# Patient Record
Sex: FEMALE | Race: WHITE | NOT HISPANIC OR LATINO | Employment: OTHER | URBAN - NONMETROPOLITAN AREA
[De-identification: names, ages, dates, MRNs, and addresses within clinical notes are randomized per-mention and may not be internally consistent; named-entity substitution may affect disease eponyms.]

---

## 2017-01-12 ENCOUNTER — OFFICE VISIT (OUTPATIENT)
Dept: GASTROENTEROLOGY | Facility: CLINIC | Age: 82
End: 2017-01-12

## 2017-01-12 VITALS
RESPIRATION RATE: 16 BRPM | BODY MASS INDEX: 28.32 KG/M2 | HEIGHT: 69 IN | WEIGHT: 191.2 LBS | HEART RATE: 72 BPM | SYSTOLIC BLOOD PRESSURE: 150 MMHG | DIASTOLIC BLOOD PRESSURE: 90 MMHG

## 2017-01-12 DIAGNOSIS — K59.1 FUNCTIONAL DIARRHEA: Primary | ICD-10-CM

## 2017-01-12 PROCEDURE — 99213 OFFICE O/P EST LOW 20 MIN: CPT | Performed by: INTERNAL MEDICINE

## 2017-01-12 RX ORDER — CLONIDINE HYDROCHLORIDE 0.2 MG/1
0.2 TABLET ORAL 2 TIMES DAILY
COMMUNITY
Start: 2016-10-24 | End: 2018-01-23

## 2017-01-12 RX ORDER — FLUCONAZOLE 100 MG/1
TABLET ORAL
COMMUNITY
Start: 2017-01-11 | End: 2017-04-11

## 2017-01-12 RX ORDER — SIMVASTATIN 20 MG
20 TABLET ORAL NIGHTLY
Status: ON HOLD | COMMUNITY
Start: 2016-10-24 | End: 2020-07-20

## 2017-01-12 RX ORDER — TRAMADOL HYDROCHLORIDE 50 MG/1
50 TABLET ORAL EVERY 4 HOURS
COMMUNITY
Start: 2016-12-22 | End: 2020-06-17

## 2017-01-12 RX ORDER — KETOCONAZOLE 20 MG/ML
SHAMPOO TOPICAL
COMMUNITY
Start: 2017-01-11 | End: 2017-10-24

## 2017-01-12 RX ORDER — POTASSIUM CHLORIDE 750 MG/1
10 CAPSULE, EXTENDED RELEASE ORAL DAILY
COMMUNITY
Start: 2016-12-22 | End: 2020-06-15

## 2017-01-12 RX ORDER — FLUTICASONE PROPIONATE 50 MCG
1 SPRAY, SUSPENSION (ML) NASAL 2 TIMES DAILY
COMMUNITY
Start: 2016-11-10 | End: 2020-12-18

## 2017-01-12 RX ORDER — HYDRALAZINE HYDROCHLORIDE 25 MG/1
10 TABLET, FILM COATED ORAL 2 TIMES DAILY
COMMUNITY
Start: 2016-12-14 | End: 2017-10-24

## 2017-01-12 RX ORDER — PAROXETINE HYDROCHLORIDE 20 MG/1
20 TABLET, FILM COATED ORAL EVERY MORNING
COMMUNITY
Start: 2016-10-24 | End: 2020-05-19

## 2017-01-12 RX ORDER — FUROSEMIDE 80 MG
80 TABLET ORAL DAILY
COMMUNITY
Start: 2016-11-20 | End: 2018-01-23

## 2017-01-12 RX ORDER — LISINOPRIL 40 MG/1
40 TABLET ORAL DAILY
COMMUNITY
Start: 2016-11-28 | End: 2020-09-22

## 2017-01-12 RX ORDER — VITAMIN E 268 MG
400 CAPSULE ORAL DAILY
COMMUNITY
End: 2020-07-21 | Stop reason: HOSPADM

## 2017-01-12 RX ORDER — ASPIRIN 81 MG/1
81 TABLET ORAL DAILY
COMMUNITY
End: 2022-01-01 | Stop reason: HOSPADM

## 2017-01-12 RX ORDER — ZOLPIDEM TARTRATE 5 MG/1
5 TABLET ORAL NIGHTLY PRN
COMMUNITY
End: 2020-02-12

## 2017-01-12 RX ORDER — HYDROCODONE BITARTRATE AND ACETAMINOPHEN 5; 325 MG/1; MG/1
1 TABLET ORAL EVERY 6 HOURS PRN
COMMUNITY
Start: 2017-01-06 | End: 2020-02-05 | Stop reason: SDUPTHER

## 2017-01-12 NOTE — MR AVS SNAPSHOT
Justina Bingham   1/12/2017 3:30 PM   Office Visit    Dept Phone:  [phone]   Encounter #:  [id number]    Provider:  Fabián Heredia MD   Department:  [facility] GASTROENTEROLOGY                Your Full Care Plan              Your Updated Medication List          This list is accurate as of: 1/12/17  3:52 PM.  Always use your most recent med list.                ALPRAZolam 0.25 MG tablet   Commonly known as:  XANAX       aspirin 81 MG EC tablet       CloNIDine 0.2 MG tablet   Commonly known as:  CATAPRES       fluconazole 100 MG tablet   Commonly known as:  DIFLUCAN       fluticasone 50 MCG/ACT nasal spray   Commonly known as:  FLONASE       furosemide 80 MG tablet   Commonly known as:  LASIX       hydrALAZINE 25 MG tablet   Commonly known as:  APRESOLINE       HYDROcodone-acetaminophen 5-325 MG per tablet   Commonly known as:  NORCO       ketoconazole 2 % shampoo   Commonly known as:  NIZORAL       lisinopril 40 MG tablet   Commonly known as:  PRINIVIL,ZESTRIL       metoprolol tartrate 25 MG tablet   Commonly known as:  LOPRESSOR       PARoxetine 20 MG tablet   Commonly known as:  PAXIL       potassium chloride 10 MEQ CR capsule   Commonly known as:  MICRO-K       simvastatin 20 MG tablet   Commonly known as:  ZOCOR       traMADol 50 MG tablet   Commonly known as:  ULTRAM       vitamin E 400 UNIT capsule       zolpidem 5 MG tablet   Commonly known as:  AMBIEN               You Were Diagnosed With        Codes Comments    Functional diarrhea    -  Primary ICD-10-CM: K59.1  ICD-9-CM: 564.5       Instructions     None    Patient Instructions History      Upcoming Appointments     Visit Type Date Time Department    OFFICE VISIT 1/12/2017  3:30 PM [facility] GASTRO [facility]    PACEMAKER CHECK 2/14/2017 10:45 AM [facility]    FOLLOW UP 4/11/2017 10:00 AM [facility]    FOLLOW UP 4/17/2017  1:45 PM [facility] GASTRO [facility]      MyChart Signup     Our records indicate that you have  "declined [facility] [employer] sign[facility]. If you would like to sign up for [employer], please email [facility] at [email or web address] or call [phone] to obtain an activation code.             Other Info from Your Visit           Your Appointments     Feb 14, 2017 10:45 AM CST   PACEMAKER CHECK with PACEMAKER HEART GRP ANU   [facility] HEART GROUP (--)    [address]  [facility] [address] [phone]            Apr 11, 2017 10:00 AM CDT   Follow Up with PAD HEART GROUP NP   [facility] HEART GROUP (--)    [address]  [facility] [address] [phone]           Arrive 15 minutes prior to appointment.            Apr 17, 2017  1:45 PM CDT   Follow Up with Fabián Heredia MD   [facility] GASTROENTEROLOGY (--)    [address]  [facility] [address] [phone]           Arrive 15 minutes prior to appointment.              Allergies     Penicillins      Sulfa Antibiotics        Reason for Visit     Diarrhea UNCONTROLLABLE      Vital Signs     Blood Pressure Pulse Respirations Height Weight Body Mass Index    150/90 (BP Location: Left arm, Patient Position: Sitting, Cuff Size: Adult) 72 16 69\" (175.3 cm) 191 lb 3.2 oz (86.7 kg) 28.24 kg/m2    Smoking Status                   Never Smoker           Problems and Diagnoses Noted     Functional diarrhea    -  Primary        "

## 2017-01-12 NOTE — PROGRESS NOTES
[facility] Gastroenterology - Office note  1/12/2017    Justina Bingham 4/4/1925     Chief Complaint   Patient presents with   • Diarrhea     UNCONTROLLABLE       HISTORY OF PRESENT ILLNESS    Justina Bingham is a 89 y.o. female who presents with complaints of diarrhea.  Actually her complaints are very much the same as they were back in 2013.  Has a lot of gas and diarrhea.  She tells me that she does not have any control at times watery area at other times it will be formed she is not seen any blood.  She has not taking her Citrucel as we discussed in the past.  She has undergone right colectomy which appears to be contributing significantly to her diarrhea     Past Medical History   Diagnosis Date   • Allergic rhinitis    • Anxiety    • Anxiety disorder    • Arthritis    • Asthma    • Asthma    • Colon polyp    • Degenerative arthritis    • Dementia    • Dementia    • Depression    • Depression    • Environmental allergies    • Hx of colonic polyps    • Hyperlipidemia    • Hypertension    • Neuropathy    • Osteoporosis    • Osteoporosis    • Pacemaker    • Peripheral neuropathy        Past Surgical History   Procedure Laterality Date   • Sinus surgery     • Cholecystectomy     • Hysterectomy     • Breast surgery       Biopsy   • Hemicolectomy       Right   • Cataract extraction     • Colonoscopy w/ polypectomy  06/23/2011     2 Adenomatous polyps, DIverticulosis, Hemorrhoids   • Endoscopy  02/29/2008     HH   • Breast surgery     • Cataract extraction     • Hemicolectomy     • Colonoscopy  06/23/2011     EXAM TO ANAST SNARE X2 RECALL PRN   • Endoscopy  02/29/2008     HIATAL HERNIA   • Pacemaker implantation           Current Outpatient Prescriptions:   •  ALPRAZolam (XANAX) 0.25 MG tablet, , Disp: , Rfl:   •  aspirin 81 MG EC tablet, Take 81 mg by mouth Daily., Disp: , Rfl:   •  CloNIDine (CATAPRES) 0.2 MG tablet, , Disp: , Rfl:   •  fluconazole (DIFLUCAN) 100 MG tablet, , Disp: , Rfl:   •  fluticasone (FLONASE)  "50 MCG/ACT nasal spray, , Disp: , Rfl:   •  furosemide (LASIX) 80 MG tablet, , Disp: , Rfl:   •  hydrALAZINE (APRESOLINE) 25 MG tablet, , Disp: , Rfl:   •  HYDROcodone-acetaminophen (NORCO) 5-325 MG per tablet, , Disp: , Rfl:   •  ketoconazole (NIZORAL) 2 % shampoo, , Disp: , Rfl:   •  lisinopril (PRINIVIL,ZESTRIL) 40 MG tablet, , Disp: , Rfl:   •  metoprolol tartrate (LOPRESSOR) 25 MG tablet, , Disp: , Rfl:   •  PARoxetine (PAXIL) 20 MG tablet, , Disp: , Rfl:   •  potassium chloride (MICRO-K) 10 MEQ CR capsule, , Disp: , Rfl:   •  simvastatin (ZOCOR) 20 MG tablet, , Disp: , Rfl:   •  traMADol (ULTRAM) 50 MG tablet, , Disp: , Rfl:   •  vitamin E 400 UNIT capsule, Take 400 Units by mouth Daily., Disp: , Rfl:   •  zolpidem (AMBIEN) 5 MG tablet, Take 5 mg by mouth At Night As Needed for sleep., Disp: , Rfl:     Allergies   Allergen Reactions   • Penicillins    • Sulfa Antibiotics        Social History     Social History   • Marital status:      Spouse name: N/A   • Number of children: N/A   • Years of education: N/A     Occupational History   • Not on file.     Social History Main Topics   • Smoking status: Never Smoker   • Smokeless tobacco: Never Used   • Alcohol use No   • Drug use: No   • Sexual activity: Not on file     Other Topics Concern   • Not on file     Social History Narrative       Family History   Problem Relation Age of Onset   • Colon cancer Neg Hx    • Colon polyps Neg Hx        Review of Systems   HENT: Negative.    Eyes: Negative.    Respiratory: Negative.    Gastrointestinal: Positive for diarrhea. Negative for abdominal pain, blood in stool and constipation.   Genitourinary: Negative.    Musculoskeletal: Negative.    Neurological: Negative.    Hematological: Negative.        Vitals:    01/12/17 1532   BP: 150/90   BP Location: Left arm   Patient Position: Sitting   Cuff Size: Adult   Pulse: 72   Resp: 16   Weight: 191 lb 3.2 oz (86.7 kg)   Height: 69\" (175.3 cm)    Body mass index is 28.24 " kg/(m^2).    Physical Exam   Constitutional: She is oriented to person, place, and time. She appears well-developed and well-nourished.   Eyes: EOM are normal. Pupils are equal, round, and reactive to light.   Abdominal: Soft. Bowel sounds are normal. She exhibits no distension. There is no tenderness.   Neurological: She is alert and oriented to person, place, and time.         ASSESSMENT AND PLAN      1. Functional diarrhea  I think is related to her right colectomy.  Nothing to sound infectious at this time.  Again her symptoms are essentially unchanged from 2013.  I recommended she try Imodium 1 or 2 daily and increase her fiber and I will see her back in 3 months    EMR Dragon/transcription disclaimer: Much of this encounter note is an electronic transcription/translation of spoken language to printed text.  The electronic translation of spoken language may permit erroneous, or at times, nonsensical words or phrases to be inadvertently transcribed.  Although I have reviewed the note for such errors, some may still exist.    Fabián Heredia MD  1/12/2017  3:46 PM

## 2017-02-14 ENCOUNTER — CLINICAL SUPPORT (OUTPATIENT)
Dept: CARDIOLOGY | Facility: CLINIC | Age: 82
End: 2017-02-14

## 2017-02-14 DIAGNOSIS — I49.5 SICK SINUS SYNDROME (HCC): ICD-10-CM

## 2017-02-14 DIAGNOSIS — Z95.0 CARDIAC PACEMAKER IN SITU: Primary | ICD-10-CM

## 2017-02-14 PROCEDURE — 93288 INTERROG EVL PM/LDLS PM IP: CPT | Performed by: INTERNAL MEDICINE

## 2017-02-17 NOTE — PROGRESS NOTES
Dual Chamber Pacemaker Evaluation Report  In office    February 17, 2017    Primary Cardiologist: Kevyn  : Guidant Model: Altrua  Implant date: March 4, 2010    Reason for evaluation:routine  Indication for pacemaker: sick sinus syndrome    Measurements  Atrial sensing - P wave: 2.8 mV  Atrial threshold: 0.6V@ 0.4ms  Atrial lead impedance: 640 ohms  Ventricular sensing - R wave: 8.9 mV  Ventricular threshold: 0.9 V @ 0.4 ms  Ventricular lead impedance:   580 ohms     Diagnostic Data  Atrial paced: 88 %  Ventricular paced: 6 %  Other: 1 episode of a-fib/flutter, 6 seconds long, max v rate 79 bpm   meds include ASA  Battery status: intensify follow up   Est <0.5 years      Final Parameters  Mode:  DDDR  Lower rate: 60 bpm   Upper rate: 130 bpm  AV Delay: paced- 200-300 ms  Yhebge-032-066 ms  Atrial - Amplitude: 2 V   Pulse width: 0.4 ms   Sensitivity: 0.75 mV     Ventricular - Amplitude: 1.4 V  Pulse width: 0.4 ms  Sensitivity: 2.5 mV    Changes made: none  Conclusions: normal pacemaker function and stable pacing and sensing thresholds    Follow up: 2 months

## 2017-04-10 PROBLEM — I49.5 SSS (SICK SINUS SYNDROME) (HCC): Status: ACTIVE | Noted: 2017-04-10

## 2017-04-11 ENCOUNTER — OFFICE VISIT (OUTPATIENT)
Dept: CARDIOLOGY | Facility: CLINIC | Age: 82
End: 2017-04-11

## 2017-04-11 ENCOUNTER — CLINICAL SUPPORT (OUTPATIENT)
Dept: CARDIOLOGY | Facility: CLINIC | Age: 82
End: 2017-04-11

## 2017-04-11 VITALS
BODY MASS INDEX: 28.79 KG/M2 | SYSTOLIC BLOOD PRESSURE: 140 MMHG | HEART RATE: 60 BPM | WEIGHT: 194.4 LBS | HEIGHT: 69 IN | DIASTOLIC BLOOD PRESSURE: 96 MMHG
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
## 2017-04-11 DIAGNOSIS — E78.5 HYPERLIPIDEMIA, UNSPECIFIED HYPERLIPIDEMIA TYPE: ICD-10-CM

## 2017-04-11 DIAGNOSIS — I10 ESSENTIAL HYPERTENSION: ICD-10-CM

## 2017-04-11 DIAGNOSIS — I49.5 SSS (SICK SINUS SYNDROME) (HCC): ICD-10-CM

## 2017-04-11 DIAGNOSIS — I49.5 SSS (SICK SINUS SYNDROME) (HCC): Primary | ICD-10-CM

## 2017-04-11 DIAGNOSIS — Z95.0 CARDIAC PACEMAKER IN SITU: Primary | ICD-10-CM

## 2017-04-11 PROCEDURE — 93000 ELECTROCARDIOGRAM COMPLETE: CPT | Performed by: NURSE PRACTITIONER

## 2017-04-11 PROCEDURE — 99213 OFFICE O/P EST LOW 20 MIN: CPT | Performed by: NURSE PRACTITIONER

## 2017-04-11 PROCEDURE — 93288 INTERROG EVL PM/LDLS PM IP: CPT | Performed by: INTERNAL MEDICINE

## 2017-04-11 RX ORDER — CETIRIZINE HYDROCHLORIDE 10 MG/1
10 TABLET ORAL DAILY
COMMUNITY
End: 2018-01-23

## 2017-04-11 NOTE — PROGRESS NOTES
Subjective: check.     Encounter Date:04/11/2017      Patient ID: Justina Bingham is a 89 y.o. female here for routine follow up and pacemaker check.    Chief Complaint: Routine yearly follow up    The patient presents today for yearly follow up. She is seen for a history of SSS s/p pacer implant, htn, and hyperlipidemia. Her daughter is present wither her in office today. She was checked today in pacer clinic prior to office visit and is nearing CARO. She will follow up in pacer clinic again in 1-2 months for recheck, pacer was functioning fine. Patient has been doing well since last visit until recently she has had some recent falls. She has a history of falls in the past, and follow regularly with Dr. Pfeiffer's office. There are have some medication changes related to her Ambien and Xanax, recommended that she follow up with her primary care this week regarding falls.  Pacemaker is working satisfactorily. Will continue close monitoring through pacer clinic and schedule generator change when appropriate.     Hypertension   This is a chronic problem. The current episode started more than 1 year ago. The problem is unchanged. The problem is controlled. Pertinent negatives include no anxiety, blurred vision, chest pain, headaches, malaise/fatigue, neck pain, orthopnea, palpitations, peripheral edema, PND, shortness of breath or sweats. Past treatments include ACE inhibitors.   Hyperlipidemia   This is a chronic problem. The current episode started more than 1 year ago. Condition status: managed through primary care. Pertinent negatives include no chest pain, focal sensory loss, focal weakness, leg pain, myalgias or shortness of breath. Current antihyperlipidemic treatment includes statins. Risk factors for coronary artery disease include dyslipidemia, family history and hypertension.       The following portions of the patient's history were reviewed and updated as appropriate: allergies, current medications, past  family history, past medical history, past social history, past surgical history and problem list.     Allergies   Allergen Reactions   • Ceclor [Cefaclor]    • Penicillins    • Sulfa Antibiotics        Current Outpatient Prescriptions:   •  ALPRAZolam (XANAX) 0.25 MG tablet, , Disp: , Rfl:   •  aspirin 81 MG EC tablet, Take 81 mg by mouth Daily., Disp: , Rfl:   •  cetirizine (zyrTEC) 10 MG tablet, Take 10 mg by mouth Daily., Disp: , Rfl:   •  CloNIDine (CATAPRES) 0.2 MG tablet, Take 0.2 mg by mouth 2 (Two) Times a Day. 1/2 tablet in AM 1 tablet in PM, Disp: , Rfl:   •  fluticasone (FLONASE) 50 MCG/ACT nasal spray, , Disp: , Rfl:   •  furosemide (LASIX) 80 MG tablet, 80 mg Daily., Disp: , Rfl:   •  hydrALAZINE (APRESOLINE) 25 MG tablet, 10 mg 2 (Two) Times a Day., Disp: , Rfl:   •  HYDROcodone-acetaminophen (NORCO) 5-325 MG per tablet, 1 tablet Every 8 (Eight) Hours As Needed., Disp: , Rfl:   •  ketoconazole (NIZORAL) 2 % shampoo, , Disp: , Rfl:   •  lisinopril (PRINIVIL,ZESTRIL) 40 MG tablet, , Disp: , Rfl:   •  Multiple Vitamins-Minerals (MULTIVITAMIN ADULT PO), Take  by mouth Daily., Disp: , Rfl:   •  PARoxetine (PAXIL) 20 MG tablet, , Disp: , Rfl:   •  potassium chloride (MICRO-K) 10 MEQ CR capsule, , Disp: , Rfl:   •  simvastatin (ZOCOR) 20 MG tablet, , Disp: , Rfl:   •  traMADol (ULTRAM) 50 MG tablet, 50 mg Every 8 (Eight) Hours As Needed., Disp: , Rfl:   •  vitamin E 400 UNIT capsule, Take 400 Units by mouth Daily., Disp: , Rfl:   •  zolpidem (AMBIEN) 5 MG tablet, Take 5 mg by mouth At Night As Needed for Sleep. 1 1/2 nightly, Disp: , Rfl:     Past Medical History:   Diagnosis Date   • Allergic rhinitis    • Anxiety    • Anxiety disorder    • Arthritis    • Asthma    • Asthma    • Colon polyp    • Degenerative arthritis    • Dementia    • Dementia    • Depression    • Depression    • Diabetes mellitus    • Environmental allergies    • Hx of colonic polyps    • Hyperlipidemia    • Hypertension    • Neuropathy     • Osteoporosis    • Osteoporosis    • Pacemaker    • Peripheral neuropathy      Family History   Problem Relation Age of Onset   • Heart attack Mother    • No Known Problems Father    • Colon cancer Neg Hx    • Colon polyps Neg Hx      Social History     Social History   • Marital status:      Spouse name: N/A   • Number of children: N/A   • Years of education: N/A     Occupational History   • Not on file.     Social History Main Topics   • Smoking status: Never Smoker   • Smokeless tobacco: Never Used   • Alcohol use No   • Drug use: Yes     Special: Hydrocodone   • Sexual activity: Defer     Other Topics Concern   • Not on file     Social History Narrative     Past Surgical History:   Procedure Laterality Date   • BREAST SURGERY      Biopsy   • BREAST SURGERY     • CATARACT EXTRACTION     • CATARACT EXTRACTION     • CHOLECYSTECTOMY     • COLONOSCOPY  06/23/2011    EXAM TO ANAST SNARE X2 RECALL PRN   • COLONOSCOPY W/ POLYPECTOMY  06/23/2011    2 Adenomatous polyps, DIverticulosis, Hemorrhoids   • ENDOSCOPY  02/29/2008    HH   • ENDOSCOPY  02/29/2008    HIATAL HERNIA   • HEMICOLECTOMY      Right   • HEMICOLECTOMY     • HYSTERECTOMY     • INSERT / REPLACE / REMOVE PACEMAKER     • PACEMAKER IMPLANTATION     • SINUS SURGERY         Review of Systems   Constitution: Positive for weakness. Negative for chills, fever and malaise/fatigue.   HENT: Positive for hearing loss. Negative for congestion, ear discharge, headaches, hoarse voice, nosebleeds and sore throat.    Eyes: Negative for blurred vision, discharge, redness and visual disturbance.   Cardiovascular: Negative for chest pain, claudication, dyspnea on exertion, irregular heartbeat, leg swelling, near-syncope, orthopnea, palpitations, paroxysmal nocturnal dyspnea and syncope.   Respiratory: Negative for cough, hemoptysis, shortness of breath, sleep disturbances due to breathing, sputum production and wheezing.    Endocrine: Negative.     Hematologic/Lymphatic: Negative for bleeding problem. Does not bruise/bleed easily.   Skin: Positive for poor wound healing (right lower leg, erythema and abrasion realted to recent fall). Negative for color change, dry skin, flushing, itching and rash.   Musculoskeletal: Negative for arthritis, back pain, falls, joint pain, joint swelling, muscle cramps, muscle weakness, myalgias and neck pain.        Uses walker to walk   Gastrointestinal: Negative for bloating, abdominal pain, change in bowel habit, heartburn, hematemesis, hematochezia, melena, nausea and vomiting.   Genitourinary: Negative for bladder incontinence and hematuria.   Neurological: Negative for difficulty with concentration, disturbances in coordination, excessive daytime sleepiness, dizziness, focal weakness, light-headedness, loss of balance, numbness, paresthesias, tremors and vertigo.   Psychiatric/Behavioral: Negative for depression, hallucinations and memory loss. The patient does not have insomnia and is not nervous/anxious.    Allergic/Immunologic: Negative for environmental allergies.       ECG 12 Lead  Date/Time: 4/11/2017 1:11 PM  Performed by: GUERO GARNICA  Authorized by: GUERO GARNICA   Comparison: compared with previous ECG from 4/15/2016  Similar to previous ECG  Rhythm: paced  Rate: normal  BPM: 60  Conduction: bifascicular block  ST Segments: ST segments normal  T Waves: T waves normal  Other: no other findings            Vitals:    04/11/17 1017   BP: 140/96   Pulse: 60            Objective:     Physical Exam   Constitutional: She is oriented to person, place, and time. Vital signs are normal. She appears well-developed and well-nourished. She is cooperative. No distress.   HENT:   Head: Normocephalic and atraumatic.   Mouth/Throat: No oropharyngeal exudate.   Eyes: Conjunctivae are normal. Right eye exhibits no discharge. Left eye exhibits no discharge.   Neck: Normal range of motion. Neck supple. Carotid bruit is not  present.   Cardiovascular: Normal rate, regular rhythm, normal heart sounds and intact distal pulses.  Exam reveals no gallop and no friction rub.    No murmur heard.  Pulses:       Posterior tibial pulses are 2+ on the right side, and 2+ on the left side.   Pulmonary/Chest: Effort normal and breath sounds normal. No respiratory distress. She has no wheezes. She has no rhonchi. She has no rales. She exhibits no tenderness.   Abdominal: Soft. She exhibits no distension. There is no tenderness.   Musculoskeletal: Normal range of motion. She exhibits no edema, tenderness or deformity.   Neurological: She is alert and oriented to person, place, and time.   Skin: Skin is warm and dry. Abrasion noted. No rash noted. She is not diaphoretic. There is erythema. No pallor.        Recent fall, patient hit front of lower right extremity on her walker. Abrasion and redness, recommended follow up with PCP   Psychiatric: She has a normal mood and affect. Her speech is normal and behavior is normal. Judgment and thought content normal. Her mood appears not anxious. Her affect is not angry. She does not exhibit a depressed mood.   Vitals reviewed.      Lab Review:       Assessment:          Diagnosis Plan   1. SSS (sick sinus syndrome)     2. Essential hypertension     3. Hyperlipidemia, unspecified hyperlipidemia type     4. Fall, initial encounter            Plan:       1. Continue close follow up in pacer clinic, patient nearing CARO. Satisfactorily functioning.    2. Managed per primary care.    3. Managed per primary care.    4. Follow up with primary care doctor this week due to complaints of recent falls, abrasion from fall on the right lower extremity and questions concerning some medication changes.    Follow up with us for routine office visit in 1 year, pacer clinic 1-2months.

## 2017-04-13 NOTE — PROGRESS NOTES
Dual Chamber Pacemaker Evaluation Report  In office    April 13, 2017    Primary Cardiologist: Kevyn  : Guidant Model: [employer]a  Implant date: March 4, 2010    Reason for evaluation:watching battery  Indication for pacemaker: sick sinus syndrome    Measurements  Atrial sensing - P wave: 2.9 mV  Atrial threshold: 0.6V@ 0.4ms  Atrial lead impedance: 580 ohms  Ventricular sensing - R wave: 10.3 mV  Ventricular threshold: 0.9 V @ 0.4 ms  Ventricular lead impedance:   590 ohms     Diagnostic Data  Atrial paced: 94 %  Ventricular paced: 7 %  Other: no new events  Battery status: intensify follow up   Est <0.5 years      Final Parameters  Mode:  DDDR  Lower rate: 60 bpm   Upper rate: 130 bpm  AV Delay: paced- 200-300 ms  Fheuxp-761-603 ms  Atrial - Amplitude: 2 V   Pulse width: 0.4 ms   Sensitivity: 0.75 mV     Ventricular - Amplitude: 1.4 V  Pulse width: 0.4 ms  Sensitivity: 2.5 mV    Changes made: none  Conclusions: normal pacemaker function and stable pacing and sensing thresholds    Follow up: 2 mo    I have reviewed the above and agree with the assessment

## 2017-06-20 ENCOUNTER — CLINICAL SUPPORT (OUTPATIENT)
Dept: CARDIOLOGY | Facility: CLINIC | Age: 82
End: 2017-06-20

## 2017-06-20 DIAGNOSIS — Z95.0 CARDIAC PACEMAKER IN SITU: Primary | ICD-10-CM

## 2017-06-20 DIAGNOSIS — I49.5 SSS (SICK SINUS SYNDROME) (HCC): ICD-10-CM

## 2017-06-20 PROCEDURE — 93280 PM DEVICE PROGR EVAL DUAL: CPT | Performed by: INTERNAL MEDICINE

## 2017-06-20 NOTE — PROGRESS NOTES
Dual Chamber Pacemaker Evaluation Report  In office    June 20, 2017    Primary Cardiologist: Kevyn  : Guidant Model: [employer]rua  Implant date: March 4, 2010    Reason for evaluation: battery check  Indication for pacemaker: sick sinus syndrome    Measurements  Atrial sensing - P wave: 2.7 mV  Atrial threshold: 0.5V@ 0.4ms  Atrial lead impedance: 540 ohms  Ventricular sensing - R wave: 11.3 mV  Ventricular threshold: 0.9 V @ 0.4 ms  Ventricular lead impedance:   600 ohms     Diagnostic Data  Atrial paced: 97 %  Ventricular paced: 8 %  Other: no new events  Battery status: intensify follow up   Est <0.5 years      Final Parameters  Mode:  DDDR  Lower rate: 60 bpm   Upper rate: 130 bpm  AV Delay: paced- 200-300 ms  Sensed- 200-300 ms  Atrial - Amplitude: 1.5 V   Pulse width: 0.4 ms   Sensitivity: 0.75 mV     Ventricular - Amplitude: 1.4 V  Pulse width: 0.4 ms  Sensitivity: 2.5 mV    Changes made: changed atrial output to 1.5V  Conclusions: normal pacemaker function and stable pacing and sensing thresholds    Follow up: 2 months

## 2017-07-11 ENCOUNTER — OFFICE VISIT (OUTPATIENT)
Dept: GASTROENTEROLOGY | Facility: CLINIC | Age: 82
End: 2017-07-11

## 2017-07-11 VITALS
HEIGHT: 69 IN | DIASTOLIC BLOOD PRESSURE: 80 MMHG | SYSTOLIC BLOOD PRESSURE: 122 MMHG | HEART RATE: 60 BPM | BODY MASS INDEX: 28.88 KG/M2 | WEIGHT: 195 LBS | OXYGEN SATURATION: 99 %

## 2017-07-11 DIAGNOSIS — K59.1 FUNCTIONAL DIARRHEA: Primary | ICD-10-CM

## 2017-07-11 PROCEDURE — 99212 OFFICE O/P EST SF 10 MIN: CPT | Performed by: INTERNAL MEDICINE

## 2017-07-11 NOTE — PROGRESS NOTES
[facility] Gastroenterology - Office note  7/11/2017    Justina Bingham 4/4/1925     Chief Complaint   Patient presents with   • GI Problem     Here to discuss her Diarrhea       HISTORY OF PRESENT ILLNESS    Justina Bingham is a 89 y.o. female who presentsFor follow-up.  Diarrhea is about the same.  Never did start taking the fiber on a regular basis     Past Medical History:   Diagnosis Date   • Allergic rhinitis    • Anxiety    • Anxiety disorder    • Arthritis    • Asthma    • Asthma    • Colon polyp    • Degenerative arthritis    • Dementia    • Dementia    • Depression    • Depression    • Diabetes mellitus    • Environmental allergies    • Hx of colonic polyps    • Hyperlipidemia    • Hypertension    • Neuropathy    • Osteoporosis    • Osteoporosis    • Pacemaker    • Peripheral neuropathy        Past Surgical History:   Procedure Laterality Date   • BREAST SURGERY      Biopsy   • BREAST SURGERY     • CATARACT EXTRACTION     • CATARACT EXTRACTION     • CHOLECYSTECTOMY     • COLONOSCOPY  06/23/2011    EXAM TO ANAST SNARE X2 RECALL PRN   • COLONOSCOPY W/ POLYPECTOMY  06/23/2011    2 Adenomatous polyps, DIverticulosis, Hemorrhoids   • ENDOSCOPY  02/29/2008    HH   • ENDOSCOPY  02/29/2008    HIATAL HERNIA   • HEMICOLECTOMY      Right   • HEMICOLECTOMY     • HYSTERECTOMY     • INSERT / REPLACE / REMOVE PACEMAKER     • PACEMAKER IMPLANTATION     • SINUS SURGERY           Current Outpatient Prescriptions:   •  ALPRAZolam (XANAX) 0.25 MG tablet, , Disp: , Rfl:   •  aspirin 81 MG EC tablet, Take 81 mg by mouth Daily., Disp: , Rfl:   •  cetirizine (zyrTEC) 10 MG tablet, Take 10 mg by mouth Daily., Disp: , Rfl:   •  CloNIDine (CATAPRES) 0.2 MG tablet, Take 0.2 mg by mouth 2 (Two) Times a Day. 1/2 tablet in AM 1 tablet in PM, Disp: , Rfl:   •  fluticasone (FLONASE) 50 MCG/ACT nasal spray, , Disp: , Rfl:   •  furosemide (LASIX) 80 MG tablet, 80 mg Daily., Disp: , Rfl:   •  hydrALAZINE (APRESOLINE) 25 MG tablet, 10 mg 2  "(Two) Times a Day., Disp: , Rfl:   •  HYDROcodone-acetaminophen (NORCO) 5-325 MG per tablet, 1 tablet Every 8 (Eight) Hours As Needed., Disp: , Rfl:   •  ketoconazole (NIZORAL) 2 % shampoo, , Disp: , Rfl:   •  lisinopril (PRINIVIL,ZESTRIL) 40 MG tablet, , Disp: , Rfl:   •  Multiple Vitamins-Minerals (MULTIVITAMIN ADULT PO), Take  by mouth Daily., Disp: , Rfl:   •  PARoxetine (PAXIL) 20 MG tablet, , Disp: , Rfl:   •  potassium chloride (MICRO-K) 10 MEQ CR capsule, , Disp: , Rfl:   •  simvastatin (ZOCOR) 20 MG tablet, , Disp: , Rfl:   •  traMADol (ULTRAM) 50 MG tablet, 50 mg Every 8 (Eight) Hours As Needed., Disp: , Rfl:   •  vitamin E 400 UNIT capsule, Take 400 Units by mouth Daily., Disp: , Rfl:   •  zolpidem (AMBIEN) 5 MG tablet, Take 5 mg by mouth At Night As Needed for Sleep. 1 1/2 nightly, Disp: , Rfl:     Allergies   Allergen Reactions   • Ceclor [Cefaclor]    • Penicillins    • Sulfa Antibiotics        Social History     Social History   • Marital status:      Spouse name: N/A   • Number of children: N/A   • Years of education: N/A     Occupational History   • Not on file.     Social History Main Topics   • Smoking status: Never Smoker   • Smokeless tobacco: Never Used   • Alcohol use No   • Drug use: Yes     Special: Hydrocodone   • Sexual activity: Defer     Other Topics Concern   • Not on file     Social History Narrative       Family History   Problem Relation Age of Onset   • Heart attack Mother    • No Known Problems Father    • Colon cancer Neg Hx    • Colon polyps Neg Hx        Review of Systems   Gastrointestinal: Positive for diarrhea. Negative for abdominal distention, abdominal pain and constipation.       Vitals:    07/11/17 1445   BP: 122/80   Pulse: 60   SpO2: 99%   Weight: 195 lb (88.5 kg)   Height: 69\" (175.3 cm)    Body mass index is 28.8 kg/(m^2).    Physical Exam   Constitutional: She is oriented to person, place, and time. She appears well-developed and well-nourished.   Eyes: " Pupils are equal, round, and reactive to light.   Pulmonary/Chest: Effort normal.   Abdominal: Soft.   Neurological: She is alert and oriented to person, place, and time.         ASSESSMENT AND PLAN      1. Functional diarrhea  Or related to her right colectomy.  Again I suggested fiber.  I do not plan any further evaluation at this time.  See me back as needed    EMR Dragon/transcription disclaimer: Much of this encounter note is an electronic transcription/translation of spoken language to printed text.  The electronic translation of spoken language may permit erroneous, or at times, nonsensical words or phrases to be inadvertently transcribed.  Although I have reviewed the note for such errors, some may still exist.    Fabián Heredia MD  7/11/2017  3:18 PM

## 2017-08-29 ENCOUNTER — CLINICAL SUPPORT (OUTPATIENT)
Dept: CARDIOLOGY | Facility: CLINIC | Age: 82
End: 2017-08-29

## 2017-08-29 ENCOUNTER — PREP FOR SURGERY (OUTPATIENT)
Dept: OTHER | Facility: HOSPITAL | Age: 82
End: 2017-08-29

## 2017-08-29 DIAGNOSIS — Z45.010 PACEMAKER AT END OF BATTERY LIFE: Primary | ICD-10-CM

## 2017-08-29 DIAGNOSIS — I49.5 SSS (SICK SINUS SYNDROME) (HCC): Primary | ICD-10-CM

## 2017-08-29 PROCEDURE — 93288 INTERROG EVL PM/LDLS PM IP: CPT | Performed by: INTERNAL MEDICINE

## 2017-08-29 RX ORDER — VANCOMYCIN HYDROCHLORIDE 1 G/200ML
1000 INJECTION, SOLUTION INTRAVENOUS
Status: CANCELLED | OUTPATIENT
Start: 2017-08-29

## 2017-08-29 RX ORDER — SODIUM CHLORIDE 0.9 % (FLUSH) 0.9 %
1-10 SYRINGE (ML) INJECTION AS NEEDED
Status: CANCELLED | OUTPATIENT
Start: 2017-08-29

## 2017-08-29 RX ORDER — ONDANSETRON 2 MG/ML
4 INJECTION INTRAMUSCULAR; INTRAVENOUS EVERY 6 HOURS PRN
Status: CANCELLED | OUTPATIENT
Start: 2017-08-29

## 2017-08-29 NOTE — PROGRESS NOTES
Dual Chamber Pacemaker Evaluation Report  In office     August 29, 2017    Primary Cardiologist: Kevyn  : Guidant Model: [email or web address]  Implant date: March 04,2010     Reason for evaluation: battery check  Indication for pacemaker: sick sinus syndrome    Measurements  Atrial sensing - P wave: N/R mV  Atrial threshold: N/RV@ N/Sonal  Atrial lead impedance: N/R ohms  Ventricular sensing - R wave: N/R mV  Ventricular threshold: N/R V @ N/R ms  Ventricular lead impedance:   N/R ohms     Diagnostic Data  Atrial paced: 94 %  Ventricular paced: 7 %  Other: N/A  Battery status: elective replacement time   Triggered on Aug 21,2017      Final Parameters  Mode:  DDD  Lower rate: 60 bpm   Upper rate: 130 bpm  AV Delay: paced- 200-300 ms  [id number]-[id number] ms  Atrial - Amplitude: 1.5 V   Pulse width: 0.4 ms   Sensitivity: 0.75 mV     Ventricular - Amplitude: 3.5 V  Pulse width: 0.4 ms  Sensitivity: 2.5 mV    Changes made: None  Conclusions: battery at elective replacement voltage    Follow up: Generator Change.    Interrogation done by company device rep.

## 2017-09-01 PROBLEM — Z45.010 PACEMAKER AT END OF BATTERY LIFE: Status: ACTIVE | Noted: 2017-09-01

## 2017-09-11 ENCOUNTER — HOSPITAL ENCOUNTER (OUTPATIENT)
Facility: HOSPITAL | Age: 82
Discharge: HOME | End: 2017-09-11
Attending: INTERNAL MEDICINE | Admitting: INTERNAL MEDICINE

## 2017-09-11 VITALS
DIASTOLIC BLOOD PRESSURE: 74 MMHG | HEART RATE: 60 BPM | WEIGHT: 191 LBS | HEIGHT: 69 IN | BODY MASS INDEX: 28.29 KG/M2 | OXYGEN SATURATION: 97 % | TEMPERATURE: 97.6 F | RESPIRATION RATE: 16 BRPM | SYSTOLIC BLOOD PRESSURE: 177 MMHG

## 2017-09-11 DIAGNOSIS — Z45.010 PACEMAKER AT END OF BATTERY LIFE: ICD-10-CM

## 2017-09-11 LAB
ANION GAP SERPL CALCULATED.3IONS-SCNC: 11 MMOL/L (ref 4–13)
BUN BLD-MCNC: 25 MG/DL (ref 5–21)
BUN/CREAT SERPL: 27.8 (ref 7–25)
CALCIUM SPEC-SCNC: 10.4 MG/DL (ref 8.4–10.4)
CHLORIDE SERPL-SCNC: 103 MMOL/L (ref 98–110)
CO2 SERPL-SCNC: 26 MMOL/L (ref 24–31)
CREAT BLD-MCNC: 0.9 MG/DL (ref 0.5–1.4)
DEPRECATED RDW RBC AUTO: 47.8 FL (ref 40–54)
ERYTHROCYTE [DISTWIDTH] IN BLOOD BY AUTOMATED COUNT: 14 % (ref 12–15)
GFR SERPL CREATININE-BSD FRML MDRD: 59 ML/MIN/1.73
GLUCOSE BLD-MCNC: 110 MG/DL (ref 70–100)
HCT VFR BLD AUTO: 46.4 % (ref 37–47)
HGB BLD-MCNC: 15.6 G/DL (ref 12–16)
MCH RBC QN AUTO: 31.6 PG (ref 28–32)
MCHC RBC AUTO-ENTMCNC: 33.6 G/DL (ref 33–36)
MCV RBC AUTO: 93.9 FL (ref 82–98)
PLATELET # BLD AUTO: 190 10*3/MM3 (ref 130–400)
PMV BLD AUTO: 11 FL (ref 6–12)
POTASSIUM BLD-SCNC: 3.9 MMOL/L (ref 3.5–5.3)
RBC # BLD AUTO: 4.94 10*6/MM3 (ref 4.2–5.4)
SODIUM BLD-SCNC: 140 MMOL/L (ref 135–145)
WBC NRBC COR # BLD: 10.45 10*3/MM3 (ref 4.8–10.8)

## 2017-09-11 PROCEDURE — C1785 PMKR, DUAL, RATE-RESP: HCPCS | Performed by: INTERNAL MEDICINE

## 2017-09-11 PROCEDURE — 99152 MOD SED SAME PHYS/QHP 5/>YRS: CPT | Performed by: INTERNAL MEDICINE

## 2017-09-11 PROCEDURE — 93005 ELECTROCARDIOGRAM TRACING: CPT | Performed by: INTERNAL MEDICINE

## 2017-09-11 PROCEDURE — 33228 REMV&REPLC PM GEN DUAL LEAD: CPT | Performed by: INTERNAL MEDICINE

## 2017-09-11 PROCEDURE — 85027 COMPLETE CBC AUTOMATED: CPT | Performed by: INTERNAL MEDICINE

## 2017-09-11 PROCEDURE — 25010000002 FENTANYL CITRATE (PF) 100 MCG/2ML SOLUTION: Performed by: INTERNAL MEDICINE

## 2017-09-11 PROCEDURE — 80048 BASIC METABOLIC PNL TOTAL CA: CPT | Performed by: INTERNAL MEDICINE

## 2017-09-11 PROCEDURE — 36415 COLL VENOUS BLD VENIPUNCTURE: CPT | Performed by: INTERNAL MEDICINE

## 2017-09-11 PROCEDURE — 93010 ELECTROCARDIOGRAM REPORT: CPT | Performed by: INTERNAL MEDICINE

## 2017-09-11 PROCEDURE — 25010000002 MIDAZOLAM PER 1 MG: Performed by: INTERNAL MEDICINE

## 2017-09-11 DEVICE — PACEMAKER
Type: IMPLANTABLE DEVICE | Status: FUNCTIONAL
Brand: ESSENTIO™ DR

## 2017-09-11 RX ORDER — ACETAMINOPHEN 325 MG/1
650 TABLET ORAL EVERY 4 HOURS PRN
Status: DISCONTINUED | OUTPATIENT
Start: 2017-09-11 | End: 2017-09-11 | Stop reason: HOSPADM

## 2017-09-11 RX ORDER — METOPROLOL TARTRATE 5 MG/5ML
INJECTION INTRAVENOUS AS NEEDED
Status: DISCONTINUED | OUTPATIENT
Start: 2017-09-11 | End: 2017-09-11 | Stop reason: HOSPADM

## 2017-09-11 RX ORDER — FENTANYL CITRATE 50 UG/ML
INJECTION, SOLUTION INTRAMUSCULAR; INTRAVENOUS AS NEEDED
Status: DISCONTINUED | OUTPATIENT
Start: 2017-09-11 | End: 2017-09-11 | Stop reason: HOSPADM

## 2017-09-11 RX ORDER — LEVOFLOXACIN 500 MG/1
500 TABLET, FILM COATED ORAL DAILY
Qty: 2 TABLET | Refills: 0 | Status: SHIPPED | OUTPATIENT
Start: 2017-09-11 | End: 2017-09-13

## 2017-09-11 RX ORDER — HYDROCODONE BITARTRATE AND ACETAMINOPHEN 5; 325 MG/1; MG/1
2 TABLET ORAL EVERY 4 HOURS PRN
Status: DISCONTINUED | OUTPATIENT
Start: 2017-09-11 | End: 2017-09-11 | Stop reason: HOSPADM

## 2017-09-11 RX ORDER — ONDANSETRON 2 MG/ML
4 INJECTION INTRAMUSCULAR; INTRAVENOUS EVERY 6 HOURS PRN
Status: DISCONTINUED | OUTPATIENT
Start: 2017-09-11 | End: 2017-09-11 | Stop reason: HOSPADM

## 2017-09-11 RX ORDER — ACETAMINOPHEN 325 MG/1
650 TABLET ORAL EVERY 4 HOURS PRN
Status: CANCELLED | OUTPATIENT
Start: 2017-09-11

## 2017-09-11 RX ORDER — SODIUM CHLORIDE 9 MG/ML
250 INJECTION, SOLUTION INTRAVENOUS CONTINUOUS
Status: DISPENSED | OUTPATIENT
Start: 2017-09-11 | End: 2017-09-11

## 2017-09-11 RX ORDER — LIDOCAINE HYDROCHLORIDE 20 MG/ML
INJECTION, SOLUTION INFILTRATION; PERINEURAL AS NEEDED
Status: DISCONTINUED | OUTPATIENT
Start: 2017-09-11 | End: 2017-09-11 | Stop reason: HOSPADM

## 2017-09-11 RX ORDER — ONDANSETRON 2 MG/ML
4 INJECTION INTRAMUSCULAR; INTRAVENOUS EVERY 6 HOURS PRN
Status: CANCELLED | OUTPATIENT
Start: 2017-09-11

## 2017-09-11 RX ORDER — MIDAZOLAM HYDROCHLORIDE 1 MG/ML
INJECTION INTRAMUSCULAR; INTRAVENOUS AS NEEDED
Status: DISCONTINUED | OUTPATIENT
Start: 2017-09-11 | End: 2017-09-11 | Stop reason: HOSPADM

## 2017-09-11 RX ORDER — NEOMYCIN AND POLYMYXIN B SULFATES 40; 200000 MG/ML; [USP'U]/ML
SOLUTION IRRIGATION AS NEEDED
Status: DISCONTINUED | OUTPATIENT
Start: 2017-09-11 | End: 2017-09-11 | Stop reason: HOSPADM

## 2017-09-11 RX ORDER — SODIUM CHLORIDE 0.9 % (FLUSH) 0.9 %
1-10 SYRINGE (ML) INJECTION AS NEEDED
Status: DISCONTINUED | OUTPATIENT
Start: 2017-09-11 | End: 2017-09-11 | Stop reason: HOSPADM

## 2017-09-11 RX ORDER — ZOLPIDEM TARTRATE 5 MG/1
5 TABLET ORAL NIGHTLY PRN
Status: DISCONTINUED | OUTPATIENT
Start: 2017-09-11 | End: 2017-09-11 | Stop reason: HOSPADM

## 2017-09-11 RX ADMIN — VANCOMYCIN HYDROCHLORIDE 1000 MG: 1 INJECTION, POWDER, LYOPHILIZED, FOR SOLUTION INTRAVENOUS at 13:21

## 2017-09-11 NOTE — H&P (VIEW-ONLY)
Dual Chamber Pacemaker Evaluation Report  In office     August 29, 2017    Primary Cardiologist: Kevyn  : Guidant Model: [email or web address]  Implant date: March 04,2010     Reason for evaluation: battery check  Indication for pacemaker: sick sinus syndrome    Measurements  Atrial sensing - P wave: N/R mV  Atrial threshold: N/RV@ N/Sonal  Atrial lead impedance: N/R ohms  Ventricular sensing - R wave: N/R mV  Ventricular threshold: N/R V @ N/R ms  Ventricular lead impedance:   N/R ohms     Diagnostic Data  Atrial paced: 94 %  Ventricular paced: 7 %  Other: N/A  Battery status: elective replacement time   Triggered on Aug 21,2017      Final Parameters  Mode:  DDD  Lower rate: 60 bpm   Upper rate: 130 bpm  AV Delay: paced- 200-300 ms  Gdlshp-928-085 ms  Atrial - Amplitude: 1.5 V   Pulse width: 0.4 ms   Sensitivity: 0.75 mV     Ventricular - Amplitude: 3.5 V  Pulse width: 0.4 ms  Sensitivity: 2.5 mV    Changes made: None  Conclusions: battery at elective replacement voltage    Follow up: Generator Change.    Interrogation done by company device rep.

## 2017-10-24 ENCOUNTER — APPOINTMENT (OUTPATIENT)
Dept: GENERAL RADIOLOGY | Facility: HOSPITAL | Age: 82
End: 2017-10-24

## 2017-10-24 ENCOUNTER — HOSPITAL ENCOUNTER (INPATIENT)
Facility: HOSPITAL | Age: 82
LOS: 6 days | Discharge: NURSING, REHAB OR LONG-TERM CARE | End: 2017-10-30
Attending: EMERGENCY MEDICINE | Admitting: INTERNAL MEDICINE

## 2017-10-24 DIAGNOSIS — Z78.9 IMPAIRED MOBILITY AND ADLS: ICD-10-CM

## 2017-10-24 DIAGNOSIS — R09.02 HYPOXEMIA: ICD-10-CM

## 2017-10-24 DIAGNOSIS — R26.89 IMPAIRED GAIT AND MOBILITY: ICD-10-CM

## 2017-10-24 DIAGNOSIS — Z74.09 IMPAIRED MOBILITY AND ADLS: ICD-10-CM

## 2017-10-24 DIAGNOSIS — D72.829 LEUKOCYTOSIS, UNSPECIFIED TYPE: ICD-10-CM

## 2017-10-24 DIAGNOSIS — S72.002A CLOSED FRACTURE OF LEFT HIP, INITIAL ENCOUNTER (HCC): Primary | ICD-10-CM
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
## 2017-10-24 LAB
ABO GROUP BLD: NORMAL
ALBUMIN SERPL-MCNC: 4.6 G/DL (ref 3.5–5)
ALBUMIN/GLOB SERPL: 1.5 G/DL (ref 1.1–2.5)
ALP SERPL-CCNC: 65 U/L (ref 24–120)
ALT SERPL W P-5'-P-CCNC: 37 U/L (ref 0–54)
ANION GAP SERPL CALCULATED.3IONS-SCNC: 18 MMOL/L (ref 4–13)
APTT PPP: 31.3 SECONDS (ref 24.1–34.8)
ARTERIAL PATENCY WRIST A: POSITIVE
AST SERPL-CCNC: 70 U/L (ref 7–45)
ATMOSPHERIC PRESS: 749 MMHG
BACTERIA UR QL AUTO: ABNORMAL /HPF
BASE EXCESS BLDA CALC-SCNC: -2.4 MMOL/L (ref 0–2)
BDY SITE: ABNORMAL
BILIRUB SERPL-MCNC: 1 MG/DL (ref 0.1–1)
BILIRUB UR QL STRIP: NEGATIVE
BLD GP AB SCN SERPL QL: NEGATIVE
BODY TEMPERATURE: 37 C
BUN BLD-MCNC: 26 MG/DL (ref 5–21)
BUN/CREAT SERPL: 21.3 (ref 7–25)
CALCIUM SPEC-SCNC: 10.4 MG/DL (ref 8.4–10.4)
CHLORIDE SERPL-SCNC: 102 MMOL/L (ref 98–110)
CK SERPL-CCNC: 1306 U/L (ref 0–203)
CLARITY UR: CLEAR
CO2 SERPL-SCNC: 23 MMOL/L (ref 24–31)
COLOR UR: YELLOW
CREAT BLD-MCNC: 1.22 MG/DL (ref 0.5–1.4)
D-LACTATE SERPL-SCNC: 4.4 MMOL/L (ref 0.5–2)
D-LACTATE SERPL-SCNC: 7.5 MMOL/L (ref 0.5–2)
DEPRECATED RDW RBC AUTO: 46.1 FL (ref 40–54)
ERYTHROCYTE [DISTWIDTH] IN BLOOD BY AUTOMATED COUNT: 14 % (ref 12–15)
GAS FLOW AIRWAY: 3.5 LPM
GFR SERPL CREATININE-BSD FRML MDRD: 41 ML/MIN/1.73
GLOBULIN UR ELPH-MCNC: 3.1 GM/DL
GLUCOSE BLD-MCNC: 235 MG/DL (ref 70–100)
GLUCOSE UR STRIP-MCNC: ABNORMAL MG/DL
HCO3 BLDA-SCNC: 21.5 MMOL/L (ref 20–26)
HCT VFR BLD AUTO: 53.4 % (ref 37–47)
HGB BLD-MCNC: 18.8 G/DL (ref 12–16)
HGB UR QL STRIP.AUTO: ABNORMAL
HOLD SPECIMEN: NORMAL
HYALINE CASTS UR QL AUTO: ABNORMAL /LPF
INR PPP: 1.05 (ref 0.91–1.09)
KETONES UR QL STRIP: ABNORMAL
LEUKOCYTE ESTERASE UR QL STRIP.AUTO: NEGATIVE
LYMPHOCYTES # BLD MANUAL: 0.95 10*3/MM3 (ref 0.72–4.86)
LYMPHOCYTES NFR BLD MANUAL: 3 % (ref 15–45)
LYMPHOCYTES NFR BLD MANUAL: 6 % (ref 4–12)
Lab: ABNORMAL
MCH RBC QN AUTO: 32 PG (ref 28–32)
MCHC RBC AUTO-ENTMCNC: 35.2 G/DL (ref 33–36)
MCV RBC AUTO: 90.8 FL (ref 82–98)
MODALITY: ABNORMAL
MONOCYTES # BLD AUTO: 1.9 10*3/MM3 (ref 0.19–1.3)
MYELOCYTES NFR BLD MANUAL: 1 % (ref 0–0)
NEUTROPHILS # BLD AUTO: 28.47 10*3/MM3 (ref 1.87–8.4)
NEUTROPHILS NFR BLD MANUAL: 71 % (ref 39–78)
NEUTS BAND NFR BLD MANUAL: 19 % (ref 0–10)
NITRITE UR QL STRIP: NEGATIVE
NT-PROBNP SERPL-MCNC: 9470 PG/ML (ref 0–1800)
PCO2 BLDA: 34.4 MM HG (ref 35–45)
PH BLDA: 7.4 PH UNITS (ref 7.35–7.45)
PH UR STRIP.AUTO: 6.5 [PH] (ref 5–8)
PLATELET # BLD AUTO: 235 10*3/MM3 (ref 130–400)
PMV BLD AUTO: 11.7 FL (ref 6–12)
PO2 BLDA: 63.1 MM HG (ref 83–108)
POTASSIUM BLD-SCNC: 3.8 MMOL/L (ref 3.5–5.3)
PROT SERPL-MCNC: 7.7 G/DL (ref 6.3–8.7)
PROT UR QL STRIP: ABNORMAL
PROTHROMBIN TIME: 14 SECONDS (ref 11.9–14.6)
RBC # BLD AUTO: 5.88 10*6/MM3 (ref 4.2–5.4)
RBC # UR: ABNORMAL /HPF
RBC MORPH BLD: NORMAL
REF LAB TEST METHOD: ABNORMAL
RH BLD: POSITIVE
SAO2 % BLDCOA: 92 % (ref 94–99)
SCAN SLIDE: NORMAL
SMALL PLATELETS BLD QL SMEAR: ADEQUATE
SODIUM BLD-SCNC: 143 MMOL/L (ref 135–145)
SP GR UR STRIP: 1.02 (ref 1–1.03)
SQUAMOUS #/AREA URNS HPF: ABNORMAL /HPF
TROPONIN I SERPL-MCNC: 0.57 NG/ML (ref 0–0.03)
TROPONIN I SERPL-MCNC: 0.7 NG/ML (ref 0–0.03)
UROBILINOGEN UR QL STRIP: ABNORMAL
VENTILATOR MODE: ABNORMAL
WBC MORPH BLD: NORMAL
WBC NRBC COR # BLD: 31.63 10*3/MM3 (ref 4.8–10.8)
WBC UR QL AUTO: ABNORMAL /HPF

## 2017-10-24 PROCEDURE — 87086 URINE CULTURE/COLONY COUNT: CPT | Performed by: EMERGENCY MEDICINE

## 2017-10-24 PROCEDURE — 81001 URINALYSIS AUTO W/SCOPE: CPT | Performed by: EMERGENCY MEDICINE

## 2017-10-24 PROCEDURE — 73502 X-RAY EXAM HIP UNI 2-3 VIEWS: CPT

## 2017-10-24 PROCEDURE — 99285 EMERGENCY DEPT VISIT HI MDM: CPT

## 2017-10-24 PROCEDURE — 85007 BL SMEAR W/DIFF WBC COUNT: CPT | Performed by: EMERGENCY MEDICINE

## 2017-10-24 PROCEDURE — 25010000002 ONDANSETRON PER 1 MG: Performed by: EMERGENCY MEDICINE

## 2017-10-24 PROCEDURE — 84484 ASSAY OF TROPONIN QUANT: CPT | Performed by: EMERGENCY MEDICINE

## 2017-10-24 PROCEDURE — 25010000002 MORPHINE PER 10 MG: Performed by: EMERGENCY MEDICINE

## 2017-10-24 PROCEDURE — 86901 BLOOD TYPING SEROLOGIC RH(D): CPT | Performed by: EMERGENCY MEDICINE

## 2017-10-24 PROCEDURE — 80053 COMPREHEN METABOLIC PANEL: CPT | Performed by: EMERGENCY MEDICINE

## 2017-10-24 PROCEDURE — 85730 THROMBOPLASTIN TIME PARTIAL: CPT | Performed by: EMERGENCY MEDICINE

## 2017-10-24 PROCEDURE — 83880 ASSAY OF NATRIURETIC PEPTIDE: CPT | Performed by: EMERGENCY MEDICINE

## 2017-10-24 PROCEDURE — 25010000002 MORPHINE SULFATE (PF) 2 MG/ML SOLUTION: Performed by: EMERGENCY MEDICINE

## 2017-10-24 PROCEDURE — 85610 PROTHROMBIN TIME: CPT | Performed by: EMERGENCY MEDICINE

## 2017-10-24 PROCEDURE — 82550 ASSAY OF CK (CPK): CPT | Performed by: EMERGENCY MEDICINE

## 2017-10-24 PROCEDURE — 86901 BLOOD TYPING SEROLOGIC RH(D): CPT

## 2017-10-24 PROCEDURE — 87040 BLOOD CULTURE FOR BACTERIA: CPT | Performed by: EMERGENCY MEDICINE

## 2017-10-24 PROCEDURE — 36415 COLL VENOUS BLD VENIPUNCTURE: CPT | Performed by: EMERGENCY MEDICINE

## 2017-10-24 PROCEDURE — 86900 BLOOD TYPING SEROLOGIC ABO: CPT | Performed by: EMERGENCY MEDICINE

## 2017-10-24 PROCEDURE — 85025 COMPLETE CBC W/AUTO DIFF WBC: CPT | Performed by: EMERGENCY MEDICINE

## 2017-10-24 PROCEDURE — 83605 ASSAY OF LACTIC ACID: CPT | Performed by: INTERNAL MEDICINE

## 2017-10-24 PROCEDURE — 99222 1ST HOSP IP/OBS MODERATE 55: CPT | Performed by: NURSE PRACTITIONER

## 2017-10-24 PROCEDURE — 86900 BLOOD TYPING SEROLOGIC ABO: CPT

## 2017-10-24 PROCEDURE — 71010 HC CHEST PA OR AP: CPT

## 2017-10-24 PROCEDURE — 86920 COMPATIBILITY TEST SPIN: CPT

## 2017-10-24 PROCEDURE — 25010000002 MORPHINE SULFATE (PF) 2 MG/ML SOLUTION: Performed by: INTERNAL MEDICINE

## 2017-10-24 PROCEDURE — 93005 ELECTROCARDIOGRAM TRACING: CPT | Performed by: EMERGENCY MEDICINE

## 2017-10-24 PROCEDURE — 86850 RBC ANTIBODY SCREEN: CPT | Performed by: EMERGENCY MEDICINE

## 2017-10-24 PROCEDURE — 83605 ASSAY OF LACTIC ACID: CPT | Performed by: EMERGENCY MEDICINE

## 2017-10-24 PROCEDURE — 82803 BLOOD GASES ANY COMBINATION: CPT

## 2017-10-24 PROCEDURE — 84484 ASSAY OF TROPONIN QUANT: CPT | Performed by: NURSE PRACTITIONER

## 2017-10-24 PROCEDURE — 93010 ELECTROCARDIOGRAM REPORT: CPT | Performed by: INTERNAL MEDICINE

## 2017-10-24 PROCEDURE — 36600 WITHDRAWAL OF ARTERIAL BLOOD: CPT

## 2017-10-24 RX ORDER — HYDRALAZINE HYDROCHLORIDE 10 MG/1
10 TABLET, FILM COATED ORAL 2 TIMES DAILY
COMMUNITY
End: 2017-10-24

## 2017-10-24 RX ORDER — MELATONIN
1000 DAILY
COMMUNITY
End: 2022-01-01 | Stop reason: HOSPADM

## 2017-10-24 RX ORDER — PAROXETINE HYDROCHLORIDE 20 MG/1
20 TABLET, FILM COATED ORAL EVERY MORNING
Status: DISCONTINUED | OUTPATIENT
Start: 2017-10-25 | End: 2017-10-30 | Stop reason: HOSPADM

## 2017-10-24 RX ORDER — NALOXONE HCL 0.4 MG/ML
0.4 VIAL (ML) INJECTION
Status: DISCONTINUED | OUTPATIENT
Start: 2017-10-24 | End: 2017-10-30 | Stop reason: HOSPADM

## 2017-10-24 RX ORDER — ACETAMINOPHEN 325 MG/1
650 TABLET ORAL EVERY 4 HOURS PRN
Status: DISCONTINUED | OUTPATIENT
Start: 2017-10-24 | End: 2017-10-26

## 2017-10-24 RX ORDER — ATORVASTATIN CALCIUM 10 MG/1
10 TABLET, FILM COATED ORAL NIGHTLY
Status: DISCONTINUED | OUTPATIENT
Start: 2017-10-24 | End: 2017-10-26

## 2017-10-24 RX ORDER — ONDANSETRON 4 MG/1
4 TABLET, FILM COATED ORAL EVERY 6 HOURS PRN
Status: DISCONTINUED | OUTPATIENT
Start: 2017-10-24 | End: 2017-10-27 | Stop reason: SDUPTHER

## 2017-10-24 RX ORDER — MELATONIN
1000 DAILY
Status: DISCONTINUED | OUTPATIENT
Start: 2017-10-24 | End: 2017-10-26

## 2017-10-24 RX ORDER — MORPHINE SULFATE 2 MG/ML
1 INJECTION, SOLUTION INTRAMUSCULAR; INTRAVENOUS EVERY 4 HOURS PRN
Status: DISCONTINUED | OUTPATIENT
Start: 2017-10-24 | End: 2017-10-30 | Stop reason: HOSPADM

## 2017-10-24 RX ORDER — CETIRIZINE HYDROCHLORIDE 10 MG/1
10 TABLET ORAL DAILY
Status: DISCONTINUED | OUTPATIENT
Start: 2017-10-24 | End: 2017-10-26

## 2017-10-24 RX ORDER — MORPHINE SULFATE 4 MG/ML
4 INJECTION, SOLUTION INTRAMUSCULAR; INTRAVENOUS ONCE
Status: COMPLETED | OUTPATIENT
Start: 2017-10-24 | End: 2017-10-24

## 2017-10-24 RX ORDER — ALPRAZOLAM 0.25 MG/1
0.12 TABLET ORAL 2 TIMES DAILY PRN
Status: DISCONTINUED | OUTPATIENT
Start: 2017-10-24 | End: 2017-10-26

## 2017-10-24 RX ORDER — CLONIDINE HYDROCHLORIDE 0.2 MG/1
0.2 TABLET ORAL 2 TIMES DAILY
Status: DISCONTINUED | OUTPATIENT
Start: 2017-10-24 | End: 2017-10-30 | Stop reason: HOSPADM

## 2017-10-24 RX ORDER — ONDANSETRON 2 MG/ML
4 INJECTION INTRAMUSCULAR; INTRAVENOUS EVERY 6 HOURS PRN
Status: DISCONTINUED | OUTPATIENT
Start: 2017-10-24 | End: 2017-10-27 | Stop reason: SDUPTHER

## 2017-10-24 RX ORDER — ALPRAZOLAM 0.25 MG/1
0.25 TABLET ORAL TAKE AS DIRECTED
Status: DISCONTINUED | OUTPATIENT
Start: 2017-10-24 | End: 2017-10-24

## 2017-10-24 RX ORDER — SODIUM CHLORIDE 0.9 % (FLUSH) 0.9 %
1-10 SYRINGE (ML) INJECTION AS NEEDED
Status: DISCONTINUED | OUTPATIENT
Start: 2017-10-24 | End: 2017-10-30 | Stop reason: HOSPADM

## 2017-10-24 RX ORDER — HYDRALAZINE HYDROCHLORIDE 25 MG/1
25 TABLET, FILM COATED ORAL 2 TIMES DAILY
COMMUNITY
End: 2020-06-15

## 2017-10-24 RX ORDER — MULTIVIT,CALC,MINS/IRON/FOLIC 9MG-400MCG
1 TABLET ORAL DAILY
Status: DISCONTINUED | OUTPATIENT
Start: 2017-10-24 | End: 2017-10-26

## 2017-10-24 RX ORDER — ONDANSETRON 4 MG/1
4 TABLET, ORALLY DISINTEGRATING ORAL EVERY 6 HOURS PRN
Status: DISCONTINUED | OUTPATIENT
Start: 2017-10-24 | End: 2017-10-27 | Stop reason: SDUPTHER

## 2017-10-24 RX ORDER — SODIUM CHLORIDE 9 MG/ML
50 INJECTION, SOLUTION INTRAVENOUS CONTINUOUS
Status: DISCONTINUED | OUTPATIENT
Start: 2017-10-24 | End: 2017-10-27

## 2017-10-24 RX ORDER — VITAMIN E 268 MG
400 CAPSULE ORAL DAILY
Status: DISCONTINUED | OUTPATIENT
Start: 2017-10-24 | End: 2017-10-26

## 2017-10-24 RX ORDER — MORPHINE SULFATE 2 MG/ML
2 INJECTION, SOLUTION INTRAMUSCULAR; INTRAVENOUS ONCE
Status: COMPLETED | OUTPATIENT
Start: 2017-10-24 | End: 2017-10-24

## 2017-10-24 RX ORDER — CHLORAL HYDRATE 500 MG
1000 CAPSULE ORAL
COMMUNITY
End: 2020-07-21 | Stop reason: HOSPADM

## 2017-10-24 RX ORDER — SODIUM CHLORIDE 0.9 % (FLUSH) 0.9 %
10 SYRINGE (ML) INJECTION AS NEEDED
Status: DISCONTINUED | OUTPATIENT
Start: 2017-10-24 | End: 2017-10-24

## 2017-10-24 RX ORDER — ONDANSETRON 2 MG/ML
4 INJECTION INTRAMUSCULAR; INTRAVENOUS ONCE
Status: COMPLETED | OUTPATIENT
Start: 2017-10-24 | End: 2017-10-24

## 2017-10-24 RX ADMIN — MORPHINE SULFATE 2 MG: 2 INJECTION, SOLUTION INTRAMUSCULAR; INTRAVENOUS at 11:54

## 2017-10-24 RX ADMIN — ONDANSETRON 4 MG: 2 INJECTION INTRAMUSCULAR; INTRAVENOUS at 11:55

## 2017-10-24 RX ADMIN — MORPHINE SULFATE 1 MG: 2 INJECTION, SOLUTION INTRAMUSCULAR; INTRAVENOUS at 15:57

## 2017-10-24 RX ADMIN — MORPHINE SULFATE 4 MG: 4 INJECTION, SOLUTION INTRAMUSCULAR; INTRAVENOUS at 14:20

## 2017-10-24 RX ADMIN — MORPHINE SULFATE 1 MG: 2 INJECTION, SOLUTION INTRAMUSCULAR; INTRAVENOUS at 21:03

## 2017-10-24 RX ADMIN — SODIUM CHLORIDE 125 ML/HR: 9 INJECTION, SOLUTION INTRAVENOUS at 12:53

## 2017-10-24 RX ADMIN — MORPHINE SULFATE 4 MG: 4 INJECTION, SOLUTION INTRAMUSCULAR; INTRAVENOUS at 12:54

## 2017-10-24 RX ADMIN — ALPRAZOLAM 0.12 MG: 0.25 TABLET ORAL at 21:03

## 2017-10-24 RX ADMIN — ATORVASTATIN CALCIUM 10 MG: 10 TABLET, FILM COATED ORAL at 21:03

## 2017-10-24 RX ADMIN — SODIUM CHLORIDE 125 ML/HR: 9 INJECTION, SOLUTION INTRAVENOUS at 15:36

## 2017-10-25 ENCOUNTER — APPOINTMENT (OUTPATIENT)
Dept: CARDIOLOGY | Facility: HOSPITAL | Age: 82
End: 2017-10-25
Attending: INTERNAL MEDICINE

## 2017-10-25 ENCOUNTER — ANESTHESIA (OUTPATIENT)
Dept: PERIOP | Facility: HOSPITAL | Age: 82
End: 2017-10-25

## 2017-10-25 ENCOUNTER — ANESTHESIA EVENT (OUTPATIENT)
Dept: PERIOP | Facility: HOSPITAL | Age: 82
End: 2017-10-25

## 2017-10-25 PROBLEM — S72.142A CLOSED DISPLACED INTERTROCHANTERIC FRACTURE OF LEFT FEMUR: Status: ACTIVE | Noted: 2017-10-24

## 2017-10-25 PROBLEM — Z95.0 PACEMAKER: Status: ACTIVE | Noted: 2017-10-25

## 2017-10-25 LAB
ANION GAP SERPL CALCULATED.3IONS-SCNC: 12 MMOL/L (ref 4–13)
BASOPHILS # BLD AUTO: 0.07 10*3/MM3 (ref 0–0.2)
BASOPHILS NFR BLD AUTO: 0.3 % (ref 0–2)
BUN BLD-MCNC: 37 MG/DL (ref 5–21)
BUN/CREAT SERPL: 30.1 (ref 7–25)
CALCIUM SPEC-SCNC: 9.2 MG/DL (ref 8.4–10.4)
CHLORIDE SERPL-SCNC: 107 MMOL/L (ref 98–110)
CK SERPL-CCNC: 780 U/L (ref 0–203)
CO2 SERPL-SCNC: 26 MMOL/L (ref 24–31)
CREAT BLD-MCNC: 1.23 MG/DL (ref 0.5–1.4)
DEPRECATED RDW RBC AUTO: 49.8 FL (ref 40–54)
EOSINOPHIL # BLD AUTO: 0.25 10*3/MM3 (ref 0–0.7)
EOSINOPHIL NFR BLD AUTO: 1 % (ref 0–4)
ERYTHROCYTE [DISTWIDTH] IN BLOOD BY AUTOMATED COUNT: 14.6 % (ref 12–15)
GFR SERPL CREATININE-BSD FRML MDRD: 41 ML/MIN/1.73
GLUCOSE BLD-MCNC: 145 MG/DL (ref 70–100)
HCT VFR BLD AUTO: 44.3 % (ref 37–47)
HGB BLD-MCNC: 14.6 G/DL (ref 12–16)
IMM GRANULOCYTES # BLD: 0.19 10*3/MM3 (ref 0–0.03)
IMM GRANULOCYTES NFR BLD: 0.8 % (ref 0–5)
LYMPHOCYTES # BLD AUTO: 2.42 10*3/MM3 (ref 0.72–4.86)
LYMPHOCYTES NFR BLD AUTO: 9.7 % (ref 15–45)
MCH RBC QN AUTO: 31.1 PG (ref 28–32)
MCHC RBC AUTO-ENTMCNC: 33 G/DL (ref 33–36)
MCV RBC AUTO: 94.5 FL (ref 82–98)
MONOCYTES # BLD AUTO: 2 10*3/MM3 (ref 0.19–1.3)
MONOCYTES NFR BLD AUTO: 8 % (ref 4–12)
NEUTROPHILS # BLD AUTO: 20.05 10*3/MM3 (ref 1.87–8.4)
NEUTROPHILS NFR BLD AUTO: 80.2 % (ref 39–78)
PLATELET # BLD AUTO: 176 10*3/MM3 (ref 130–400)
PMV BLD AUTO: 11.1 FL (ref 6–12)
POTASSIUM BLD-SCNC: 4 MMOL/L (ref 3.5–5.3)
RBC # BLD AUTO: 4.69 10*6/MM3 (ref 4.2–5.4)
SODIUM BLD-SCNC: 145 MMOL/L (ref 135–145)
TROPONIN I SERPL-MCNC: 0.26 NG/ML (ref 0–0.03)
TROPONIN I SERPL-MCNC: 0.48 NG/ML (ref 0–0.03)
WBC NRBC COR # BLD: 24.98 10*3/MM3 (ref 4.8–10.8)

## 2017-10-25 PROCEDURE — 99232 SBSQ HOSP IP/OBS MODERATE 35: CPT | Performed by: INTERNAL MEDICINE

## 2017-10-25 PROCEDURE — 82550 ASSAY OF CK (CPK): CPT | Performed by: NURSE PRACTITIONER

## 2017-10-25 PROCEDURE — 25010000002 MORPHINE SULFATE (PF) 2 MG/ML SOLUTION: Performed by: INTERNAL MEDICINE

## 2017-10-25 PROCEDURE — 84484 ASSAY OF TROPONIN QUANT: CPT | Performed by: NURSE PRACTITIONER

## 2017-10-25 PROCEDURE — 93306 TTE W/DOPPLER COMPLETE: CPT

## 2017-10-25 PROCEDURE — 85025 COMPLETE CBC W/AUTO DIFF WBC: CPT | Performed by: INTERNAL MEDICINE

## 2017-10-25 PROCEDURE — 93306 TTE W/DOPPLER COMPLETE: CPT | Performed by: INTERNAL MEDICINE

## 2017-10-25 PROCEDURE — 80048 BASIC METABOLIC PNL TOTAL CA: CPT | Performed by: INTERNAL MEDICINE

## 2017-10-25 RX ORDER — LISINOPRIL 20 MG/1
40 TABLET ORAL DAILY
Status: DISCONTINUED | OUTPATIENT
Start: 2017-10-25 | End: 2017-10-30 | Stop reason: HOSPADM

## 2017-10-25 RX ADMIN — CLONIDINE HYDROCHLORIDE 0.2 MG: 0.2 TABLET ORAL at 09:22

## 2017-10-25 RX ADMIN — LISINOPRIL 40 MG: 20 TABLET ORAL at 16:58

## 2017-10-25 RX ADMIN — MORPHINE SULFATE 1 MG: 2 INJECTION, SOLUTION INTRAMUSCULAR; INTRAVENOUS at 08:40

## 2017-10-25 RX ADMIN — ALPRAZOLAM 0.12 MG: 0.25 TABLET ORAL at 21:53

## 2017-10-25 RX ADMIN — SODIUM CHLORIDE 125 ML/HR: 9 INJECTION, SOLUTION INTRAVENOUS at 09:22

## 2017-10-25 RX ADMIN — ATORVASTATIN CALCIUM 10 MG: 10 TABLET, FILM COATED ORAL at 21:55

## 2017-10-25 RX ADMIN — MORPHINE SULFATE 1 MG: 2 INJECTION, SOLUTION INTRAMUSCULAR; INTRAVENOUS at 21:55

## 2017-10-25 RX ADMIN — METOPROLOL TARTRATE 12.5 MG: 25 TABLET, FILM COATED ORAL at 17:47

## 2017-10-25 RX ADMIN — MORPHINE SULFATE 1 MG: 2 INJECTION, SOLUTION INTRAMUSCULAR; INTRAVENOUS at 03:27

## 2017-10-25 RX ADMIN — CLONIDINE HYDROCHLORIDE 0.2 MG: 0.2 TABLET ORAL at 17:47

## 2017-10-25 RX ADMIN — SODIUM CHLORIDE 125 ML/HR: 9 INJECTION, SOLUTION INTRAVENOUS at 00:51

## 2017-10-25 RX ADMIN — SODIUM CHLORIDE 125 ML/HR: 9 INJECTION, SOLUTION INTRAVENOUS at 18:58

## 2017-10-26 ENCOUNTER — APPOINTMENT (OUTPATIENT)
Dept: GENERAL RADIOLOGY | Facility: HOSPITAL | Age: 82
End: 2017-10-26

## 2017-10-26 ENCOUNTER — CLINICAL SUPPORT (OUTPATIENT)
Dept: CARDIOLOGY | Facility: CLINIC | Age: 82
End: 2017-10-26

## 2017-10-26 DIAGNOSIS — I49.5 SSS (SICK SINUS SYNDROME) (HCC): ICD-10-CM

## 2017-10-26 LAB
ANION GAP SERPL CALCULATED.3IONS-SCNC: 4 MMOL/L (ref 4–13)
BACTERIA SPEC AEROBE CULT: NORMAL
BASOPHILS # BLD AUTO: 0.05 10*3/MM3 (ref 0–0.2)
BASOPHILS NFR BLD AUTO: 0.2 % (ref 0–2)
BH CV ECHO MEAS - AO MAX PG (FULL): 8.6 MMHG
BH CV ECHO MEAS - AO MAX PG: 18.3 MMHG
BH CV ECHO MEAS - AO MEAN PG (FULL): 4 MMHG
BH CV ECHO MEAS - AO MEAN PG: 10 MMHG
BH CV ECHO MEAS - AO ROOT AREA (BSA CORRECTED): 1.3
BH CV ECHO MEAS - AO ROOT AREA: 5.7 CM^2
BH CV ECHO MEAS - AO ROOT DIAM: 2.7 CM
BH CV ECHO MEAS - AO V2 MAX: 214 CM/SEC
BH CV ECHO MEAS - AO V2 MEAN: 142 CM/SEC
BH CV ECHO MEAS - AO V2 VTI: 35.3 CM
BH CV ECHO MEAS - AVA(I,A): 3 CM^2
BH CV ECHO MEAS - AVA(I,D): 3 CM^2
BH CV ECHO MEAS - AVA(V,A): 2.3 CM^2
BH CV ECHO MEAS - AVA(V,D): 2.3 CM^2
BH CV ECHO MEAS - BSA(HAYCOCK): 2.1 M^2
BH CV ECHO MEAS - BSA: 2 M^2
BH CV ECHO MEAS - BZI_BMI: 28.4 KILOGRAMS/M^2
BH CV ECHO MEAS - BZI_METRIC_HEIGHT: 175.3 CM
BH CV ECHO MEAS - BZI_METRIC_WEIGHT: 87.1 KG
BH CV ECHO MEAS - CONTRAST EF 4CH: 66.8 ML/M^2
BH CV ECHO MEAS - EDV(CUBED): 48.6 ML
BH CV ECHO MEAS - EDV(MOD-SP4): 54.6 ML
BH CV ECHO MEAS - EDV(TEICH): 56.3 ML
BH CV ECHO MEAS - EF(CUBED): 65.9 %
BH CV ECHO MEAS - EF(MOD-SP4): 66.8 %
BH CV ECHO MEAS - EF(TEICH): 58.3 %
BH CV ECHO MEAS - ESV(CUBED): 16.6 ML
BH CV ECHO MEAS - ESV(MOD-SP4): 18.1 ML
BH CV ECHO MEAS - ESV(TEICH): 23.4 ML
BH CV ECHO MEAS - FS: 30.1 %
BH CV ECHO MEAS - IVS/LVPW: 1.4
BH CV ECHO MEAS - IVSD: 1.6 CM
BH CV ECHO MEAS - LA DIMENSION: 3 CM
BH CV ECHO MEAS - LA/AO: 1.1
BH CV ECHO MEAS - LAT PEAK E' VEL: 7.5 CM/SEC
BH CV ECHO MEAS - LV DIASTOLIC VOL/BSA (35-75): 26.9 ML/M^2
BH CV ECHO MEAS - LV MASS(C)D: 182.4 GRAMS
BH CV ECHO MEAS - LV MASS(C)DI: 89.8 GRAMS/M^2
BH CV ECHO MEAS - LV MAX PG: 9.8 MMHG
BH CV ECHO MEAS - LV MEAN PG: 6 MMHG
BH CV ECHO MEAS - LV SYSTOLIC VOL/BSA (12-30): 8.9 ML/M^2
BH CV ECHO MEAS - LV V1 MAX: 156 CM/SEC
BH CV ECHO MEAS - LV V1 MEAN: 115 CM/SEC
BH CV ECHO MEAS - LV V1 VTI: 33.9 CM
BH CV ECHO MEAS - LVIDD: 3.7 CM
BH CV ECHO MEAS - LVIDS: 2.6 CM
BH CV ECHO MEAS - LVLD AP4: 7.4 CM
BH CV ECHO MEAS - LVLS AP4: 6.3 CM
BH CV ECHO MEAS - LVOT AREA (M): 3.1 CM^2
BH CV ECHO MEAS - LVOT AREA: 3.1 CM^2
BH CV ECHO MEAS - LVOT DIAM: 2 CM
BH CV ECHO MEAS - LVPWD: 1.2 CM
BH CV ECHO MEAS - MED PEAK E' VEL: 8.05 CM/SEC
BH CV ECHO MEAS - MR MAX PG: 9.4 MMHG
BH CV ECHO MEAS - MR MAX VEL: 153 CM/SEC
BH CV ECHO MEAS - MV A MAX VEL: 138 CM/SEC
BH CV ECHO MEAS - MV DEC TIME: 0.39 SEC
BH CV ECHO MEAS - MV E MAX VEL: 88.6 CM/SEC
BH CV ECHO MEAS - MV E/A: 0.64
BH CV ECHO MEAS - RAP SYSTOLE: 5 MMHG
BH CV ECHO MEAS - RVSP: 46.2 MMHG
BH CV ECHO MEAS - SI(AO): 99.6 ML/M^2
BH CV ECHO MEAS - SI(CUBED): 15.8 ML/M^2
BH CV ECHO MEAS - SI(LVOT): 52.5 ML/M^2
BH CV ECHO MEAS - SI(MOD-SP4): 18 ML/M^2
BH CV ECHO MEAS - SI(TEICH): 16.2 ML/M^2
BH CV ECHO MEAS - SV(AO): 202.1 ML
BH CV ECHO MEAS - SV(CUBED): 32 ML
BH CV ECHO MEAS - SV(LVOT): 106.5 ML
BH CV ECHO MEAS - SV(MOD-SP4): 36.5 ML
BH CV ECHO MEAS - SV(TEICH): 32.8 ML
BH CV ECHO MEAS - TR MAX VEL: 321 CM/SEC
BUN BLD-MCNC: 27 MG/DL (ref 5–21)
BUN/CREAT SERPL: 33.3 (ref 7–25)
CALCIUM SPEC-SCNC: 8.7 MG/DL (ref 8.4–10.4)
CHLORIDE SERPL-SCNC: 111 MMOL/L (ref 98–110)
CO2 SERPL-SCNC: 26 MMOL/L (ref 24–31)
CREAT BLD-MCNC: 0.81 MG/DL (ref 0.5–1.4)
DEPRECATED RDW RBC AUTO: 52 FL (ref 40–54)
EOSINOPHIL # BLD AUTO: 0.63 10*3/MM3 (ref 0–0.7)
EOSINOPHIL NFR BLD AUTO: 3.1 % (ref 0–4)
ERYTHROCYTE [DISTWIDTH] IN BLOOD BY AUTOMATED COUNT: 14.8 % (ref 12–15)
GFR SERPL CREATININE-BSD FRML MDRD: 66 ML/MIN/1.73
GLUCOSE BLD-MCNC: 120 MG/DL (ref 70–100)
GLUCOSE BLDC GLUCOMTR-MCNC: 138 MG/DL (ref 70–130)
GLUCOSE BLDC GLUCOMTR-MCNC: 138 MG/DL (ref 70–130)
HCT VFR BLD AUTO: 39.7 % (ref 37–47)
HGB BLD-MCNC: 13 G/DL (ref 12–16)
IMM GRANULOCYTES # BLD: 0.15 10*3/MM3 (ref 0–0.03)
IMM GRANULOCYTES NFR BLD: 0.7 % (ref 0–5)
INR PPP: 1.12 (ref 0.91–1.09)
LEFT ATRIUM VOLUME INDEX: 22.6 ML/M2
LEFT ATRIUM VOLUME: 45.9 CM3
LV EF 2D ECHO EST: 70 %
LYMPHOCYTES # BLD AUTO: 2.88 10*3/MM3 (ref 0.72–4.86)
LYMPHOCYTES NFR BLD AUTO: 14.3 % (ref 15–45)
MCH RBC QN AUTO: 31.3 PG (ref 28–32)
MCHC RBC AUTO-ENTMCNC: 32.7 G/DL (ref 33–36)
MCV RBC AUTO: 95.4 FL (ref 82–98)
MONOCYTES # BLD AUTO: 1.76 10*3/MM3 (ref 0.19–1.3)
MONOCYTES NFR BLD AUTO: 8.7 % (ref 4–12)
NEUTROPHILS # BLD AUTO: 14.67 10*3/MM3 (ref 1.87–8.4)
NEUTROPHILS NFR BLD AUTO: 73 % (ref 39–78)
NRBC BLD MANUAL-RTO: 0 /100 WBC (ref 0–0)
PLATELET # BLD AUTO: 142 10*3/MM3 (ref 130–400)
PMV BLD AUTO: 10.8 FL (ref 6–12)
POTASSIUM BLD-SCNC: 4.1 MMOL/L (ref 3.5–5.3)
PROTHROMBIN TIME: 14.8 SECONDS (ref 11.9–14.6)
RBC # BLD AUTO: 4.16 10*6/MM3 (ref 4.2–5.4)
SODIUM BLD-SCNC: 141 MMOL/L (ref 135–145)
TROPONIN I SERPL-MCNC: 0.27 NG/ML (ref 0–0.03)
WBC NRBC COR # BLD: 20.14 10*3/MM3 (ref 4.8–10.8)

## 2017-10-26 PROCEDURE — 73501 X-RAY EXAM HIP UNI 1 VIEW: CPT

## 2017-10-26 PROCEDURE — 76000 FLUOROSCOPY <1 HR PHYS/QHP: CPT

## 2017-10-26 PROCEDURE — 36600 WITHDRAWAL OF ARTERIAL BLOOD: CPT

## 2017-10-26 PROCEDURE — 80048 BASIC METABOLIC PNL TOTAL CA: CPT | Performed by: INTERNAL MEDICINE

## 2017-10-26 PROCEDURE — 99232 SBSQ HOSP IP/OBS MODERATE 35: CPT | Performed by: INTERNAL MEDICINE

## 2017-10-26 PROCEDURE — 25010000002 NEOSTIGMINE PER 0.5 MG: Performed by: NURSE ANESTHETIST, CERTIFIED REGISTERED

## 2017-10-26 PROCEDURE — 93296 REM INTERROG EVL PM/IDS: CPT | Performed by: PHYSICIAN ASSISTANT

## 2017-10-26 PROCEDURE — 85025 COMPLETE CBC W/AUTO DIFF WBC: CPT | Performed by: INTERNAL MEDICINE

## 2017-10-26 PROCEDURE — C1713 ANCHOR/SCREW BN/BN,TIS/BN: HCPCS | Performed by: ORTHOPAEDIC SURGERY

## 2017-10-26 PROCEDURE — 94640 AIRWAY INHALATION TREATMENT: CPT

## 2017-10-26 PROCEDURE — 0QS706Z REPOSITION LEFT UPPER FEMUR WITH INTRAMEDULLARY INTERNAL FIXATION DEVICE, OPEN APPROACH: ICD-10-PCS | Performed by: ORTHOPAEDIC SURGERY

## 2017-10-26 PROCEDURE — 94799 UNLISTED PULMONARY SVC/PX: CPT

## 2017-10-26 PROCEDURE — 25010000002 HYDRALAZINE PER 20 MG: Performed by: NURSE PRACTITIONER

## 2017-10-26 PROCEDURE — 93294 REM INTERROG EVL PM/LDLS PM: CPT | Performed by: PHYSICIAN ASSISTANT

## 2017-10-26 PROCEDURE — 82962 GLUCOSE BLOOD TEST: CPT

## 2017-10-26 PROCEDURE — 25010000002 FENTANYL CITRATE (PF) 100 MCG/2ML SOLUTION: Performed by: NURSE ANESTHETIST, CERTIFIED REGISTERED

## 2017-10-26 PROCEDURE — 25010000002 MORPHINE SULFATE (PF) 2 MG/ML SOLUTION: Performed by: INTERNAL MEDICINE

## 2017-10-26 PROCEDURE — 85610 PROTHROMBIN TIME: CPT | Performed by: ORTHOPAEDIC SURGERY

## 2017-10-26 PROCEDURE — 25010000002 HYDROMORPHONE PER 4 MG: Performed by: ANESTHESIOLOGY

## 2017-10-26 PROCEDURE — 25010000002 ONDANSETRON PER 1 MG: Performed by: ANESTHESIOLOGY

## 2017-10-26 PROCEDURE — 25010000002 VANCOMYCIN PER 500 MG: Performed by: INTERNAL MEDICINE

## 2017-10-26 DEVICE — SCRW LK STRDRV TI 5X36MM STRL: Type: IMPLANTABLE DEVICE | Status: FUNCTIONAL

## 2017-10-26 DEVICE — NAIL FEM TFN ADV PROX 130D 11X235MM LT STRL: Type: IMPLANTABLE DEVICE | Status: FUNCTIONAL

## 2017-10-26 DEVICE — BLD FEM FIX HELI TFN ADV 100MM STRL: Type: IMPLANTABLE DEVICE | Status: FUNCTIONAL

## 2017-10-26 RX ORDER — IPRATROPIUM BROMIDE AND ALBUTEROL SULFATE 2.5; .5 MG/3ML; MG/3ML
3 SOLUTION RESPIRATORY (INHALATION) ONCE
Status: COMPLETED | OUTPATIENT
Start: 2017-10-26 | End: 2017-10-26

## 2017-10-26 RX ORDER — VANCOMYCIN HYDROCHLORIDE 1 G/200ML
1 INJECTION, SOLUTION INTRAVENOUS ONCE
Status: DISCONTINUED | OUTPATIENT
Start: 2017-10-26 | End: 2017-10-26 | Stop reason: SDUPTHER

## 2017-10-26 RX ORDER — ONDANSETRON 4 MG/1
4 TABLET, ORALLY DISINTEGRATING ORAL EVERY 6 HOURS PRN
Status: DISCONTINUED | OUTPATIENT
Start: 2017-10-26 | End: 2017-10-30 | Stop reason: HOSPADM

## 2017-10-26 RX ORDER — ROCURONIUM BROMIDE 10 MG/ML
INJECTION, SOLUTION INTRAVENOUS AS NEEDED
Status: DISCONTINUED | OUTPATIENT
Start: 2017-10-26 | End: 2017-10-26 | Stop reason: SURG

## 2017-10-26 RX ORDER — VANCOMYCIN HYDROCHLORIDE 1 G/200ML
1000 INJECTION, SOLUTION INTRAVENOUS EVERY 12 HOURS
Status: DISCONTINUED | OUTPATIENT
Start: 2017-10-27 | End: 2017-10-27 | Stop reason: DRUGHIGH

## 2017-10-26 RX ORDER — LABETALOL HYDROCHLORIDE 5 MG/ML
5 INJECTION, SOLUTION INTRAVENOUS
Status: DISCONTINUED | OUTPATIENT
Start: 2017-10-26 | End: 2017-10-26 | Stop reason: HOSPADM

## 2017-10-26 RX ORDER — ONDANSETRON 2 MG/ML
4 INJECTION INTRAMUSCULAR; INTRAVENOUS AS NEEDED
Status: DISCONTINUED | OUTPATIENT
Start: 2017-10-26 | End: 2017-10-26 | Stop reason: HOSPADM

## 2017-10-26 RX ORDER — SODIUM CHLORIDE, SODIUM LACTATE, POTASSIUM CHLORIDE, CALCIUM CHLORIDE 600; 310; 30; 20 MG/100ML; MG/100ML; MG/100ML; MG/100ML
100 INJECTION, SOLUTION INTRAVENOUS CONTINUOUS
Status: DISCONTINUED | OUTPATIENT
Start: 2017-10-26 | End: 2017-10-26

## 2017-10-26 RX ORDER — ZOLPIDEM TARTRATE 5 MG/1
5 TABLET ORAL NIGHTLY PRN
Status: DISCONTINUED | OUTPATIENT
Start: 2017-10-26 | End: 2017-10-30 | Stop reason: HOSPADM

## 2017-10-26 RX ORDER — MEPERIDINE HYDROCHLORIDE 25 MG/ML
12.5 INJECTION INTRAMUSCULAR; INTRAVENOUS; SUBCUTANEOUS
Status: DISCONTINUED | OUTPATIENT
Start: 2017-10-26 | End: 2017-10-26 | Stop reason: HOSPADM

## 2017-10-26 RX ORDER — NALOXONE HCL 0.4 MG/ML
0.04 VIAL (ML) INJECTION AS NEEDED
Status: DISCONTINUED | OUTPATIENT
Start: 2017-10-26 | End: 2017-10-26 | Stop reason: HOSPADM

## 2017-10-26 RX ORDER — IPRATROPIUM BROMIDE AND ALBUTEROL SULFATE 2.5; .5 MG/3ML; MG/3ML
3 SOLUTION RESPIRATORY (INHALATION) ONCE AS NEEDED
Status: COMPLETED | OUTPATIENT
Start: 2017-10-26 | End: 2017-10-26

## 2017-10-26 RX ORDER — HYDROCODONE BITARTRATE AND ACETAMINOPHEN 7.5; 325 MG/1; MG/1
1 TABLET ORAL EVERY 4 HOURS PRN
Status: DISCONTINUED | OUTPATIENT
Start: 2017-10-26 | End: 2017-10-30 | Stop reason: HOSPADM

## 2017-10-26 RX ORDER — SODIUM CHLORIDE, SODIUM LACTATE, POTASSIUM CHLORIDE, CALCIUM CHLORIDE 600; 310; 30; 20 MG/100ML; MG/100ML; MG/100ML; MG/100ML
50 INJECTION, SOLUTION INTRAVENOUS CONTINUOUS
Status: DISCONTINUED | OUTPATIENT
Start: 2017-10-26 | End: 2017-10-27

## 2017-10-26 RX ORDER — ONDANSETRON 4 MG/1
4 TABLET, FILM COATED ORAL EVERY 6 HOURS PRN
Status: DISCONTINUED | OUTPATIENT
Start: 2017-10-26 | End: 2017-10-30 | Stop reason: HOSPADM

## 2017-10-26 RX ORDER — HYDRALAZINE HYDROCHLORIDE 20 MG/ML
5 INJECTION INTRAMUSCULAR; INTRAVENOUS
Status: DISCONTINUED | OUTPATIENT
Start: 2017-10-26 | End: 2017-10-26 | Stop reason: HOSPADM

## 2017-10-26 RX ORDER — SODIUM CHLORIDE 0.9 % (FLUSH) 0.9 %
1-10 SYRINGE (ML) INJECTION AS NEEDED
Status: DISCONTINUED | OUTPATIENT
Start: 2017-10-26 | End: 2017-10-26 | Stop reason: HOSPADM

## 2017-10-26 RX ORDER — GLYCOPYRROLATE 0.2 MG/ML
INJECTION INTRAMUSCULAR; INTRAVENOUS AS NEEDED
Status: DISCONTINUED | OUTPATIENT
Start: 2017-10-26 | End: 2017-10-26 | Stop reason: SURG

## 2017-10-26 RX ORDER — HYDRALAZINE HYDROCHLORIDE 25 MG/1
25 TABLET, FILM COATED ORAL EVERY 12 HOURS SCHEDULED
Status: DISCONTINUED | OUTPATIENT
Start: 2017-10-26 | End: 2017-10-30 | Stop reason: HOSPADM

## 2017-10-26 RX ORDER — HYDRALAZINE HYDROCHLORIDE 20 MG/ML
20 INJECTION INTRAMUSCULAR; INTRAVENOUS ONCE
Status: COMPLETED | OUTPATIENT
Start: 2017-10-26 | End: 2017-10-26

## 2017-10-26 RX ORDER — FLUMAZENIL 0.1 MG/ML
0.2 INJECTION INTRAVENOUS AS NEEDED
Status: DISCONTINUED | OUTPATIENT
Start: 2017-10-26 | End: 2017-10-26 | Stop reason: HOSPADM

## 2017-10-26 RX ORDER — WARFARIN SODIUM 3 MG/1
3 TABLET ORAL
Status: DISCONTINUED | OUTPATIENT
Start: 2017-10-26 | End: 2017-10-27

## 2017-10-26 RX ORDER — PHENYLEPHRINE HCL IN 0.9% NACL 0.8MG/10ML
SYRINGE (ML) INTRAVENOUS AS NEEDED
Status: DISCONTINUED | OUTPATIENT
Start: 2017-10-26 | End: 2017-10-26 | Stop reason: SURG

## 2017-10-26 RX ORDER — ETOMIDATE 2 MG/ML
INJECTION INTRAVENOUS AS NEEDED
Status: DISCONTINUED | OUTPATIENT
Start: 2017-10-26 | End: 2017-10-26 | Stop reason: SURG

## 2017-10-26 RX ORDER — MAGNESIUM HYDROXIDE 1200 MG/15ML
LIQUID ORAL AS NEEDED
Status: DISCONTINUED | OUTPATIENT
Start: 2017-10-26 | End: 2017-10-26 | Stop reason: HOSPADM

## 2017-10-26 RX ORDER — METOCLOPRAMIDE HYDROCHLORIDE 5 MG/ML
5 INJECTION INTRAMUSCULAR; INTRAVENOUS
Status: DISCONTINUED | OUTPATIENT
Start: 2017-10-26 | End: 2017-10-26 | Stop reason: HOSPADM

## 2017-10-26 RX ORDER — MORPHINE SULFATE 2 MG/ML
2 INJECTION, SOLUTION INTRAMUSCULAR; INTRAVENOUS AS NEEDED
Status: DISCONTINUED | OUTPATIENT
Start: 2017-10-26 | End: 2017-10-26 | Stop reason: HOSPADM

## 2017-10-26 RX ORDER — FENTANYL CITRATE 50 UG/ML
INJECTION, SOLUTION INTRAMUSCULAR; INTRAVENOUS AS NEEDED
Status: DISCONTINUED | OUTPATIENT
Start: 2017-10-26 | End: 2017-10-26 | Stop reason: SURG

## 2017-10-26 RX ORDER — ONDANSETRON 2 MG/ML
4 INJECTION INTRAMUSCULAR; INTRAVENOUS EVERY 6 HOURS PRN
Status: DISCONTINUED | OUTPATIENT
Start: 2017-10-26 | End: 2017-10-30 | Stop reason: HOSPADM

## 2017-10-26 RX ORDER — BISACODYL 5 MG/1
10 TABLET, DELAYED RELEASE ORAL DAILY PRN
Status: DISCONTINUED | OUTPATIENT
Start: 2017-10-26 | End: 2017-10-30 | Stop reason: HOSPADM

## 2017-10-26 RX ADMIN — LISINOPRIL 40 MG: 20 TABLET ORAL at 09:20

## 2017-10-26 RX ADMIN — SODIUM CHLORIDE 50 ML/HR: 9 INJECTION, SOLUTION INTRAVENOUS at 13:31

## 2017-10-26 RX ADMIN — FENTANYL CITRATE 100 MCG: 50 INJECTION, SOLUTION INTRAMUSCULAR; INTRAVENOUS at 16:31

## 2017-10-26 RX ADMIN — SODIUM CHLORIDE 125 ML/HR: 9 INJECTION, SOLUTION INTRAVENOUS at 04:01

## 2017-10-26 RX ADMIN — PAROXETINE 20 MG: 20 TABLET, FILM COATED ORAL at 06:20

## 2017-10-26 RX ADMIN — MORPHINE SULFATE 1 MG: 2 INJECTION, SOLUTION INTRAMUSCULAR; INTRAVENOUS at 06:26

## 2017-10-26 RX ADMIN — Medication 160 MCG: at 16:41

## 2017-10-26 RX ADMIN — HYDRALAZINE HYDROCHLORIDE 20 MG: 20 INJECTION INTRAMUSCULAR; INTRAVENOUS at 11:37

## 2017-10-26 RX ADMIN — LABETALOL HYDROCHLORIDE 5 MG: 5 INJECTION, SOLUTION INTRAVENOUS at 17:33

## 2017-10-26 RX ADMIN — METOPROLOL TARTRATE 12.5 MG: 25 TABLET, FILM COATED ORAL at 22:50

## 2017-10-26 RX ADMIN — SODIUM CHLORIDE, POTASSIUM CHLORIDE, SODIUM LACTATE AND CALCIUM CHLORIDE 100 ML/HR: 600; 310; 30; 20 INJECTION, SOLUTION INTRAVENOUS at 15:29

## 2017-10-26 RX ADMIN — FENTANYL CITRATE 100 MCG: 50 INJECTION, SOLUTION INTRAMUSCULAR; INTRAVENOUS at 15:55

## 2017-10-26 RX ADMIN — IPRATROPIUM BROMIDE AND ALBUTEROL SULFATE 3 ML: 2.5; .5 SOLUTION RESPIRATORY (INHALATION) at 15:30

## 2017-10-26 RX ADMIN — IPRATROPIUM BROMIDE AND ALBUTEROL SULFATE 3 ML: 2.5; .5 SOLUTION RESPIRATORY (INHALATION) at 17:46

## 2017-10-26 RX ADMIN — CLONIDINE HYDROCHLORIDE 0.2 MG: 0.2 TABLET ORAL at 09:12

## 2017-10-26 RX ADMIN — ACETAMINOPHEN 650 MG: 325 TABLET, FILM COATED ORAL at 01:15

## 2017-10-26 RX ADMIN — HYDRALAZINE HYDROCHLORIDE 25 MG: 25 TABLET ORAL at 22:50

## 2017-10-26 RX ADMIN — MEPERIDINE HYDROCHLORIDE 12.5 MG: 25 INJECTION, SOLUTION INTRAMUSCULAR; INTRAVENOUS; SUBCUTANEOUS at 17:27

## 2017-10-26 RX ADMIN — ONDANSETRON 4 MG: 2 INJECTION INTRAMUSCULAR; INTRAVENOUS at 17:47

## 2017-10-26 RX ADMIN — HYDROMORPHONE HYDROCHLORIDE 0.5 MG: 1 INJECTION, SOLUTION INTRAMUSCULAR; INTRAVENOUS; SUBCUTANEOUS at 19:20

## 2017-10-26 RX ADMIN — Medication 3 MG: at 17:03

## 2017-10-26 RX ADMIN — WARFARIN SODIUM 3 MG: 3 TABLET ORAL at 22:51

## 2017-10-26 RX ADMIN — CLONIDINE HYDROCHLORIDE 0.2 MG: 0.2 TABLET ORAL at 22:52

## 2017-10-26 RX ADMIN — SUGAMMADEX 100 MG: 100 INJECTION, SOLUTION INTRAVENOUS at 17:08

## 2017-10-26 RX ADMIN — GLYCOPYRROLATE 0.4 MG: 0.2 INJECTION, SOLUTION INTRAMUSCULAR; INTRAVENOUS at 17:03

## 2017-10-26 RX ADMIN — VANCOMYCIN HYDROCHLORIDE 1000 MG: 1 INJECTION, POWDER, LYOPHILIZED, FOR SOLUTION INTRAVENOUS at 15:49

## 2017-10-26 RX ADMIN — ROCURONIUM BROMIDE 20 MG: 10 INJECTION INTRAVENOUS at 15:55

## 2017-10-26 RX ADMIN — METOPROLOL TARTRATE 12.5 MG: 25 TABLET, FILM COATED ORAL at 09:20

## 2017-10-26 RX ADMIN — ETOMIDATE 10 MG: 2 INJECTION, SOLUTION INTRAVENOUS at 15:55

## 2017-10-26 NOTE — ANESTHESIA PROCEDURE NOTES
Airway  Urgency: elective    Airway not difficult    General Information and Staff    Patient location during procedure: OR    Indications and Patient Condition  Indications for airway management: airway protection    Preoxygenated: yes  MILS maintained throughout  Mask difficulty assessment: 1 - vent by mask    Final Airway Details  Final airway type: endotracheal airway      Successful airway: ETT  Cuffed: yes   Successful intubation technique: direct laryngoscopy  Endotracheal tube insertion site: oral  Blade: Brock  Blade size: #2  ETT size: 7.5 mm  Cormack-Lehane Classification: grade I - full view of glottis  Placement verified by: chest auscultation, capnometry and palpation of cuff   Cuff volume (mL): 10  Measured from: lips  ETT to lips (cm): 20  Number of attempts at approach: 1

## 2017-10-26 NOTE — ANESTHESIA PREPROCEDURE EVALUATION
Anesthesia Evaluation     Patient summary reviewed and Nursing notes reviewed          Airway   Mallampati: I  TM distance: <3 FB  Neck ROM: full  no difficulty expected  Dental - normal exam     Pulmonary - normal exam   (+) asthma,   Cardiovascular - normal exam    Patient on routine beta blocker and Beta blocker given within 24 hours of surgery    (+) pacemaker pacemaker interrogated 1-3 months ago, hypertension,     ROS comment: Echo today, EF 70%    Troponin elevated, cardiology consulted. Echo reviewed. Thought to be 2/2 hypotension/anemia and not cardiac    Neuro/Psych  GI/Hepatic/Renal/Endo    (+)  diabetes mellitus,     Musculoskeletal     Abdominal  - normal exam    Bowel sounds: normal.   Substance History      OB/GYN          Other   (+) arthritis                                   Anesthesia Plan    ASA 3     general   (Pt has pacemaker, interrogation reviewed. Recently had battery change. 98% a paced, occasionally vpaced. Have magnet in room. )  intravenous induction   Anesthetic plan and risks discussed with patient.

## 2017-10-27 LAB
HCT VFR BLD AUTO: 36.7 % (ref 37–47)
HGB BLD-MCNC: 11.8 G/DL (ref 12–16)
INR PPP: 1.16 (ref 0.91–1.09)
PROTHROMBIN TIME: 15.2 SECONDS (ref 11.9–14.6)

## 2017-10-27 PROCEDURE — G8988 SELF CARE GOAL STATUS: HCPCS

## 2017-10-27 PROCEDURE — G8978 MOBILITY CURRENT STATUS: HCPCS | Performed by: PHYSICAL THERAPIST

## 2017-10-27 PROCEDURE — 97162 PT EVAL MOD COMPLEX 30 MIN: CPT

## 2017-10-27 PROCEDURE — 25010000002 VANCOMYCIN 10 G RECONSTITUTED SOLUTION: Performed by: ORTHOPAEDIC SURGERY

## 2017-10-27 PROCEDURE — G8979 MOBILITY GOAL STATUS: HCPCS | Performed by: PHYSICAL THERAPIST

## 2017-10-27 PROCEDURE — G8987 SELF CARE CURRENT STATUS: HCPCS

## 2017-10-27 PROCEDURE — 97110 THERAPEUTIC EXERCISES: CPT

## 2017-10-27 PROCEDURE — 85018 HEMOGLOBIN: CPT | Performed by: ORTHOPAEDIC SURGERY

## 2017-10-27 PROCEDURE — 85610 PROTHROMBIN TIME: CPT | Performed by: ORTHOPAEDIC SURGERY

## 2017-10-27 PROCEDURE — 85014 HEMATOCRIT: CPT | Performed by: ORTHOPAEDIC SURGERY

## 2017-10-27 PROCEDURE — 97166 OT EVAL MOD COMPLEX 45 MIN: CPT

## 2017-10-27 RX ORDER — WARFARIN SODIUM 2 MG/1
2 TABLET ORAL
Status: DISCONTINUED | OUTPATIENT
Start: 2017-10-27 | End: 2017-10-30

## 2017-10-27 RX ADMIN — LISINOPRIL 40 MG: 20 TABLET ORAL at 10:55

## 2017-10-27 RX ADMIN — HYDRALAZINE HYDROCHLORIDE 25 MG: 25 TABLET ORAL at 21:47

## 2017-10-27 RX ADMIN — METOPROLOL TARTRATE 12.5 MG: 25 TABLET, FILM COATED ORAL at 10:55

## 2017-10-27 RX ADMIN — SODIUM CHLORIDE, POTASSIUM CHLORIDE, SODIUM LACTATE AND CALCIUM CHLORIDE 50 ML/HR: 600; 310; 30; 20 INJECTION, SOLUTION INTRAVENOUS at 04:20

## 2017-10-27 RX ADMIN — VANCOMYCIN HYDROCHLORIDE 1500 MG: 10 INJECTION, POWDER, LYOPHILIZED, FOR SOLUTION INTRAVENOUS at 10:55

## 2017-10-27 RX ADMIN — METOPROLOL TARTRATE 12.5 MG: 25 TABLET, FILM COATED ORAL at 17:21

## 2017-10-27 RX ADMIN — HYDRALAZINE HYDROCHLORIDE 25 MG: 25 TABLET ORAL at 10:55

## 2017-10-27 RX ADMIN — CLONIDINE HYDROCHLORIDE 0.2 MG: 0.2 TABLET ORAL at 10:55

## 2017-10-27 RX ADMIN — HYDROCODONE BITARTRATE AND ACETAMINOPHEN 1 TABLET: 7.5; 325 TABLET ORAL at 15:10

## 2017-10-27 RX ADMIN — HYDROCODONE BITARTRATE AND ACETAMINOPHEN 1 TABLET: 7.5; 325 TABLET ORAL at 04:29

## 2017-10-27 RX ADMIN — CLONIDINE HYDROCHLORIDE 0.2 MG: 0.2 TABLET ORAL at 17:21

## 2017-10-27 RX ADMIN — WARFARIN SODIUM 2 MG: 2 TABLET ORAL at 17:21

## 2017-10-27 RX ADMIN — HYDROCODONE BITARTRATE AND ACETAMINOPHEN 1 TABLET: 7.5; 325 TABLET ORAL at 23:08

## 2017-10-27 RX ADMIN — PAROXETINE 20 MG: 20 TABLET, FILM COATED ORAL at 10:55

## 2017-10-27 RX ADMIN — BISACODYL 10 MG: 5 TABLET, COATED ORAL at 10:56

## 2017-10-27 RX ADMIN — ZOLPIDEM TARTRATE 5 MG: 5 TABLET, COATED ORAL at 23:08

## 2017-10-27 NOTE — ANESTHESIA POSTPROCEDURE EVALUATION
"Patient: Justina MARIE Mix    Procedure Summary     Date Anesthesia Start Anesthesia Stop Room / Location    10/26/17 1555 1716 [facility] OR 11 / [facility] OR       Procedure Diagnosis Surgeon Provider    HIP TROCANTERIC NAILING SHORT WITH INTRAMEDULLARY HIP SCREW (Left Hip) No diagnosis on file. MD Luciana Campo CRNA          Anesthesia Type: general  Last vitals  BP   148/59 (10/27/17 0421)   Temp   100.5 °F (38.1 °C) (10/27/17 0421)   Pulse   73 (10/27/17 0421)   Resp   18 (10/27/17 0421)     SpO2   94 % (10/27/17 0421)     Post Anesthesia Care and Evaluation    Patient location during evaluation: PACU  Patient participation: complete - patient participated  Level of consciousness: awake and alert  Pain management: adequate  Airway patency: patent  Anesthetic complications: No anesthetic complications  PONV Status: none  Cardiovascular status: acceptable and hemodynamically stable  Respiratory status: acceptable  Hydration status: acceptable    Comments: Blood pressure 148/59, pulse 73, temperature 100.5 °F (38.1 °C), temperature source Oral, resp. rate 18, height 69\" (175.3 cm), weight 197 lb 7 oz (89.6 kg), SpO2 94 %.    Patient discharged from PACU based upon Harish score. Please see RN notes for further details      "

## 2017-10-28 LAB
ABO + RH BLD: NORMAL
ABO + RH BLD: NORMAL
BH BB BLOOD EXPIRATION DATE: NORMAL
BH BB BLOOD EXPIRATION DATE: NORMAL
BH BB BLOOD TYPE BARCODE: 5100
BH BB BLOOD TYPE BARCODE: 5100
BH BB DISPENSE STATUS: NORMAL
BH BB DISPENSE STATUS: NORMAL
BH BB PRODUCT CODE: NORMAL
BH BB PRODUCT CODE: NORMAL
BH BB UNIT NUMBER: NORMAL
BH BB UNIT NUMBER: NORMAL
CROSSMATCH INTERPRETATION: NORMAL
CROSSMATCH INTERPRETATION: NORMAL
GLUCOSE BLDC GLUCOMTR-MCNC: 118 MG/DL (ref 70–130)
HCT VFR BLD AUTO: 36.2 % (ref 37–47)
HGB BLD-MCNC: 11.7 G/DL (ref 12–16)
INR PPP: 1.2 (ref 0.91–1.09)
PROTHROMBIN TIME: 15.6 SECONDS (ref 11.9–14.6)
UNIT  ABO: NORMAL
UNIT  ABO: NORMAL
UNIT  RH: NORMAL
UNIT  RH: NORMAL

## 2017-10-28 PROCEDURE — 36600 WITHDRAWAL OF ARTERIAL BLOOD: CPT

## 2017-10-28 PROCEDURE — 85014 HEMATOCRIT: CPT | Performed by: ORTHOPAEDIC SURGERY

## 2017-10-28 PROCEDURE — 97110 THERAPEUTIC EXERCISES: CPT

## 2017-10-28 PROCEDURE — 85610 PROTHROMBIN TIME: CPT | Performed by: ORTHOPAEDIC SURGERY

## 2017-10-28 PROCEDURE — 85018 HEMOGLOBIN: CPT | Performed by: ORTHOPAEDIC SURGERY

## 2017-10-28 PROCEDURE — 94640 AIRWAY INHALATION TREATMENT: CPT

## 2017-10-28 PROCEDURE — 82962 GLUCOSE BLOOD TEST: CPT

## 2017-10-28 PROCEDURE — 97530 THERAPEUTIC ACTIVITIES: CPT

## 2017-10-28 RX ORDER — TAMSULOSIN HYDROCHLORIDE 0.4 MG/1
0.4 CAPSULE ORAL DAILY
Status: DISCONTINUED | OUTPATIENT
Start: 2017-10-28 | End: 2017-10-30 | Stop reason: HOSPADM

## 2017-10-28 RX ADMIN — CLONIDINE HYDROCHLORIDE 0.2 MG: 0.2 TABLET ORAL at 08:49

## 2017-10-28 RX ADMIN — METOPROLOL TARTRATE 12.5 MG: 25 TABLET, FILM COATED ORAL at 18:12

## 2017-10-28 RX ADMIN — TAMSULOSIN HYDROCHLORIDE 0.4 MG: 0.4 CAPSULE ORAL at 13:18

## 2017-10-28 RX ADMIN — CLONIDINE HYDROCHLORIDE 0.2 MG: 0.2 TABLET ORAL at 18:12

## 2017-10-28 RX ADMIN — WARFARIN SODIUM 2 MG: 2 TABLET ORAL at 18:12

## 2017-10-28 RX ADMIN — HYDRALAZINE HYDROCHLORIDE 25 MG: 25 TABLET ORAL at 08:49

## 2017-10-28 RX ADMIN — HYDROCODONE BITARTRATE AND ACETAMINOPHEN 1 TABLET: 7.5; 325 TABLET ORAL at 08:56

## 2017-10-28 RX ADMIN — HYDROCODONE BITARTRATE AND ACETAMINOPHEN 1 TABLET: 7.5; 325 TABLET ORAL at 13:17

## 2017-10-28 RX ADMIN — HYDRALAZINE HYDROCHLORIDE 25 MG: 25 TABLET ORAL at 20:43

## 2017-10-28 RX ADMIN — PAROXETINE 20 MG: 20 TABLET, FILM COATED ORAL at 08:49

## 2017-10-28 RX ADMIN — BISACODYL 10 MG: 5 TABLET, COATED ORAL at 13:17

## 2017-10-28 RX ADMIN — METOPROLOL TARTRATE 12.5 MG: 25 TABLET, FILM COATED ORAL at 08:49

## 2017-10-28 RX ADMIN — LISINOPRIL 40 MG: 20 TABLET ORAL at 08:49

## 2017-10-28 RX ADMIN — HYDROCODONE BITARTRATE AND ACETAMINOPHEN 1 TABLET: 7.5; 325 TABLET ORAL at 20:42

## 2017-10-29 LAB
BACTERIA SPEC AEROBE CULT: NORMAL
BACTERIA SPEC AEROBE CULT: NORMAL
HCT VFR BLD AUTO: 35.2 % (ref 37–47)
HGB BLD-MCNC: 11.3 G/DL (ref 12–16)
INR PPP: 1.11 (ref 0.91–1.09)
PROTHROMBIN TIME: 14.7 SECONDS (ref 11.9–14.6)

## 2017-10-29 PROCEDURE — 97110 THERAPEUTIC EXERCISES: CPT

## 2017-10-29 PROCEDURE — 85018 HEMOGLOBIN: CPT | Performed by: ORTHOPAEDIC SURGERY

## 2017-10-29 PROCEDURE — 85014 HEMATOCRIT: CPT | Performed by: ORTHOPAEDIC SURGERY

## 2017-10-29 PROCEDURE — 85610 PROTHROMBIN TIME: CPT | Performed by: ORTHOPAEDIC SURGERY

## 2017-10-29 PROCEDURE — 97530 THERAPEUTIC ACTIVITIES: CPT

## 2017-10-29 RX ORDER — METOPROLOL TARTRATE 50 MG/1
50 TABLET, FILM COATED ORAL EVERY 12 HOURS SCHEDULED
Status: DISCONTINUED | OUTPATIENT
Start: 2017-10-29 | End: 2017-10-30 | Stop reason: HOSPADM

## 2017-10-29 RX ADMIN — ZOLPIDEM TARTRATE 5 MG: 5 TABLET, COATED ORAL at 00:13

## 2017-10-29 RX ADMIN — ZOLPIDEM TARTRATE 5 MG: 5 TABLET, COATED ORAL at 22:11

## 2017-10-29 RX ADMIN — HYDRALAZINE HYDROCHLORIDE 25 MG: 25 TABLET ORAL at 22:11

## 2017-10-29 RX ADMIN — METOPROLOL TARTRATE 12.5 MG: 25 TABLET, FILM COATED ORAL at 08:28

## 2017-10-29 RX ADMIN — LISINOPRIL 40 MG: 20 TABLET ORAL at 08:28

## 2017-10-29 RX ADMIN — HYDROCODONE BITARTRATE AND ACETAMINOPHEN 1 TABLET: 7.5; 325 TABLET ORAL at 12:58

## 2017-10-29 RX ADMIN — METOPROLOL TARTRATE 12.5 MG: 25 TABLET, FILM COATED ORAL at 17:14

## 2017-10-29 RX ADMIN — CLONIDINE HYDROCHLORIDE 0.2 MG: 0.2 TABLET ORAL at 17:14

## 2017-10-29 RX ADMIN — HYDROCODONE BITARTRATE AND ACETAMINOPHEN 1 TABLET: 7.5; 325 TABLET ORAL at 22:18

## 2017-10-29 RX ADMIN — CLONIDINE HYDROCHLORIDE 0.2 MG: 0.2 TABLET ORAL at 08:28

## 2017-10-29 RX ADMIN — HYDROCODONE BITARTRATE AND ACETAMINOPHEN 1 TABLET: 7.5; 325 TABLET ORAL at 18:27

## 2017-10-29 RX ADMIN — WARFARIN SODIUM 2 MG: 2 TABLET ORAL at 17:14

## 2017-10-29 RX ADMIN — HYDROCODONE BITARTRATE AND ACETAMINOPHEN 1 TABLET: 7.5; 325 TABLET ORAL at 00:50

## 2017-10-29 RX ADMIN — HYDRALAZINE HYDROCHLORIDE 25 MG: 25 TABLET ORAL at 08:28

## 2017-10-29 RX ADMIN — PAROXETINE 20 MG: 20 TABLET, FILM COATED ORAL at 08:28

## 2017-10-29 RX ADMIN — HYDROCODONE BITARTRATE AND ACETAMINOPHEN 1 TABLET: 7.5; 325 TABLET ORAL at 08:28

## 2017-10-29 RX ADMIN — METOPROLOL TARTRATE 50 MG: 50 TABLET, FILM COATED ORAL at 19:26

## 2017-10-29 RX ADMIN — TAMSULOSIN HYDROCHLORIDE 0.4 MG: 0.4 CAPSULE ORAL at 08:28

## 2017-10-30 VITALS
DIASTOLIC BLOOD PRESSURE: 75 MMHG | SYSTOLIC BLOOD PRESSURE: 195 MMHG | OXYGEN SATURATION: 92 % | RESPIRATION RATE: 20 BRPM | HEIGHT: 69 IN | TEMPERATURE: 99.4 F | HEART RATE: 70 BPM | BODY MASS INDEX: 29.24 KG/M2 | WEIGHT: 197.44 LBS

## 2017-10-30 LAB
INR PPP: 1.05 (ref 0.91–1.09)
PROTHROMBIN TIME: 14 SECONDS (ref 11.9–14.6)

## 2017-10-30 PROCEDURE — 97530 THERAPEUTIC ACTIVITIES: CPT

## 2017-10-30 PROCEDURE — 85610 PROTHROMBIN TIME: CPT | Performed by: ORTHOPAEDIC SURGERY

## 2017-10-30 PROCEDURE — 97110 THERAPEUTIC EXERCISES: CPT

## 2017-10-30 RX ORDER — TAMSULOSIN HYDROCHLORIDE 0.4 MG/1
0.4 CAPSULE ORAL DAILY
Qty: 30 CAPSULE
Start: 2017-10-31 | End: 2019-05-03

## 2017-10-30 RX ORDER — WARFARIN SODIUM 3 MG/1
3 TABLET ORAL
Status: DISCONTINUED | OUTPATIENT
Start: 2017-10-30 | End: 2017-10-30 | Stop reason: HOSPADM

## 2017-10-30 RX ORDER — CLONIDINE HYDROCHLORIDE 0.1 MG/1
0.1 TABLET ORAL ONCE
Status: COMPLETED | OUTPATIENT
Start: 2017-10-30 | End: 2017-10-30

## 2017-10-30 RX ORDER — WARFARIN SODIUM 3 MG/1
TABLET ORAL
Start: 2017-10-30 | End: 2018-01-23

## 2017-10-30 RX ADMIN — LISINOPRIL 40 MG: 20 TABLET ORAL at 08:09

## 2017-10-30 RX ADMIN — HYDROCODONE BITARTRATE AND ACETAMINOPHEN 1 TABLET: 7.5; 325 TABLET ORAL at 06:11

## 2017-10-30 RX ADMIN — METOPROLOL TARTRATE 50 MG: 50 TABLET, FILM COATED ORAL at 06:12

## 2017-10-30 RX ADMIN — PAROXETINE 20 MG: 20 TABLET, FILM COATED ORAL at 08:09

## 2017-10-30 RX ADMIN — CLONIDINE HYDROCHLORIDE 0.2 MG: 0.2 TABLET ORAL at 08:09

## 2017-10-30 RX ADMIN — TAMSULOSIN HYDROCHLORIDE 0.4 MG: 0.4 CAPSULE ORAL at 08:09

## 2017-10-30 RX ADMIN — CLONIDINE HYDROCHLORIDE 0.1 MG: 0.1 TABLET ORAL at 12:53

## 2017-10-30 RX ADMIN — HYDROCODONE BITARTRATE AND ACETAMINOPHEN 1 TABLET: 7.5; 325 TABLET ORAL at 11:43

## 2017-10-30 RX ADMIN — HYDRALAZINE HYDROCHLORIDE 25 MG: 25 TABLET ORAL at 08:09

## 2018-01-23 ENCOUNTER — APPOINTMENT (OUTPATIENT)
Dept: GENERAL RADIOLOGY | Facility: HOSPITAL | Age: 83
End: 2018-01-23

## 2018-01-23 ENCOUNTER — HOSPITAL ENCOUNTER (INPATIENT)
Facility: HOSPITAL | Age: 83
LOS: 13 days | Discharge: NURSING, REHAB OR LONG-TERM CARE | End: 2018-02-05
Attending: EMERGENCY MEDICINE | Admitting: INTERNAL MEDICINE

## 2018-01-23 DIAGNOSIS — A41.9 SEPSIS, DUE TO UNSPECIFIED ORGANISM: ICD-10-CM

## 2018-01-23 DIAGNOSIS — J18.9 PNEUMONIA OF BOTH LUNGS DUE TO INFECTIOUS ORGANISM, UNSPECIFIED PART OF LUNG: Primary | ICD-10-CM

## 2018-01-23 DIAGNOSIS — Z74.09 IMPAIRED MOBILITY AND ADLS: ICD-10-CM

## 2018-01-23 DIAGNOSIS — R77.8 ELEVATED TROPONIN: ICD-10-CM

## 2018-01-23 DIAGNOSIS — Z74.09 IMPAIRED FUNCTIONAL MOBILITY, BALANCE, GAIT, AND ENDURANCE: ICD-10-CM

## 2018-01-23 DIAGNOSIS — Z78.9 IMPAIRED MOBILITY AND ADLS: ICD-10-CM

## 2018-01-23 DIAGNOSIS — R09.02 HYPOXIA: ICD-10-CM

## 2018-01-23 LAB
ALBUMIN SERPL-MCNC: 4 G/DL (ref 3.5–5)
ALBUMIN/GLOB SERPL: 1.4 G/DL (ref 1.1–2.5)
ALP SERPL-CCNC: 84 U/L (ref 24–120)
ALT SERPL W P-5'-P-CCNC: 37 U/L (ref 0–54)
ANION GAP SERPL CALCULATED.3IONS-SCNC: 12 MMOL/L (ref 4–13)
ARTERIAL PATENCY WRIST A: POSITIVE
AST SERPL-CCNC: 24 U/L (ref 7–45)
ATMOSPHERIC PRESS: 749 MMHG
ATMOSPHERIC PRESS: 749 MMHG
BACTERIA UR QL AUTO: ABNORMAL /HPF
BASE EXCESS BLDA CALC-SCNC: 6.1 MMOL/L (ref 0–2)
BASE EXCESS BLDV CALC-SCNC: 6.7 MMOL/L (ref 0–2)
BASOPHILS # BLD AUTO: 0.06 10*3/MM3 (ref 0–0.2)
BASOPHILS NFR BLD AUTO: 0.4 % (ref 0–2)
BDY SITE: ABNORMAL
BDY SITE: ABNORMAL
BILIRUB SERPL-MCNC: 0.4 MG/DL (ref 0.1–1)
BILIRUB UR QL STRIP: NEGATIVE
BODY TEMPERATURE: 37 C
BODY TEMPERATURE: 37 C
BUN BLD-MCNC: 16 MG/DL (ref 5–21)
BUN/CREAT SERPL: 19.8 (ref 7–25)
CALCIUM SPEC-SCNC: 9.9 MG/DL (ref 8.4–10.4)
CHLORIDE SERPL-SCNC: 97 MMOL/L (ref 98–110)
CK MB SERPL-CCNC: 0.35 NG/ML (ref 0–5)
CLARITY UR: ABNORMAL
CO2 SERPL-SCNC: 32 MMOL/L (ref 24–31)
COLOR UR: YELLOW
CREAT BLD-MCNC: 0.81 MG/DL (ref 0.5–1.4)
D-LACTATE SERPL-SCNC: 1.1 MMOL/L (ref 0.5–2)
DEPRECATED RDW RBC AUTO: 51 FL (ref 40–54)
EOSINOPHIL # BLD AUTO: 0.06 10*3/MM3 (ref 0–0.7)
EOSINOPHIL NFR BLD AUTO: 0.4 % (ref 0–4)
ERYTHROCYTE [DISTWIDTH] IN BLOOD BY AUTOMATED COUNT: 15.5 % (ref 12–15)
FLUAV AG NPH QL: NEGATIVE
FLUBV AG NPH QL IA: NEGATIVE
GAS FLOW AIRWAY: 11 LPM
GAS FLOW AIRWAY: 5 LPM
GFR SERPL CREATININE-BSD FRML MDRD: 66 ML/MIN/1.73
GLOBULIN UR ELPH-MCNC: 2.8 GM/DL
GLUCOSE BLD-MCNC: 123 MG/DL (ref 70–100)
GLUCOSE UR STRIP-MCNC: NEGATIVE MG/DL
HCO3 BLDA-SCNC: 31.9 MMOL/L (ref 20–26)
HCO3 BLDV-SCNC: 32.6 MMOL/L (ref 22–28)
HCT VFR BLD AUTO: 38.5 % (ref 37–47)
HGB BLD-MCNC: 12.2 G/DL (ref 12–16)
HGB UR QL STRIP.AUTO: ABNORMAL
HOLD SPECIMEN: NORMAL
HOLD SPECIMEN: NORMAL
HYALINE CASTS UR QL AUTO: ABNORMAL /LPF
IMM GRANULOCYTES # BLD: 0.1 10*3/MM3 (ref 0–0.03)
IMM GRANULOCYTES NFR BLD: 0.7 % (ref 0–5)
INR PPP: 1.72 (ref 0.91–1.09)
INR PPP: 1.73 (ref 0.91–1.09)
KETONES UR QL STRIP: NEGATIVE
LEUKOCYTE ESTERASE UR QL STRIP.AUTO: ABNORMAL
LYMPHOCYTES # BLD AUTO: 0.99 10*3/MM3 (ref 0.72–4.86)
LYMPHOCYTES NFR BLD AUTO: 7.2 % (ref 15–45)
Lab: ABNORMAL
Lab: ABNORMAL
MCH RBC QN AUTO: 28 PG (ref 28–32)
MCHC RBC AUTO-ENTMCNC: 31.7 G/DL (ref 33–36)
MCV RBC AUTO: 88.3 FL (ref 82–98)
MODALITY: ABNORMAL
MODALITY: ABNORMAL
MONOCYTES # BLD AUTO: 1.34 10*3/MM3 (ref 0.19–1.3)
MONOCYTES NFR BLD AUTO: 9.8 % (ref 4–12)
NEUTROPHILS # BLD AUTO: 11.11 10*3/MM3 (ref 1.87–8.4)
NEUTROPHILS NFR BLD AUTO: 81.5 % (ref 39–78)
NITRITE UR QL STRIP: POSITIVE
NRBC BLD MANUAL-RTO: 0 /100 WBC (ref 0–0)
NT-PROBNP SERPL-MCNC: 730 PG/ML (ref 0–1800)
PCO2 BLDA: 50.7 MM HG (ref 35–45)
PCO2 BLDV: 50.9 MM HG (ref 41–51)
PH BLDA: 7.41 PH UNITS (ref 7.35–7.45)
PH BLDV: 7.41 PH UNITS (ref 7.32–7.42)
PH UR STRIP.AUTO: 5.5 [PH] (ref 5–8)
PLATELET # BLD AUTO: 179 10*3/MM3 (ref 130–400)
PMV BLD AUTO: 10.3 FL (ref 6–12)
PO2 BLDA: 80.1 MM HG (ref 83–108)
PO2 BLDV: 75.2 MM HG (ref 27–53)
POTASSIUM BLD-SCNC: 3.8 MMOL/L (ref 3.5–5.3)
PROT SERPL-MCNC: 6.8 G/DL (ref 6.3–8.7)
PROT UR QL STRIP: ABNORMAL
PROTHROMBIN TIME: 20.8 SECONDS (ref 11.9–14.6)
PROTHROMBIN TIME: 20.9 SECONDS (ref 11.9–14.6)
RBC # BLD AUTO: 4.36 10*6/MM3 (ref 4.2–5.4)
RBC # UR: ABNORMAL /HPF
REF LAB TEST METHOD: ABNORMAL
SAO2 % BLDCOA: 96.5 % (ref 94–99)
SAO2 % BLDCOV: 95.9 % (ref 45–75)
SODIUM BLD-SCNC: 141 MMOL/L (ref 135–145)
SP GR UR STRIP: 1.01 (ref 1–1.03)
SQUAMOUS #/AREA URNS HPF: ABNORMAL /HPF
TROPONIN I SERPL-MCNC: 0.05 NG/ML (ref 0–0.03)
UROBILINOGEN UR QL STRIP: ABNORMAL
VENTILATOR MODE: ABNORMAL
VENTILATOR MODE: ABNORMAL
WBC NRBC COR # BLD: 13.66 10*3/MM3 (ref 4.8–10.8)
WBC UR QL AUTO: ABNORMAL /HPF
WHOLE BLOOD HOLD SPECIMEN: NORMAL
WHOLE BLOOD HOLD SPECIMEN: NORMAL

## 2018-01-23 PROCEDURE — 25010000002 LEVOFLOXACIN PER 250 MG: Performed by: EMERGENCY MEDICINE

## 2018-01-23 PROCEDURE — 94644 CONT INHLJ TX 1ST HOUR: CPT

## 2018-01-23 PROCEDURE — 25010000002 ONDANSETRON PER 1 MG: Performed by: INTERNAL MEDICINE

## 2018-01-23 PROCEDURE — 81001 URINALYSIS AUTO W/SCOPE: CPT | Performed by: EMERGENCY MEDICINE

## 2018-01-23 PROCEDURE — 36600 WITHDRAWAL OF ARTERIAL BLOOD: CPT

## 2018-01-23 PROCEDURE — 85025 COMPLETE CBC W/AUTO DIFF WBC: CPT | Performed by: EMERGENCY MEDICINE

## 2018-01-23 PROCEDURE — 83880 ASSAY OF NATRIURETIC PEPTIDE: CPT | Performed by: EMERGENCY MEDICINE

## 2018-01-23 PROCEDURE — 25010000002 VANCOMYCIN 10 G RECONSTITUTED SOLUTION: Performed by: NURSE PRACTITIONER

## 2018-01-23 PROCEDURE — 94799 UNLISTED PULMONARY SVC/PX: CPT

## 2018-01-23 PROCEDURE — 93010 ELECTROCARDIOGRAM REPORT: CPT | Performed by: INTERNAL MEDICINE

## 2018-01-23 PROCEDURE — 93005 ELECTROCARDIOGRAM TRACING: CPT | Performed by: EMERGENCY MEDICINE

## 2018-01-23 PROCEDURE — 80053 COMPREHEN METABOLIC PANEL: CPT | Performed by: EMERGENCY MEDICINE

## 2018-01-23 PROCEDURE — 87147 CULTURE TYPE IMMUNOLOGIC: CPT | Performed by: EMERGENCY MEDICINE

## 2018-01-23 PROCEDURE — 94640 AIRWAY INHALATION TREATMENT: CPT

## 2018-01-23 PROCEDURE — 85610 PROTHROMBIN TIME: CPT | Performed by: INTERNAL MEDICINE

## 2018-01-23 PROCEDURE — 82553 CREATINE MB FRACTION: CPT | Performed by: EMERGENCY MEDICINE

## 2018-01-23 PROCEDURE — 99284 EMERGENCY DEPT VISIT MOD MDM: CPT

## 2018-01-23 PROCEDURE — 84484 ASSAY OF TROPONIN QUANT: CPT | Performed by: EMERGENCY MEDICINE

## 2018-01-23 PROCEDURE — 83605 ASSAY OF LACTIC ACID: CPT | Performed by: EMERGENCY MEDICINE

## 2018-01-23 PROCEDURE — 87804 INFLUENZA ASSAY W/OPTIC: CPT | Performed by: EMERGENCY MEDICINE

## 2018-01-23 PROCEDURE — 85610 PROTHROMBIN TIME: CPT | Performed by: EMERGENCY MEDICINE

## 2018-01-23 PROCEDURE — 71046 X-RAY EXAM CHEST 2 VIEWS: CPT

## 2018-01-23 PROCEDURE — 87040 BLOOD CULTURE FOR BACTERIA: CPT | Performed by: EMERGENCY MEDICINE

## 2018-01-23 PROCEDURE — 82803 BLOOD GASES ANY COMBINATION: CPT

## 2018-01-23 PROCEDURE — 87086 URINE CULTURE/COLONY COUNT: CPT | Performed by: EMERGENCY MEDICINE

## 2018-01-23 RX ORDER — ONDANSETRON 4 MG/1
4 TABLET, FILM COATED ORAL EVERY 6 HOURS PRN
Status: DISCONTINUED | OUTPATIENT
Start: 2018-01-23 | End: 2018-02-05 | Stop reason: HOSPADM

## 2018-01-23 RX ORDER — ECHINACEA PURPUREA EXTRACT 125 MG
1 TABLET ORAL DAILY
COMMUNITY
End: 2019-05-03

## 2018-01-23 RX ORDER — GABAPENTIN 300 MG/1
300 CAPSULE ORAL DAILY
Status: DISCONTINUED | OUTPATIENT
Start: 2018-01-23 | End: 2018-02-05 | Stop reason: HOSPADM

## 2018-01-23 RX ORDER — ALBUTEROL SULFATE 2.5 MG/3ML
10 SOLUTION RESPIRATORY (INHALATION) CONTINUOUS
Status: DISPENSED | OUTPATIENT
Start: 2018-01-23 | End: 2018-01-23

## 2018-01-23 RX ORDER — HYDRALAZINE HYDROCHLORIDE 25 MG/1
25 TABLET, FILM COATED ORAL EVERY 12 HOURS SCHEDULED
Status: DISCONTINUED | OUTPATIENT
Start: 2018-01-23 | End: 2018-01-29

## 2018-01-23 RX ORDER — FLUTICASONE PROPIONATE 50 MCG
1 SPRAY, SUSPENSION (ML) NASAL 2 TIMES DAILY
Status: DISCONTINUED | OUTPATIENT
Start: 2018-01-23 | End: 2018-02-05 | Stop reason: HOSPADM

## 2018-01-23 RX ORDER — ASPIRIN 81 MG/1
162 TABLET ORAL DAILY
Status: DISCONTINUED | OUTPATIENT
Start: 2018-01-24 | End: 2018-02-05 | Stop reason: HOSPADM

## 2018-01-23 RX ORDER — SODIUM CHLORIDE 0.9 % (FLUSH) 0.9 %
1-10 SYRINGE (ML) INJECTION AS NEEDED
Status: DISCONTINUED | OUTPATIENT
Start: 2018-01-23 | End: 2018-02-05 | Stop reason: HOSPADM

## 2018-01-23 RX ORDER — TAMSULOSIN HYDROCHLORIDE 0.4 MG/1
0.4 CAPSULE ORAL DAILY
Status: DISCONTINUED | OUTPATIENT
Start: 2018-01-23 | End: 2018-02-05 | Stop reason: HOSPADM

## 2018-01-23 RX ORDER — ACETAMINOPHEN 325 MG/1
650 TABLET ORAL EVERY 6 HOURS PRN
COMMUNITY
End: 2022-01-01 | Stop reason: HOSPADM

## 2018-01-23 RX ORDER — WARFARIN SODIUM 5 MG/1
5 TABLET ORAL NIGHTLY
COMMUNITY
End: 2019-05-03

## 2018-01-23 RX ORDER — ACETAMINOPHEN 325 MG/1
650 TABLET ORAL EVERY 4 HOURS PRN
Status: DISCONTINUED | OUTPATIENT
Start: 2018-01-23 | End: 2018-02-05 | Stop reason: HOSPADM

## 2018-01-23 RX ORDER — AMLODIPINE BESYLATE 5 MG/1
5 TABLET ORAL DAILY
COMMUNITY
End: 2018-02-05 | Stop reason: HOSPADM

## 2018-01-23 RX ORDER — FUROSEMIDE 40 MG/1
40 TABLET ORAL DAILY
Status: DISCONTINUED | OUTPATIENT
Start: 2018-01-23 | End: 2018-02-05 | Stop reason: HOSPADM

## 2018-01-23 RX ORDER — GABAPENTIN 300 MG/1
300 CAPSULE ORAL DAILY
COMMUNITY
End: 2019-05-03 | Stop reason: SDUPTHER

## 2018-01-23 RX ORDER — VITAMIN E 268 MG
400 CAPSULE ORAL DAILY
Status: DISCONTINUED | OUTPATIENT
Start: 2018-01-23 | End: 2018-02-05 | Stop reason: HOSPADM

## 2018-01-23 RX ORDER — ONDANSETRON 2 MG/ML
4 INJECTION INTRAMUSCULAR; INTRAVENOUS EVERY 6 HOURS PRN
Status: DISCONTINUED | OUTPATIENT
Start: 2018-01-23 | End: 2018-02-05 | Stop reason: HOSPADM

## 2018-01-23 RX ORDER — AMLODIPINE BESYLATE 5 MG/1
5 TABLET ORAL DAILY
Status: DISCONTINUED | OUTPATIENT
Start: 2018-01-23 | End: 2018-01-30

## 2018-01-23 RX ORDER — POTASSIUM CHLORIDE 750 MG/1
10 CAPSULE, EXTENDED RELEASE ORAL DAILY
Status: DISCONTINUED | OUTPATIENT
Start: 2018-01-23 | End: 2018-02-05 | Stop reason: HOSPADM

## 2018-01-23 RX ORDER — ALPRAZOLAM 0.25 MG/1
0.12 TABLET ORAL DAILY
Status: DISCONTINUED | OUTPATIENT
Start: 2018-01-23 | End: 2018-02-05 | Stop reason: HOSPADM

## 2018-01-23 RX ORDER — ZOLPIDEM TARTRATE 5 MG/1
5 TABLET ORAL NIGHTLY PRN
Status: DISCONTINUED | OUTPATIENT
Start: 2018-01-23 | End: 2018-02-05 | Stop reason: HOSPADM

## 2018-01-23 RX ORDER — CLONIDINE HYDROCHLORIDE 0.1 MG/1
0.1 TABLET ORAL EVERY 4 HOURS PRN
Status: DISCONTINUED | OUTPATIENT
Start: 2018-01-23 | End: 2018-02-05 | Stop reason: HOSPADM

## 2018-01-23 RX ORDER — IPRATROPIUM BROMIDE AND ALBUTEROL SULFATE 2.5; .5 MG/3ML; MG/3ML
3 SOLUTION RESPIRATORY (INHALATION)
Status: DISCONTINUED | OUTPATIENT
Start: 2018-01-23 | End: 2018-02-05 | Stop reason: HOSPADM

## 2018-01-23 RX ORDER — ATORVASTATIN CALCIUM 10 MG/1
10 TABLET, FILM COATED ORAL NIGHTLY
Status: DISCONTINUED | OUTPATIENT
Start: 2018-01-23 | End: 2018-02-05 | Stop reason: HOSPADM

## 2018-01-23 RX ORDER — TRAMADOL HYDROCHLORIDE 50 MG/1
50 TABLET ORAL EVERY 4 HOURS
Status: DISCONTINUED | OUTPATIENT
Start: 2018-01-23 | End: 2018-02-05 | Stop reason: HOSPADM

## 2018-01-23 RX ORDER — ACETAMINOPHEN 325 MG/1
650 TABLET ORAL EVERY 4 HOURS PRN
Status: DISCONTINUED | OUTPATIENT
Start: 2018-01-23 | End: 2018-01-23 | Stop reason: SDUPTHER

## 2018-01-23 RX ORDER — LEVOFLOXACIN 5 MG/ML
750 INJECTION, SOLUTION INTRAVENOUS ONCE
Status: COMPLETED | OUTPATIENT
Start: 2018-01-23 | End: 2018-01-23

## 2018-01-23 RX ORDER — GABAPENTIN 300 MG/1
600 CAPSULE ORAL NIGHTLY
Status: DISCONTINUED | OUTPATIENT
Start: 2018-01-23 | End: 2018-02-05 | Stop reason: HOSPADM

## 2018-01-23 RX ORDER — ONDANSETRON 4 MG/1
4 TABLET, ORALLY DISINTEGRATING ORAL EVERY 6 HOURS PRN
Status: DISCONTINUED | OUTPATIENT
Start: 2018-01-23 | End: 2018-02-05 | Stop reason: HOSPADM

## 2018-01-23 RX ORDER — PAROXETINE HYDROCHLORIDE 20 MG/1
20 TABLET, FILM COATED ORAL EVERY MORNING
Status: DISCONTINUED | OUTPATIENT
Start: 2018-01-24 | End: 2018-02-05 | Stop reason: HOSPADM

## 2018-01-23 RX ORDER — HYDROCODONE BITARTRATE AND ACETAMINOPHEN 5; 325 MG/1; MG/1
1 TABLET ORAL EVERY 6 HOURS PRN
Status: DISCONTINUED | OUTPATIENT
Start: 2018-01-23 | End: 2018-02-05 | Stop reason: HOSPADM

## 2018-01-23 RX ORDER — GABAPENTIN 300 MG/1
300 CAPSULE ORAL DAILY
COMMUNITY
End: 2020-03-23

## 2018-01-23 RX ORDER — SODIUM CHLORIDE 0.9 % (FLUSH) 0.9 %
10 SYRINGE (ML) INJECTION AS NEEDED
Status: DISCONTINUED | OUTPATIENT
Start: 2018-01-23 | End: 2018-02-05 | Stop reason: HOSPADM

## 2018-01-23 RX ORDER — MELATONIN
1000 DAILY
Status: DISCONTINUED | OUTPATIENT
Start: 2018-01-23 | End: 2018-02-05 | Stop reason: HOSPADM

## 2018-01-23 RX ORDER — LISINOPRIL 20 MG/1
40 TABLET ORAL DAILY
Status: DISCONTINUED | OUTPATIENT
Start: 2018-01-23 | End: 2018-01-30

## 2018-01-23 RX ORDER — IPRATROPIUM BROMIDE AND ALBUTEROL SULFATE 2.5; .5 MG/3ML; MG/3ML
3 SOLUTION RESPIRATORY (INHALATION) ONCE
Status: COMPLETED | OUTPATIENT
Start: 2018-01-23 | End: 2018-01-23

## 2018-01-23 RX ORDER — CLONIDINE HYDROCHLORIDE 0.1 MG/1
0.1 TABLET ORAL 2 TIMES DAILY
COMMUNITY
End: 2020-04-13

## 2018-01-23 RX ORDER — WARFARIN SODIUM 5 MG/1
5 TABLET ORAL NIGHTLY
Status: DISCONTINUED | OUTPATIENT
Start: 2018-01-23 | End: 2018-01-28

## 2018-01-23 RX ORDER — ECHINACEA PURPUREA EXTRACT 125 MG
1 TABLET ORAL DAILY
Status: DISCONTINUED | OUTPATIENT
Start: 2018-01-23 | End: 2018-02-05 | Stop reason: HOSPADM

## 2018-01-23 RX ORDER — ACETAMINOPHEN 500 MG
1000 TABLET ORAL ONCE
Status: COMPLETED | OUTPATIENT
Start: 2018-01-23 | End: 2018-01-23

## 2018-01-23 RX ORDER — FUROSEMIDE 40 MG/1
40 TABLET ORAL DAILY
COMMUNITY
End: 2020-06-29

## 2018-01-23 RX ORDER — CHOLECALCIFEROL (VITAMIN D3) 125 MCG
10 CAPSULE ORAL NIGHTLY
COMMUNITY
End: 2022-01-01 | Stop reason: HOSPADM

## 2018-01-23 RX ORDER — ASPIRIN 81 MG/1
324 TABLET, CHEWABLE ORAL ONCE
Status: COMPLETED | OUTPATIENT
Start: 2018-01-23 | End: 2018-01-23

## 2018-01-23 RX ADMIN — WARFARIN SODIUM 5 MG: 5 TABLET ORAL at 20:34

## 2018-01-23 RX ADMIN — VITAMIN E CAP 400 UNIT 400 UNITS: 400 CAP at 15:29

## 2018-01-23 RX ADMIN — ONDANSETRON 4 MG: 2 INJECTION, SOLUTION INTRAMUSCULAR; INTRAVENOUS at 18:51

## 2018-01-23 RX ADMIN — CLONIDINE HYDROCHLORIDE 0.1 MG: 0.1 TABLET ORAL at 17:31

## 2018-01-23 RX ADMIN — SODIUM CHLORIDE 1000 ML: 9 INJECTION, SOLUTION INTRAVENOUS at 07:54

## 2018-01-23 RX ADMIN — CHOLECALCIFEROL (VITAMIN D3) 25 MCG (1,000 UNIT) TABLET 1000 UNITS: TABLET at 15:29

## 2018-01-23 RX ADMIN — AZTREONAM 500 MG: 1 INJECTION, POWDER, LYOPHILIZED, FOR SOLUTION INTRAMUSCULAR; INTRAVENOUS at 15:59

## 2018-01-23 RX ADMIN — IPRATROPIUM BROMIDE AND ALBUTEROL SULFATE 3 ML: 2.5; .5 SOLUTION RESPIRATORY (INHALATION) at 19:44

## 2018-01-23 RX ADMIN — POTASSIUM CHLORIDE 10 MEQ: 750 CAPSULE, EXTENDED RELEASE ORAL at 15:30

## 2018-01-23 RX ADMIN — ACETAMINOPHEN 1000 MG: 500 TABLET ORAL at 06:39

## 2018-01-23 RX ADMIN — ZOLPIDEM TARTRATE 5 MG: 5 TABLET ORAL at 20:40

## 2018-01-23 RX ADMIN — FLUTICASONE PROPIONATE 1 SPRAY: 50 SPRAY, METERED NASAL at 20:34

## 2018-01-23 RX ADMIN — GABAPENTIN 600 MG: 300 CAPSULE ORAL at 20:40

## 2018-01-23 RX ADMIN — GABAPENTIN 300 MG: 300 CAPSULE ORAL at 15:29

## 2018-01-23 RX ADMIN — ATORVASTATIN CALCIUM 10 MG: 10 TABLET, FILM COATED ORAL at 20:34

## 2018-01-23 RX ADMIN — METOPROLOL TARTRATE 12.5 MG: 25 TABLET, FILM COATED ORAL at 20:34

## 2018-01-23 RX ADMIN — ALPRAZOLAM 0.12 MG: 0.25 TABLET ORAL at 15:30

## 2018-01-23 RX ADMIN — SODIUM CHLORIDE 1000 ML: 9 INJECTION, SOLUTION INTRAVENOUS at 06:29

## 2018-01-23 RX ADMIN — TRAMADOL HYDROCHLORIDE 50 MG: 50 TABLET, COATED ORAL at 15:29

## 2018-01-23 RX ADMIN — HYDRALAZINE HYDROCHLORIDE 25 MG: 25 TABLET ORAL at 20:34

## 2018-01-23 RX ADMIN — VANCOMYCIN HYDROCHLORIDE 1250 MG: 100 INJECTION, POWDER, LYOPHILIZED, FOR SOLUTION INTRAVENOUS at 17:16

## 2018-01-23 RX ADMIN — Medication 1 SPRAY: at 15:31

## 2018-01-23 RX ADMIN — LISINOPRIL 40 MG: 20 TABLET ORAL at 15:29

## 2018-01-23 RX ADMIN — IPRATROPIUM BROMIDE AND ALBUTEROL SULFATE 3 ML: 2.5; .5 SOLUTION RESPIRATORY (INHALATION) at 06:16

## 2018-01-23 RX ADMIN — ACETAMINOPHEN 650 MG: 325 TABLET, FILM COATED ORAL at 18:51

## 2018-01-23 RX ADMIN — AZTREONAM 2000 MG: 2 INJECTION, POWDER, LYOPHILIZED, FOR SOLUTION INTRAMUSCULAR; INTRAVENOUS at 06:38

## 2018-01-23 RX ADMIN — FUROSEMIDE 40 MG: 40 TABLET ORAL at 15:29

## 2018-01-23 RX ADMIN — ALBUTEROL SULFATE 10 MG: 2.5 SOLUTION RESPIRATORY (INHALATION) at 07:15

## 2018-01-23 RX ADMIN — AZTREONAM 500 MG: 1 INJECTION, POWDER, LYOPHILIZED, FOR SOLUTION INTRAMUSCULAR; INTRAVENOUS at 21:30

## 2018-01-23 RX ADMIN — TAMSULOSIN HYDROCHLORIDE 0.4 MG: 0.4 CAPSULE ORAL at 15:29

## 2018-01-23 RX ADMIN — TRAMADOL HYDROCHLORIDE 50 MG: 50 TABLET, COATED ORAL at 20:40

## 2018-01-23 RX ADMIN — ASPIRIN 81 MG 324 MG: 81 TABLET ORAL at 10:35

## 2018-01-23 RX ADMIN — AMLODIPINE BESYLATE 5 MG: 5 TABLET ORAL at 15:29

## 2018-01-23 RX ADMIN — LEVOFLOXACIN 750 MG: 5 INJECTION, SOLUTION INTRAVENOUS at 06:44

## 2018-01-23 RX ADMIN — HYDROCODONE BITARTRATE AND ACETAMINOPHEN 1 TABLET: 5; 325 TABLET ORAL at 17:16

## 2018-01-24 LAB
ANION GAP SERPL CALCULATED.3IONS-SCNC: 9 MMOL/L (ref 4–13)
BACTERIA SPEC AEROBE CULT: ABNORMAL
BACTERIA SPEC AEROBE CULT: ABNORMAL
BASOPHILS # BLD AUTO: 0.05 10*3/MM3 (ref 0–0.2)
BASOPHILS NFR BLD AUTO: 0.4 % (ref 0–2)
BUN BLD-MCNC: 15 MG/DL (ref 5–21)
BUN/CREAT SERPL: 18.5 (ref 7–25)
CALCIUM SPEC-SCNC: 9.1 MG/DL (ref 8.4–10.4)
CHLORIDE SERPL-SCNC: 98 MMOL/L (ref 98–110)
CO2 SERPL-SCNC: 35 MMOL/L (ref 24–31)
CREAT BLD-MCNC: 0.81 MG/DL (ref 0.5–1.4)
DEPRECATED RDW RBC AUTO: 51.6 FL (ref 40–54)
EOSINOPHIL # BLD AUTO: 0.03 10*3/MM3 (ref 0–0.7)
EOSINOPHIL NFR BLD AUTO: 0.2 % (ref 0–4)
ERYTHROCYTE [DISTWIDTH] IN BLOOD BY AUTOMATED COUNT: 15.8 % (ref 12–15)
FLUAV AG NPH QL: POSITIVE
FLUBV AG NPH QL IA: NEGATIVE
GFR SERPL CREATININE-BSD FRML MDRD: 66 ML/MIN/1.73
GLUCOSE BLD-MCNC: 111 MG/DL (ref 70–100)
HCT VFR BLD AUTO: 39.5 % (ref 37–47)
HGB BLD-MCNC: 12.4 G/DL (ref 12–16)
IMM GRANULOCYTES # BLD: 0.06 10*3/MM3 (ref 0–0.03)
IMM GRANULOCYTES NFR BLD: 0.4 % (ref 0–5)
INR PPP: 1.74 (ref 0.91–1.09)
LYMPHOCYTES # BLD AUTO: 2.98 10*3/MM3 (ref 0.72–4.86)
LYMPHOCYTES NFR BLD AUTO: 21.7 % (ref 15–45)
MCH RBC QN AUTO: 28.2 PG (ref 28–32)
MCHC RBC AUTO-ENTMCNC: 31.4 G/DL (ref 33–36)
MCV RBC AUTO: 89.8 FL (ref 82–98)
MONOCYTES # BLD AUTO: 1.18 10*3/MM3 (ref 0.19–1.3)
MONOCYTES NFR BLD AUTO: 8.6 % (ref 4–12)
NEUTROPHILS # BLD AUTO: 9.43 10*3/MM3 (ref 1.87–8.4)
NEUTROPHILS NFR BLD AUTO: 68.7 % (ref 39–78)
NRBC BLD MANUAL-RTO: 0 /100 WBC (ref 0–0)
PLATELET # BLD AUTO: 144 10*3/MM3 (ref 130–400)
PMV BLD AUTO: 10.3 FL (ref 6–12)
POTASSIUM BLD-SCNC: 4 MMOL/L (ref 3.5–5.3)
PROTHROMBIN TIME: 21 SECONDS (ref 11.9–14.6)
RBC # BLD AUTO: 4.4 10*6/MM3 (ref 4.2–5.4)
SODIUM BLD-SCNC: 142 MMOL/L (ref 135–145)
WBC NRBC COR # BLD: 13.73 10*3/MM3 (ref 4.8–10.8)

## 2018-01-24 PROCEDURE — 94799 UNLISTED PULMONARY SVC/PX: CPT

## 2018-01-24 PROCEDURE — 94760 N-INVAS EAR/PLS OXIMETRY 1: CPT

## 2018-01-24 PROCEDURE — 85025 COMPLETE CBC W/AUTO DIFF WBC: CPT | Performed by: INTERNAL MEDICINE

## 2018-01-24 PROCEDURE — 80048 BASIC METABOLIC PNL TOTAL CA: CPT | Performed by: INTERNAL MEDICINE

## 2018-01-24 PROCEDURE — G8981 BODY POS CURRENT STATUS: HCPCS

## 2018-01-24 PROCEDURE — 97162 PT EVAL MOD COMPLEX 30 MIN: CPT

## 2018-01-24 PROCEDURE — 85610 PROTHROMBIN TIME: CPT | Performed by: INTERNAL MEDICINE

## 2018-01-24 PROCEDURE — 25010000002 VANCOMYCIN 10 G RECONSTITUTED SOLUTION: Performed by: NURSE PRACTITIONER

## 2018-01-24 PROCEDURE — 87804 INFLUENZA ASSAY W/OPTIC: CPT | Performed by: INTERNAL MEDICINE

## 2018-01-24 PROCEDURE — G8987 SELF CARE CURRENT STATUS: HCPCS

## 2018-01-24 PROCEDURE — G8982 BODY POS GOAL STATUS: HCPCS

## 2018-01-24 PROCEDURE — G8988 SELF CARE GOAL STATUS: HCPCS

## 2018-01-24 PROCEDURE — 97167 OT EVAL HIGH COMPLEX 60 MIN: CPT

## 2018-01-24 RX ADMIN — AZTREONAM 500 MG: 1 INJECTION, POWDER, LYOPHILIZED, FOR SOLUTION INTRAMUSCULAR; INTRAVENOUS at 05:18

## 2018-01-24 RX ADMIN — IPRATROPIUM BROMIDE AND ALBUTEROL SULFATE 3 ML: 2.5; .5 SOLUTION RESPIRATORY (INHALATION) at 14:30

## 2018-01-24 RX ADMIN — TRAMADOL HYDROCHLORIDE 50 MG: 50 TABLET, COATED ORAL at 04:59

## 2018-01-24 RX ADMIN — ACETAMINOPHEN 650 MG: 325 TABLET, FILM COATED ORAL at 19:43

## 2018-01-24 RX ADMIN — FUROSEMIDE 40 MG: 40 TABLET ORAL at 08:55

## 2018-01-24 RX ADMIN — VANCOMYCIN HYDROCHLORIDE 1250 MG: 100 INJECTION, POWDER, LYOPHILIZED, FOR SOLUTION INTRAVENOUS at 17:14

## 2018-01-24 RX ADMIN — TRAMADOL HYDROCHLORIDE 50 MG: 50 TABLET, COATED ORAL at 12:28

## 2018-01-24 RX ADMIN — CHOLECALCIFEROL (VITAMIN D3) 25 MCG (1,000 UNIT) TABLET 1000 UNITS: TABLET at 08:55

## 2018-01-24 RX ADMIN — AMLODIPINE BESYLATE 5 MG: 5 TABLET ORAL at 08:55

## 2018-01-24 RX ADMIN — AZTREONAM 500 MG: 1 INJECTION, POWDER, LYOPHILIZED, FOR SOLUTION INTRAMUSCULAR; INTRAVENOUS at 12:28

## 2018-01-24 RX ADMIN — ZOLPIDEM TARTRATE 5 MG: 5 TABLET ORAL at 20:50

## 2018-01-24 RX ADMIN — ATORVASTATIN CALCIUM 10 MG: 10 TABLET, FILM COATED ORAL at 20:51

## 2018-01-24 RX ADMIN — AZTREONAM 500 MG: 1 INJECTION, POWDER, LYOPHILIZED, FOR SOLUTION INTRAMUSCULAR; INTRAVENOUS at 22:20

## 2018-01-24 RX ADMIN — POTASSIUM CHLORIDE 10 MEQ: 750 CAPSULE, EXTENDED RELEASE ORAL at 09:01

## 2018-01-24 RX ADMIN — Medication 1 SPRAY: at 08:56

## 2018-01-24 RX ADMIN — TAMSULOSIN HYDROCHLORIDE 0.4 MG: 0.4 CAPSULE ORAL at 08:55

## 2018-01-24 RX ADMIN — TRAMADOL HYDROCHLORIDE 50 MG: 50 TABLET, COATED ORAL at 08:54

## 2018-01-24 RX ADMIN — TRAMADOL HYDROCHLORIDE 50 MG: 50 TABLET, COATED ORAL at 20:51

## 2018-01-24 RX ADMIN — VITAMIN E CAP 400 UNIT 400 UNITS: 400 CAP at 08:54

## 2018-01-24 RX ADMIN — TRAMADOL HYDROCHLORIDE 50 MG: 50 TABLET, COATED ORAL at 17:14

## 2018-01-24 RX ADMIN — METOPROLOL TARTRATE 12.5 MG: 25 TABLET, FILM COATED ORAL at 08:55

## 2018-01-24 RX ADMIN — GABAPENTIN 300 MG: 300 CAPSULE ORAL at 08:54

## 2018-01-24 RX ADMIN — METOPROLOL TARTRATE 12.5 MG: 25 TABLET, FILM COATED ORAL at 20:51

## 2018-01-24 RX ADMIN — HYDRALAZINE HYDROCHLORIDE 25 MG: 25 TABLET ORAL at 08:55

## 2018-01-24 RX ADMIN — IPRATROPIUM BROMIDE AND ALBUTEROL SULFATE 3 ML: 2.5; .5 SOLUTION RESPIRATORY (INHALATION) at 10:31

## 2018-01-24 RX ADMIN — GABAPENTIN 600 MG: 300 CAPSULE ORAL at 20:50

## 2018-01-24 RX ADMIN — FLUTICASONE PROPIONATE 1 SPRAY: 50 SPRAY, METERED NASAL at 08:56

## 2018-01-24 RX ADMIN — WARFARIN SODIUM 5 MG: 5 TABLET ORAL at 20:51

## 2018-01-24 RX ADMIN — Medication 162 MG: at 08:55

## 2018-01-24 RX ADMIN — PAROXETINE 20 MG: 20 TABLET, FILM COATED ORAL at 08:55

## 2018-01-24 RX ADMIN — HYDRALAZINE HYDROCHLORIDE 25 MG: 25 TABLET ORAL at 20:51

## 2018-01-24 RX ADMIN — IPRATROPIUM BROMIDE AND ALBUTEROL SULFATE 3 ML: 2.5; .5 SOLUTION RESPIRATORY (INHALATION) at 06:33

## 2018-01-24 RX ADMIN — CLONIDINE HYDROCHLORIDE 0.1 MG: 0.1 TABLET ORAL at 19:43

## 2018-01-24 RX ADMIN — ALPRAZOLAM 0.12 MG: 0.25 TABLET ORAL at 08:55

## 2018-01-24 RX ADMIN — LISINOPRIL 40 MG: 20 TABLET ORAL at 08:54

## 2018-01-24 RX ADMIN — IPRATROPIUM BROMIDE AND ALBUTEROL SULFATE 3 ML: 2.5; .5 SOLUTION RESPIRATORY (INHALATION) at 20:37

## 2018-01-24 RX ADMIN — CLONIDINE HYDROCHLORIDE 0.1 MG: 0.1 TABLET ORAL at 12:41

## 2018-01-24 RX ADMIN — FLUTICASONE PROPIONATE 1 SPRAY: 50 SPRAY, METERED NASAL at 20:51

## 2018-01-25 ENCOUNTER — APPOINTMENT (OUTPATIENT)
Dept: GENERAL RADIOLOGY | Facility: HOSPITAL | Age: 83
End: 2018-01-25

## 2018-01-25 LAB
ANION GAP SERPL CALCULATED.3IONS-SCNC: 7 MMOL/L (ref 4–13)
ARTERIAL PATENCY WRIST A: ABNORMAL
ATMOSPHERIC PRESS: 760 MMHG
BASE EXCESS BLDA CALC-SCNC: 6.8 MMOL/L (ref 0–2)
BASOPHILS # BLD AUTO: 0.04 10*3/MM3 (ref 0–0.2)
BASOPHILS NFR BLD AUTO: 0.3 % (ref 0–2)
BDY SITE: ABNORMAL
BODY TEMPERATURE: 37 C
BUN BLD-MCNC: 15 MG/DL (ref 5–21)
BUN/CREAT SERPL: 21.7 (ref 7–25)
CALCIUM SPEC-SCNC: 9.3 MG/DL (ref 8.4–10.4)
CHLORIDE SERPL-SCNC: 93 MMOL/L (ref 98–110)
CO2 SERPL-SCNC: 36 MMOL/L (ref 24–31)
CREAT BLD-MCNC: 0.69 MG/DL (ref 0.5–1.4)
DEPRECATED RDW RBC AUTO: 51.3 FL (ref 40–54)
EOSINOPHIL # BLD AUTO: 0.01 10*3/MM3 (ref 0–0.7)
EOSINOPHIL NFR BLD AUTO: 0.1 % (ref 0–4)
ERYTHROCYTE [DISTWIDTH] IN BLOOD BY AUTOMATED COUNT: 15.8 % (ref 12–15)
GAS FLOW AIRWAY: 10 LPM
GFR SERPL CREATININE-BSD FRML MDRD: 80 ML/MIN/1.73
GLUCOSE BLD-MCNC: 116 MG/DL (ref 70–100)
HCO3 BLDA-SCNC: 33 MMOL/L (ref 20–26)
HCT VFR BLD AUTO: 36.5 % (ref 37–47)
HGB BLD-MCNC: 11.6 G/DL (ref 12–16)
IMM GRANULOCYTES # BLD: 0.07 10*3/MM3 (ref 0–0.03)
IMM GRANULOCYTES NFR BLD: 0.5 % (ref 0–5)
INR PPP: 1.88 (ref 0.91–1.09)
LYMPHOCYTES # BLD AUTO: 1.79 10*3/MM3 (ref 0.72–4.86)
LYMPHOCYTES NFR BLD AUTO: 12.7 % (ref 15–45)
Lab: ABNORMAL
MCH RBC QN AUTO: 28 PG (ref 28–32)
MCHC RBC AUTO-ENTMCNC: 31.8 G/DL (ref 33–36)
MCV RBC AUTO: 88.2 FL (ref 82–98)
MODALITY: ABNORMAL
MONOCYTES # BLD AUTO: 1.12 10*3/MM3 (ref 0.19–1.3)
MONOCYTES NFR BLD AUTO: 7.9 % (ref 4–12)
NEUTROPHILS # BLD AUTO: 11.06 10*3/MM3 (ref 1.87–8.4)
NEUTROPHILS NFR BLD AUTO: 78.5 % (ref 39–78)
NRBC BLD MANUAL-RTO: 0 /100 WBC (ref 0–0)
PCO2 BLDA: 52.5 MM HG (ref 35–45)
PH BLDA: 7.41 PH UNITS (ref 7.35–7.45)
PLATELET # BLD AUTO: 141 10*3/MM3 (ref 130–400)
PMV BLD AUTO: 10.6 FL (ref 6–12)
PO2 BLDA: 58.8 MM HG (ref 83–108)
POTASSIUM BLD-SCNC: 3.8 MMOL/L (ref 3.5–5.3)
PROTHROMBIN TIME: 22.3 SECONDS (ref 11.9–14.6)
RBC # BLD AUTO: 4.14 10*6/MM3 (ref 4.2–5.4)
SAO2 % BLDCOA: 91.7 % (ref 94–99)
SODIUM BLD-SCNC: 136 MMOL/L (ref 135–145)
VENTILATOR MODE: ABNORMAL
WBC NRBC COR # BLD: 14.09 10*3/MM3 (ref 4.8–10.8)

## 2018-01-25 PROCEDURE — 25010000002 FUROSEMIDE PER 20 MG: Performed by: INTERNAL MEDICINE

## 2018-01-25 PROCEDURE — 85025 COMPLETE CBC W/AUTO DIFF WBC: CPT | Performed by: INTERNAL MEDICINE

## 2018-01-25 PROCEDURE — 94799 UNLISTED PULMONARY SVC/PX: CPT

## 2018-01-25 PROCEDURE — 82803 BLOOD GASES ANY COMBINATION: CPT

## 2018-01-25 PROCEDURE — 94660 CPAP INITIATION&MGMT: CPT

## 2018-01-25 PROCEDURE — 80048 BASIC METABOLIC PNL TOTAL CA: CPT | Performed by: INTERNAL MEDICINE

## 2018-01-25 PROCEDURE — 97110 THERAPEUTIC EXERCISES: CPT

## 2018-01-25 PROCEDURE — 97530 THERAPEUTIC ACTIVITIES: CPT

## 2018-01-25 PROCEDURE — 36415 COLL VENOUS BLD VENIPUNCTURE: CPT | Performed by: INTERNAL MEDICINE

## 2018-01-25 PROCEDURE — 85610 PROTHROMBIN TIME: CPT | Performed by: INTERNAL MEDICINE

## 2018-01-25 PROCEDURE — 71045 X-RAY EXAM CHEST 1 VIEW: CPT

## 2018-01-25 PROCEDURE — 25010000002 VANCOMYCIN 10 G RECONSTITUTED SOLUTION: Performed by: NURSE PRACTITIONER

## 2018-01-25 PROCEDURE — 36600 WITHDRAWAL OF ARTERIAL BLOOD: CPT

## 2018-01-25 PROCEDURE — 94760 N-INVAS EAR/PLS OXIMETRY 1: CPT

## 2018-01-25 RX ORDER — GUAIFENESIN 600 MG/1
1200 TABLET, EXTENDED RELEASE ORAL EVERY 12 HOURS SCHEDULED
Status: DISCONTINUED | OUTPATIENT
Start: 2018-01-25 | End: 2018-02-05 | Stop reason: HOSPADM

## 2018-01-25 RX ORDER — FUROSEMIDE 10 MG/ML
40 INJECTION INTRAMUSCULAR; INTRAVENOUS ONCE
Status: COMPLETED | OUTPATIENT
Start: 2018-01-25 | End: 2018-01-25

## 2018-01-25 RX ORDER — OSELTAMIVIR PHOSPHATE 75 MG/1
75 CAPSULE ORAL EVERY 12 HOURS SCHEDULED
Status: COMPLETED | OUTPATIENT
Start: 2018-01-25 | End: 2018-01-29

## 2018-01-25 RX ADMIN — TRAMADOL HYDROCHLORIDE 50 MG: 50 TABLET, COATED ORAL at 09:24

## 2018-01-25 RX ADMIN — GABAPENTIN 300 MG: 300 CAPSULE ORAL at 09:24

## 2018-01-25 RX ADMIN — TRAMADOL HYDROCHLORIDE 50 MG: 50 TABLET, COATED ORAL at 05:23

## 2018-01-25 RX ADMIN — IPRATROPIUM BROMIDE AND ALBUTEROL SULFATE 3 ML: 2.5; .5 SOLUTION RESPIRATORY (INHALATION) at 20:48

## 2018-01-25 RX ADMIN — ACETAMINOPHEN 650 MG: 325 TABLET, FILM COATED ORAL at 15:05

## 2018-01-25 RX ADMIN — PAROXETINE 20 MG: 20 TABLET, FILM COATED ORAL at 09:22

## 2018-01-25 RX ADMIN — Medication 1 SPRAY: at 09:25

## 2018-01-25 RX ADMIN — AZTREONAM 500 MG: 1 INJECTION, POWDER, LYOPHILIZED, FOR SOLUTION INTRAMUSCULAR; INTRAVENOUS at 05:22

## 2018-01-25 RX ADMIN — CLONIDINE HYDROCHLORIDE 0.1 MG: 0.1 TABLET ORAL at 01:17

## 2018-01-25 RX ADMIN — ALPRAZOLAM 0.12 MG: 0.25 TABLET ORAL at 09:22

## 2018-01-25 RX ADMIN — POTASSIUM CHLORIDE 10 MEQ: 750 CAPSULE, EXTENDED RELEASE ORAL at 12:35

## 2018-01-25 RX ADMIN — CLONIDINE HYDROCHLORIDE 0.1 MG: 0.1 TABLET ORAL at 05:52

## 2018-01-25 RX ADMIN — LISINOPRIL 40 MG: 20 TABLET ORAL at 12:22

## 2018-01-25 RX ADMIN — GABAPENTIN 600 MG: 300 CAPSULE ORAL at 21:50

## 2018-01-25 RX ADMIN — WARFARIN SODIUM 5 MG: 5 TABLET ORAL at 21:50

## 2018-01-25 RX ADMIN — Medication 162 MG: at 09:25

## 2018-01-25 RX ADMIN — AZTREONAM 500 MG: 1 INJECTION, POWDER, LYOPHILIZED, FOR SOLUTION INTRAMUSCULAR; INTRAVENOUS at 12:49

## 2018-01-25 RX ADMIN — GUAIFENESIN 1200 MG: 600 TABLET, EXTENDED RELEASE ORAL at 21:49

## 2018-01-25 RX ADMIN — TRAMADOL HYDROCHLORIDE 50 MG: 50 TABLET, COATED ORAL at 16:33

## 2018-01-25 RX ADMIN — CHOLECALCIFEROL (VITAMIN D3) 25 MCG (1,000 UNIT) TABLET 1000 UNITS: TABLET at 12:22

## 2018-01-25 RX ADMIN — TRAMADOL HYDROCHLORIDE 50 MG: 50 TABLET, COATED ORAL at 00:53

## 2018-01-25 RX ADMIN — AZTREONAM 500 MG: 1 INJECTION, POWDER, LYOPHILIZED, FOR SOLUTION INTRAMUSCULAR; INTRAVENOUS at 21:50

## 2018-01-25 RX ADMIN — DOXYCYCLINE 100 MG: 100 INJECTION, POWDER, LYOPHILIZED, FOR SOLUTION INTRAVENOUS at 20:16

## 2018-01-25 RX ADMIN — TRAMADOL HYDROCHLORIDE 50 MG: 50 TABLET, COATED ORAL at 21:50

## 2018-01-25 RX ADMIN — METOPROLOL TARTRATE 12.5 MG: 25 TABLET, FILM COATED ORAL at 21:49

## 2018-01-25 RX ADMIN — FLUTICASONE PROPIONATE 1 SPRAY: 50 SPRAY, METERED NASAL at 09:25

## 2018-01-25 RX ADMIN — METOPROLOL TARTRATE 12.5 MG: 25 TABLET, FILM COATED ORAL at 09:23

## 2018-01-25 RX ADMIN — ATORVASTATIN CALCIUM 10 MG: 10 TABLET, FILM COATED ORAL at 21:49

## 2018-01-25 RX ADMIN — IPRATROPIUM BROMIDE AND ALBUTEROL SULFATE 3 ML: 2.5; .5 SOLUTION RESPIRATORY (INHALATION) at 10:50

## 2018-01-25 RX ADMIN — VANCOMYCIN HYDROCHLORIDE 1250 MG: 100 INJECTION, POWDER, LYOPHILIZED, FOR SOLUTION INTRAVENOUS at 16:32

## 2018-01-25 RX ADMIN — FUROSEMIDE 40 MG: 40 TABLET ORAL at 12:35

## 2018-01-25 RX ADMIN — OSELTAMIVIR PHOSPHATE 75 MG: 75 CAPSULE ORAL at 12:23

## 2018-01-25 RX ADMIN — HYDRALAZINE HYDROCHLORIDE 25 MG: 25 TABLET ORAL at 09:22

## 2018-01-25 RX ADMIN — IPRATROPIUM BROMIDE AND ALBUTEROL SULFATE 3 ML: 2.5; .5 SOLUTION RESPIRATORY (INHALATION) at 14:33

## 2018-01-25 RX ADMIN — OSELTAMIVIR PHOSPHATE 75 MG: 75 CAPSULE ORAL at 21:59

## 2018-01-25 RX ADMIN — ACETAMINOPHEN 650 MG: 325 TABLET, FILM COATED ORAL at 05:52

## 2018-01-25 RX ADMIN — HYDRALAZINE HYDROCHLORIDE 25 MG: 25 TABLET ORAL at 21:50

## 2018-01-25 RX ADMIN — FLUTICASONE PROPIONATE 1 SPRAY: 50 SPRAY, METERED NASAL at 21:59

## 2018-01-25 RX ADMIN — VITAMIN E CAP 400 UNIT 400 UNITS: 400 CAP at 09:23

## 2018-01-25 RX ADMIN — AMLODIPINE BESYLATE 5 MG: 5 TABLET ORAL at 09:26

## 2018-01-25 RX ADMIN — IPRATROPIUM BROMIDE AND ALBUTEROL SULFATE 3 ML: 2.5; .5 SOLUTION RESPIRATORY (INHALATION) at 06:34

## 2018-01-25 RX ADMIN — TAMSULOSIN HYDROCHLORIDE 0.4 MG: 0.4 CAPSULE ORAL at 09:23

## 2018-01-25 RX ADMIN — FUROSEMIDE 40 MG: 10 INJECTION, SOLUTION INTRAMUSCULAR; INTRAVENOUS at 12:49

## 2018-01-26 LAB
ANION GAP SERPL CALCULATED.3IONS-SCNC: 8 MMOL/L (ref 4–13)
BASOPHILS # BLD AUTO: 0.03 10*3/MM3 (ref 0–0.2)
BASOPHILS NFR BLD AUTO: 0.2 % (ref 0–2)
BUN BLD-MCNC: 22 MG/DL (ref 5–21)
BUN/CREAT SERPL: 29.7 (ref 7–25)
CALCIUM SPEC-SCNC: 9.7 MG/DL (ref 8.4–10.4)
CHLORIDE SERPL-SCNC: 94 MMOL/L (ref 98–110)
CO2 SERPL-SCNC: 35 MMOL/L (ref 24–31)
CREAT BLD-MCNC: 0.74 MG/DL (ref 0.5–1.4)
DEPRECATED RDW RBC AUTO: 50.2 FL (ref 40–54)
EOSINOPHIL # BLD AUTO: 0.02 10*3/MM3 (ref 0–0.7)
EOSINOPHIL NFR BLD AUTO: 0.2 % (ref 0–4)
ERYTHROCYTE [DISTWIDTH] IN BLOOD BY AUTOMATED COUNT: 15.8 % (ref 12–15)
GFR SERPL CREATININE-BSD FRML MDRD: 73 ML/MIN/1.73
GLUCOSE BLD-MCNC: 104 MG/DL (ref 70–100)
HCT VFR BLD AUTO: 34.8 % (ref 37–47)
HGB BLD-MCNC: 11.4 G/DL (ref 12–16)
IMM GRANULOCYTES # BLD: 0.09 10*3/MM3 (ref 0–0.03)
IMM GRANULOCYTES NFR BLD: 0.7 % (ref 0–5)
INR PPP: 2.82 (ref 0.91–1.09)
LYMPHOCYTES # BLD AUTO: 1.99 10*3/MM3 (ref 0.72–4.86)
LYMPHOCYTES NFR BLD AUTO: 15.1 % (ref 15–45)
MCH RBC QN AUTO: 28.2 PG (ref 28–32)
MCHC RBC AUTO-ENTMCNC: 32.8 G/DL (ref 33–36)
MCV RBC AUTO: 86.1 FL (ref 82–98)
MONOCYTES # BLD AUTO: 1.1 10*3/MM3 (ref 0.19–1.3)
MONOCYTES NFR BLD AUTO: 8.4 % (ref 4–12)
NEUTROPHILS # BLD AUTO: 9.93 10*3/MM3 (ref 1.87–8.4)
NEUTROPHILS NFR BLD AUTO: 75.4 % (ref 39–78)
NRBC BLD MANUAL-RTO: 0 /100 WBC (ref 0–0)
PLATELET # BLD AUTO: 142 10*3/MM3 (ref 130–400)
PMV BLD AUTO: 10.5 FL (ref 6–12)
POTASSIUM BLD-SCNC: 4 MMOL/L (ref 3.5–5.3)
PROTHROMBIN TIME: 30.7 SECONDS (ref 11.9–14.6)
RBC # BLD AUTO: 4.04 10*6/MM3 (ref 4.2–5.4)
SODIUM BLD-SCNC: 137 MMOL/L (ref 135–145)
VANCOMYCIN TROUGH SERPL-MCNC: 12.13 MCG/ML (ref 10–20)
WBC NRBC COR # BLD: 13.16 10*3/MM3 (ref 4.8–10.8)

## 2018-01-26 PROCEDURE — 94660 CPAP INITIATION&MGMT: CPT

## 2018-01-26 PROCEDURE — 85025 COMPLETE CBC W/AUTO DIFF WBC: CPT | Performed by: INTERNAL MEDICINE

## 2018-01-26 PROCEDURE — 25010000002 VANCOMYCIN PER 500 MG: Performed by: INTERNAL MEDICINE

## 2018-01-26 PROCEDURE — 94799 UNLISTED PULMONARY SVC/PX: CPT

## 2018-01-26 PROCEDURE — 80048 BASIC METABOLIC PNL TOTAL CA: CPT | Performed by: INTERNAL MEDICINE

## 2018-01-26 PROCEDURE — 97530 THERAPEUTIC ACTIVITIES: CPT

## 2018-01-26 PROCEDURE — 85610 PROTHROMBIN TIME: CPT | Performed by: INTERNAL MEDICINE

## 2018-01-26 PROCEDURE — 36415 COLL VENOUS BLD VENIPUNCTURE: CPT | Performed by: INTERNAL MEDICINE

## 2018-01-26 PROCEDURE — 80202 ASSAY OF VANCOMYCIN: CPT | Performed by: INTERNAL MEDICINE

## 2018-01-26 PROCEDURE — 97110 THERAPEUTIC EXERCISES: CPT

## 2018-01-26 RX ADMIN — ACETAMINOPHEN 650 MG: 325 TABLET, FILM COATED ORAL at 21:38

## 2018-01-26 RX ADMIN — PAROXETINE 20 MG: 20 TABLET, FILM COATED ORAL at 08:21

## 2018-01-26 RX ADMIN — GABAPENTIN 300 MG: 300 CAPSULE ORAL at 08:31

## 2018-01-26 RX ADMIN — OSELTAMIVIR PHOSPHATE 75 MG: 75 CAPSULE ORAL at 21:40

## 2018-01-26 RX ADMIN — WARFARIN SODIUM 5 MG: 5 TABLET ORAL at 21:34

## 2018-01-26 RX ADMIN — ALPRAZOLAM 0.12 MG: 0.25 TABLET ORAL at 08:30

## 2018-01-26 RX ADMIN — IPRATROPIUM BROMIDE AND ALBUTEROL SULFATE 3 ML: 2.5; .5 SOLUTION RESPIRATORY (INHALATION) at 11:25

## 2018-01-26 RX ADMIN — HYDROCODONE BITARTRATE AND ACETAMINOPHEN 1 TABLET: 5; 325 TABLET ORAL at 07:26

## 2018-01-26 RX ADMIN — TRAMADOL HYDROCHLORIDE 50 MG: 50 TABLET, COATED ORAL at 15:58

## 2018-01-26 RX ADMIN — AZTREONAM 500 MG: 1 INJECTION, POWDER, LYOPHILIZED, FOR SOLUTION INTRAMUSCULAR; INTRAVENOUS at 21:34

## 2018-01-26 RX ADMIN — FUROSEMIDE 40 MG: 40 TABLET ORAL at 08:30

## 2018-01-26 RX ADMIN — HYDRALAZINE HYDROCHLORIDE 25 MG: 25 TABLET ORAL at 21:35

## 2018-01-26 RX ADMIN — GUAIFENESIN 1200 MG: 600 TABLET, EXTENDED RELEASE ORAL at 21:34

## 2018-01-26 RX ADMIN — Medication 1 SPRAY: at 08:19

## 2018-01-26 RX ADMIN — FLUTICASONE PROPIONATE 1 SPRAY: 50 SPRAY, METERED NASAL at 08:22

## 2018-01-26 RX ADMIN — METOPROLOL TARTRATE 12.5 MG: 25 TABLET, FILM COATED ORAL at 21:34

## 2018-01-26 RX ADMIN — METOPROLOL TARTRATE 12.5 MG: 25 TABLET, FILM COATED ORAL at 08:18

## 2018-01-26 RX ADMIN — HYDRALAZINE HYDROCHLORIDE 25 MG: 25 TABLET ORAL at 08:21

## 2018-01-26 RX ADMIN — CHOLECALCIFEROL (VITAMIN D3) 25 MCG (1,000 UNIT) TABLET 1000 UNITS: TABLET at 08:22

## 2018-01-26 RX ADMIN — IPRATROPIUM BROMIDE AND ALBUTEROL SULFATE 3 ML: 2.5; .5 SOLUTION RESPIRATORY (INHALATION) at 16:09

## 2018-01-26 RX ADMIN — DOXYCYCLINE 100 MG: 100 INJECTION, POWDER, LYOPHILIZED, FOR SOLUTION INTRAVENOUS at 21:41

## 2018-01-26 RX ADMIN — IPRATROPIUM BROMIDE AND ALBUTEROL SULFATE 3 ML: 2.5; .5 SOLUTION RESPIRATORY (INHALATION) at 07:52

## 2018-01-26 RX ADMIN — AZTREONAM 500 MG: 1 INJECTION, POWDER, LYOPHILIZED, FOR SOLUTION INTRAMUSCULAR; INTRAVENOUS at 04:57

## 2018-01-26 RX ADMIN — VITAMIN E CAP 400 UNIT 400 UNITS: 400 CAP at 08:21

## 2018-01-26 RX ADMIN — LISINOPRIL 40 MG: 20 TABLET ORAL at 08:20

## 2018-01-26 RX ADMIN — TRAMADOL HYDROCHLORIDE 50 MG: 50 TABLET, COATED ORAL at 21:39

## 2018-01-26 RX ADMIN — VANCOMYCIN HYDROCHLORIDE 1750 MG: 1 INJECTION, POWDER, LYOPHILIZED, FOR SOLUTION INTRAVENOUS at 17:32

## 2018-01-26 RX ADMIN — CLONIDINE HYDROCHLORIDE 0.1 MG: 0.1 TABLET ORAL at 21:38

## 2018-01-26 RX ADMIN — FLUTICASONE PROPIONATE 1 SPRAY: 50 SPRAY, METERED NASAL at 21:44

## 2018-01-26 RX ADMIN — TRAMADOL HYDROCHLORIDE 50 MG: 50 TABLET, COATED ORAL at 04:57

## 2018-01-26 RX ADMIN — GUAIFENESIN 1200 MG: 600 TABLET, EXTENDED RELEASE ORAL at 08:19

## 2018-01-26 RX ADMIN — DOXYCYCLINE 100 MG: 100 INJECTION, POWDER, LYOPHILIZED, FOR SOLUTION INTRAVENOUS at 08:22

## 2018-01-26 RX ADMIN — AMLODIPINE BESYLATE 5 MG: 5 TABLET ORAL at 08:19

## 2018-01-26 RX ADMIN — AZTREONAM 500 MG: 1 INJECTION, POWDER, LYOPHILIZED, FOR SOLUTION INTRAMUSCULAR; INTRAVENOUS at 15:16

## 2018-01-26 RX ADMIN — TAMSULOSIN HYDROCHLORIDE 0.4 MG: 0.4 CAPSULE ORAL at 08:21

## 2018-01-26 RX ADMIN — ATORVASTATIN CALCIUM 10 MG: 10 TABLET, FILM COATED ORAL at 21:34

## 2018-01-26 RX ADMIN — OSELTAMIVIR PHOSPHATE 75 MG: 75 CAPSULE ORAL at 08:19

## 2018-01-26 RX ADMIN — TRAMADOL HYDROCHLORIDE 50 MG: 50 TABLET, COATED ORAL at 12:07

## 2018-01-26 RX ADMIN — GABAPENTIN 600 MG: 300 CAPSULE ORAL at 21:34

## 2018-01-26 RX ADMIN — POTASSIUM CHLORIDE 10 MEQ: 750 CAPSULE, EXTENDED RELEASE ORAL at 08:30

## 2018-01-26 RX ADMIN — Medication 162 MG: at 08:21

## 2018-01-26 RX ADMIN — IPRATROPIUM BROMIDE AND ALBUTEROL SULFATE 3 ML: 2.5; .5 SOLUTION RESPIRATORY (INHALATION) at 20:04

## 2018-01-27 LAB
ANION GAP SERPL CALCULATED.3IONS-SCNC: 9 MMOL/L (ref 4–13)
BASOPHILS # BLD AUTO: 0.03 10*3/MM3 (ref 0–0.2)
BASOPHILS NFR BLD AUTO: 0.2 % (ref 0–2)
BUN BLD-MCNC: 20 MG/DL (ref 5–21)
BUN/CREAT SERPL: 29.4 (ref 7–25)
CALCIUM SPEC-SCNC: 9.7 MG/DL (ref 8.4–10.4)
CHLORIDE SERPL-SCNC: 95 MMOL/L (ref 98–110)
CO2 SERPL-SCNC: 35 MMOL/L (ref 24–31)
CREAT BLD-MCNC: 0.68 MG/DL (ref 0.5–1.4)
DEPRECATED RDW RBC AUTO: 49.6 FL (ref 40–54)
EOSINOPHIL # BLD AUTO: 0.07 10*3/MM3 (ref 0–0.7)
EOSINOPHIL NFR BLD AUTO: 0.6 % (ref 0–4)
ERYTHROCYTE [DISTWIDTH] IN BLOOD BY AUTOMATED COUNT: 15.5 % (ref 12–15)
GFR SERPL CREATININE-BSD FRML MDRD: 81 ML/MIN/1.73
GLUCOSE BLD-MCNC: 101 MG/DL (ref 70–100)
HCT VFR BLD AUTO: 35.7 % (ref 37–47)
HGB BLD-MCNC: 11.2 G/DL (ref 12–16)
IMM GRANULOCYTES # BLD: 0.11 10*3/MM3 (ref 0–0.03)
IMM GRANULOCYTES NFR BLD: 0.9 % (ref 0–5)
INR PPP: 2.94 (ref 0.91–1.09)
LYMPHOCYTES # BLD AUTO: 1.58 10*3/MM3 (ref 0.72–4.86)
LYMPHOCYTES NFR BLD AUTO: 12.8 % (ref 15–45)
MCH RBC QN AUTO: 27.5 PG (ref 28–32)
MCHC RBC AUTO-ENTMCNC: 31.4 G/DL (ref 33–36)
MCV RBC AUTO: 87.5 FL (ref 82–98)
MONOCYTES # BLD AUTO: 1.09 10*3/MM3 (ref 0.19–1.3)
MONOCYTES NFR BLD AUTO: 8.8 % (ref 4–12)
NEUTROPHILS # BLD AUTO: 9.51 10*3/MM3 (ref 1.87–8.4)
NEUTROPHILS NFR BLD AUTO: 76.7 % (ref 39–78)
NRBC BLD MANUAL-RTO: 0 /100 WBC (ref 0–0)
PLATELET # BLD AUTO: 149 10*3/MM3 (ref 130–400)
PMV BLD AUTO: 10.9 FL (ref 6–12)
POTASSIUM BLD-SCNC: 3.2 MMOL/L (ref 3.5–5.3)
PROTHROMBIN TIME: 31.8 SECONDS (ref 11.9–14.6)
RBC # BLD AUTO: 4.08 10*6/MM3 (ref 4.2–5.4)
SODIUM BLD-SCNC: 139 MMOL/L (ref 135–145)
WBC NRBC COR # BLD: 12.39 10*3/MM3 (ref 4.8–10.8)

## 2018-01-27 PROCEDURE — 97530 THERAPEUTIC ACTIVITIES: CPT

## 2018-01-27 PROCEDURE — 85025 COMPLETE CBC W/AUTO DIFF WBC: CPT | Performed by: INTERNAL MEDICINE

## 2018-01-27 PROCEDURE — 85610 PROTHROMBIN TIME: CPT | Performed by: INTERNAL MEDICINE

## 2018-01-27 PROCEDURE — 80048 BASIC METABOLIC PNL TOTAL CA: CPT | Performed by: INTERNAL MEDICINE

## 2018-01-27 PROCEDURE — 94799 UNLISTED PULMONARY SVC/PX: CPT

## 2018-01-27 PROCEDURE — 94660 CPAP INITIATION&MGMT: CPT

## 2018-01-27 PROCEDURE — 25010000002 VANCOMYCIN PER 500 MG: Performed by: INTERNAL MEDICINE

## 2018-01-27 PROCEDURE — 97110 THERAPEUTIC EXERCISES: CPT

## 2018-01-27 RX ORDER — POTASSIUM CHLORIDE 750 MG/1
40 CAPSULE, EXTENDED RELEASE ORAL ONCE
Status: COMPLETED | OUTPATIENT
Start: 2018-01-27 | End: 2018-01-27

## 2018-01-27 RX ADMIN — POTASSIUM CHLORIDE 40 MEQ: 750 CAPSULE, EXTENDED RELEASE ORAL at 09:07

## 2018-01-27 RX ADMIN — HYDRALAZINE HYDROCHLORIDE 25 MG: 25 TABLET ORAL at 20:41

## 2018-01-27 RX ADMIN — TRAMADOL HYDROCHLORIDE 50 MG: 50 TABLET, COATED ORAL at 20:40

## 2018-01-27 RX ADMIN — TRAMADOL HYDROCHLORIDE 50 MG: 50 TABLET, COATED ORAL at 05:17

## 2018-01-27 RX ADMIN — LISINOPRIL 40 MG: 20 TABLET ORAL at 08:58

## 2018-01-27 RX ADMIN — ALPRAZOLAM 0.12 MG: 0.25 TABLET ORAL at 09:07

## 2018-01-27 RX ADMIN — GUAIFENESIN 1200 MG: 600 TABLET, EXTENDED RELEASE ORAL at 20:41

## 2018-01-27 RX ADMIN — AZTREONAM 500 MG: 1 INJECTION, POWDER, LYOPHILIZED, FOR SOLUTION INTRAMUSCULAR; INTRAVENOUS at 05:17

## 2018-01-27 RX ADMIN — DOXYCYCLINE 100 MG: 100 INJECTION, POWDER, LYOPHILIZED, FOR SOLUTION INTRAVENOUS at 23:59

## 2018-01-27 RX ADMIN — PAROXETINE 20 MG: 20 TABLET, FILM COATED ORAL at 08:59

## 2018-01-27 RX ADMIN — IPRATROPIUM BROMIDE AND ALBUTEROL SULFATE 3 ML: 2.5; .5 SOLUTION RESPIRATORY (INHALATION) at 20:12

## 2018-01-27 RX ADMIN — FLUTICASONE PROPIONATE 1 SPRAY: 50 SPRAY, METERED NASAL at 20:49

## 2018-01-27 RX ADMIN — Medication 162 MG: at 09:08

## 2018-01-27 RX ADMIN — ATORVASTATIN CALCIUM 10 MG: 10 TABLET, FILM COATED ORAL at 20:41

## 2018-01-27 RX ADMIN — AZTREONAM 500 MG: 1 INJECTION, POWDER, LYOPHILIZED, FOR SOLUTION INTRAMUSCULAR; INTRAVENOUS at 23:14

## 2018-01-27 RX ADMIN — AZTREONAM 500 MG: 1 INJECTION, POWDER, LYOPHILIZED, FOR SOLUTION INTRAMUSCULAR; INTRAVENOUS at 14:02

## 2018-01-27 RX ADMIN — POTASSIUM CHLORIDE 10 MEQ: 750 CAPSULE, EXTENDED RELEASE ORAL at 09:08

## 2018-01-27 RX ADMIN — TRAMADOL HYDROCHLORIDE 50 MG: 50 TABLET, COATED ORAL at 09:10

## 2018-01-27 RX ADMIN — HYDRALAZINE HYDROCHLORIDE 25 MG: 25 TABLET ORAL at 08:59

## 2018-01-27 RX ADMIN — VANCOMYCIN HYDROCHLORIDE 1750 MG: 1 INJECTION, POWDER, LYOPHILIZED, FOR SOLUTION INTRAVENOUS at 17:43

## 2018-01-27 RX ADMIN — FLUTICASONE PROPIONATE 1 SPRAY: 50 SPRAY, METERED NASAL at 09:09

## 2018-01-27 RX ADMIN — IPRATROPIUM BROMIDE AND ALBUTEROL SULFATE 3 ML: 2.5; .5 SOLUTION RESPIRATORY (INHALATION) at 07:51

## 2018-01-27 RX ADMIN — METOPROLOL TARTRATE 12.5 MG: 25 TABLET, FILM COATED ORAL at 20:42

## 2018-01-27 RX ADMIN — CHOLECALCIFEROL (VITAMIN D3) 25 MCG (1,000 UNIT) TABLET 1000 UNITS: TABLET at 09:08

## 2018-01-27 RX ADMIN — OSELTAMIVIR PHOSPHATE 75 MG: 75 CAPSULE ORAL at 20:42

## 2018-01-27 RX ADMIN — GUAIFENESIN 1200 MG: 600 TABLET, EXTENDED RELEASE ORAL at 09:08

## 2018-01-27 RX ADMIN — GABAPENTIN 600 MG: 300 CAPSULE ORAL at 20:41

## 2018-01-27 RX ADMIN — OSELTAMIVIR PHOSPHATE 75 MG: 75 CAPSULE ORAL at 09:08

## 2018-01-27 RX ADMIN — FUROSEMIDE 40 MG: 40 TABLET ORAL at 09:07

## 2018-01-27 RX ADMIN — AMLODIPINE BESYLATE 5 MG: 5 TABLET ORAL at 08:59

## 2018-01-27 RX ADMIN — VITAMIN E CAP 400 UNIT 400 UNITS: 400 CAP at 09:08

## 2018-01-27 RX ADMIN — GABAPENTIN 300 MG: 300 CAPSULE ORAL at 09:07

## 2018-01-27 RX ADMIN — DOXYCYCLINE 100 MG: 100 INJECTION, POWDER, LYOPHILIZED, FOR SOLUTION INTRAVENOUS at 09:09

## 2018-01-27 RX ADMIN — MAGNESIUM HYDROXIDE 10 ML: 2400 SUSPENSION ORAL at 14:01

## 2018-01-27 RX ADMIN — WARFARIN SODIUM 5 MG: 5 TABLET ORAL at 20:48

## 2018-01-27 RX ADMIN — IPRATROPIUM BROMIDE AND ALBUTEROL SULFATE 3 ML: 2.5; .5 SOLUTION RESPIRATORY (INHALATION) at 15:52

## 2018-01-27 RX ADMIN — MAGNESIUM HYDROXIDE 10 ML: 2400 SUSPENSION ORAL at 20:58

## 2018-01-27 RX ADMIN — METOPROLOL TARTRATE 12.5 MG: 25 TABLET, FILM COATED ORAL at 08:58

## 2018-01-27 RX ADMIN — ZOLPIDEM TARTRATE 5 MG: 5 TABLET ORAL at 23:14

## 2018-01-27 RX ADMIN — Medication 1 SPRAY: at 09:09

## 2018-01-27 RX ADMIN — TAMSULOSIN HYDROCHLORIDE 0.4 MG: 0.4 CAPSULE ORAL at 08:59

## 2018-01-27 RX ADMIN — IPRATROPIUM BROMIDE AND ALBUTEROL SULFATE 3 ML: 2.5; .5 SOLUTION RESPIRATORY (INHALATION) at 11:02

## 2018-01-28 ENCOUNTER — APPOINTMENT (OUTPATIENT)
Dept: GENERAL RADIOLOGY | Facility: HOSPITAL | Age: 83
End: 2018-01-28

## 2018-01-28 LAB
ANION GAP SERPL CALCULATED.3IONS-SCNC: 8 MMOL/L (ref 4–13)
ARTERIAL PATENCY WRIST A: ABNORMAL
ATMOSPHERIC PRESS: 759 MMHG
BACTERIA SPEC AEROBE CULT: NORMAL
BACTERIA SPEC AEROBE CULT: NORMAL
BASE EXCESS BLDA CALC-SCNC: 10.2 MMOL/L (ref 0–2)
BASOPHILS # BLD AUTO: 0.03 10*3/MM3 (ref 0–0.2)
BASOPHILS NFR BLD AUTO: 0.2 % (ref 0–2)
BDY SITE: ABNORMAL
BODY TEMPERATURE: 37 C
BUN BLD-MCNC: 15 MG/DL (ref 5–21)
BUN/CREAT SERPL: 24.2 (ref 7–25)
CA-I BLD-MCNC: 5.05 MG/DL (ref 4.6–5.4)
CALCIUM SPEC-SCNC: 9.3 MG/DL (ref 8.4–10.4)
CHLORIDE SERPL-SCNC: 96 MMOL/L (ref 98–110)
CO2 SERPL-SCNC: 36 MMOL/L (ref 24–31)
COHGB MFR BLD: 1.1 % (ref 0–5)
CREAT BLD-MCNC: 0.62 MG/DL (ref 0.5–1.4)
DEPRECATED RDW RBC AUTO: 49 FL (ref 40–54)
EOSINOPHIL # BLD AUTO: 0.09 10*3/MM3 (ref 0–0.7)
EOSINOPHIL NFR BLD AUTO: 0.7 % (ref 0–4)
EPAP: 6
ERYTHROCYTE [DISTWIDTH] IN BLOOD BY AUTOMATED COUNT: 15.4 % (ref 12–15)
GFR SERPL CREATININE-BSD FRML MDRD: 90 ML/MIN/1.73
GLUCOSE BLD-MCNC: 108 MG/DL (ref 70–100)
HCO3 BLDA-SCNC: 35.3 MMOL/L (ref 20–26)
HCT VFR BLD AUTO: 35.8 % (ref 37–47)
HCT VFR BLD CALC: 35.6 % (ref 38–51)
HGB BLD-MCNC: 11.4 G/DL (ref 12–16)
HGB BLDA-MCNC: 11.6 G/DL (ref 13.5–17.5)
HOROWITZ INDEX BLD+IHG-RTO: 60 %
IMM GRANULOCYTES # BLD: 0.13 10*3/MM3 (ref 0–0.03)
IMM GRANULOCYTES NFR BLD: 1 % (ref 0–5)
INR PPP: 3.36 (ref 0.91–1.09)
IPAP: 12
LYMPHOCYTES # BLD AUTO: 1.61 10*3/MM3 (ref 0.72–4.86)
LYMPHOCYTES NFR BLD AUTO: 12.4 % (ref 15–45)
Lab: ABNORMAL
MAGNESIUM SERPL-MCNC: 1.9 MG/DL (ref 1.4–2.2)
MCH RBC QN AUTO: 27.7 PG (ref 28–32)
MCHC RBC AUTO-ENTMCNC: 31.8 G/DL (ref 33–36)
MCV RBC AUTO: 86.9 FL (ref 82–98)
METHGB BLD QL: 0.9 % (ref 0–3)
MODALITY: ABNORMAL
MONOCYTES # BLD AUTO: 1.06 10*3/MM3 (ref 0.19–1.3)
MONOCYTES NFR BLD AUTO: 8.1 % (ref 4–12)
NEUTROPHILS # BLD AUTO: 10.1 10*3/MM3 (ref 1.87–8.4)
NEUTROPHILS NFR BLD AUTO: 77.6 % (ref 39–78)
NRBC BLD MANUAL-RTO: 0 /100 WBC (ref 0–0)
OXYHGB MFR BLDV: 94 % (ref 94–99)
PCO2 BLDA: 48.6 MM HG (ref 35–45)
PH BLDA: 7.47 PH UNITS (ref 7.35–7.45)
PLATELET # BLD AUTO: 179 10*3/MM3 (ref 130–400)
PMV BLD AUTO: 10.7 FL (ref 6–12)
PO2 BLDA: 73.1 MM HG (ref 83–108)
POTASSIUM BLD-SCNC: 3.5 MMOL/L (ref 3.5–5.3)
POTASSIUM BLDA-SCNC: 3.7 MMOL/L (ref 3.5–5.2)
PROTHROMBIN TIME: 35.3 SECONDS (ref 11.9–14.6)
RBC # BLD AUTO: 4.12 10*6/MM3 (ref 4.2–5.4)
SAO2 % BLDCOA: 95.9 % (ref 94–99)
SET MECH RESP RATE: 14
SODIUM BLD-SCNC: 140 MMOL/L (ref 135–145)
SODIUM BLDA-SCNC: 137 MMOL/L (ref 136–145)
VENTILATOR MODE: ABNORMAL
WBC NRBC COR # BLD: 13.02 10*3/MM3 (ref 4.8–10.8)

## 2018-01-28 PROCEDURE — 80048 BASIC METABOLIC PNL TOTAL CA: CPT | Performed by: INTERNAL MEDICINE

## 2018-01-28 PROCEDURE — 85025 COMPLETE CBC W/AUTO DIFF WBC: CPT | Performed by: INTERNAL MEDICINE

## 2018-01-28 PROCEDURE — 94799 UNLISTED PULMONARY SVC/PX: CPT

## 2018-01-28 PROCEDURE — 82375 ASSAY CARBOXYHB QUANT: CPT

## 2018-01-28 PROCEDURE — 83050 HGB METHEMOGLOBIN QUAN: CPT

## 2018-01-28 PROCEDURE — 36600 WITHDRAWAL OF ARTERIAL BLOOD: CPT

## 2018-01-28 PROCEDURE — 25010000002 VANCOMYCIN PER 500 MG: Performed by: NURSE PRACTITIONER

## 2018-01-28 PROCEDURE — 71045 X-RAY EXAM CHEST 1 VIEW: CPT

## 2018-01-28 PROCEDURE — 83735 ASSAY OF MAGNESIUM: CPT | Performed by: NURSE PRACTITIONER

## 2018-01-28 PROCEDURE — 85610 PROTHROMBIN TIME: CPT | Performed by: INTERNAL MEDICINE

## 2018-01-28 PROCEDURE — 94669 MECHANICAL CHEST WALL OSCILL: CPT

## 2018-01-28 PROCEDURE — 25010000002 MEROPENEM PER 100 MG: Performed by: INTERNAL MEDICINE

## 2018-01-28 PROCEDURE — 94668 MNPJ CHEST WALL SBSQ: CPT

## 2018-01-28 PROCEDURE — 94760 N-INVAS EAR/PLS OXIMETRY 1: CPT

## 2018-01-28 PROCEDURE — 82805 BLOOD GASES W/O2 SATURATION: CPT

## 2018-01-28 RX ORDER — ACETYLCYSTEINE 200 MG/ML
1.5 SOLUTION ORAL; RESPIRATORY (INHALATION)
Status: COMPLETED | OUTPATIENT
Start: 2018-01-28 | End: 2018-01-28

## 2018-01-28 RX ORDER — WARFARIN SODIUM 5 MG/1
5 TABLET ORAL
Status: DISCONTINUED | OUTPATIENT
Start: 2018-01-29 | End: 2018-01-29

## 2018-01-28 RX ADMIN — ALPRAZOLAM 0.12 MG: 0.25 TABLET ORAL at 08:37

## 2018-01-28 RX ADMIN — CHOLECALCIFEROL (VITAMIN D3) 25 MCG (1,000 UNIT) TABLET 1000 UNITS: TABLET at 08:41

## 2018-01-28 RX ADMIN — GABAPENTIN 600 MG: 300 CAPSULE ORAL at 20:10

## 2018-01-28 RX ADMIN — FUROSEMIDE 40 MG: 40 TABLET ORAL at 08:37

## 2018-01-28 RX ADMIN — FLUTICASONE PROPIONATE 1 SPRAY: 50 SPRAY, METERED NASAL at 20:11

## 2018-01-28 RX ADMIN — IPRATROPIUM BROMIDE AND ALBUTEROL SULFATE 3 ML: 2.5; .5 SOLUTION RESPIRATORY (INHALATION) at 20:04

## 2018-01-28 RX ADMIN — OSELTAMIVIR PHOSPHATE 75 MG: 75 CAPSULE ORAL at 08:40

## 2018-01-28 RX ADMIN — TRAMADOL HYDROCHLORIDE 50 MG: 50 TABLET, COATED ORAL at 08:37

## 2018-01-28 RX ADMIN — IPRATROPIUM BROMIDE AND ALBUTEROL SULFATE 3 ML: 2.5; .5 SOLUTION RESPIRATORY (INHALATION) at 07:33

## 2018-01-28 RX ADMIN — ACETYLCYSTEINE 1.5 ML: 200 SOLUTION ORAL; RESPIRATORY (INHALATION) at 20:04

## 2018-01-28 RX ADMIN — GUAIFENESIN 1200 MG: 600 TABLET, EXTENDED RELEASE ORAL at 20:10

## 2018-01-28 RX ADMIN — IPRATROPIUM BROMIDE AND ALBUTEROL SULFATE 3 ML: 2.5; .5 SOLUTION RESPIRATORY (INHALATION) at 14:55

## 2018-01-28 RX ADMIN — GUAIFENESIN 1200 MG: 600 TABLET, EXTENDED RELEASE ORAL at 08:41

## 2018-01-28 RX ADMIN — VITAMIN E CAP 400 UNIT 400 UNITS: 400 CAP at 08:43

## 2018-01-28 RX ADMIN — OSELTAMIVIR PHOSPHATE 75 MG: 75 CAPSULE ORAL at 20:10

## 2018-01-28 RX ADMIN — METOPROLOL TARTRATE 12.5 MG: 25 TABLET, FILM COATED ORAL at 08:41

## 2018-01-28 RX ADMIN — METOPROLOL TARTRATE 12.5 MG: 25 TABLET, FILM COATED ORAL at 20:10

## 2018-01-28 RX ADMIN — FLUTICASONE PROPIONATE 1 SPRAY: 50 SPRAY, METERED NASAL at 08:43

## 2018-01-28 RX ADMIN — TRAMADOL HYDROCHLORIDE 50 MG: 50 TABLET, COATED ORAL at 00:12

## 2018-01-28 RX ADMIN — PAROXETINE 20 MG: 20 TABLET, FILM COATED ORAL at 08:43

## 2018-01-28 RX ADMIN — GABAPENTIN 300 MG: 300 CAPSULE ORAL at 08:37

## 2018-01-28 RX ADMIN — AZTREONAM 500 MG: 1 INJECTION, POWDER, LYOPHILIZED, FOR SOLUTION INTRAMUSCULAR; INTRAVENOUS at 06:19

## 2018-01-28 RX ADMIN — HYDRALAZINE HYDROCHLORIDE 25 MG: 25 TABLET ORAL at 08:43

## 2018-01-28 RX ADMIN — VANCOMYCIN HYDROCHLORIDE 1750 MG: 1 INJECTION, POWDER, LYOPHILIZED, FOR SOLUTION INTRAVENOUS at 16:03

## 2018-01-28 RX ADMIN — TAMSULOSIN HYDROCHLORIDE 0.4 MG: 0.4 CAPSULE ORAL at 08:42

## 2018-01-28 RX ADMIN — POTASSIUM CHLORIDE 10 MEQ: 750 CAPSULE, EXTENDED RELEASE ORAL at 08:37

## 2018-01-28 RX ADMIN — DOXYCYCLINE 100 MG: 100 INJECTION, POWDER, LYOPHILIZED, FOR SOLUTION INTRAVENOUS at 20:10

## 2018-01-28 RX ADMIN — CLONIDINE HYDROCHLORIDE 0.1 MG: 0.1 TABLET ORAL at 00:11

## 2018-01-28 RX ADMIN — MEROPENEM 1 G: 1 INJECTION, POWDER, FOR SOLUTION INTRAVENOUS at 15:48

## 2018-01-28 RX ADMIN — LISINOPRIL 40 MG: 20 TABLET ORAL at 08:41

## 2018-01-28 RX ADMIN — Medication 162 MG: at 08:41

## 2018-01-28 RX ADMIN — DOXYCYCLINE 100 MG: 100 INJECTION, POWDER, LYOPHILIZED, FOR SOLUTION INTRAVENOUS at 10:44

## 2018-01-28 RX ADMIN — CLONIDINE HYDROCHLORIDE 0.1 MG: 0.1 TABLET ORAL at 06:20

## 2018-01-28 RX ADMIN — ACETYLCYSTEINE 1.5 ML: 200 SOLUTION ORAL; RESPIRATORY (INHALATION) at 11:20

## 2018-01-28 RX ADMIN — AMLODIPINE BESYLATE 5 MG: 5 TABLET ORAL at 08:43

## 2018-01-28 RX ADMIN — IPRATROPIUM BROMIDE AND ALBUTEROL SULFATE 3 ML: 2.5; .5 SOLUTION RESPIRATORY (INHALATION) at 11:19

## 2018-01-28 RX ADMIN — HYDRALAZINE HYDROCHLORIDE 25 MG: 25 TABLET ORAL at 20:10

## 2018-01-28 RX ADMIN — ATORVASTATIN CALCIUM 10 MG: 10 TABLET, FILM COATED ORAL at 20:12

## 2018-01-29 ENCOUNTER — APPOINTMENT (OUTPATIENT)
Dept: GENERAL RADIOLOGY | Facility: HOSPITAL | Age: 83
End: 2018-01-29

## 2018-01-29 LAB
ANION GAP SERPL CALCULATED.3IONS-SCNC: 6 MMOL/L (ref 4–13)
BASOPHILS # BLD AUTO: 0.05 10*3/MM3 (ref 0–0.2)
BASOPHILS NFR BLD AUTO: 0.4 % (ref 0–2)
BUN BLD-MCNC: 13 MG/DL (ref 5–21)
BUN/CREAT SERPL: 20.6 (ref 7–25)
CALCIUM SPEC-SCNC: 9.4 MG/DL (ref 8.4–10.4)
CHLORIDE SERPL-SCNC: 96 MMOL/L (ref 98–110)
CO2 SERPL-SCNC: 37 MMOL/L (ref 24–31)
CREAT BLD-MCNC: 0.63 MG/DL (ref 0.5–1.4)
DEPRECATED RDW RBC AUTO: 48.4 FL (ref 40–54)
EOSINOPHIL # BLD AUTO: 0.12 10*3/MM3 (ref 0–0.7)
EOSINOPHIL NFR BLD AUTO: 0.9 % (ref 0–4)
ERYTHROCYTE [DISTWIDTH] IN BLOOD BY AUTOMATED COUNT: 15.3 % (ref 12–15)
GFR SERPL CREATININE-BSD FRML MDRD: 88 ML/MIN/1.73
GLUCOSE BLD-MCNC: 108 MG/DL (ref 70–100)
HCT VFR BLD AUTO: 34.1 % (ref 37–47)
HGB BLD-MCNC: 11 G/DL (ref 12–16)
IMM GRANULOCYTES # BLD: 0.22 10*3/MM3 (ref 0–0.03)
IMM GRANULOCYTES NFR BLD: 1.6 % (ref 0–5)
INR PPP: 5.21 (ref 0.91–1.09)
LYMPHOCYTES # BLD AUTO: 1.68 10*3/MM3 (ref 0.72–4.86)
LYMPHOCYTES NFR BLD AUTO: 12.4 % (ref 15–45)
MCH RBC QN AUTO: 27.8 PG (ref 28–32)
MCHC RBC AUTO-ENTMCNC: 32.3 G/DL (ref 33–36)
MCV RBC AUTO: 86.3 FL (ref 82–98)
MONOCYTES # BLD AUTO: 1.26 10*3/MM3 (ref 0.19–1.3)
MONOCYTES NFR BLD AUTO: 9.3 % (ref 4–12)
NEUTROPHILS # BLD AUTO: 10.17 10*3/MM3 (ref 1.87–8.4)
NEUTROPHILS NFR BLD AUTO: 75.4 % (ref 39–78)
NRBC BLD MANUAL-RTO: 0 /100 WBC (ref 0–0)
PLATELET # BLD AUTO: 235 10*3/MM3 (ref 130–400)
PMV BLD AUTO: 10.4 FL (ref 6–12)
POTASSIUM BLD-SCNC: 3.5 MMOL/L (ref 3.5–5.3)
PROTHROMBIN TIME: 50 SECONDS (ref 11.9–14.6)
RBC # BLD AUTO: 3.95 10*6/MM3 (ref 4.2–5.4)
SODIUM BLD-SCNC: 139 MMOL/L (ref 135–145)
WBC NRBC COR # BLD: 13.5 10*3/MM3 (ref 4.8–10.8)

## 2018-01-29 PROCEDURE — 94799 UNLISTED PULMONARY SVC/PX: CPT

## 2018-01-29 PROCEDURE — 25010000002 MEROPENEM PER 100 MG: Performed by: INTERNAL MEDICINE

## 2018-01-29 PROCEDURE — 85610 PROTHROMBIN TIME: CPT | Performed by: INTERNAL MEDICINE

## 2018-01-29 PROCEDURE — 94660 CPAP INITIATION&MGMT: CPT

## 2018-01-29 PROCEDURE — 97110 THERAPEUTIC EXERCISES: CPT

## 2018-01-29 PROCEDURE — 25010000002 VITAMIN K1 1 MG/1 ML SOLUTION: Performed by: INTERNAL MEDICINE

## 2018-01-29 PROCEDURE — 94669 MECHANICAL CHEST WALL OSCILL: CPT

## 2018-01-29 PROCEDURE — 71045 X-RAY EXAM CHEST 1 VIEW: CPT

## 2018-01-29 PROCEDURE — 85025 COMPLETE CBC W/AUTO DIFF WBC: CPT | Performed by: INTERNAL MEDICINE

## 2018-01-29 PROCEDURE — 80048 BASIC METABOLIC PNL TOTAL CA: CPT | Performed by: INTERNAL MEDICINE

## 2018-01-29 PROCEDURE — 36415 COLL VENOUS BLD VENIPUNCTURE: CPT | Performed by: INTERNAL MEDICINE

## 2018-01-29 PROCEDURE — 97530 THERAPEUTIC ACTIVITIES: CPT

## 2018-01-29 RX ORDER — ACETYLCYSTEINE 200 MG/ML
1.5 SOLUTION ORAL; RESPIRATORY (INHALATION)
Status: DISPENSED | OUTPATIENT
Start: 2018-01-29 | End: 2018-01-30

## 2018-01-29 RX ORDER — PHYTONADIONE 2 MG/ML
10 INJECTION, EMULSION INTRAMUSCULAR; INTRAVENOUS; SUBCUTANEOUS ONCE
Status: COMPLETED | OUTPATIENT
Start: 2018-01-29 | End: 2018-01-29

## 2018-01-29 RX ORDER — DOCUSATE SODIUM 100 MG/1
100 CAPSULE, LIQUID FILLED ORAL 2 TIMES DAILY
Status: DISCONTINUED | OUTPATIENT
Start: 2018-01-29 | End: 2018-02-05 | Stop reason: HOSPADM

## 2018-01-29 RX ORDER — HYDRALAZINE HYDROCHLORIDE 25 MG/1
25 TABLET, FILM COATED ORAL EVERY 8 HOURS SCHEDULED
Status: DISCONTINUED | OUTPATIENT
Start: 2018-01-29 | End: 2018-02-02

## 2018-01-29 RX ORDER — LACTULOSE 20 G/30ML
20 SOLUTION ORAL ONCE
Status: COMPLETED | OUTPATIENT
Start: 2018-01-29 | End: 2018-01-29

## 2018-01-29 RX ADMIN — DOXYCYCLINE 100 MG: 100 INJECTION, POWDER, LYOPHILIZED, FOR SOLUTION INTRAVENOUS at 08:58

## 2018-01-29 RX ADMIN — CHOLECALCIFEROL (VITAMIN D3) 25 MCG (1,000 UNIT) TABLET 1000 UNITS: TABLET at 08:57

## 2018-01-29 RX ADMIN — GABAPENTIN 300 MG: 300 CAPSULE ORAL at 08:58

## 2018-01-29 RX ADMIN — DOXYCYCLINE 100 MG: 100 INJECTION, POWDER, LYOPHILIZED, FOR SOLUTION INTRAVENOUS at 21:14

## 2018-01-29 RX ADMIN — DOCUSATE SODIUM 100 MG: 100 CAPSULE ORAL at 10:47

## 2018-01-29 RX ADMIN — METOPROLOL TARTRATE 12.5 MG: 25 TABLET, FILM COATED ORAL at 21:14

## 2018-01-29 RX ADMIN — METOPROLOL TARTRATE 12.5 MG: 25 TABLET, FILM COATED ORAL at 08:57

## 2018-01-29 RX ADMIN — ZOLPIDEM TARTRATE 5 MG: 5 TABLET ORAL at 21:38

## 2018-01-29 RX ADMIN — OSELTAMIVIR PHOSPHATE 75 MG: 75 CAPSULE ORAL at 21:13

## 2018-01-29 RX ADMIN — MEROPENEM 1 G: 1 INJECTION, POWDER, FOR SOLUTION INTRAVENOUS at 00:18

## 2018-01-29 RX ADMIN — AMLODIPINE BESYLATE 5 MG: 5 TABLET ORAL at 08:56

## 2018-01-29 RX ADMIN — TAMSULOSIN HYDROCHLORIDE 0.4 MG: 0.4 CAPSULE ORAL at 08:56

## 2018-01-29 RX ADMIN — IPRATROPIUM BROMIDE AND ALBUTEROL SULFATE 3 ML: 2.5; .5 SOLUTION RESPIRATORY (INHALATION) at 11:28

## 2018-01-29 RX ADMIN — ALPRAZOLAM 0.12 MG: 0.25 TABLET ORAL at 08:58

## 2018-01-29 RX ADMIN — ACETYLCYSTEINE 1.5 ML: 200 SOLUTION ORAL; RESPIRATORY (INHALATION) at 19:48

## 2018-01-29 RX ADMIN — IPRATROPIUM BROMIDE AND ALBUTEROL SULFATE 3 ML: 2.5; .5 SOLUTION RESPIRATORY (INHALATION) at 15:44

## 2018-01-29 RX ADMIN — HYDRALAZINE HYDROCHLORIDE 25 MG: 25 TABLET, FILM COATED ORAL at 14:53

## 2018-01-29 RX ADMIN — IPRATROPIUM BROMIDE AND ALBUTEROL SULFATE 3 ML: 2.5; .5 SOLUTION RESPIRATORY (INHALATION) at 19:48

## 2018-01-29 RX ADMIN — GUAIFENESIN 1200 MG: 600 TABLET, EXTENDED RELEASE ORAL at 21:14

## 2018-01-29 RX ADMIN — Medication 1 SPRAY: at 08:58

## 2018-01-29 RX ADMIN — HYDRALAZINE HYDROCHLORIDE 25 MG: 25 TABLET ORAL at 08:57

## 2018-01-29 RX ADMIN — MEROPENEM 1 G: 1 INJECTION, POWDER, FOR SOLUTION INTRAVENOUS at 21:14

## 2018-01-29 RX ADMIN — LISINOPRIL 40 MG: 20 TABLET ORAL at 08:57

## 2018-01-29 RX ADMIN — POTASSIUM CHLORIDE 10 MEQ: 750 CAPSULE, EXTENDED RELEASE ORAL at 08:57

## 2018-01-29 RX ADMIN — CLONIDINE HYDROCHLORIDE 0.1 MG: 0.1 TABLET ORAL at 00:18

## 2018-01-29 RX ADMIN — Medication 162 MG: at 08:57

## 2018-01-29 RX ADMIN — HYDRALAZINE HYDROCHLORIDE 25 MG: 25 TABLET, FILM COATED ORAL at 21:14

## 2018-01-29 RX ADMIN — TRAMADOL HYDROCHLORIDE 50 MG: 50 TABLET, COATED ORAL at 08:57

## 2018-01-29 RX ADMIN — IPRATROPIUM BROMIDE AND ALBUTEROL SULFATE 3 ML: 2.5; .5 SOLUTION RESPIRATORY (INHALATION) at 07:30

## 2018-01-29 RX ADMIN — GUAIFENESIN 1200 MG: 600 TABLET, EXTENDED RELEASE ORAL at 08:57

## 2018-01-29 RX ADMIN — LACTULOSE 20 G: 20 SOLUTION ORAL at 10:47

## 2018-01-29 RX ADMIN — PAROXETINE 20 MG: 20 TABLET, FILM COATED ORAL at 07:32

## 2018-01-29 RX ADMIN — TRAMADOL HYDROCHLORIDE 50 MG: 50 TABLET, COATED ORAL at 17:43

## 2018-01-29 RX ADMIN — MEROPENEM 1 G: 1 INJECTION, POWDER, FOR SOLUTION INTRAVENOUS at 05:18

## 2018-01-29 RX ADMIN — FUROSEMIDE 40 MG: 40 TABLET ORAL at 08:57

## 2018-01-29 RX ADMIN — DOCUSATE SODIUM 100 MG: 100 CAPSULE ORAL at 21:14

## 2018-01-29 RX ADMIN — TRAMADOL HYDROCHLORIDE 50 MG: 50 TABLET, COATED ORAL at 00:20

## 2018-01-29 RX ADMIN — FLUTICASONE PROPIONATE 1 SPRAY: 50 SPRAY, METERED NASAL at 21:14

## 2018-01-29 RX ADMIN — GABAPENTIN 600 MG: 300 CAPSULE ORAL at 21:14

## 2018-01-29 RX ADMIN — VITAMIN E CAP 400 UNIT 400 UNITS: 400 CAP at 08:57

## 2018-01-29 RX ADMIN — FLUTICASONE PROPIONATE 1 SPRAY: 50 SPRAY, METERED NASAL at 08:58

## 2018-01-29 RX ADMIN — PHYTONADIONE 10 MG: 1 INJECTION, EMULSION INTRAMUSCULAR; INTRAVENOUS; SUBCUTANEOUS at 07:32

## 2018-01-29 RX ADMIN — OSELTAMIVIR PHOSPHATE 75 MG: 75 CAPSULE ORAL at 08:58

## 2018-01-29 RX ADMIN — TRAMADOL HYDROCHLORIDE 50 MG: 50 TABLET, COATED ORAL at 21:13

## 2018-01-29 RX ADMIN — MEROPENEM 1 G: 1 INJECTION, POWDER, FOR SOLUTION INTRAVENOUS at 14:53

## 2018-01-29 RX ADMIN — ATORVASTATIN CALCIUM 10 MG: 10 TABLET, FILM COATED ORAL at 21:14

## 2018-01-30 LAB
ANION GAP SERPL CALCULATED.3IONS-SCNC: 9 MMOL/L (ref 4–13)
BASOPHILS # BLD AUTO: 0.07 10*3/MM3 (ref 0–0.2)
BASOPHILS NFR BLD AUTO: 0.5 % (ref 0–2)
BUN BLD-MCNC: 15 MG/DL (ref 5–21)
BUN/CREAT SERPL: 24.6 (ref 7–25)
CALCIUM SPEC-SCNC: 9.8 MG/DL (ref 8.4–10.4)
CHLORIDE SERPL-SCNC: 95 MMOL/L (ref 98–110)
CO2 SERPL-SCNC: 35 MMOL/L (ref 24–31)
CREAT BLD-MCNC: 0.61 MG/DL (ref 0.5–1.4)
DEPRECATED RDW RBC AUTO: 48.9 FL (ref 40–54)
EOSINOPHIL # BLD AUTO: 0.42 10*3/MM3 (ref 0–0.7)
EOSINOPHIL NFR BLD AUTO: 2.8 % (ref 0–4)
ERYTHROCYTE [DISTWIDTH] IN BLOOD BY AUTOMATED COUNT: 15.5 % (ref 12–15)
GFR SERPL CREATININE-BSD FRML MDRD: 92 ML/MIN/1.73
GLUCOSE BLD-MCNC: 113 MG/DL (ref 70–100)
HCT VFR BLD AUTO: 35.1 % (ref 37–47)
HGB BLD-MCNC: 11.2 G/DL (ref 12–16)
IMM GRANULOCYTES # BLD: 0.57 10*3/MM3 (ref 0–0.03)
IMM GRANULOCYTES NFR BLD: 3.8 % (ref 0–5)
INR PPP: 1.28 (ref 0.91–1.09)
LYMPHOCYTES # BLD AUTO: 2 10*3/MM3 (ref 0.72–4.86)
LYMPHOCYTES NFR BLD AUTO: 13.4 % (ref 15–45)
MCH RBC QN AUTO: 27.5 PG (ref 28–32)
MCHC RBC AUTO-ENTMCNC: 31.9 G/DL (ref 33–36)
MCV RBC AUTO: 86.2 FL (ref 82–98)
MONOCYTES # BLD AUTO: 1.22 10*3/MM3 (ref 0.19–1.3)
MONOCYTES NFR BLD AUTO: 8.2 % (ref 4–12)
NEUTROPHILS # BLD AUTO: 10.62 10*3/MM3 (ref 1.87–8.4)
NEUTROPHILS NFR BLD AUTO: 71.3 % (ref 39–78)
NRBC BLD MANUAL-RTO: 0 /100 WBC (ref 0–0)
PLATELET # BLD AUTO: 298 10*3/MM3 (ref 130–400)
PMV BLD AUTO: 10.2 FL (ref 6–12)
POTASSIUM BLD-SCNC: 3.3 MMOL/L (ref 3.5–5.3)
PROTHROMBIN TIME: 16.4 SECONDS (ref 11.9–14.6)
RBC # BLD AUTO: 4.07 10*6/MM3 (ref 4.2–5.4)
SODIUM BLD-SCNC: 139 MMOL/L (ref 135–145)
WBC NRBC COR # BLD: 14.9 10*3/MM3 (ref 4.8–10.8)

## 2018-01-30 PROCEDURE — 94799 UNLISTED PULMONARY SVC/PX: CPT

## 2018-01-30 PROCEDURE — 94669 MECHANICAL CHEST WALL OSCILL: CPT

## 2018-01-30 PROCEDURE — 94660 CPAP INITIATION&MGMT: CPT

## 2018-01-30 PROCEDURE — 97535 SELF CARE MNGMENT TRAINING: CPT

## 2018-01-30 PROCEDURE — 80048 BASIC METABOLIC PNL TOTAL CA: CPT | Performed by: INTERNAL MEDICINE

## 2018-01-30 PROCEDURE — 25010000002 MEROPENEM PER 100 MG: Performed by: INTERNAL MEDICINE

## 2018-01-30 PROCEDURE — 85610 PROTHROMBIN TIME: CPT | Performed by: INTERNAL MEDICINE

## 2018-01-30 PROCEDURE — 85025 COMPLETE CBC W/AUTO DIFF WBC: CPT | Performed by: INTERNAL MEDICINE

## 2018-01-30 PROCEDURE — 97110 THERAPEUTIC EXERCISES: CPT

## 2018-01-30 RX ORDER — POTASSIUM CHLORIDE 750 MG/1
40 CAPSULE, EXTENDED RELEASE ORAL ONCE
Status: COMPLETED | OUTPATIENT
Start: 2018-01-30 | End: 2018-01-30

## 2018-01-30 RX ORDER — AMLODIPINE BESYLATE 10 MG/1
10 TABLET ORAL DAILY
Status: DISCONTINUED | OUTPATIENT
Start: 2018-01-30 | End: 2018-02-05 | Stop reason: HOSPADM

## 2018-01-30 RX ORDER — ACETYLCYSTEINE 200 MG/ML
1.5 SOLUTION ORAL; RESPIRATORY (INHALATION)
Status: DISPENSED | OUTPATIENT
Start: 2018-01-30 | End: 2018-01-31

## 2018-01-30 RX ORDER — WARFARIN SODIUM 2.5 MG/1
2.5 TABLET ORAL
Status: DISCONTINUED | OUTPATIENT
Start: 2018-01-30 | End: 2018-02-01

## 2018-01-30 RX ORDER — LISINOPRIL 20 MG/1
40 TABLET ORAL
Status: DISCONTINUED | OUTPATIENT
Start: 2018-01-30 | End: 2018-02-05 | Stop reason: HOSPADM

## 2018-01-30 RX ADMIN — MEROPENEM 1 G: 1 INJECTION, POWDER, FOR SOLUTION INTRAVENOUS at 06:03

## 2018-01-30 RX ADMIN — IPRATROPIUM BROMIDE AND ALBUTEROL SULFATE 3 ML: 2.5; .5 SOLUTION RESPIRATORY (INHALATION) at 11:47

## 2018-01-30 RX ADMIN — MEROPENEM 1 G: 1 INJECTION, POWDER, FOR SOLUTION INTRAVENOUS at 13:56

## 2018-01-30 RX ADMIN — CLONIDINE HYDROCHLORIDE 0.1 MG: 0.1 TABLET ORAL at 05:04

## 2018-01-30 RX ADMIN — MEROPENEM 1 G: 1 INJECTION, POWDER, FOR SOLUTION INTRAVENOUS at 23:35

## 2018-01-30 RX ADMIN — METOPROLOL TARTRATE 25 MG: 25 TABLET, FILM COATED ORAL at 20:29

## 2018-01-30 RX ADMIN — Medication 162 MG: at 09:25

## 2018-01-30 RX ADMIN — FUROSEMIDE 40 MG: 40 TABLET ORAL at 09:25

## 2018-01-30 RX ADMIN — ZOLPIDEM TARTRATE 5 MG: 5 TABLET ORAL at 21:53

## 2018-01-30 RX ADMIN — VITAMIN E CAP 400 UNIT 400 UNITS: 400 CAP at 09:25

## 2018-01-30 RX ADMIN — TRAMADOL HYDROCHLORIDE 50 MG: 50 TABLET, COATED ORAL at 01:19

## 2018-01-30 RX ADMIN — GUAIFENESIN 1200 MG: 600 TABLET, EXTENDED RELEASE ORAL at 09:25

## 2018-01-30 RX ADMIN — PAROXETINE 20 MG: 20 TABLET, FILM COATED ORAL at 06:02

## 2018-01-30 RX ADMIN — HYDRALAZINE HYDROCHLORIDE 25 MG: 25 TABLET, FILM COATED ORAL at 05:04

## 2018-01-30 RX ADMIN — GABAPENTIN 600 MG: 300 CAPSULE ORAL at 20:28

## 2018-01-30 RX ADMIN — ATORVASTATIN CALCIUM 10 MG: 10 TABLET, FILM COATED ORAL at 21:53

## 2018-01-30 RX ADMIN — GUAIFENESIN 1200 MG: 600 TABLET, EXTENDED RELEASE ORAL at 20:28

## 2018-01-30 RX ADMIN — IPRATROPIUM BROMIDE AND ALBUTEROL SULFATE 3 ML: 2.5; .5 SOLUTION RESPIRATORY (INHALATION) at 15:28

## 2018-01-30 RX ADMIN — METOPROLOL TARTRATE 25 MG: 25 TABLET, FILM COATED ORAL at 09:25

## 2018-01-30 RX ADMIN — TRAMADOL HYDROCHLORIDE 50 MG: 50 TABLET, COATED ORAL at 23:25

## 2018-01-30 RX ADMIN — HYDROCODONE BITARTRATE AND ACETAMINOPHEN 1 TABLET: 5; 325 TABLET ORAL at 20:28

## 2018-01-30 RX ADMIN — FLUTICASONE PROPIONATE 1 SPRAY: 50 SPRAY, METERED NASAL at 09:35

## 2018-01-30 RX ADMIN — GABAPENTIN 300 MG: 300 CAPSULE ORAL at 09:26

## 2018-01-30 RX ADMIN — LISINOPRIL 40 MG: 20 TABLET ORAL at 09:25

## 2018-01-30 RX ADMIN — IPRATROPIUM BROMIDE AND ALBUTEROL SULFATE 3 ML: 2.5; .5 SOLUTION RESPIRATORY (INHALATION) at 07:22

## 2018-01-30 RX ADMIN — POTASSIUM CHLORIDE 40 MEQ: 750 CAPSULE, EXTENDED RELEASE ORAL at 09:35

## 2018-01-30 RX ADMIN — TAMSULOSIN HYDROCHLORIDE 0.4 MG: 0.4 CAPSULE ORAL at 09:25

## 2018-01-30 RX ADMIN — ALPRAZOLAM 0.12 MG: 0.25 TABLET ORAL at 09:26

## 2018-01-30 RX ADMIN — DOXYCYCLINE 100 MG: 100 INJECTION, POWDER, LYOPHILIZED, FOR SOLUTION INTRAVENOUS at 09:25

## 2018-01-30 RX ADMIN — IPRATROPIUM BROMIDE AND ALBUTEROL SULFATE 3 ML: 2.5; .5 SOLUTION RESPIRATORY (INHALATION) at 21:38

## 2018-01-30 RX ADMIN — WARFARIN SODIUM 2.5 MG: 5 TABLET ORAL at 17:21

## 2018-01-30 RX ADMIN — TRAMADOL HYDROCHLORIDE 50 MG: 50 TABLET, COATED ORAL at 09:26

## 2018-01-30 RX ADMIN — FLUTICASONE PROPIONATE 1 SPRAY: 50 SPRAY, METERED NASAL at 20:31

## 2018-01-30 RX ADMIN — DOCUSATE SODIUM 100 MG: 100 CAPSULE ORAL at 20:32

## 2018-01-30 RX ADMIN — DOCUSATE SODIUM 100 MG: 100 CAPSULE ORAL at 09:25

## 2018-01-30 RX ADMIN — CHOLECALCIFEROL (VITAMIN D3) 25 MCG (1,000 UNIT) TABLET 1000 UNITS: TABLET at 09:25

## 2018-01-30 RX ADMIN — HYDRALAZINE HYDROCHLORIDE 25 MG: 25 TABLET, FILM COATED ORAL at 13:56

## 2018-01-30 RX ADMIN — TRAMADOL HYDROCHLORIDE 50 MG: 50 TABLET, COATED ORAL at 04:34

## 2018-01-30 RX ADMIN — TRAMADOL HYDROCHLORIDE 50 MG: 50 TABLET, COATED ORAL at 11:53

## 2018-01-30 RX ADMIN — AMLODIPINE BESYLATE 10 MG: 10 TABLET ORAL at 09:25

## 2018-01-30 RX ADMIN — HYDRALAZINE HYDROCHLORIDE 25 MG: 25 TABLET, FILM COATED ORAL at 21:53

## 2018-01-30 RX ADMIN — Medication 1 SPRAY: at 09:35

## 2018-01-30 RX ADMIN — POTASSIUM CHLORIDE 10 MEQ: 750 CAPSULE, EXTENDED RELEASE ORAL at 09:25

## 2018-01-30 RX ADMIN — DOXYCYCLINE 100 MG: 100 INJECTION, POWDER, LYOPHILIZED, FOR SOLUTION INTRAVENOUS at 20:28

## 2018-01-30 RX ADMIN — TRAMADOL HYDROCHLORIDE 50 MG: 50 TABLET, COATED ORAL at 17:21

## 2018-01-31 ENCOUNTER — APPOINTMENT (OUTPATIENT)
Dept: GENERAL RADIOLOGY | Facility: HOSPITAL | Age: 83
End: 2018-01-31

## 2018-01-31 LAB
ANION GAP SERPL CALCULATED.3IONS-SCNC: 6 MMOL/L (ref 4–13)
BUN BLD-MCNC: 14 MG/DL (ref 5–21)
BUN/CREAT SERPL: 21.5 (ref 7–25)
CALCIUM SPEC-SCNC: 9.9 MG/DL (ref 8.4–10.4)
CHLORIDE SERPL-SCNC: 96 MMOL/L (ref 98–110)
CO2 SERPL-SCNC: 39 MMOL/L (ref 24–31)
CREAT BLD-MCNC: 0.65 MG/DL (ref 0.5–1.4)
DEPRECATED RDW RBC AUTO: 49.6 FL (ref 40–54)
EOSINOPHIL # BLD MANUAL: 0.9 10*3/MM3 (ref 0–0.7)
EOSINOPHIL NFR BLD MANUAL: 6 % (ref 0–4)
ERYTHROCYTE [DISTWIDTH] IN BLOOD BY AUTOMATED COUNT: 15.5 % (ref 12–15)
GFR SERPL CREATININE-BSD FRML MDRD: 85 ML/MIN/1.73
GLUCOSE BLD-MCNC: 113 MG/DL (ref 70–100)
HCT VFR BLD AUTO: 38.2 % (ref 37–47)
HGB BLD-MCNC: 11.9 G/DL (ref 12–16)
INR PPP: 1.15 (ref 0.91–1.09)
LYMPHOCYTES # BLD MANUAL: 2.1 10*3/MM3 (ref 0.72–4.86)
LYMPHOCYTES NFR BLD MANUAL: 11 % (ref 4–12)
LYMPHOCYTES NFR BLD MANUAL: 14 % (ref 15–45)
MAGNESIUM SERPL-MCNC: 1.9 MG/DL (ref 1.4–2.2)
MCH RBC QN AUTO: 27.1 PG (ref 28–32)
MCHC RBC AUTO-ENTMCNC: 31.2 G/DL (ref 33–36)
MCV RBC AUTO: 87 FL (ref 82–98)
MONOCYTES # BLD AUTO: 1.65 10*3/MM3 (ref 0.19–1.3)
MYELOCYTES NFR BLD MANUAL: 1 % (ref 0–0)
NEUTROPHILS # BLD AUTO: 10.2 10*3/MM3 (ref 1.87–8.4)
NEUTROPHILS NFR BLD MANUAL: 66 % (ref 39–78)
NEUTS BAND NFR BLD MANUAL: 2 % (ref 0–10)
PLAT MORPH BLD: NORMAL
PLATELET # BLD AUTO: 358 10*3/MM3 (ref 130–400)
PMV BLD AUTO: 9.7 FL (ref 6–12)
POIKILOCYTOSIS BLD QL SMEAR: ABNORMAL
POTASSIUM BLD-SCNC: 4 MMOL/L (ref 3.5–5.3)
PROTHROMBIN TIME: 15.1 SECONDS (ref 11.9–14.6)
RBC # BLD AUTO: 4.39 10*6/MM3 (ref 4.2–5.4)
SCAN SLIDE: NORMAL
SODIUM BLD-SCNC: 141 MMOL/L (ref 135–145)
WBC MORPH BLD: NORMAL
WBC NRBC COR # BLD: 15 10*3/MM3 (ref 4.8–10.8)

## 2018-01-31 PROCEDURE — 94799 UNLISTED PULMONARY SVC/PX: CPT

## 2018-01-31 PROCEDURE — 25010000002 MEROPENEM PER 100 MG: Performed by: INTERNAL MEDICINE

## 2018-01-31 PROCEDURE — 97110 THERAPEUTIC EXERCISES: CPT

## 2018-01-31 PROCEDURE — 25010000002 MEROPENEM 1 G/20ML IV PUSH SYRINGE KIT (PAD): Performed by: INTERNAL MEDICINE

## 2018-01-31 PROCEDURE — 83735 ASSAY OF MAGNESIUM: CPT | Performed by: NURSE PRACTITIONER

## 2018-01-31 PROCEDURE — 97530 THERAPEUTIC ACTIVITIES: CPT

## 2018-01-31 PROCEDURE — 94660 CPAP INITIATION&MGMT: CPT

## 2018-01-31 PROCEDURE — 85025 COMPLETE CBC W/AUTO DIFF WBC: CPT | Performed by: INTERNAL MEDICINE

## 2018-01-31 PROCEDURE — 71045 X-RAY EXAM CHEST 1 VIEW: CPT

## 2018-01-31 PROCEDURE — 36415 COLL VENOUS BLD VENIPUNCTURE: CPT | Performed by: INTERNAL MEDICINE

## 2018-01-31 PROCEDURE — 94668 MNPJ CHEST WALL SBSQ: CPT

## 2018-01-31 PROCEDURE — 85007 BL SMEAR W/DIFF WBC COUNT: CPT | Performed by: INTERNAL MEDICINE

## 2018-01-31 PROCEDURE — 94669 MECHANICAL CHEST WALL OSCILL: CPT

## 2018-01-31 PROCEDURE — 80048 BASIC METABOLIC PNL TOTAL CA: CPT | Performed by: INTERNAL MEDICINE

## 2018-01-31 PROCEDURE — 85610 PROTHROMBIN TIME: CPT | Performed by: INTERNAL MEDICINE

## 2018-01-31 RX ORDER — METOPROLOL TARTRATE 50 MG/1
50 TABLET, FILM COATED ORAL EVERY 12 HOURS SCHEDULED
Status: DISCONTINUED | OUTPATIENT
Start: 2018-01-31 | End: 2018-02-05 | Stop reason: HOSPADM

## 2018-01-31 RX ADMIN — ALPRAZOLAM 0.12 MG: 0.25 TABLET ORAL at 10:11

## 2018-01-31 RX ADMIN — DOCUSATE SODIUM 100 MG: 100 CAPSULE ORAL at 20:48

## 2018-01-31 RX ADMIN — NYSTATIN: 100000 CREAM TOPICAL at 14:25

## 2018-01-31 RX ADMIN — IPRATROPIUM BROMIDE AND ALBUTEROL SULFATE 3 ML: 2.5; .5 SOLUTION RESPIRATORY (INHALATION) at 20:10

## 2018-01-31 RX ADMIN — Medication 1 SPRAY: at 10:14

## 2018-01-31 RX ADMIN — CLONIDINE HYDROCHLORIDE 0.1 MG: 0.1 TABLET ORAL at 12:00

## 2018-01-31 RX ADMIN — METOPROLOL TARTRATE 50 MG: 50 TABLET ORAL at 20:48

## 2018-01-31 RX ADMIN — VITAMIN E CAP 400 UNIT 400 UNITS: 400 CAP at 10:13

## 2018-01-31 RX ADMIN — DOXYCYCLINE 100 MG: 100 INJECTION, POWDER, LYOPHILIZED, FOR SOLUTION INTRAVENOUS at 20:48

## 2018-01-31 RX ADMIN — HYDRALAZINE HYDROCHLORIDE 25 MG: 25 TABLET, FILM COATED ORAL at 13:31

## 2018-01-31 RX ADMIN — LISINOPRIL 40 MG: 20 TABLET ORAL at 10:12

## 2018-01-31 RX ADMIN — AMLODIPINE BESYLATE 10 MG: 10 TABLET ORAL at 10:13

## 2018-01-31 RX ADMIN — Medication 162 MG: at 10:14

## 2018-01-31 RX ADMIN — WARFARIN SODIUM 2.5 MG: 5 TABLET ORAL at 17:10

## 2018-01-31 RX ADMIN — GABAPENTIN 300 MG: 300 CAPSULE ORAL at 10:12

## 2018-01-31 RX ADMIN — GABAPENTIN 600 MG: 300 CAPSULE ORAL at 20:49

## 2018-01-31 RX ADMIN — MEROPENEM 1 G: 1 INJECTION, POWDER, FOR SOLUTION INTRAVENOUS at 21:59

## 2018-01-31 RX ADMIN — GUAIFENESIN 1200 MG: 600 TABLET, EXTENDED RELEASE ORAL at 20:49

## 2018-01-31 RX ADMIN — GUAIFENESIN 1200 MG: 600 TABLET, EXTENDED RELEASE ORAL at 10:13

## 2018-01-31 RX ADMIN — IPRATROPIUM BROMIDE AND ALBUTEROL SULFATE 3 ML: 2.5; .5 SOLUTION RESPIRATORY (INHALATION) at 07:43

## 2018-01-31 RX ADMIN — TRAMADOL HYDROCHLORIDE 50 MG: 50 TABLET, COATED ORAL at 23:31

## 2018-01-31 RX ADMIN — HYDROCODONE BITARTRATE AND ACETAMINOPHEN 1 TABLET: 5; 325 TABLET ORAL at 10:19

## 2018-01-31 RX ADMIN — HYDRALAZINE HYDROCHLORIDE 25 MG: 25 TABLET, FILM COATED ORAL at 21:59

## 2018-01-31 RX ADMIN — TRAMADOL HYDROCHLORIDE 50 MG: 50 TABLET, COATED ORAL at 04:30

## 2018-01-31 RX ADMIN — IPRATROPIUM BROMIDE AND ALBUTEROL SULFATE 3 ML: 2.5; .5 SOLUTION RESPIRATORY (INHALATION) at 11:17

## 2018-01-31 RX ADMIN — POTASSIUM CHLORIDE 10 MEQ: 750 CAPSULE, EXTENDED RELEASE ORAL at 10:13

## 2018-01-31 RX ADMIN — TRAMADOL HYDROCHLORIDE 50 MG: 50 TABLET, COATED ORAL at 17:10

## 2018-01-31 RX ADMIN — DOXYCYCLINE 100 MG: 100 INJECTION, POWDER, LYOPHILIZED, FOR SOLUTION INTRAVENOUS at 10:55

## 2018-01-31 RX ADMIN — HYDROCODONE BITARTRATE AND ACETAMINOPHEN 1 TABLET: 5; 325 TABLET ORAL at 20:56

## 2018-01-31 RX ADMIN — CHOLECALCIFEROL (VITAMIN D3) 25 MCG (1,000 UNIT) TABLET 1000 UNITS: TABLET at 10:13

## 2018-01-31 RX ADMIN — MEROPENEM 1 G: 1 INJECTION, POWDER, FOR SOLUTION INTRAVENOUS at 13:32

## 2018-01-31 RX ADMIN — DOCUSATE SODIUM 100 MG: 100 CAPSULE ORAL at 10:13

## 2018-01-31 RX ADMIN — FLUTICASONE PROPIONATE 1 SPRAY: 50 SPRAY, METERED NASAL at 10:14

## 2018-01-31 RX ADMIN — FLUTICASONE PROPIONATE 1 SPRAY: 50 SPRAY, METERED NASAL at 20:56

## 2018-01-31 RX ADMIN — MEROPENEM 1 G: 1 INJECTION, POWDER, FOR SOLUTION INTRAVENOUS at 06:12

## 2018-01-31 RX ADMIN — IPRATROPIUM BROMIDE AND ALBUTEROL SULFATE 3 ML: 2.5; .5 SOLUTION RESPIRATORY (INHALATION) at 16:19

## 2018-01-31 RX ADMIN — ZOLPIDEM TARTRATE 5 MG: 5 TABLET ORAL at 21:59

## 2018-01-31 RX ADMIN — HYDRALAZINE HYDROCHLORIDE 25 MG: 25 TABLET, FILM COATED ORAL at 06:12

## 2018-01-31 RX ADMIN — CLONIDINE HYDROCHLORIDE 0.1 MG: 0.1 TABLET ORAL at 04:28

## 2018-01-31 RX ADMIN — PAROXETINE 20 MG: 20 TABLET, FILM COATED ORAL at 06:12

## 2018-01-31 RX ADMIN — TAMSULOSIN HYDROCHLORIDE 0.4 MG: 0.4 CAPSULE ORAL at 10:13

## 2018-01-31 RX ADMIN — FUROSEMIDE 40 MG: 40 TABLET ORAL at 10:13

## 2018-01-31 RX ADMIN — ATORVASTATIN CALCIUM 10 MG: 10 TABLET, FILM COATED ORAL at 20:49

## 2018-01-31 RX ADMIN — METOPROLOL TARTRATE 50 MG: 50 TABLET ORAL at 10:12

## 2018-02-01 LAB
ANION GAP SERPL CALCULATED.3IONS-SCNC: 8 MMOL/L (ref 4–13)
BASOPHILS # BLD MANUAL: 0.14 10*3/MM3 (ref 0–0.2)
BASOPHILS NFR BLD AUTO: 1 % (ref 0–2)
BUN BLD-MCNC: 15 MG/DL (ref 5–21)
BUN/CREAT SERPL: 25 (ref 7–25)
CALCIUM SPEC-SCNC: 9.7 MG/DL (ref 8.4–10.4)
CHLORIDE SERPL-SCNC: 94 MMOL/L (ref 98–110)
CO2 SERPL-SCNC: 37 MMOL/L (ref 24–31)
CREAT BLD-MCNC: 0.6 MG/DL (ref 0.5–1.4)
DEPRECATED RDW RBC AUTO: 49 FL (ref 40–54)
EOSINOPHIL # BLD MANUAL: 0.28 10*3/MM3 (ref 0–0.7)
EOSINOPHIL NFR BLD MANUAL: 2 % (ref 0–4)
ERYTHROCYTE [DISTWIDTH] IN BLOOD BY AUTOMATED COUNT: 15.6 % (ref 12–15)
GFR SERPL CREATININE-BSD FRML MDRD: 93 ML/MIN/1.73
GLUCOSE BLD-MCNC: 108 MG/DL (ref 70–100)
HCT VFR BLD AUTO: 34.2 % (ref 37–47)
HGB BLD-MCNC: 11.1 G/DL (ref 12–16)
INR PPP: 1.07 (ref 0.91–1.09)
LYMPHOCYTES # BLD MANUAL: 2.08 10*3/MM3 (ref 0.72–4.86)
LYMPHOCYTES NFR BLD MANUAL: 15 % (ref 15–45)
LYMPHOCYTES NFR BLD MANUAL: 6 % (ref 4–12)
MCH RBC QN AUTO: 27.8 PG (ref 28–32)
MCHC RBC AUTO-ENTMCNC: 32.5 G/DL (ref 33–36)
MCV RBC AUTO: 85.7 FL (ref 82–98)
MONOCYTES # BLD AUTO: 0.83 10*3/MM3 (ref 0.19–1.3)
MYELOCYTES NFR BLD MANUAL: 1 % (ref 0–0)
NEUTROPHILS # BLD AUTO: 10.12 10*3/MM3 (ref 1.87–8.4)
NEUTROPHILS NFR BLD MANUAL: 73 % (ref 39–78)
PLATELET # BLD AUTO: 356 10*3/MM3 (ref 130–400)
PMV BLD AUTO: 10 FL (ref 6–12)
POIKILOCYTOSIS BLD QL SMEAR: ABNORMAL
POTASSIUM BLD-SCNC: 4 MMOL/L (ref 3.5–5.3)
PROTHROMBIN TIME: 14.2 SECONDS (ref 11.9–14.6)
RBC # BLD AUTO: 3.99 10*6/MM3 (ref 4.2–5.4)
SCAN SLIDE: NORMAL
SMALL PLATELETS BLD QL SMEAR: ADEQUATE
SODIUM BLD-SCNC: 139 MMOL/L (ref 135–145)
VARIANT LYMPHS NFR BLD MANUAL: 2 % (ref 0–5)
WBC MORPH BLD: NORMAL
WBC NRBC COR # BLD: 13.86 10*3/MM3 (ref 4.8–10.8)

## 2018-02-01 PROCEDURE — 85610 PROTHROMBIN TIME: CPT | Performed by: INTERNAL MEDICINE

## 2018-02-01 PROCEDURE — 87070 CULTURE OTHR SPECIMN AEROBIC: CPT | Performed by: NURSE PRACTITIONER

## 2018-02-01 PROCEDURE — 85007 BL SMEAR W/DIFF WBC COUNT: CPT | Performed by: INTERNAL MEDICINE

## 2018-02-01 PROCEDURE — 94799 UNLISTED PULMONARY SVC/PX: CPT

## 2018-02-01 PROCEDURE — 25010000002 MEROPENEM PER 100 MG: Performed by: INTERNAL MEDICINE

## 2018-02-01 PROCEDURE — 94669 MECHANICAL CHEST WALL OSCILL: CPT

## 2018-02-01 PROCEDURE — 97110 THERAPEUTIC EXERCISES: CPT

## 2018-02-01 PROCEDURE — 97530 THERAPEUTIC ACTIVITIES: CPT

## 2018-02-01 PROCEDURE — 36415 COLL VENOUS BLD VENIPUNCTURE: CPT | Performed by: INTERNAL MEDICINE

## 2018-02-01 PROCEDURE — 87205 SMEAR GRAM STAIN: CPT | Performed by: NURSE PRACTITIONER

## 2018-02-01 PROCEDURE — 85025 COMPLETE CBC W/AUTO DIFF WBC: CPT | Performed by: INTERNAL MEDICINE

## 2018-02-01 PROCEDURE — 94660 CPAP INITIATION&MGMT: CPT

## 2018-02-01 PROCEDURE — 94668 MNPJ CHEST WALL SBSQ: CPT

## 2018-02-01 PROCEDURE — 80048 BASIC METABOLIC PNL TOTAL CA: CPT | Performed by: INTERNAL MEDICINE

## 2018-02-01 RX ORDER — WARFARIN SODIUM 5 MG/1
5 TABLET ORAL
Status: DISCONTINUED | OUTPATIENT
Start: 2018-02-01 | End: 2018-02-05

## 2018-02-01 RX ADMIN — DOCUSATE SODIUM 100 MG: 100 CAPSULE ORAL at 21:15

## 2018-02-01 RX ADMIN — TAMSULOSIN HYDROCHLORIDE 0.4 MG: 0.4 CAPSULE ORAL at 11:03

## 2018-02-01 RX ADMIN — GABAPENTIN 600 MG: 300 CAPSULE ORAL at 21:15

## 2018-02-01 RX ADMIN — HYDROCODONE BITARTRATE AND ACETAMINOPHEN 1 TABLET: 5; 325 TABLET ORAL at 21:15

## 2018-02-01 RX ADMIN — WARFARIN SODIUM 5 MG: 5 TABLET ORAL at 18:20

## 2018-02-01 RX ADMIN — ATORVASTATIN CALCIUM 10 MG: 10 TABLET, FILM COATED ORAL at 21:15

## 2018-02-01 RX ADMIN — FLUTICASONE PROPIONATE 1 SPRAY: 50 SPRAY, METERED NASAL at 11:02

## 2018-02-01 RX ADMIN — IPRATROPIUM BROMIDE AND ALBUTEROL SULFATE 3 ML: 2.5; .5 SOLUTION RESPIRATORY (INHALATION) at 11:18

## 2018-02-01 RX ADMIN — AMLODIPINE BESYLATE 10 MG: 10 TABLET ORAL at 11:02

## 2018-02-01 RX ADMIN — HYDRALAZINE HYDROCHLORIDE 25 MG: 25 TABLET, FILM COATED ORAL at 14:02

## 2018-02-01 RX ADMIN — DOCUSATE SODIUM 100 MG: 100 CAPSULE ORAL at 11:02

## 2018-02-01 RX ADMIN — CLONIDINE HYDROCHLORIDE 0.1 MG: 0.1 TABLET ORAL at 11:30

## 2018-02-01 RX ADMIN — GUAIFENESIN 1200 MG: 600 TABLET, EXTENDED RELEASE ORAL at 21:15

## 2018-02-01 RX ADMIN — FUROSEMIDE 40 MG: 40 TABLET ORAL at 11:02

## 2018-02-01 RX ADMIN — IPRATROPIUM BROMIDE AND ALBUTEROL SULFATE 3 ML: 2.5; .5 SOLUTION RESPIRATORY (INHALATION) at 20:35

## 2018-02-01 RX ADMIN — TRAMADOL HYDROCHLORIDE 50 MG: 50 TABLET, COATED ORAL at 11:01

## 2018-02-01 RX ADMIN — METOPROLOL TARTRATE 50 MG: 50 TABLET ORAL at 11:02

## 2018-02-01 RX ADMIN — FLUTICASONE PROPIONATE 1 SPRAY: 50 SPRAY, METERED NASAL at 21:14

## 2018-02-01 RX ADMIN — Medication 1 SPRAY: at 11:02

## 2018-02-01 RX ADMIN — MEROPENEM 1 G: 1 INJECTION, POWDER, FOR SOLUTION INTRAVENOUS at 21:14

## 2018-02-01 RX ADMIN — ZOLPIDEM TARTRATE 5 MG: 5 TABLET ORAL at 21:15

## 2018-02-01 RX ADMIN — POTASSIUM CHLORIDE 10 MEQ: 750 CAPSULE, EXTENDED RELEASE ORAL at 11:03

## 2018-02-01 RX ADMIN — LISINOPRIL 40 MG: 20 TABLET ORAL at 11:02

## 2018-02-01 RX ADMIN — PAROXETINE 20 MG: 20 TABLET, FILM COATED ORAL at 06:39

## 2018-02-01 RX ADMIN — IPRATROPIUM BROMIDE AND ALBUTEROL SULFATE 3 ML: 2.5; .5 SOLUTION RESPIRATORY (INHALATION) at 15:35

## 2018-02-01 RX ADMIN — VITAMIN E CAP 400 UNIT 400 UNITS: 400 CAP at 11:03

## 2018-02-01 RX ADMIN — HYDRALAZINE HYDROCHLORIDE 25 MG: 25 TABLET, FILM COATED ORAL at 21:15

## 2018-02-01 RX ADMIN — TRAMADOL HYDROCHLORIDE 50 MG: 50 TABLET, COATED ORAL at 03:30

## 2018-02-01 RX ADMIN — GABAPENTIN 300 MG: 300 CAPSULE ORAL at 11:01

## 2018-02-01 RX ADMIN — Medication 162 MG: at 11:04

## 2018-02-01 RX ADMIN — TRAMADOL HYDROCHLORIDE 50 MG: 50 TABLET, COATED ORAL at 14:02

## 2018-02-01 RX ADMIN — HYDROCODONE BITARTRATE AND ACETAMINOPHEN 1 TABLET: 5; 325 TABLET ORAL at 06:39

## 2018-02-01 RX ADMIN — TRAMADOL HYDROCHLORIDE 50 MG: 50 TABLET, COATED ORAL at 18:20

## 2018-02-01 RX ADMIN — TRAMADOL HYDROCHLORIDE 50 MG: 50 TABLET, COATED ORAL at 23:22

## 2018-02-01 RX ADMIN — MEROPENEM 1 G: 1 INJECTION, POWDER, FOR SOLUTION INTRAVENOUS at 06:40

## 2018-02-01 RX ADMIN — HYDRALAZINE HYDROCHLORIDE 25 MG: 25 TABLET, FILM COATED ORAL at 06:39

## 2018-02-01 RX ADMIN — DOXYCYCLINE 100 MG: 100 INJECTION, POWDER, LYOPHILIZED, FOR SOLUTION INTRAVENOUS at 11:01

## 2018-02-01 RX ADMIN — IPRATROPIUM BROMIDE AND ALBUTEROL SULFATE 3 ML: 2.5; .5 SOLUTION RESPIRATORY (INHALATION) at 07:40

## 2018-02-01 RX ADMIN — MEROPENEM 1 G: 1 INJECTION, POWDER, FOR SOLUTION INTRAVENOUS at 14:08

## 2018-02-01 RX ADMIN — DOXYCYCLINE 100 MG: 100 INJECTION, POWDER, LYOPHILIZED, FOR SOLUTION INTRAVENOUS at 21:15

## 2018-02-01 RX ADMIN — ALPRAZOLAM 0.12 MG: 0.25 TABLET ORAL at 11:04

## 2018-02-01 RX ADMIN — METOPROLOL TARTRATE 50 MG: 50 TABLET ORAL at 21:15

## 2018-02-01 RX ADMIN — CHOLECALCIFEROL (VITAMIN D3) 25 MCG (1,000 UNIT) TABLET 1000 UNITS: TABLET at 11:03

## 2018-02-01 RX ADMIN — GUAIFENESIN 1200 MG: 600 TABLET, EXTENDED RELEASE ORAL at 11:03

## 2018-02-01 RX ADMIN — NYSTATIN: 100000 CREAM TOPICAL at 14:02

## 2018-02-01 NOTE — PLAN OF CARE
Problem: Patient Care Overview (Adult)  Goal: Plan of Care Review  Outcome: Ongoing (interventions implemented as appropriate)   01/31/18 1912   Coping/Psychosocial Response Interventions   Plan Of Care Reviewed With patient   Patient Care Overview   Progress no change   Outcome Evaluation   Outcome Summary/Follow up Plan Pt weaned to 9L hiflo today. rested well. medicated for pain with some relief. incont. exoriated. will continue to monitor       Problem: Fall Risk (Adult)  Goal: Absence of Falls  Outcome: Ongoing (interventions implemented as appropriate)      Problem: Pneumonia (Adult)  Goal: Signs and Symptoms of Listed Potential Problems Will be Absent or Manageable (Pneumonia)  Outcome: Ongoing (interventions implemented as appropriate)      Problem: Infection, Risk/Actual (Adult)  Goal: Infection Prevention/Resolution  Outcome: Ongoing (interventions implemented as appropriate)

## 2018-02-01 NOTE — PLAN OF CARE
Problem: Patient Care Overview (Adult)  Goal: Plan of Care Review  Outcome: Ongoing (interventions implemented as appropriate)   02/01/18 0417   Coping/Psychosocial Response Interventions   Plan Of Care Reviewed With patient   Patient Care Overview   Progress no change   Outcome Evaluation   Outcome Summary/Follow up Plan 9L [place] O2 continues, BiPAP with sleep. Pain meds given with relief. Incontinence care provided.        Problem: Fall Risk (Adult)  Goal: Identify Related Risk Factors and Signs and Symptoms  Outcome: Ongoing (interventions implemented as appropriate)   02/01/18 0417   Fall Risk   Fall Risk: Related Risk Factors age-related changes;fatigue/slow reaction;gait/mobility problems;environment unfamiliar   Fall Risk: Signs and Symptoms presence of risk factors     Goal: Absence of Falls  Outcome: Ongoing (interventions implemented as appropriate)   02/01/18 0417   Fall Risk (Adult)   Absence of Falls achieves outcome       Problem: Pneumonia (Adult)  Goal: Signs and Symptoms of Listed Potential Problems Will be Absent or Manageable (Pneumonia)  Outcome: Ongoing (interventions implemented as appropriate)   02/01/18 0417   Pneumonia   Problems Assessed (Pneumonia) all   Problems Present (Pneumonia) respiratory compromise       Problem: Infection, Risk/Actual (Adult)  Goal: Identify Related Risk Factors and Signs and Symptoms  Outcome: Ongoing (interventions implemented as appropriate)   02/01/18 0417   Infection, Risk/Actual   Infection, Risk/Actual: Related Risk Factors age extremes;exposure to microbes;prolonged hospitalization   Signs and Symptoms (Infection, Risk/Actual) lab value changes;pain;weakness     Goal: Infection Prevention/Resolution  Outcome: Ongoing (interventions implemented as appropriate)   02/01/18 0417   Infection, Risk/Actual (Adult)   Infection Prevention/Resolution making progress toward outcome

## 2018-02-01 NOTE — PLAN OF CARE
Problem: Patient Care Overview (Adult)  Goal: Plan of Care Review  Outcome: Ongoing (interventions implemented as appropriate)   02/01/18 1045   Coping/Psychosocial Response Interventions   Plan Of Care Reviewed With patient   Patient Care Overview   Progress progress toward functional goals as expected   Outcome Evaluation   Outcome Summary/Follow up Plan Pt. progressing as expected, sob and requires rest prn!

## 2018-02-01 NOTE — PROGRESS NOTES
[facility]  REGAN Raines M.D.  [facility]      Internal Medicine Progress Note    2/1/2018   8:51 AM    Name:  Justina Bingham  MRN:    [id number]     Acct:     [id number]   Room:  [address] [place] [address] Day: 9     Admit Date: 1/23/2018  5:05 AM  PCP: Ruddy Pfeiffer MD    Subjective:     C/C:   Chief Complaint   Patient presents with   • Shortness of Breath   • Cough       Interval History: Status:  Improved. Up to chair. No family at bedside. Tolerating 02 at 9 lpm hi flow. Bipap with sleep. Feeling some better. Remains congested and very weak. BP remains elevated at times. Counts stable.     Review of Systems   Constitution: Positive for weakness and malaise/fatigue. Negative for chills, decreased appetite, weight gain and weight loss.   HENT: Negative for congestion, ear discharge, hoarse voice and tinnitus.    Eyes: Negative for blurred vision, discharge, visual disturbance and visual halos.   Cardiovascular: Negative for chest pain, claudication, dyspnea on exertion, irregular heartbeat, leg swelling, orthopnea and paroxysmal nocturnal dyspnea.   Respiratory: Positive for cough and shortness of breath. Negative for sputum production and wheezing.    Endocrine: Negative for cold intolerance, heat intolerance and polyuria.   Hematologic/Lymphatic: Negative for adenopathy. Does not bruise/bleed easily.   Skin: Negative for dry skin, itching and suspicious lesions.   Musculoskeletal: Negative for arthritis, back pain, falls, joint pain, muscle weakness and myalgias.   Gastrointestinal: Positive for constipation. Negative for abdominal pain, diarrhea, dysphagia and hematemesis.   Genitourinary: Negative for bladder incontinence, dysuria and frequency.   Neurological: Negative for aphonia, disturbances in coordination and dizziness.   Psychiatric/Behavioral: Negative for altered mental status, depression, memory loss and substance abuse. The patient does not have insomnia and is  not nervous/anxious.      Medications:     Allergies:   Allergies   Allergen Reactions   • Ceclor [Cefaclor]    • Eggs Or Egg-Derived Products Itching   • Penicillins    • Sulfa Antibiotics        Current Meds:   Current Facility-Administered Medications:   •  acetaminophen (TYLENOL) tablet 650 mg, 650 mg, Oral, Q4H PRN, Clinton Pfeiffer MD, 650 mg at 01/26/18 2138  •  ALPRAZolam (XANAX) tablet 0.125 mg, 0.125 mg, Oral, Daily, Clinton Pfeiffer MD, 0.125 mg at 01/31/18 1011  •  amLODIPine (NORVASC) tablet 10 mg, 10 mg, Oral, Daily, WU Burt, 10 mg at 01/31/18 1013  •  aspirin EC tablet 162 mg, 162 mg, Oral, Daily, Clinton Pfeiffer MD, 162 mg at 01/31/18 1014  •  atorvastatin (LIPITOR) tablet 10 mg, 10 mg, Oral, Nightly, Clinton Pfeiffer MD, 10 mg at 01/31/18 2049  •  cholecalciferol (VITAMIN D3) tablet 1,000 Units, 1,000 Units, Oral, Daily, Clinton Pfeiffer MD, 1,000 Units at 01/31/18 1013  •  CloNIDine (CATAPRES) tablet 0.1 mg, 0.1 mg, Oral, Q4H PRN, Clinton Pfeiffer MD, 0.1 mg at 01/31/18 1200  •  docusate sodium (COLACE) capsule 100 mg, 100 mg, Oral, BID, WU Burt, 100 mg at 01/31/18 2048  •  doxycycline (VIBRAMYCIN) 100 mg/100 mL 0.9% NS MBP, 100 mg, Intravenous, Q12H, Angeles Davidson, APRN, Stopped at 01/31/18 2329  •  fluticasone (FLONASE) 50 MCG/ACT nasal spray 1 spray, 1 spray, Nasal, BID, Clinton Pfeiffer MD, 1 spray at 01/31/18 2056  •  furosemide (LASIX) tablet 40 mg, 40 mg, Oral, Daily, Clinton Pfeiffer MD, 40 mg at 01/31/18 1013  •  gabapentin (NEURONTIN) capsule 300 mg, 300 mg, Oral, Daily, Clinton Pfeiffer MD, 300 mg at 01/31/18 1012  •  gabapentin (NEURONTIN) capsule 600 mg, 600 mg, Oral, Nightly, Clinton Pfeiffer MD, 600 mg at 01/31/18 2049  •  guaiFENesin (MUCINEX) 12 hr tablet 1,200 mg, 1,200 mg, Oral, Q12H, WU Germain, 1,200 mg at 01/31/18 2049  •  hydrALAZINE (APRESOLINE) tablet 25 mg, 25  mg, Oral, Q8H, Binta Lundy APRN, 25 mg at 02/01/18 0639  •  HYDROcodone-acetaminophen (NORCO) 5-325 MG per tablet 1 tablet, 1 tablet, Oral, Q6H PRN, Clinton Pfeiffer MD, 1 tablet at 02/01/18 0639  •  ipratropium-albuterol (DUO-NEB) nebulizer solution 3 mL, 3 mL, Nebulization, 4x Daily - RT, Clinton Pfeiffer MD, 3 mL at 02/01/18 0740  •  lisinopril (PRINIVIL,ZESTRIL) tablet 40 mg, 40 mg, Oral, Q24H, Binta Lundy APRN, 40 mg at 01/31/18 1012  •  magnesium hydroxide (MILK OF MAGNESIA) suspension 2400 mg/10mL 10 mL, 10 mL, Oral, Nightly PRN, Clinton Pfeiffer MD, 10 mL at 01/27/18 2058  •  magnesium hydroxide (MILK OF MAGNESIA) suspension 2400 mg/10mL 10 mL, 10 mL, Oral, Daily PRN, Clinton Pfeiffer MD, 10 mL at 01/27/18 1401  •  meropenem (MERREM) 1 g/20mL IV PUSH syringe, 1 g, Intravenous, Q8H, Lonny Xavier MD, 1 g at 02/01/18 0640  •  metoprolol tartrate (LOPRESSOR) tablet 50 mg, 50 mg, Oral, Q12H, Binta Lundy, APRN, 50 mg at 01/31/18 2048  •  ondansetron (ZOFRAN) tablet 4 mg, 4 mg, Oral, Q6H PRN **OR** ondansetron ODT (ZOFRAN-ODT) disintegrating tablet 4 mg, 4 mg, Oral, Q6H PRN **OR** ondansetron (ZOFRAN) injection 4 mg, 4 mg, Intravenous, Q6H PRN, Clinton Pfeiffer MD, 4 mg at 01/23/18 1851  •  PARoxetine (PAXIL) tablet 20 mg, 20 mg, Oral, QAM, Clinton Pfeiffer MD, 20 mg at 02/01/18 0639  •  potassium chloride (MICRO-K) CR capsule 10 mEq, 10 mEq, Oral, Daily, Clinton Pfeiffer MD, 10 mEq at 01/31/18 1013  •  sodium chloride (OCEAN) nasal spray 1 spray, 1 spray, Nasal, Daily, Clinton Pfeiffer MD, 1 spray at 01/31/18 1014  •  sodium chloride 0.9 % flush 1-10 mL, 1-10 mL, Intravenous, PRN, Clinton Pfeiffer MD  •  sodium chloride 0.9 % flush 10 mL, 10 mL, Intravenous, PRN, Shawn Blount MD  •  tamsulosin (FLOMAX) 24 hr capsule 0.4 mg, 0.4 mg, Oral, Daily, Clinton Pfeiffer MD, 0.4 mg at 01/31/18 1013  •  traMADol (ULTRAM) tablet 50  "mg, 50 mg, Oral, Q4H, Clinton Pfeiffer MD, 50 mg at 18 0330  •  vitamin E capsule 400 Units, 400 Units, Oral, Daily, Clinton Pfeiffer MD, 400 Units at 18 1013  •  Karen's amazing butt cream, , Topical, PRN, Binta Lundy APRN  •  warfarin (COUMADIN) tablet 2.5 mg, 2.5 mg, Oral, Daily, Binta Lundy APRN, 2.5 mg at 18 1710  •  zolpidem (AMBIEN) tablet 5 mg, 5 mg, Oral, Nightly PRN, Clinton Pfeiffer MD, 5 mg at 18 8529    Data:     Code Status:  Full Code    Family History   Problem Relation Age of Onset   • Heart attack Mother    • No Known Problems Father    • Colon cancer Neg Hx    • Colon polyps Neg Hx        Social History     Social History   • Marital status:      Spouse name: N/A   • Number of children: N/A   • Years of education: N/A     Occupational History   • Not on file.     Social History Main Topics   • Smoking status: Never Smoker   • Smokeless tobacco: Never Used   • Alcohol use No   • Drug use: Yes     Special: Hydrocodone   • Sexual activity: Defer     Other Topics Concern   • Not on file     Social History Narrative       Vitals:  /65  Pulse 73  Temp 98.7 °F (37.1 °C)  Resp 18  Ht 175.3 cm (69\")  Wt 95 kg (209 lb 6.4 oz)  SpO2 93%  BMI 30.92 kg/m2  Temp (24hrs), Av.8 °F (37.1 °C), Min:98.2 °F (36.8 °C), Max:99.5 °F (37.5 °C)      I/O (24Hr):    Intake/Output Summary (Last 24 hours) at 18 0851  Last data filed at 18 2329   Gross per 24 hour   Intake           289.05 ml   Output                0 ml   Net           289.05 ml       Labs and imaging:      Recent Results (from the past 12 hour(s))   Protime-INR    Collection Time: 18  7:00 AM   Result Value Ref Range    Protime 14.2 11.9 - 14.6 Seconds    INR 1.07 0.91 - 1.09   Basic Metabolic Panel    Collection Time: 18  7:00 AM   Result Value Ref Range    Glucose 108 (H) 70 - 100 mg/dL    BUN 15 5 - 21 mg/dL    Creatinine 0.60 0.50 - 1.40 mg/dL    " Sodium 139 135 - 145 mmol/L    Potassium 4.0 3.5 - 5.3 mmol/L    Chloride 94 (L) 98 - 110 mmol/L    CO2 37.0 (H) 24.0 - 31.0 mmol/L    Calcium 9.7 8.4 - 10.4 mg/dL    eGFR Non African Amer 93 >60 mL/min/1.73    BUN/Creatinine Ratio 25.0 7.0 - 25.0    Anion Gap 8.0 4.0 - 13.0 mmol/L   CBC Auto Differential    Collection Time: 02/01/18  7:00 AM   Result Value Ref Range    WBC 13.86 (H) 4.80 - 10.80 10*3/mm3    RBC 3.99 (L) 4.20 - 5.40 10*6/mm3    Hemoglobin 11.1 (L) 12.0 - 16.0 g/dL    Hematocrit 34.2 (L) 37.0 - 47.0 %    MCV 85.7 82.0 - 98.0 fL    MCH 27.8 (L) 28.0 - 32.0 pg    MCHC 32.5 (L) 33.0 - 36.0 g/dL    RDW 15.6 (H) 12.0 - 15.0 %    RDW-SD 49.0 40.0 - 54.0 fl    MPV 10.0 6.0 - 12.0 fL    Platelets 356 130 - 400 10*3/mm3   Scan Slide    Collection Time: 02/01/18  7:00 AM   Result Value Ref Range    Scan Slide     Manual Differential    Collection Time: 02/01/18  7:00 AM   Result Value Ref Range    Neutrophil % 73.0 39.0 - 78.0 %    Lymphocyte % 15.0 15.0 - 45.0 %    Monocyte % 6.0 4.0 - 12.0 %    Eosinophil % 2.0 0.0 - 4.0 %    Basophil % 1.0 0.0 - 2.0 %    Myelocyte % 1.0 (H) 0.0 - 0.0 %    Atypical Lymphocyte % 2.0 0.0 - 5.0 %    Neutrophils Absolute 10.12 (H) 1.87 - 8.40 10*3/mm3    Lymphocytes Absolute 2.08 0.72 - 4.86 10*3/mm3    Monocytes Absolute 0.83 0.19 - 1.30 10*3/mm3    Eosinophils Absolute 0.28 0.00 - 0.70 10*3/mm3    Basophils Absolute 0.14 0.00 - 0.20 10*3/mm3    Poikilocytes Slight/1+ None Seen    WBC Morphology Normal Normal    Platelet Estimate Adequate Normal     Physical Examination:        Physical Exam   Constitutional: She is oriented to person, place, and time. She appears well-developed and well-nourished.   HENT:   Head: Normocephalic and atraumatic.   Nose: Nose normal.   Mouth/Throat: Oropharynx is clear and moist.   Koi   Eyes: Conjunctivae and EOM are normal. Pupils are equal, round, and reactive to light.   Neck: Normal range of motion. Neck supple.   Cardiovascular: Normal rate,  regular rhythm, normal heart sounds and intact distal pulses.    Pulmonary/Chest: Effort normal. She has rhonchi. She has rales.   Abdominal: Soft. Bowel sounds are normal.   Musculoskeletal:   Generalized weakness   Neurological: She is alert and oriented to person, place, and time. She has normal reflexes.   Skin: Skin is warm and dry.   Psychiatric: She has a normal mood and affect. Her behavior is normal.   Nursing note and vitals reviewed.    Assessment:        Active Problems:    Pneumonia of both lungs due to infectious organism    Past Medical History:   Diagnosis Date   • Allergic rhinitis    • Anxiety    • Anxiety disorder    • Arthritis    • Asthma    • Asthma    • Colon polyp    • Degenerative arthritis    • Dementia    • Dementia    • Depression    • Depression    • Diabetes mellitus    • Environmental allergies    • Hx of colonic polyps    • Hyperlipidemia    • Hypertension    • Neuropathy    • Osteoporosis    • Osteoporosis    • Pacemaker    • Peripheral neuropathy         Plan:        1. Bilateral HCAP  2. HTN  3. Anxiety  4. Chronic anticoagulation  5. Insomnia  6. HLD  7. Influenza A  8. Hypokalemia  9. Coumadin toxicity    Continue current treatment. Monitor counts. Increase activity. Continue antibiotics. Continue aggressive pulmonary toilet. Monitor INR closely and adjust coumadin dosing accordingly. Increase coumadin. Wean oxygen as patient tolerates.         Electronically signed by WU Burt on 2/1/2018 at 8:51 AM

## 2018-02-01 NOTE — THERAPY TREATMENT NOTE
Acute Care - Physical Therapy Treatment Note  [facility]     Patient Name: Justina Bingham  : 1925  MRN: [id number]  Today's Date: 2018  Onset of Illness/Injury or Date of Surgery Date: 18  Date of Referral to PT: 18  Referring Physician: Dr. Pfeiffer    Admit Date: 2018    Visit Dx:    ICD-10-CM ICD-9-CM   1. Pneumonia of both lungs due to infectious organism, unspecified part of lung J18.9 483.8   2. Hypoxia R09.02 799.02   3. Elevated troponin R74.8 790.6   4. Sepsis, due to unspecified organism A41.9 038.9     995.91   5. Impaired functional mobility, balance, gait, and endurance Z74.09 V49.89   6. 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   • Closed displaced intertrochanteric fracture of left femur   • Pacemaker   • Pneumonia of both lungs due to infectious organism               Adult Rehabilitation Note       18 0819 18 1534 18 1300    Rehab Assessment/Intervention    Discipline physical therapy assistant  -LG physical therapy assistant  -LG occupational therapy assistant  -CJ    Document Type therapy note (daily note)  -LG therapy note (daily note)  -LG therapy note (daily note)  -    Subjective Information agree to therapy;no complaints  -LG agree to therapy;complains of;weakness;fatigue  -LG agree to therapy;complains of;weakness;fatigue  -CJ    Patient Effort, Rehab Treatment good  -LG good  -LG good  -CJ    Precautions/Limitations fall precautions;oxygen therapy device and L/min  -LG fall precautions;oxygen therapy device and L/min  -LG fall precautions;oxygen therapy device and L/min  -CJ    Precautions/Limitations, Hearing hearing aid, bilaterally  -LG hearing aid, bilaterally  -LG hearing aid, bilaterally;hearing impairment, bilaterally  -CJ    Specific Treatment Considerations Extra time spent  setting bedside chair, incontenence and lfit pad.   -LG      Recorded by [LG] Anup Hui PTA [LG] Anup Hui PTA [CJ] Michi Gong, DAMON/L    Vital Signs    Pre SpO2 (%) 93  -LG      O2 Delivery Pre Treatment supplemental O2   bipap  -LG  supplemental O2   10L/o2 hi sadaf  -CJ    Intra SpO2 (%) 89  -LG      O2 Delivery Intra Treatment supplemental O2   9L  -LG  supplemental O2   10L/o2 hi sadaf!  -CJ    Post SpO2 (%) 85  -LG      O2 Delivery Post Treatment supplemental O2   15L, Teri CARTER turned up O2 with pt up eating breakfast.   -LG  supplemental O2   10L/o2 hi sadaf!  -CJ    Pre Patient Position Supine  -LG  Sitting  -CJ    Intra Patient Position Sitting  -LG  Sitting  -CJ    Post Patient Position Sitting  -LG  Sitting  -CJ    Recorded by [LG] Anup Hui PTA  [CJ] Michi Gong, DAMON/L    Pain Assessment    Pain Assessment No/denies pain  -LG No/denies pain  -LG No/denies pain  -CJ    Recorded by [LG] Anup Hui PTA [LG] Anup Hui PTA [CJ] Michi Gong, DAMON/L    Bed Mobility, Assessment/Treatment    Bed Mobility, Assistive Device  bed rails;draw sheet  -LG     Bed Mobility, Roll Left, [place] verbal cues required;moderate assist (50% patient effort)  -LG verbal cues required;moderate assist (50% patient effort)  -LG     Bed Mobility, Roll Right, [place]  verbal cues required;moderate assist (50% patient effort)  -LG     Bed Mobility, Scoot/Bridge, [place]  maximum assist (25% patient effort)  -LG     Bed Mob, Supine to Sit, [place] verbal cues required;minimum assist (75% patient effort);moderate assist (50% patient effort);2 person assist required  -LG      Bed Mob, Sit to Supine, [place] not tested;other (see comments)   up in chair  -LG      Bed Mobility, Safety Issues decreased use of arms for pushing/pulling;decreased use of legs for bridging/pushing  -LG      Bed Mobility, Impairments strength decreased;impaired balance  -LG      Bed Mobility, Comment Pt  iniitated legs to eob but needed assist to get legs off bed.   -LG worked on rolling in bed, scooting and repositioning  -LG fowlers in bed!  -CJ    Recorded by [LG] Anup Hui PTA [LG] Anup Hui PTA [CJ] Michi Gong, DAMON/L    Transfer Assessment/Treatment    Transfers, Bed-Chair [place] moderate assist (50% patient effort);maximum assist (25% patient effort);2 person assist required  -LG      Transfers, Sit-Stand [place] moderate assist (50% patient effort);maximum assist (25% patient effort);2 person assist required   stood x 2 reps  -LG      Transfer, Safety Issues balance decreased during turns;step length decreased;weight-shifting ability decreased  -LG      Transfer, Impairments strength decreased;impaired balance  -LG      Transfer, Comment Pt took a few shuffling steps from bed to chair  -LG      Recorded by [LG] Anup Hui PTA      Therapy Exercises    Bilateral Lower Extremities AAROM:;10 reps;sitting  - AAROM:;20 reps;supine;sitting;ankle pumps/circles;heel slides;hip abduction/adduction;quad sets  -     Bilateral Upper Extremity   AROM:;20 reps;sitting;elbow flexion/extension;shoulder extension/flexion    3lb. bar, 2lb. dumb bells! No shd. flex exs!  -CJ    Recorded by [LG] Anup Hui PTA [LG] Anup Hui PTA [CJ] Michi Gong, DAMON/L    Positioning and Restraints    Pre-Treatment Position in bed  -LG in bed  -LG in bed  -CJ    Post Treatment Position chair  - bed  -LG bed  -CJ    In Bed  fowlers;call light within reach;encouraged to call for assist;side rails up x2  -LG fowlers;call light within reach;encouraged to call for assist;with family/caregiver;side rails up x2  -CJ    In Chair sitting;call light within reach;encouraged to call for assist;on mechanical lift sling;with nsg   Notified Teri CARTRE pt on lift sling for nsg to transfer back  -LG      Recorded by [LG] Anup Hui PTA [LG] Anup Hui PTA [CJ] Michi Gong, DAMON/L      01/30/18 1117  01/30/18 0835 01/29/18 1043    Rehab Assessment/Intervention    Discipline occupational therapy assistant  -TS physical therapy assistant  -CW physical therapy assistant  -LG    Document Type therapy note (daily note)  -TS therapy note (daily note)  -CW therapy note (daily note)  -LG    Subjective Information agree to therapy;complains of;fatigue  -TS agree to therapy  -CW     Patient Effort, Rehab Treatment good  -TS adequate  -CW     Precautions/Limitations fall precautions;oxygen therapy device and L/min   10 L high sadaf, flu  -TS fall precautions   contact, droplet, flu  -CW fall precautions   contact, droplet, flu  -LG    Precautions/Limitations, Hearing  hearing aid, bilaterally  -CW hearing aid, bilaterally  -LG    Recorded by [TS] MARISOL Ibarra/L [CW] Esperanza Hutton PTA [LG] Anup Hui, PTA    Pain Assessment    Pain Assessment No/denies pain  -TS No/denies pain  -CW No/denies pain  -LG    Recorded by [TS] MARISOL Ibarra/SHAILESH [CW] Esperanza Hutton PTA [LG] Anup Hui PTA    Cognitive Assessment/Intervention    Personal Safety Interventions fall prevention program maintained;nonskid shoes/slippers when out of bed  -TS      Recorded by [TS] JONAS IbarraA/L      Bed Mobility, Assessment/Treatment    Bed Mobility, Assistive Device bed rails;head of bed elevated;draw sheet  -TS      Bed Mobility, Roll Left, [place] moderate assist (50% patient effort);2 person assist required  -TS      Bed Mobility, Roll Right, [place] moderate assist (50% patient effort);2 person assist required  -TS  verbal cues required;moderate assist (50% patient effort);2 person assist required  -LG    Bed Mobility, Scoot/Bridge, [place] maximum assist (25% patient effort);2 person assist required  -TS  dependent (less than 25% patient effort);2 person assist required  -LG    Bed Mob, Supine to Sit, [place] moderate assist (50% patient effort);maximum assist (25% patient  effort)  -TS  verbal cues required;moderate assist (50% patient effort);2 person assist required  -LG    Bed Mob, Sit to Supine, [place] maximum assist (25% patient effort)  -TS  moderate assist (50% patient effort);maximum assist (25% patient effort);2 person assist required  -LG    Bed Mobility, Safety Issues   decreased use of arms for pushing/pulling;decreased use of legs for bridging/pushing  -LG    Bed Mobility, Impairments   decreased flexibility;ROM decreased;strength decreased;impaired balance  -LG    Bed Mobility, Comment   Sat eob x 13 min working on balance and LE strengthenning exercises.   -LG    Recorded by [TS] JORGE Ibarra  [LG] Anup Hui PTA    Transfer Assessment/Treatment    Transfers, Sit-Stand [place]   maximum assist (25% patient effort);2 person assist required   x 4 reps x 10 secs each rep  -LG    Transfers, Stand-Sit [place]   maximum assist (25% patient effort);2 person assist required  -LG    Transfer, Impairments   strength decreased;impaired balance  -LG    Transfer, Comment   Pt stood 4 reps approx 10 secs each reps  -LG    Recorded by   [LG] Anup Hui PTA    Upper Body Dressing Assessment/Training    UB Dressing Assess/Train, Clothing Type donning:;doffing:;hospital gown  -TS      UB Dressing Assess/Train, Position edge of bed  -TS      UB Dressing Assess/Train, [place] minimum assist (75% patient effort);set up required  -TS      Recorded by [TS] MARISOL Ibarra/L      Toileting Assessment/Training    Toileting Assess/Train, Position supine  -TS      Toileting Assess/Train, Indepen Level maximum assist (25% patient effort)  -TS      Recorded by [TS] MARISOL Ibarra/L      Grooming Assessment/Training    Grooming Assess/Train, Position sitting;edge of bed  -TS      Grooming Assess/Train, Indepen Level set up required;supervision required  -TS      Recorded by [TS] MARISOL Ibarra/L      Balance Skills Training     Sitting-Level of Assistance Close supervision  -TS      Sitting-Balance Support Feet supported  -TS      Sitting # of Minutes 15  -TS      Recorded by [TS] JORGE Ibarra      Therapy Exercises    Bilateral Lower Extremities  AAROM:;20 reps;supine   R weaker than L  -CW AAROM:;20 reps;sitting;ankle pumps/circles;LAQ;hip flexion;hip abduction/adduction  -LG    Recorded by  [CW] Esperanza Hutton PTA [LG] Anup Hui PTA    Positioning and Restraints    Pre-Treatment Position in bed  -TS in bed  -CW in bed  -LG    Post Treatment Position bed  -TS bed  -CW bed  -LG    In Bed fowlers;call light within reach;encouraged to call for assist;with family/caregiver;side rails up x2  -TS fowlers;call light within reach;with nsg  -CW fowlers;call light within reach;encouraged to call for assist;with family/caregiver;with nsg;side rails up x2  -LG    Recorded by [TS] MARISOL Ibarra/SHAILESH [CW] Esperanza Hutton PTA [LG] Anup Hui PTA      01/29/18 0912          Rehab Assessment/Intervention    Discipline occupational therapy assistant  -CJ      Document Type therapy note (daily note)  -CJ      Subjective Information agree to therapy;complains of;weakness;fatigue  -CJ      Patient Effort, Rehab Treatment adequate  -CJ      Precautions/Limitations fall precautions;oxygen therapy device and L/min  -CJ      Precautions/Limitations, Hearing hearing impairment, bilaterally  -CJ      Recorded by [CJ] MARISOL Langston/L      Vital Signs    O2 Delivery Pre Treatment supplemental O2   10L/o2  -CJ      O2 Delivery Intra Treatment supplemental O2   10L/o2  -CJ      O2 Delivery Post Treatment supplemental O2   10L/o2  -CJ      Pre Patient Position Sitting  -CJ      Intra Patient Position Sitting  -CJ      Post Patient Position Sitting  -CJ      Recorded by [CJ] MARISOL Langston/L      Pain Assessment    Pain Assessment No/denies pain  -CJ      Recorded by [CJ] MARISOL [place]/SHAILESH      Bed  Mobility, Assessment/Treatment    Bed Mobility, Comment fowlers in bed!  -CJ      Recorded by [CJ] MARISOL Langston/L      Therapy Exercises    Bilateral Upper Extremity AROM:;sitting;elbow flexion/extension;shoulder extension/flexion;20 reps   3lb. bar, 2lb. dumb bells, No shd. flex exs!  -CJ      Recorded by [CJ] MARISOL Lang[place]/SHAILESH      Positioning and Restraints    Pre-Treatment Position in bed  -CJ      Post Treatment Position bed  -CJ      In Bed fowlers;call light within reach;encouraged to call for assist;with family/caregiver;side rails up x2  -CJ      Recorded by [CJ] JONAS LangstonA/L        User Key  (r) = Recorded By, (t) = Taken By, (c) = Cosigned By    Initials Name Effective Dates    LG Anup Hui, PTA 08/02/16 -     CJ Michi Gong, DAMON/L 08/02/16 -     TS Luz Maria Jarrett, DAMON/L 08/02/16 -     CW Esperanza Hutton, PTA 06/22/15 -                 IP PT Goals       01/24/18 1304          Bed Mobility PT LTG    Bed Mobility PT LTG, Date Established 01/24/18  -PB (r) JM (t) PB (c)      Bed Mobility PT LTG, Time to Achieve by discharge  -PB (r) JM (t) PB (c)      Bed Mobility PT LTG, Activity Type all bed mobility  -PB (r) JM (t) PB (c)      Bed Mobility PT LTG, [place] Level contact guard assist;conditional independence  -PB (r) JM (t) PB (c)      Bed Mobility PT Goal  LTG, Assist Device bed rails  -PB (r) JM (t) PB (c)      Transfer Training PT LTG    Transfer Training PT LTG, Date Established 01/24/18  -PB (r) JM (t) PB (c)      Transfer Training PT LTG, Time to Achieve by discharge  -PB (r) JM (t) PB (c)      Transfer Training PT LTG, Activity Type bed to chair /chair to bed;sit to stand/stand to sit  -PB (r) JM (t) PB (c)      Transfer Training PT LTG, [place] Level minimum assist (75% patient effort)  -PB (r) JM (t) PB (c)      Transfer Training PT LTG, Assist Device walker, rolling  -PB (r) JM (t) PB (c)      Strength Goal PT LTG    Strength Goal PT  LTG, Date Established 01/24/18  -PB (r) JM (t) PB (c)      Strength Goal PT LTG, Time to Achieve by discharge  -PB (r) JM (t) PB (c)      Strength Goal PT LTG, Measure to Achieve 10 reps of LAQs, hip flexion, ankle pumps, ab/adduction sitting EOB against gravity  -PB (r) JM (t) PB (c)        User Key  (r) = Recorded By, (t) = Taken By, (c) = Cosigned By    Initials Name Provider Type    PB Cristi Holland, PT DPT Physical Therapist    RITA Vizcarra, PT Student PT Student          Physical Therapy Education     Title: PT OT SLP Therapies (Active)     Topic: Physical Therapy (Active)     Point: Mobility training (Active)    Learning Progress Summary    Learner Readiness Method Response Comment Documented by Status   Patient Acceptance E,D NR Educated on benefits of activity. Instructed in transfers, gait, and therapeutic exercises. Also eduated NSG to use mechanical lift to get pt from bedside chair back to bed.  02/01/18 0819 Active    Acceptance E NR Educated on benefits of activity and bed mobility  01/31/18 1534 Active    Acceptance E,D NR Plan of care, exercise, benefit of exercise  01/30/18 0942 Active    Acceptance E,D NR Educated on benefits of activity, instructed in bed moblity, sit to stand transfers, LE strengthening exercises.  01/29/18 1043 Active    Eager E NR   01/27/18 1549 Active    Eager E VU poc, t/f's AE 01/26/18 1221 Done    Acceptance E VU Educated on benefits and role of PT and increased activity  01/24/18 1003 Done   Family Acceptance E,D NR Educated on benefits of activity, instructed in bed moblity, sit to stand transfers, LE strengthening exercises.  01/29/18 1043 Active   Caregiver Acceptance E VU Educated on benefits and role of PT and increased activity  01/24/18 1003 Done                      User Key     Initials Effective Dates Name Provider Type Discipline    EC 08/02/16 -  John Maldonado PTA Physical Therapy Assistant PT    AE 06/22/15 -  Rachel Griffith,  PTA Physical Therapy Assistant PT    LG 08/02/16 -  Anup Hui, PTA Physical Therapy Assistant PT    CW 06/22/15 -  Esperanza Hutton, PTA Physical Therapy Assistant PT    JM 01/10/18 -  Nadeem Vizcarra, PT Student PT Student PT                    PT Recommendation and Plan  Anticipated Equipment Needs At Discharge: front wheeled walker  Anticipated Discharge Disposition: skilled nursing facility, extended care facility  Planned Therapy Interventions: balance training, bed mobility training, patient/family education, strengthening, transfer training  PT Frequency: daily, 2 times/day, per priority policy  Plan of Care Review  Plan Of Care Reviewed With: patient  Progress: progress toward functional goals is gradual  Outcome Summary/Follow up Plan: Pt awake/alert in fowlers on bipap, O2 @ 95%.  Mod A Supine to Sit EOB, Mod Max x 2 Sit to Stand, Mod Max x 2 steps from bed to bedside chair approx 2 feet, Pt states she feels some better today. O2 sats dropped with activity to 87-89%,  Teri CARTER increaased hiflo to 15L so pt could eat breakfast sitting up in bedside chair.           Outcome Measures       02/01/18 0904 01/31/18 1629 01/31/18 1300    How much help from another person do you currently need...    Turning from your back to your side while in flat bed without using bedrails? 2  -LG 2  -LG     Moving from lying on back to sitting on the side of a flat bed without bedrails? 2  -LG 2  -LG     Moving to and from a bed to a chair (including a wheelchair)? 2  -LG 2  -LG     Standing up from a chair using your arms (e.g., wheelchair, bedside chair)? 2  -LG 2  -LG     Climbing 3-5 steps with a railing? 1  -LG 1  -LG     To walk in hospital room? 1  -LG 1  -LG     AM-PAC 6 Clicks Score 10  -LG 10  -LG     How much help from another is currently needed...    Putting on and taking off regular lower body clothing?   2  -CJ    Bathing (including washing, rinsing, and drying)   2  -CJ    Toileting (which includes using  toilet bed pan or urinal)   2  -CJ    Putting on and taking off regular upper body clothing   3  -CJ    Taking care of personal grooming (such as brushing teeth)   3  -CJ    Eating meals   4  -CJ    Score   16  -CJ    Functional Assessment    Outcome Measure Options AM-PAC 6 Clicks Basic Mobility (PT)  -LG AM-PAC 6 Clicks Basic Mobility (PT)  -LG AM-PAC 6 Clicks Daily Activity (OT)  -CJ      01/30/18 1300 01/30/18 0900 01/29/18 1112    How much help from another person do you currently need...    Turning from your back to your side while in flat bed without using bedrails?  2  -CW 2  -LG    Moving from lying on back to sitting on the side of a flat bed without bedrails?  2  -CW 2  -LG    Moving to and from a bed to a chair (including a wheelchair)?  2  -CW 2  -LG    Standing up from a chair using your arms (e.g., wheelchair, bedside chair)?  2  -CW 2  -LG    Climbing 3-5 steps with a railing?  1  -CW 1  -LG    To walk in hospital room?  1  -CW 1  -LG    AM-PAC 6 Clicks Score  10  -CW 10  -LG    How much help from another is currently needed...    Putting on and taking off regular lower body clothing? 2  -TS      Bathing (including washing, rinsing, and drying) 2  -TS      Toileting (which includes using toilet bed pan or urinal) 2  -TS      Putting on and taking off regular upper body clothing 3  -TS      Taking care of personal grooming (such as brushing teeth) 3  -TS      Eating meals 4  -TS      Score 16  -TS      Functional Assessment    Outcome Measure Options AM-PAC 6 Clicks Daily Activity (OT)  -TS AM-PAC 6 Clicks Basic Mobility (PT)  -CW AM-PAC 6 Clicks Basic Mobility (PT)  -LG      01/29/18 0912          How much help from another is currently needed...    Putting on and taking off regular lower body clothing? 2  -CJ      Bathing (including washing, rinsing, and drying) 2  -CJ      Toileting (which includes using toilet bed pan or urinal) 2  -CJ      Putting on and taking off regular upper body clothing 2   -CJ      Taking care of personal grooming (such as brushing teeth) 3  -CJ      Eating meals 4  -CJ      Score 15  -CJ      Functional Assessment    Outcome Measure Options AM-PAC 6 Clicks Daily Activity (OT)  -CJ        User Key  (r) = Recorded By, (t) = Taken By, (c) = Cosigned By    Initials Name Provider Type    LG Anup Hui, JOE Physical Therapy Assistant    CJ Michi Gong, DAMON/L Occupational Therapy Assistant    TS Luz Maria Jarrett, DAMON/L Occupational Therapy Assistant    CW Esperanza Hutton, JOE Physical Therapy Assistant           Time Calculation:         PT Charges       02/01/18 0819          Time Calculation    Start Time 0819  -LG      Stop Time 0905  -LG      Time Calculation (min) 46 min  -LG      PT Received On 02/01/18  -LG      PT Goal Re-Cert Due Date 02/03/18  -      Time Calculation- PT    Total Timed Code Minutes- PT 46 minute(s)  -LG        User Key  (r) = Recorded By, (t) = Taken By, (c) = Cosigned By    Initials Name Provider Type    LG Anup Hui PTA Physical Therapy Assistant          Therapy Charges for Today     Code Description Service Date Service Provider Modifiers Qty    80553041814 HC PT THERAPEUTIC ACT EA 15 MIN 1/31/2018 Anup Hui, PTA GP, KX 2    09458216169 HC PT THER PROC EA 15 MIN 1/31/2018 Anup Hui, PTA GP, KX 1    49023798421 HC PT THERAPEUTIC ACT EA 15 MIN 2/1/2018 Anup Hui, JOE GP, KX 2    57898796292 HC PT THER PROC EA 15 MIN 2/1/2018 Anup Hui, JOE GP, KX 1          PT G-Codes  Outcome Measure Options: AM-PAC 6 Clicks Basic Mobility (PT)  Score: 11  Functional Limitation: Changing and maintaining body position  Changing and Maintaining Body Position Current Status (): At least 60 percent but less than 80 percent impaired, limited or restricted  Changing and Maintaining Body Position Goal Status (): At least 40 percent but less than 60 percent impaired, limited or restricted    Anup Hui PTA  2/1/2018

## 2018-02-01 NOTE — PROGRESS NOTES
PULMONARY AND CRITICAL CARE PROGRESS NOTE - [facility]    Patient: Justina MARIE Mix  4/4/1925   MR# [id number]   Acct# [id number]  02/01/18   9:55 AM  Referring Provider: Clinton Pfeiffer MD    Chief Complaint: Dyspnea    Interval history: Patient is currently resting on BiPAP 16/8, 45%, O2 sat 92%.  She awakens easily, but does not have hearing aids in to understand us.  No other aggravating or allevitating factors.     Meds:    ALPRAZolam 0.125 mg Oral Daily   amLODIPine 10 mg Oral Daily   aspirin 162 mg Oral Daily   atorvastatin 10 mg Oral Nightly   cholecalciferol 1,000 Units Oral Daily   docusate sodium 100 mg Oral BID   doxycycline 100 mg Intravenous Q12H   fluticasone 1 spray Nasal BID   furosemide 40 mg Oral Daily   gabapentin 300 mg Oral Daily   gabapentin 600 mg Oral Nightly   guaiFENesin 1,200 mg Oral Q12H   hydrALAZINE 25 mg Oral Q8H   ipratropium-albuterol 3 mL Nebulization 4x Daily - RT   lisinopril 40 mg Oral Q24H   meropenem 1 g Intravenous Q8H   metoprolol tartrate 50 mg Oral Q12H   PARoxetine 20 mg Oral QAM   potassium chloride 10 mEq Oral Daily   sodium chloride 1 spray Nasal Daily   tamsulosin 0.4 mg Oral Daily   traMADol 50 mg Oral Q4H   vitamin E 400 Units Oral Daily   warfarin 5 mg Oral Daily        Review of Systems:   Review of Systems   Constitutional: Positive for fatigue. Negative for chills and fever.   Cardiovascular: Negative for chest pain.   Gastrointestinal: Negative for diarrhea, nausea and vomiting.   :    Very hard of hearing but non-verbal at the moment    Physical Exam:  SpO2 Readings from Last 3 Encounters:   02/01/18 93%   10/30/17 92%   09/11/17 97%     Temp:  [98.5 °F (36.9 °C)-99.5 °F (37.5 °C)] 98.7 °F (37.1 °C)  Heart Rate:  [62-88] 73  Resp:  [14-22] 18  BP: (147-193)/() 147/65    Intake/Output Summary (Last 24 hours) at 02/01/18 0955  Last data filed at 01/31/18 2329   Gross per 24 hour   Intake           289.05 ml   Output                0 ml    Net           289.05 ml     Physical Exam:    Constitutional: She appears well-developed and well-nourished. She appears awake and alert. She has a sickly appearance.    HENT: BiPAP in place. Very hard of hearing  Head: Normocephalic and atraumatic.   Eyes: Pupils are equal, round, and reactive to light. No scleral icterus.   Neck: Neck supple.   Cardiovascular: Normal rate and regular rhythm.    Pulmonary/Chest: She has rhonchi. Diminished bilaterally.   Abdominal: Soft. Bowel sounds are normal. She exhibits no distension.   Musculoskeletal: Normal range of motion. She exhibits edema (bilateral LE).   Lymphadenopathy:     She has no cervical adenopathy.   Neurological: She appears awake and alert but non-verbal   Skin: Skin is warm and dry.   Nursing note and vitals reviewed.    Laboratory Data:    Results from last 7 days  Lab Units 02/01/18  0700 01/31/18  0442 01/30/18  0436   WBC 10*3/mm3 13.86* 15.00* 14.90*   HEMOGLOBIN g/dL 11.1* 11.9* 11.2*   PLATELETS 10*3/mm3 356 358 298       Results from last 7 days  Lab Units 02/01/18  0700 01/31/18  0442 01/30/18  0436   SODIUM mmol/L 139 141 139   POTASSIUM mmol/L 4.0 4.0 3.3*   BUN mg/dL 15 14 15   CREATININE mg/dL 0.60 0.65 0.61   INR  1.07 1.15* 1.28*       Results from last 7 days  Lab Units 01/28/18  1108 01/25/18  1258   PH, ARTERIAL pH units 7.469* 7.406   PCO2, ARTERIAL mm Hg 48.6* 52.5*   PO2 ART mm Hg 73.1* 58.8*   FIO2 % 60  --      Blood Culture   Date Value Ref Range Status   01/23/2018 No growth at 3 days  Preliminary   01/23/2018 No growth at 3 days  Preliminary     Urine Culture   Date Value Ref Range Status   01/23/2018 >100,000 CFU/mL Streptococcus agalactiae (Group B) (A)  Final     Comment:     This organism is considered to be universally susceptible to penicillin.  No further antibiotic testing will be performed.     Recent films:  Imaging Results (last 24 hours)     ** No results found for the last 24 hours. **        Films reviewed  personally by me.  My interpretation: none today     Pulmonary Assessment:    1. Acute hypoxemic/hypercapnic respiratory failure  2. Influenza A  3. Bilateral pneumonia  4. High risk for post viral complications with advanced age  5. Chronic Coumadin therapy  6. Mucous plugging on right  7. Full code status  8. Supratherapeutic INR  9. Streptococcus UTI     Recommend:     · Continue supplemental high flow oxygen and wean as tolerated  · Continue Bipap with sleep and as needed  · Continue merrem and vibramycin    · Continue duonebs, mucinex  · Continue CPT, IS/flutter   · High risk for decompensation due to advanced age and post viral complications  · CXR in AM     Electronically signed by WU Mendoza, 02/01/18, 9:55 AM     Physician substantive contribution:  Pertinent symptoms/interval history include: She is about the same.  Still on bipap. noncommunicative  Respiratory exam shows pertinent findings of:diminished breath sounds, crackles, stable, on bipap.  Plan includes: no room to reduce any of the above interventions; improved however so this does appear survivable.  I have seen and examined patient personally, performing a face-to-face diagnostic evaluation with plan of care reviewed and developed with APRN and nursing staff. I have addended and/or modified the above history of present illness, physical examination, and assessment and plan to reflect my findings and impressions. Essential elements of the care plan were discussed with APRN above.  Agree with findings and assessment/plan as documented above.    Electronically signed by Joseph Reynoso MD, on 2/1/2018, 10:23 AM

## 2018-02-01 NOTE — PROGRESS NOTES
Continued Stay Note  RADHA Quevedo     Patient Name: Justina Bingham  MRN: [id number]  Today's Date: 2/1/2018    Admit Date: 1/23/2018          Discharge Plan       02/01/18 1122    Case Management/Social Work Plan    Plan Possible LTAC    Patient/Family In Agreement With Plan yes    Additional Comments [facility] started precert yesterday. Spoke with daughter in the room and she is in agreement with this.               Discharge Codes     None            TAJ Deng

## 2018-02-01 NOTE — PLAN OF CARE
Problem: Patient Care Overview (Adult)  Goal: Plan of Care Review   02/01/18 1711   Coping/Psychosocial Response Interventions   Plan Of Care Reviewed With patient   Patient Care Overview   Progress improving   Outcome Evaluation   Outcome Summary/Follow up Plan patient on 9L high flow nasal canula with O2 saturation at 94%, Very [place] bilaterally, up with mechanical lift, up in chair today, no complaints of pain       Problem: Fall Risk (Adult)  Goal: Identify Related Risk Factors and Signs and Symptoms  Outcome: Ongoing (interventions implemented as appropriate)    Goal: Absence of Falls  Outcome: Ongoing (interventions implemented as appropriate)      Problem: Pneumonia (Adult)  Goal: Signs and Symptoms of Listed Potential Problems Will be Absent or Manageable (Pneumonia)  Outcome: Ongoing (interventions implemented as appropriate)      Problem: Infection, Risk/Actual (Adult)  Goal: Identify Related Risk Factors and Signs and Symptoms  Outcome: Ongoing (interventions implemented as appropriate)    Goal: Infection Prevention/Resolution  Outcome: Ongoing (interventions implemented as appropriate)

## 2018-02-01 NOTE — PROGRESS NOTES
[facility]  REGAN Raines M.D.  WU Sawyer      Internal Medicine Progress Note    2/1/2018   12:55 PM    Name:  Justina Bingham  MRN:    [id number]     Acct:     [id number]   Room:  [address] [place] [address] Day: 9     Admit Date: 1/23/2018  5:05 AM  PCP: Ruddy Pfeiffer MD    Subjective:     C/C:   Chief Complaint   Patient presents with   • Shortness of Breath   • Cough       Interval History: Status:  Improved. Up to chair. Daughter at bedside. Tolerating 02 at 9 lpm hi flow. Bipap with sleep. Better air movement and more appetite.     Review of Systems   Constitution: Positive for weakness and malaise/fatigue. Negative for chills, decreased appetite, weight gain and weight loss.   HENT: Negative for congestion, ear discharge, hoarse voice and tinnitus.    Eyes: Negative for blurred vision, discharge, visual disturbance and visual halos.   Cardiovascular: Negative for chest pain, claudication, dyspnea on exertion, irregular heartbeat, leg swelling, orthopnea and paroxysmal nocturnal dyspnea.   Respiratory: Positive for cough and shortness of breath. Negative for sputum production and wheezing.    Endocrine: Negative for cold intolerance, heat intolerance and polyuria.   Hematologic/Lymphatic: Negative for adenopathy. Does not bruise/bleed easily.   Skin: Negative for dry skin, itching and suspicious lesions.   Musculoskeletal: Negative for arthritis, back pain, falls, joint pain, muscle weakness and myalgias.   Gastrointestinal: Positive for constipation. Negative for abdominal pain, diarrhea, dysphagia and hematemesis.   Genitourinary: Negative for bladder incontinence, dysuria and frequency.   Neurological: Negative for aphonia, disturbances in coordination and dizziness.   Psychiatric/Behavioral: Negative for altered mental status, depression, memory loss and substance abuse. The patient does not have insomnia and is not nervous/anxious.      Medications:     Allergies:    Allergies   Allergen Reactions   • Ceclor [Cefaclor]    • Eggs Or Egg-Derived Products Itching   • Penicillins    • Sulfa Antibiotics        Current Meds:   Current Facility-Administered Medications:   •  acetaminophen (TYLENOL) tablet 650 mg, 650 mg, Oral, Q4H PRN, Clinton Pfeiffer MD, 650 mg at 01/26/18 2138  •  ALPRAZolam (XANAX) tablet 0.125 mg, 0.125 mg, Oral, Daily, Clinton Pfeiffer MD, 0.125 mg at 02/01/18 1104  •  amLODIPine (NORVASC) tablet 10 mg, 10 mg, Oral, Daily, Binta Lundy APRN, 10 mg at 02/01/18 1102  •  aspirin EC tablet 162 mg, 162 mg, Oral, Daily, Clinton Pfeiffer MD, 162 mg at 02/01/18 1104  •  atorvastatin (LIPITOR) tablet 10 mg, 10 mg, Oral, Nightly, Clinton Pfeiffer MD, 10 mg at 01/31/18 2049  •  cholecalciferol (VITAMIN D3) tablet 1,000 Units, 1,000 Units, Oral, Daily, Clinton Pfeiffer MD, 1,000 Units at 02/01/18 1103  •  CloNIDine (CATAPRES) tablet 0.1 mg, 0.1 mg, Oral, Q4H PRN, Clinton Pfeiffer MD, 0.1 mg at 02/01/18 1130  •  docusate sodium (COLACE) capsule 100 mg, 100 mg, Oral, BID, Binta Lundy APRN, 100 mg at 02/01/18 1102  •  doxycycline (VIBRAMYCIN) 100 mg/100 mL 0.9% NS MBP, 100 mg, Intravenous, Q12H, Angeles Davidson, APRN, Last Rate: 0 mL/hr at 01/31/18 2329, 100 mg at 02/01/18 1101  •  fluticasone (FLONASE) 50 MCG/ACT nasal spray 1 spray, 1 spray, Nasal, BID, Clinton Pfeiffer MD, 1 spray at 02/01/18 1102  •  furosemide (LASIX) tablet 40 mg, 40 mg, Oral, Daily, Clinton Pfeiffer MD, 40 mg at 02/01/18 1102  •  gabapentin (NEURONTIN) capsule 300 mg, 300 mg, Oral, Daily, Clinton Pfeiffer MD, 300 mg at 02/01/18 1101  •  gabapentin (NEURONTIN) capsule 600 mg, 600 mg, Oral, Nightly, Clinton Pfeiffer MD, 600 mg at 01/31/18 2049  •  guaiFENesin (MUCINEX) 12 hr tablet 1,200 mg, 1,200 mg, Oral, Q12H, Jeff Judd, APRN, 1,200 mg at 02/01/18 1103  •  hydrALAZINE (APRESOLINE) tablet 25 mg, 25 mg, Oral, Q8H,  Binta Lundy APRN, 25 mg at 02/01/18 0639  •  HYDROcodone-acetaminophen (NORCO) 5-325 MG per tablet 1 tablet, 1 tablet, Oral, Q6H PRN, Clinton Pfeiffer MD, 1 tablet at 02/01/18 0639  •  ipratropium-albuterol (DUO-NEB) nebulizer solution 3 mL, 3 mL, Nebulization, 4x Daily - RT, Clinton Pfeiffer MD, 3 mL at 02/01/18 1118  •  lisinopril (PRINIVIL,ZESTRIL) tablet 40 mg, 40 mg, Oral, Q24H, Binta Lundy APRN, 40 mg at 02/01/18 1102  •  magnesium hydroxide (MILK OF MAGNESIA) suspension 2400 mg/10mL 10 mL, 10 mL, Oral, Nightly PRN, Clinton Pfeiffer MD, 10 mL at 01/27/18 2058  •  magnesium hydroxide (MILK OF MAGNESIA) suspension 2400 mg/10mL 10 mL, 10 mL, Oral, Daily PRN, Clinton Pfeiffer MD, 10 mL at 01/27/18 1401  •  meropenem (MERREM) 1 g/20mL IV PUSH syringe, 1 g, Intravenous, Q8H, Lonny Xavier MD, 1 g at 02/01/18 0640  •  metoprolol tartrate (LOPRESSOR) tablet 50 mg, 50 mg, Oral, Q12H, Binta Lundy APRN, 50 mg at 02/01/18 1102  •  ondansetron (ZOFRAN) tablet 4 mg, 4 mg, Oral, Q6H PRN **OR** ondansetron ODT (ZOFRAN-ODT) disintegrating tablet 4 mg, 4 mg, Oral, Q6H PRN **OR** ondansetron (ZOFRAN) injection 4 mg, 4 mg, Intravenous, Q6H PRN, Clinton Pfeiffer MD, 4 mg at 01/23/18 1851  •  PARoxetine (PAXIL) tablet 20 mg, 20 mg, Oral, QAM, Clinton Pfeiffer MD, 20 mg at 02/01/18 0639  •  potassium chloride (MICRO-K) CR capsule 10 mEq, 10 mEq, Oral, Daily, Clinton Pfeiffer MD, 10 mEq at 02/01/18 1103  •  sodium chloride (OCEAN) nasal spray 1 spray, 1 spray, Nasal, Daily, Clinton Pfeiffer MD, 1 spray at 02/01/18 1102  •  sodium chloride 0.9 % flush 1-10 mL, 1-10 mL, Intravenous, PRN, Clniton Pfeiffer MD  •  sodium chloride 0.9 % flush 10 mL, 10 mL, Intravenous, PRN, Shawn Blount MD  •  tamsulosin (FLOMAX) 24 hr capsule 0.4 mg, 0.4 mg, Oral, Daily, Clinton Pfeiffer MD, 0.4 mg at 02/01/18 1103  •  traMADol (ULTRAM) tablet 50 mg, 50 mg, Oral,  "Q4H, Clinton Pfeiffer MD, 50 mg at 18 1101  •  vitamin E capsule 400 Units, 400 Units, Oral, Daily, Clinton Pfeiffer MD, 400 Units at 18 1103  •  Karen's amazing butt cream, , Topical, PRN, Binta Lundy APRN  •  warfarin (COUMADIN) tablet 5 mg, 5 mg, Oral, Daily, WU Burt  •  zolpidem (AMBIEN) tablet 5 mg, 5 mg, Oral, Nightly PRN, Clinton Pfeiffer MD, 5 mg at 18 1682    Data:     Code Status:  Full Code    Family History   Problem Relation Age of Onset   • Heart attack Mother    • No Known Problems Father    • Colon cancer Neg Hx    • Colon polyps Neg Hx        Social History     Social History   • Marital status:      Spouse name: N/A   • Number of children: N/A   • Years of education: N/A     Occupational History   • Not on file.     Social History Main Topics   • Smoking status: Never Smoker   • Smokeless tobacco: Never Used   • Alcohol use No   • Drug use: Yes     Special: Hydrocodone   • Sexual activity: Defer     Other Topics Concern   • Not on file     Social History Narrative       Vitals:  BP (!) 186/63  Pulse 82  Temp 98.5 °F (36.9 °C)  Resp 16  Ht 175.3 cm (69\")  Wt 95 kg (209 lb 6.4 oz)  SpO2 91%  BMI 30.92 kg/m2  Temp (24hrs), Av.8 °F (37.1 °C), Min:98.5 °F (36.9 °C), Max:99.5 °F (37.5 °C)      I/O (24Hr):    Intake/Output Summary (Last 24 hours) at 18 1255  Last data filed at 18 2329   Gross per 24 hour   Intake           189.05 ml   Output                0 ml   Net           189.05 ml       Labs and imaging:      Recent Results (from the past 12 hour(s))   Protime-INR    Collection Time: 18  7:00 AM   Result Value Ref Range    Protime 14.2 11.9 - 14.6 Seconds    INR 1.07 0.91 - 1.09   Basic Metabolic Panel    Collection Time: 18  7:00 AM   Result Value Ref Range    Glucose 108 (H) 70 - 100 mg/dL    BUN 15 5 - 21 mg/dL    Creatinine 0.60 0.50 - 1.40 mg/dL    Sodium 139 135 - 145 mmol/L    Potassium 4.0 " 3.5 - 5.3 mmol/L    Chloride 94 (L) 98 - 110 mmol/L    CO2 37.0 (H) 24.0 - 31.0 mmol/L    Calcium 9.7 8.4 - 10.4 mg/dL    eGFR Non African Amer 93 >60 mL/min/1.73    BUN/Creatinine Ratio 25.0 7.0 - 25.0    Anion Gap 8.0 4.0 - 13.0 mmol/L   CBC Auto Differential    Collection Time: 02/01/18  7:00 AM   Result Value Ref Range    WBC 13.86 (H) 4.80 - 10.80 10*3/mm3    RBC 3.99 (L) 4.20 - 5.40 10*6/mm3    Hemoglobin 11.1 (L) 12.0 - 16.0 g/dL    Hematocrit 34.2 (L) 37.0 - 47.0 %    MCV 85.7 82.0 - 98.0 fL    MCH 27.8 (L) 28.0 - 32.0 pg    MCHC 32.5 (L) 33.0 - 36.0 g/dL    RDW 15.6 (H) 12.0 - 15.0 %    RDW-SD 49.0 40.0 - 54.0 fl    MPV 10.0 6.0 - 12.0 fL    Platelets 356 130 - 400 10*3/mm3   Scan Slide    Collection Time: 02/01/18  7:00 AM   Result Value Ref Range    Scan Slide     Manual Differential    Collection Time: 02/01/18  7:00 AM   Result Value Ref Range    Neutrophil % 73.0 39.0 - 78.0 %    Lymphocyte % 15.0 15.0 - 45.0 %    Monocyte % 6.0 4.0 - 12.0 %    Eosinophil % 2.0 0.0 - 4.0 %    Basophil % 1.0 0.0 - 2.0 %    Myelocyte % 1.0 (H) 0.0 - 0.0 %    Atypical Lymphocyte % 2.0 0.0 - 5.0 %    Neutrophils Absolute 10.12 (H) 1.87 - 8.40 10*3/mm3    Lymphocytes Absolute 2.08 0.72 - 4.86 10*3/mm3    Monocytes Absolute 0.83 0.19 - 1.30 10*3/mm3    Eosinophils Absolute 0.28 0.00 - 0.70 10*3/mm3    Basophils Absolute 0.14 0.00 - 0.20 10*3/mm3    Poikilocytes Slight/1+ None Seen    WBC Morphology Normal Normal    Platelet Estimate Adequate Normal     Physical Examination:        Physical Exam   Constitutional: She is oriented to person, place, and time. She appears well-developed and well-nourished.   HENT:   Head: Normocephalic and atraumatic.   Nose: Nose normal.   Mouth/Throat: Oropharynx is clear and moist.   [place]   Eyes: Conjunctivae and EOM are normal. Pupils are equal, round, and reactive to light.   Neck: Normal range of motion. Neck supple.   Cardiovascular: Normal rate, regular rhythm, normal heart sounds and  intact distal pulses.    Pulmonary/Chest: Effort normal. She has rhonchi. She has no rales.   Abdominal: Soft. Bowel sounds are normal.   Musculoskeletal:   Generalized weakness   Neurological: She is alert and oriented to person, place, and time. She has normal reflexes.   Skin: Skin is warm and dry.   Psychiatric: She has a normal mood and affect. Her behavior is normal.   Nursing note and vitals reviewed.    Assessment:        Active Problems:    Pneumonia of both lungs due to infectious organism    Past Medical History:   Diagnosis Date   • Allergic rhinitis    • Anxiety    • Anxiety disorder    • Arthritis    • Asthma    • Asthma    • Colon polyp    • Degenerative arthritis    • Dementia    • Dementia    • Depression    • Depression    • Diabetes mellitus    • Environmental allergies    • Hx of colonic polyps    • Hyperlipidemia    • Hypertension    • Neuropathy    • Osteoporosis    • Osteoporosis    • Pacemaker    • Peripheral neuropathy         Plan:        1. Bilateral HCAP  2. HTN  3. Anxiety  4. Chronic anticoagulation  5. Insomnia  6. HLD  7. Influenza A  8. Hypokalemia  9. Coumadin toxicity    Continue current treatment. Monitor counts. Increase activity. Continue antibiotics. Continue aggressive pulmonary toilet. Monitor INR closely and adjust coumadin dosing accordingly. Look to possible ltach placement.         Electronically signed by Clinton Pfeiffer MD on 2/1/2018 at 12:55 PM

## 2018-02-01 NOTE — PLAN OF CARE
Problem: Patient Care Overview (Adult)  Goal: Plan of Care Review  Outcome: Ongoing (interventions implemented as appropriate)   02/01/18 0819   Coping/Psychosocial Response Interventions   Plan Of Care Reviewed With patient   Patient Care Overview   Progress progress toward functional goals is gradual   Outcome Evaluation   Outcome Summary/Follow up Plan Pt awake/alert in fowlers on bipap, O2 @ 95%. Mod A Supine to Sit EOB, Mod Max x 2 Sit to Stand, Mod Max x 2 steps from bed to bedside chair approx 2 feet, Pt states she feels some better today. O2 sats dropped with activity to 87-89%, Teri CARTER increaased hiflo to 15L so pt could eat breakfast sitting up in bedside chair.

## 2018-02-02 ENCOUNTER — APPOINTMENT (OUTPATIENT)
Dept: GENERAL RADIOLOGY | Facility: HOSPITAL | Age: 83
End: 2018-02-02

## 2018-02-02 LAB
ANION GAP SERPL CALCULATED.3IONS-SCNC: 8 MMOL/L (ref 4–13)
BUN BLD-MCNC: 19 MG/DL (ref 5–21)
BUN/CREAT SERPL: 28.4 (ref 7–25)
CALCIUM SPEC-SCNC: 9.5 MG/DL (ref 8.4–10.4)
CHLORIDE SERPL-SCNC: 92 MMOL/L (ref 98–110)
CO2 SERPL-SCNC: 38 MMOL/L (ref 24–31)
CREAT BLD-MCNC: 0.67 MG/DL (ref 0.5–1.4)
DEPRECATED RDW RBC AUTO: 48.7 FL (ref 40–54)
EOSINOPHIL # BLD MANUAL: 0.42 10*3/MM3 (ref 0–0.7)
EOSINOPHIL NFR BLD MANUAL: 3 % (ref 0–4)
ERYTHROCYTE [DISTWIDTH] IN BLOOD BY AUTOMATED COUNT: 15.6 % (ref 12–15)
GFR SERPL CREATININE-BSD FRML MDRD: 82 ML/MIN/1.73
GLUCOSE BLD-MCNC: 102 MG/DL (ref 70–100)
HCT VFR BLD AUTO: 33 % (ref 37–47)
HGB BLD-MCNC: 10.7 G/DL (ref 12–16)
INR PPP: 1.18 (ref 0.91–1.09)
LYMPHOCYTES # BLD MANUAL: 1.41 10*3/MM3 (ref 0.72–4.86)
LYMPHOCYTES NFR BLD MANUAL: 10 % (ref 15–45)
LYMPHOCYTES NFR BLD MANUAL: 3 % (ref 4–12)
MCH RBC QN AUTO: 27.7 PG (ref 28–32)
MCHC RBC AUTO-ENTMCNC: 32.4 G/DL (ref 33–36)
MCV RBC AUTO: 85.5 FL (ref 82–98)
MONOCYTES # BLD AUTO: 0.42 10*3/MM3 (ref 0.19–1.3)
MYELOCYTES NFR BLD MANUAL: 3 % (ref 0–0)
NEUTROPHILS # BLD AUTO: 10.72 10*3/MM3 (ref 1.87–8.4)
NEUTROPHILS NFR BLD MANUAL: 75 % (ref 39–78)
NEUTS BAND NFR BLD MANUAL: 1 % (ref 0–10)
PLATELET # BLD AUTO: 360 10*3/MM3 (ref 130–400)
PMV BLD AUTO: 9.8 FL (ref 6–12)
POIKILOCYTOSIS BLD QL SMEAR: ABNORMAL
POTASSIUM BLD-SCNC: 4 MMOL/L (ref 3.5–5.3)
PROTHROMBIN TIME: 15.4 SECONDS (ref 11.9–14.6)
RBC # BLD AUTO: 3.86 10*6/MM3 (ref 4.2–5.4)
SCAN SLIDE: NORMAL
SMALL PLATELETS BLD QL SMEAR: ADEQUATE
SODIUM BLD-SCNC: 138 MMOL/L (ref 135–145)
VARIANT LYMPHS NFR BLD MANUAL: 5 % (ref 0–5)
WBC MORPH BLD: NORMAL
WBC NRBC COR # BLD: 14.1 10*3/MM3 (ref 4.8–10.8)

## 2018-02-02 PROCEDURE — G8987 SELF CARE CURRENT STATUS: HCPCS | Performed by: OCCUPATIONAL THERAPIST

## 2018-02-02 PROCEDURE — G8988 SELF CARE GOAL STATUS: HCPCS | Performed by: OCCUPATIONAL THERAPIST

## 2018-02-02 PROCEDURE — 94660 CPAP INITIATION&MGMT: CPT

## 2018-02-02 PROCEDURE — 94799 UNLISTED PULMONARY SVC/PX: CPT

## 2018-02-02 PROCEDURE — 25010000002 MEROPENEM PER 100 MG: Performed by: INTERNAL MEDICINE

## 2018-02-02 PROCEDURE — 94669 MECHANICAL CHEST WALL OSCILL: CPT

## 2018-02-02 PROCEDURE — 36415 COLL VENOUS BLD VENIPUNCTURE: CPT | Performed by: INTERNAL MEDICINE

## 2018-02-02 PROCEDURE — 85007 BL SMEAR W/DIFF WBC COUNT: CPT | Performed by: INTERNAL MEDICINE

## 2018-02-02 PROCEDURE — 97535 SELF CARE MNGMENT TRAINING: CPT | Performed by: OCCUPATIONAL THERAPIST

## 2018-02-02 PROCEDURE — 80048 BASIC METABOLIC PNL TOTAL CA: CPT | Performed by: INTERNAL MEDICINE

## 2018-02-02 PROCEDURE — 71045 X-RAY EXAM CHEST 1 VIEW: CPT

## 2018-02-02 PROCEDURE — 85610 PROTHROMBIN TIME: CPT | Performed by: INTERNAL MEDICINE

## 2018-02-02 PROCEDURE — 85025 COMPLETE CBC W/AUTO DIFF WBC: CPT | Performed by: INTERNAL MEDICINE

## 2018-02-02 RX ORDER — HYDRALAZINE HYDROCHLORIDE 50 MG/1
50 TABLET, FILM COATED ORAL EVERY 8 HOURS SCHEDULED
Status: DISCONTINUED | OUTPATIENT
Start: 2018-02-02 | End: 2018-02-05 | Stop reason: HOSPADM

## 2018-02-02 RX ADMIN — ALPRAZOLAM 0.12 MG: 0.25 TABLET ORAL at 08:15

## 2018-02-02 RX ADMIN — GABAPENTIN 600 MG: 300 CAPSULE ORAL at 21:30

## 2018-02-02 RX ADMIN — CHOLECALCIFEROL (VITAMIN D3) 25 MCG (1,000 UNIT) TABLET 1000 UNITS: TABLET at 08:14

## 2018-02-02 RX ADMIN — GABAPENTIN 300 MG: 300 CAPSULE ORAL at 08:15

## 2018-02-02 RX ADMIN — HYDRALAZINE HYDROCHLORIDE 25 MG: 25 TABLET, FILM COATED ORAL at 06:02

## 2018-02-02 RX ADMIN — TRAMADOL HYDROCHLORIDE 50 MG: 50 TABLET, COATED ORAL at 13:19

## 2018-02-02 RX ADMIN — DOXYCYCLINE 100 MG: 100 INJECTION, POWDER, LYOPHILIZED, FOR SOLUTION INTRAVENOUS at 21:29

## 2018-02-02 RX ADMIN — TRAMADOL HYDROCHLORIDE 50 MG: 50 TABLET, COATED ORAL at 18:19

## 2018-02-02 RX ADMIN — IPRATROPIUM BROMIDE AND ALBUTEROL SULFATE 3 ML: 2.5; .5 SOLUTION RESPIRATORY (INHALATION) at 20:16

## 2018-02-02 RX ADMIN — DOCUSATE SODIUM 100 MG: 100 CAPSULE ORAL at 08:14

## 2018-02-02 RX ADMIN — NYSTATIN: 100000 CREAM TOPICAL at 09:26

## 2018-02-02 RX ADMIN — AMLODIPINE BESYLATE 10 MG: 10 TABLET ORAL at 08:14

## 2018-02-02 RX ADMIN — LISINOPRIL 40 MG: 20 TABLET ORAL at 08:14

## 2018-02-02 RX ADMIN — METOPROLOL TARTRATE 50 MG: 50 TABLET ORAL at 08:14

## 2018-02-02 RX ADMIN — ZOLPIDEM TARTRATE 5 MG: 5 TABLET ORAL at 21:30

## 2018-02-02 RX ADMIN — DOXYCYCLINE 100 MG: 100 INJECTION, POWDER, LYOPHILIZED, FOR SOLUTION INTRAVENOUS at 09:23

## 2018-02-02 RX ADMIN — MEROPENEM 1 G: 1 INJECTION, POWDER, FOR SOLUTION INTRAVENOUS at 21:30

## 2018-02-02 RX ADMIN — GUAIFENESIN 1200 MG: 600 TABLET, EXTENDED RELEASE ORAL at 21:30

## 2018-02-02 RX ADMIN — VITAMIN E CAP 400 UNIT 400 UNITS: 400 CAP at 08:14

## 2018-02-02 RX ADMIN — Medication 1 SPRAY: at 08:15

## 2018-02-02 RX ADMIN — HYDROCODONE BITARTRATE AND ACETAMINOPHEN 1 TABLET: 5; 325 TABLET ORAL at 18:20

## 2018-02-02 RX ADMIN — GUAIFENESIN 1200 MG: 600 TABLET, EXTENDED RELEASE ORAL at 08:14

## 2018-02-02 RX ADMIN — FLUTICASONE PROPIONATE 1 SPRAY: 50 SPRAY, METERED NASAL at 08:15

## 2018-02-02 RX ADMIN — MEROPENEM 1 G: 1 INJECTION, POWDER, FOR SOLUTION INTRAVENOUS at 13:19

## 2018-02-02 RX ADMIN — HYDRALAZINE HYDROCHLORIDE 50 MG: 50 TABLET ORAL at 21:29

## 2018-02-02 RX ADMIN — DOCUSATE SODIUM 100 MG: 100 CAPSULE ORAL at 21:30

## 2018-02-02 RX ADMIN — CLONIDINE HYDROCHLORIDE 0.1 MG: 0.1 TABLET ORAL at 08:14

## 2018-02-02 RX ADMIN — TRAMADOL HYDROCHLORIDE 50 MG: 50 TABLET, COATED ORAL at 21:30

## 2018-02-02 RX ADMIN — ACETAMINOPHEN 650 MG: 325 TABLET, FILM COATED ORAL at 23:01

## 2018-02-02 RX ADMIN — TRAMADOL HYDROCHLORIDE 50 MG: 50 TABLET, COATED ORAL at 08:15

## 2018-02-02 RX ADMIN — ATORVASTATIN CALCIUM 10 MG: 10 TABLET, FILM COATED ORAL at 21:30

## 2018-02-02 RX ADMIN — FLUTICASONE PROPIONATE 1 SPRAY: 50 SPRAY, METERED NASAL at 21:29

## 2018-02-02 RX ADMIN — IPRATROPIUM BROMIDE AND ALBUTEROL SULFATE 3 ML: 2.5; .5 SOLUTION RESPIRATORY (INHALATION) at 07:35

## 2018-02-02 RX ADMIN — MEROPENEM 1 G: 1 INJECTION, POWDER, FOR SOLUTION INTRAVENOUS at 06:02

## 2018-02-02 RX ADMIN — METOPROLOL TARTRATE 50 MG: 50 TABLET ORAL at 21:29

## 2018-02-02 RX ADMIN — POTASSIUM CHLORIDE 10 MEQ: 750 CAPSULE, EXTENDED RELEASE ORAL at 08:14

## 2018-02-02 RX ADMIN — IPRATROPIUM BROMIDE AND ALBUTEROL SULFATE 3 ML: 2.5; .5 SOLUTION RESPIRATORY (INHALATION) at 14:36

## 2018-02-02 RX ADMIN — HYDROCODONE BITARTRATE AND ACETAMINOPHEN 1 TABLET: 5; 325 TABLET ORAL at 09:23

## 2018-02-02 RX ADMIN — WARFARIN SODIUM 5 MG: 5 TABLET ORAL at 18:19

## 2018-02-02 RX ADMIN — FUROSEMIDE 40 MG: 40 TABLET ORAL at 08:14

## 2018-02-02 RX ADMIN — PAROXETINE 20 MG: 20 TABLET, FILM COATED ORAL at 06:02

## 2018-02-02 RX ADMIN — HYDRALAZINE HYDROCHLORIDE 50 MG: 50 TABLET ORAL at 13:20

## 2018-02-02 RX ADMIN — Medication 162 MG: at 08:14

## 2018-02-02 RX ADMIN — TAMSULOSIN HYDROCHLORIDE 0.4 MG: 0.4 CAPSULE ORAL at 08:14

## 2018-02-02 NOTE — PLAN OF CARE
Problem: Patient Care Overview (Adult)  Goal: Plan of Care Review  Outcome: Ongoing (interventions implemented as appropriate)   02/02/18 1342   Coping/Psychosocial Response Interventions   Plan Of Care Reviewed With patient;daughter   Patient Care Overview   Progress improving   Outcome Evaluation   Outcome Summary/Follow up Plan OT Re-Assessment completed. Pt requires Min to Mod A with UB ADL. Max A for LB ADL. Mod A of 1-2 for bed mobility. Max A of 2 for sit <> stand transfers. Unable to safely transfer to a chair today due to impaired balance and weakness. OT will continue working with pt. Recommend d/c to a SNF.        Problem: Inpatient Occupational Therapy  Goal: Transfer Training Goal 1 LTG- OT  Outcome: Ongoing (interventions implemented as appropriate)   01/24/18 1010 02/02/18 1342   Transfer Training OT LTG   Transfer Training OT LTG, Date Established 01/24/18 --    Transfer Training OT LTG, Time to Achieve by discharge --    Transfer Training OT LTG, Activity Type toilet;shower chair --    Transfer Training OT LTG, [place] Level moderate assist (50% patient effort) --    Transfer Training OT LTG, Additional Goal DME as needed --    Transfer Training OT LTG, Date Goal Reviewed --  02/02/18   Transfer Training OT LTG, Outcome --  goal ongoing   Transfer Training OT LTG, Reason Goal Not Met --  progress slower than expected     Goal: Bathing Goal LTG- OT  Outcome: Ongoing (interventions implemented as appropriate)   01/24/18 1010 01/24/18 1451 02/02/18 1342   Bathing OT LTG   Bathing Goal OT LTG, Date Established 01/24/18 --  --    Bathing Goal OT LTG, Time to Achieve by discharge --  --    Bathing Goal OT LTG, Activity Type --  --  lower body bathing   Bathing Goal OT LTG, [place] Level moderate assist (50% patient effort) --  --    Bathing Goal OT LTG, Assist Device --  shower chair with back;shower head, detachable;sponge, long handled --    Bathing Goal OT LTG, Additional Goal DME as needed  --  --    Bathing Goal OT LTG, Date Goal Reviewed --  --  02/02/18   Bathing Goal OT LTG, Outcome --  --  goal revised   Bathing Goal OT LTG, Reason Goal Not Met --  --  goals revised this date     Goal: ADL Goal LTG- OT  Outcome: Ongoing (interventions implemented as appropriate)   01/24/18 1010 02/02/18 1342   ADL OT LTG   ADL OT LTG, Date Established 01/24/18 --    ADL OT LTG, Time to Achieve by discharge --    ADL OT LTG, [place] Level mod assist --    ADL OT LTG, Additional Goal UB/LB dressing in sitting with compensatory strategies as needed for vision impairment --    ADL OT LTG, Date Goal Reviewed --  02/02/18   ADL OT LTG, Outcome --  goal ongoing   ADL OT LTG, Reason Goal Not Met --  progress slower than expected

## 2018-02-02 NOTE — PLAN OF CARE
Problem: Patient Care Overview (Adult)  Goal: Plan of Care Review  Outcome: Ongoing (interventions implemented as appropriate)   02/02/18 0509   Coping/Psychosocial Response Interventions   Plan Of Care Reviewed With patient   Patient Care Overview   Progress no change   Outcome Evaluation   Outcome Summary/Follow up Plan O2 at 9L [place]; VSS. PRN pain meds given with relief.        Problem: Fall Risk (Adult)  Goal: Identify Related Risk Factors and Signs and Symptoms  Outcome: Ongoing (interventions implemented as appropriate)   02/02/18 0509   Fall Risk   Fall Risk: Related Risk Factors age-related changes;gait/mobility problems;history of falls;environment unfamiliar   Fall Risk: Signs and Symptoms presence of risk factors     Goal: Absence of Falls  Outcome: Ongoing (interventions implemented as appropriate)   02/02/18 0509   Fall Risk (Adult)   Absence of Falls achieves outcome       Problem: Pneumonia (Adult)  Goal: Signs and Symptoms of Listed Potential Problems Will be Absent or Manageable (Pneumonia)  Outcome: Ongoing (interventions implemented as appropriate)   02/02/18 0509   Pneumonia   Problems Assessed (Pneumonia) all   Problems Present (Pneumonia) respiratory compromise       Problem: Infection, Risk/Actual (Adult)  Goal: Identify Related Risk Factors and Signs and Symptoms  Outcome: Ongoing (interventions implemented as appropriate)   02/02/18 0509   Infection, Risk/Actual   Infection, Risk/Actual: Related Risk Factors age extremes;exposure to microbes   Signs and Symptoms (Infection, Risk/Actual) lab value changes;weakness     Goal: Infection Prevention/Resolution  Outcome: Ongoing (interventions implemented as appropriate)   02/02/18 0509   Infection, Risk/Actual (Adult)   Infection Prevention/Resolution making progress toward outcome

## 2018-02-02 NOTE — PROGRESS NOTES
Continued Stay Note   Emy     Patient Name: Justina Bingham  MRN: [id number]  Today's Date: 2/2/2018    Admit Date: 1/23/2018          Discharge Plan       02/02/18 1157    Case Management/Social Work Plan    Plan Continue Care after precert    Patient/Family In Agreement With Plan yes    Additional Comments Iris at [facility] still waiting for precert decision. Will follow.               Discharge Codes     None            TAJ Deng

## 2018-02-02 NOTE — PROGRESS NOTES
PULMONARY AND CRITICAL CARE PROGRESS NOTE - [facility]    Patient: Justina MARIE Mix  4/4/1925   MR# [id number]   Acct# [id number]  02/02/18   10:53 AM  Referring Provider: Clinton Pfeiffer MD    Chief Complaint: Dyspnea    Interval history: Patient is looking better this AM.  She is sitting up in bed on 7L NC with O2 sat 91%.  HR 72.  She has a very strong cough.  No new complaints.  No other aggravating or allevitating factors.     Meds:    ALPRAZolam 0.125 mg Oral Daily   amLODIPine 10 mg Oral Daily   aspirin 162 mg Oral Daily   atorvastatin 10 mg Oral Nightly   cholecalciferol 1,000 Units Oral Daily   docusate sodium 100 mg Oral BID   doxycycline 100 mg Intravenous Q12H   fluticasone 1 spray Nasal BID   furosemide 40 mg Oral Daily   gabapentin 300 mg Oral Daily   gabapentin 600 mg Oral Nightly   guaiFENesin 1,200 mg Oral Q12H   hydrALAZINE 50 mg Oral Q8H   ipratropium-albuterol 3 mL Nebulization 4x Daily - RT   lisinopril 40 mg Oral Q24H   meropenem 1 g Intravenous Q8H   metoprolol tartrate 50 mg Oral Q12H   PARoxetine 20 mg Oral QAM   potassium chloride 10 mEq Oral Daily   sodium chloride 1 spray Nasal Daily   tamsulosin 0.4 mg Oral Daily   traMADol 50 mg Oral Q4H   vitamin E 400 Units Oral Daily   warfarin 5 mg Oral Daily        Review of Systems:   Review of Systems   Constitutional: Positive for fatigue. Negative for chills and fever.   Cardiovascular: Negative for chest pain.   Gastrointestinal: Negative for diarrhea, nausea and vomiting.   :    Very hard of hearing but non-verbal at the moment    Physical Exam:  SpO2 Readings from Last 3 Encounters:   02/02/18 92%   10/30/17 92%   09/11/17 97%     Temp:  [97.6 °F (36.4 °C)-99.1 °F (37.3 °C)] 98.9 °F (37.2 °C)  Heart Rate:  [61-82] 75  Resp:  [16-20] 18  BP: (151-197)/(62-78) 197/75    Intake/Output Summary (Last 24 hours) at 02/02/18 1053  Last data filed at 02/02/18 0900   Gross per 24 hour   Intake           438.95 ml   Output                 0 ml   Net           438.95 ml     Physical Exam:    Constitutional: She appears well-developed and well-nourished. She appears awake and alert. She has a sickly appearance.    HENT: NC in place. Very hard of hearing  Head: Normocephalic and atraumatic.   Eyes: Pupils are equal, round, and reactive to light. No scleral icterus.   Neck: Neck supple.   Cardiovascular: Normal rate and regular rhythm.    Pulmonary/Chest: Diminished bilaterally.   Abdominal: Soft. Bowel sounds are normal. She exhibits no distension.   Musculoskeletal: Normal range of motion. She exhibits edema (bilateral LE).   Lymphadenopathy:     She has no cervical adenopathy.   Neurological: She appears awake and alert but non-verbal   Skin: Skin is warm and dry.   Nursing note and vitals reviewed.    Laboratory Data:    Results from last 7 days  Lab Units 02/02/18  0545 02/01/18  0700 01/31/18  0442   WBC 10*3/mm3 14.10* 13.86* 15.00*   HEMOGLOBIN g/dL 10.7* 11.1* 11.9*   PLATELETS 10*3/mm3 360 356 358       Results from last 7 days  Lab Units 02/02/18  0545 02/01/18  0700 01/31/18  0442   SODIUM mmol/L 138 139 141   POTASSIUM mmol/L 4.0 4.0 4.0   BUN mg/dL 19 15 14   CREATININE mg/dL 0.67 0.60 0.65   INR  1.18* 1.07 1.15*       Results from last 7 days  Lab Units 01/28/18  1108   PH, ARTERIAL pH units 7.469*   PCO2, ARTERIAL mm Hg 48.6*   PO2 ART mm Hg 73.1*   FIO2 % 60     Blood Culture   Date Value Ref Range Status   01/23/2018 No growth at 3 days  Preliminary   01/23/2018 No growth at 3 days  Preliminary     Urine Culture   Date Value Ref Range Status   01/23/2018 >100,000 CFU/mL Streptococcus agalactiae (Group B) (A)  Final     Comment:     This organism is considered to be universally susceptible to penicillin.  No further antibiotic testing will be performed.     Recent films:  Imaging Results (last 24 hours)     Procedure Component Value Units Date/Time    XR Chest 1 View [[id number]] Collected:  02/02/18 0714     Updated:  02/02/18  0718    Narrative:       XR CHEST 1 VW- 2/2/2018 4:10 AM CST     HISTORY: pneumonia; J18.9-Pneumonia, unspecified organism;  R09.02-Hypoxemia; R74.8-Abnormal levels of other serum enzymes;  A41.9-Sepsis, unspecified organism; Z74.09-Other reduced mobility;  Z74.09-Other reduced mobility       COMPARISON: 01/31/2018.     FINDINGS:   Opacities in the RIGHT upper lung have improved since the previous exam.  Small pleural effusions are seen. The heart remains slightly enlarged.  Kyphoplasty cement is seen in the T9 and T12 vertebral bodies.      The osseous structures and surrounding soft tissues demonstrate no acute  abnormality.       Impression:       1. Slight improvement in RIGHT upper lung opacities.  2. Small pleural effusions are suspected.        This report was finalized on 02/02/2018 07:15 by Dr. Percy Sethi MD.        Films reviewed personally by me.  My interpretation: improvement in RUL opacities     Pulmonary Assessment:    1. Acute hypoxemic/hypercapnic respiratory failure  2. Influenza A  3. Bilateral pneumonia  4. High risk for post viral complications with advanced age  5. Chronic Coumadin therapy  6. Mucous plugging on right  7. Full code status  8. Supratherapeutic INR normalized  9. Streptococcus UTI     Recommend:     · Continue supplemental high flow oxygen and wean as tolerated  · Continue Bipap with sleep and as needed  · Continue merrem and vibramycin    · Continue duonebs, mucinex  · Continue CPT, IS/flutter   · High risk for decompensation due to advanced age and post viral complications    Electronically signed by WU Mendoza, 02/02/18, 10:53 AM     Physician substantive contribution:  Pertinent symptoms/interval history include: poorly communicative.  Family reports she is better  Respiratory exam shows pertinent findings of:diminished breath sounds, wheezing, crackles, improved, off bipap.  Plan includes: continue bipap at night, continue antibiotics.  Candida in sputum  is a colonizer.  I have seen and examined patient personally, performing a face-to-face diagnostic evaluation with plan of care reviewed and developed with APRN and nursing staff. I have addended and/or modified the above history of present illness, physical examination, and assessment and plan to reflect my findings and impressions. Essential elements of the care plan were discussed with APRN above.  Agree with findings and assessment/plan as documented above.    Electronically signed by Joseph Reynoso MD, on 2/2/2018, 5:04 PM

## 2018-02-02 NOTE — THERAPY PROGRESS REPORT/RE-CERT
Acute Care - Occupational Therapy Re-Assessment  [facility]     Patient Name: Justina Bingham  : 1925  MRN: [id number]  Today's Date: 2018  Onset of Illness/Injury or Date of Surgery Date: 18  Date of Referral to OT: 18  Referring Physician: Dr. Pfeiffer    Admit Date: 2018       ICD-10-CM ICD-9-CM   1. Pneumonia of both lungs due to infectious organism, unspecified part of lung J18.9 483.8   2. Hypoxia R09.02 799.02   3. Elevated troponin R74.8 790.6   4. Sepsis, due to unspecified organism A41.9 038.9     995.91   5. Impaired functional mobility, balance, gait, and endurance Z74.09 V49.89   6. 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   • Closed displaced intertrochanteric fracture of left femur   • Pacemaker   • Pneumonia of both lungs due to infectious organism     Past Medical History:   Diagnosis Date   • Allergic rhinitis    • Anxiety    • Anxiety disorder    • Arthritis    • Asthma    • Asthma    • Colon polyp    • Degenerative arthritis    • Dementia    • Dementia    • Depression    • Depression    • Diabetes mellitus    • Environmental allergies    • Hx of colonic polyps    • Hyperlipidemia    • Hypertension    • Neuropathy    • Osteoporosis    • Osteoporosis    • Pacemaker    • Peripheral neuropathy      Past Surgical History:   Procedure Laterality Date   • BREAST SURGERY      Biopsy   • BREAST SURGERY     • CARDIAC ELECTROPHYSIOLOGY PROCEDURE N/A 2017    Procedure: PPM battery change;  Surgeon: Miguel Bagley MD;  Location: [facility] INVASIVE LOCATION;  Service:    • CATARACT EXTRACTION     • CATARACT EXTRACTION     • CHOLECYSTECTOMY     • COLONOSCOPY  2011    EXAM TO ANAST SNARE X2 RECALL PRN   • COLONOSCOPY W/ POLYPECTOMY  2011    2 Adenomatous polyps, DIverticulosis, Hemorrhoids   • ENDOSCOPY   02/29/2008    HH   • ENDOSCOPY  02/29/2008    HIATAL HERNIA   • HEMICOLECTOMY      Right   • HEMICOLECTOMY     • HIP TROCANTERIC NAILING WITH INTRAMEDULLARY HIP SCREW Left 10/26/2017    Procedure: HIP TROCANTERIC NAILING SHORT WITH INTRAMEDULLARY HIP SCREW;  Surgeon: Ulises Molina MD;  Location: [facility] OR;  Service:    • HYSTERECTOMY     • INSERT / REPLACE / REMOVE PACEMAKER     • PACEMAKER IMPLANTATION     • SINUS SURGERY            OT ASSESSMENT FLOWSHEET (last 72 hours)      OT Evaluation       02/02/18 1101 02/01/18 0933 02/01/18 0819 01/31/18 1534 01/31/18 1300    Rehab Evaluation    Document Type progress note  -TR therapy note (daily note)  -CJ therapy note (daily note)  -LG therapy note (daily note)  -LG therapy note (daily note)  -CJ    Subjective Information agree to therapy;no complaints  -TR weakness;complains of;agree to therapy;fatigue  -CJ agree to therapy;no complaints  -LG agree to therapy;complains of;weakness;fatigue  -LG agree to therapy;complains of;weakness;fatigue  -CJ    Patient Effort, Rehab Treatment good  -TR good  -CJ good  -LG good  -LG good  -CJ    Symptoms Noted During/After Treatment fatigue  -TR        General Information    Patient Profile Review yes  -TR        Onset of Illness/Injury or Date of Surgery Date 01/23/18  -TR        Referring Physician Dr. Pfeiffer  -TR        General Observations Fowlers in bed, O2 NC 6L, telemetry, IV L UE, daughters present. Pt requested a shower.   -TR        Precautions/Limitations fall precautions;oxygen therapy device and L/min  -TR fall precautions;oxygen therapy device and L/min  -CJ fall precautions;oxygen therapy device and L/min  -LG fall precautions;oxygen therapy device and L/min  -LG fall precautions;oxygen therapy device and L/min  -CJ    Plans/Goals Discussed With patient and family;agreed upon  -TR        Risks Reviewed patient and family:;LOB;dizziness;increased discomfort;change in vital signs  -TR        Benefits Reviewed  patient and family:;improve function;increase independence;increase strength;increase balance;decrease pain;increase knowledge  -TR        Barriers to Rehab previous functional deficit;cognitive status;hearing deficit;visual deficit  -TR        Clinical Impression    Date of Referral to OT 01/23/18  -TR        OT Diagnosis Decreased ADL  -TR        Impairments Found (describe specific impairments) aerobic capacity/endurance;ergonomics and body mechanics;gait, locomotion, and balance;muscle performance;posture;ROM;ventilation and respiration/gas exchange  -TR        Patient/Family Goals Statement Pt did not state.   -TR        Criteria for Skilled Therapeutic Interventions Met yes;treatment indicated  -TR        Rehab Potential good, to achieve stated therapy goals  -TR        Therapy Frequency 3-5 times/wk  -TR        Predicted Duration of Therapy Intervention (days/wks) Until d/c from facility.  -TR        Anticipated Equipment Needs At Discharge --   SNF will provide.  -TR        Anticipated Discharge Disposition skilled nursing facility  -TR        Vital Signs    Posttreatment Heart Rate (beats/min) 67  -TR        Pre SpO2 (%)   93  -LG      O2 Delivery Pre Treatment  supplemental O2   10L/o2  -CJ supplemental O2   bipap  -LG  supplemental O2   10L/o2 hi sadaf  -CJ    Intra SpO2 (%)   89  -LG      O2 Delivery Intra Treatment  supplemental O2   10L/o2  -CJ supplemental O2   9L  -LG  supplemental O2   10L/o2 hi sadaf!  -CJ    Post SpO2 (%) 93  -TR  85  -LG      O2 Delivery Post Treatment supplemental O2   6L NC  -TR supplemental O2   10L/o2  -CJ supplemental O2   15L, Teri CARTER turned up O2 with pt up eating breakfast.   -LG  supplemental O2   10L/o2 hi sadaf!  -CJ    Pre Patient Position Supine  -TR Sitting  -CJ Supine  -LG  Sitting  -CJ    Intra Patient Position Sitting  -TR Sitting  -CJ Sitting  -LG  Sitting  -CJ    Post Patient Position Supine  -TR Sitting  -CJ Sitting  -LG  Sitting  -CJ    Pain Assessment    Pain  Assessment Singh-Dolye FACES  -TR No/denies pain  -CJ No/denies pain  -LG No/denies pain  -LG No/denies pain  -CJ    Singh-Doyle FACES Pain Rating 2  -TR        Pain Intervention(s) Repositioned;Ambulation/increased activity  -TR        Response to Interventions Tolerated.   -TR        Vision Assessment/Intervention    Visual Impairment Comment Macular degeneration in B eyes.   -TR        Cognitive Assessment/Intervention    Current Cognitive/Communication Assessment --   Hearing impaired.   -TR        Follows Commands/Answers Questions able to follow single-step instructions;100% of the time  -TR        Personal Safety decreased awareness, need for assist  -TR        Personal Safety Interventions gait belt;fall prevention program maintained;supervised activity;nonskid shoes/slippers when out of bed  -TR        ROM (Range of Motion)    General ROM Detail B UE AROM WFL  -TR        MMT (Manual Muscle Testing)    General MMT Assessment Detail B UE Functionally 4/5  -TR        Muscle Tone Assessment    Muscle Tone Assessment --   B UE WFL.   -TR        Bed Mobility, Assessment/Treatment    Bed Mobility, Assistive Device bed rails;head of bed elevated;draw sheet  -TR   bed rails;draw sheet  -LG     Bed Mobility, Roll Left, [place] moderate assist (50% patient effort);verbal cues required;nonverbal cues required (demo/gesture)  -TR  verbal cues required;moderate assist (50% patient effort)  -LG verbal cues required;moderate assist (50% patient effort)  -LG     Bed Mobility, Roll Right, [place] moderate assist (50% patient effort);nonverbal cues required (demo/gesture)  -TR   verbal cues required;moderate assist (50% patient effort)  -LG     Bed Mobility, Scoot/Bridge, [place] maximum assist (25% patient effort);2 person assist required  -TR   maximum assist (25% patient effort)  -LG     Bed Mob, Supine to Sit, [place] moderate assist (50% patient effort);2 person assist required;verbal cues required  -TR   verbal cues required;minimum assist (75% patient effort);moderate assist (50% patient effort);2 person assist required  -LG      Bed Mob, Sit to Supine, [place] moderate assist (50% patient effort);2 person assist required;verbal cues required  -TR  not tested;other (see comments)   up in chair  -LG      Bed Mobility, Safety Issues decreased use of arms for pushing/pulling;decreased use of legs for bridging/pushing;impaired trunk control for bed mobility  -TR  decreased use of arms for pushing/pulling;decreased use of legs for bridging/pushing  -LG      Bed Mobility, Impairments strength decreased;impaired balance  -TR  strength decreased;impaired balance  -LG      Bed Mobility, Comment  up in chair!  -CJ Pt iniitated legs to eob but needed assist to get legs off bed.   -LG worked on rolling in bed, scooting and repositioning  -LG fowlers in bed!  -CJ    Transfer Assessment/Treatment    Transfers, Bed-Chair [place]   moderate assist (50% patient effort);maximum assist (25% patient effort);2 person assist required  -LG      Transfers, Sit-Stand [place] maximum assist (25% patient effort);2 person assist required;verbal cues required;nonverbal cues required (demo/gesture)  -TR  moderate assist (50% patient effort);maximum assist (25% patient effort);2 person assist required   stood x 2 reps  -LG      Transfers, Stand-Sit [place] maximum assist (25% patient effort);2 person assist required;verbal cues required;nonverbal cues required (demo/gesture)  -TR        Transfers, Sit-Stand-Sit, Assist Device rolling walker  -TR        Transfer, Safety Issues loses balance backward;weight-shifting ability decreased  -TR  balance decreased during turns;step length decreased;weight-shifting ability decreased  -LG      Transfer, Impairments strength decreased;impaired balance  -TR  strength decreased;impaired balance  -LG      Transfer, Comment Attempted 6 sit <> stands at EOB. Unable to safely complete  standing to attempt pivot to w/c. Pt was returned to bed.   -TR  Pt took a few shuffling steps from bed to chair  -LG      Functional Mobility    Functional Mobility- Comment Unable  -TR        Upper Body Bathing Assessment/Training    UB Bathing Assess/Train, Position supine  -TR        UB Bathing Assess/Train, [place] Level minimum assist (75% patient effort);verbal cues required  -TR        UB Bathing Assess/Train, Impairments coordination impaired  -TR        UB Bathing Assess/Train, Comment Min A to wash lower abdomen. Max A to wash back. Hearing impairments limited performance and cueing.   -TR        Lower Body Bathing Assessment/Training    LB Bathing Assess/Train, Position supine  -TR        LB Bathing Assess/Train, [place] Level maximum assist (25% patient effort);verbal cues required  -TR        LB Bathing Assess/Train, Impairments decreased flexibility  -TR        Upper Body Dressing Assessment/Training    UB Dressing Assess/Train, Clothing Type doffing:;donning:;hospital gown  -TR        UB Dressing Assess/Train, Position supine  -TR        UB Dressing Assess/Train, [place] moderate assist (50% patient effort)  -TR        UB Dressing Assess/Train, Impairments coordination impaired  -TR        Lower Body Dressing Assessment/Training    LB Dressing Assess/Train, Clothing Type doffing:;socks   brief  -TR        LB Dressing Assess/Train, Position supine  -TR        LB Dressing Assess/Train, [place] maximum assist (25% patient effort)  -TR        LB Dressing Assess/Train, Impairments decreased flexibility  -TR        Toileting Assessment/Training    Toileting Assess/Train, Position supine  -TR        Toileting Assess/Train, Indepen Level maximum assist (25% patient effort)  -TR        Toileting Assess/Train, Impairments decreased flexibility  -TR        Toileting Assess/Train, Comment Max A for brief change and hygiene following incontinence.   -TR        Grooming Assessment/Training     Grooming Assess/Train, Position supine  -TR        Grooming Assess/Train, Indepen Level set up required;supervision required  -TR        Grooming Assess/Train, Impairments coordination impaired  -TR        Grooming Assess/Train, Comment Oral care  -TR        Motor Skills/Interventions    Additional Documentation Balance Skills Training (Group)  -TR        Balance Skills Training    Sitting-Level of Assistance Contact guard  -TR        Sitting-Balance Support Feet supported;Right upper extremity supported;Left upper extremity supported  -TR        Standing-Level of Assistance Maximum assistance;x2  -TR        Static Standing Balance Support assistive device;Right upper extremity supported;Left upper extremity supported  -TR        Therapy Exercises    Bilateral Lower Extremities   AAROM:;10 reps;sitting  -LG AAROM:;20 reps;supine;sitting;ankle pumps/circles;heel slides;hip abduction/adduction;quad sets  -LG     Bilateral Upper Extremity  AROM:;20 reps;sitting;elbow flexion/extension;shoulder extension/flexion   3lb. bar, 2lb. dumb bells! No shd. flex exs!  -CJ   AROM:;20 reps;sitting;elbow flexion/extension;shoulder extension/flexion    3lb. bar, 2lb. dumb bells! No shd. flex exs!  -CJ    Edema Management    Edema Amount --   L UE redness and edema noted. RN aware.   -TR        General Therapy Interventions    Planned Therapy Interventions activity intolerance;adaptive equipment training;ADL retraining;balance training;bed mobility training;edema management;fine motor coordination training;home exercise program;motor coordination training;strengthening;stretching;transfer training;wheelchair fit and training;ROM (Range of Motion)  -TR        Positioning and Restraints    Pre-Treatment Position in bed  -TR sitting in chair/recliner  -CJ in bed  -LG in bed  -LG in bed  -CJ    Post Treatment Position bed  -TR chair  -CJ chair  -LG bed  -LG bed  -CJ    In Bed fowlers;notified nsg;call light within reach;encouraged to  call for assist;side rails up x2;with family/caregiver  -TR   fowlers;call light within reach;encouraged to call for assist;side rails up x2  -LG fowlers;call light within reach;encouraged to call for assist;with family/caregiver;side rails up x2  -CJ    In Chair  sitting;call light within reach;encouraged to call for assist  -CJ sitting;call light within reach;encouraged to call for assist;on mechanical lift sling;with nsg   Notified Teri CARTER pt on lift sling for nsg to transfer back  -LG        User Key  (r) = Recorded By, (t) = Taken By, (c) = Cosigned By    Initials Name Effective Dates    LG Anup Hui, JOE 08/02/16 -     CJ Michi Gong, DAMON/L 08/02/16 -     TR Luz Maria Meehan, OTR/L 06/22/15 -            Occupational Therapy Education     Title: PT OT SLP Therapies (Active)     Topic: Occupational Therapy (Done)     Point: ADL training (Done)    Description: Instruct learner(s) on proper safety adaptation and remediation techniques during self care or transfers.   Instruct in proper use of assistive devices.    Learning Progress Summary    Learner Readiness Method Response Comment Documented by Status   Patient Acceptance E,D VU,NR Education provided on purpose of OT re-assessment, impairments found, need for continued intervention and d/c planning. TR 02/02/18 1342 Done    Acceptance E NR Educated on benefits of activity and bed mobility LG 01/31/18 1534 Active    Acceptance E,D VU progression of POC, benefits of activity TS 01/30/18 1333 Done    Acceptance E VU pursed lip breathing technique, rolling/bed mobility, benefit of activity/OT MK 01/24/18 0958 Done   Family Acceptance E,D VU,NR Education provided on purpose of OT re-assessment, impairments found, need for continued intervention and d/c planning. TR 02/02/18 1342 Done               Point: Home exercise program (Done)    Description: Instruct learner(s) on appropriate technique for monitoring, assisting and/or progressing therapeutic  exercises/activities.    Learning Progress Summary    Learner Readiness Method Response Comment Documented by Status   Patient Acceptance ED MELANIEDU,NR Ue exs performed with pt. Pt. unable at this time to perform adl task with sob, getting better, but still sob!  02/01/18 1044 Done    Acceptance E NR Educated on benefits of activity and bed mobility  01/31/18 1534 Active    Acceptance E,D VU,DU Pt. performing ue exs with more energy and less breaks, no issues with ot tx today!  01/31/18 1444 Done    Acceptance E,D VU,DU,NR Pt. performs ue exs with tactile/vc's from this morris/l, Pt. lost her hearing aid and requires more tactile cues than verbal!  01/29/18 1138 Done    Acceptance ED MELANIE,DU,NR Pt. tired and required rest between exs to prevent sob! Ue exs performed to increase her act. alva!  01/27/18 1235 Done    Acceptance STEFAND LAURA NAVARRO,NR Pt. slow and requires frequent rests of 2-3 min. ea. between exs to prevent sob and fatigue! Vc's from this morris/l on exs and sequence!  01/25/18 1138 Done               Point: Precautions (Done)    Description: Instruct learner(s) on prescribed precautions during self-care and functional transfers.    Learning Progress Summary    Learner Readiness Method Response Comment Documented by Status   Patient Acceptance STEFAND VU,NR Education provided on purpose of OT re-assessment, impairments found, need for continued intervention and d/c planning. TR 02/02/18 1342 Done    Acceptance ED LAURA NAVARRO,NR Ue exs performed with pt. Pt. unable at this time to perform adl task with sob, getting better, but still sob!  02/01/18 1044 Done    Acceptance E NR Educated on benefits of activity and bed mobility  01/31/18 1534 Active    Acceptance ED VU,DU Pt. performing ue exs with more energy and less breaks, no issues with ot tx today!  01/31/18 1444 Done    Acceptance E,D VU,DU,NR Pt. performs ue exs with tactile/vc's from this morris/l, Pt. lost her hearing aid and requires more tactile cues  than verbal!  01/29/18 1138 Done    Acceptance E,D VU,DU,NR Pt. tired and required rest between exs to prevent sob! Ue exs performed to increase her act. alva!  01/27/18 1235 Done    Acceptance E,D VU,DU,NR Pt. slow and requires frequent rests of 2-3 min. ea. between exs to prevent sob and fatigue! Vc's from this morris/l on exs and sequence!  01/25/18 1138 Done   Family Acceptance E,D VU,NR Education provided on purpose of OT re-assessment, impairments found, need for continued intervention and d/c planning. TR 02/02/18 1342 Done               Point: Body mechanics (Done)    Description: Instruct learner(s) on proper positioning and spine alignment during self-care, functional mobility activities and/or exercises.    Learning Progress Summary    Learner Readiness Method Response Comment Documented by Status   Patient Acceptance E,D VU,DU,NR Ue exs performed with pt. Pt. unable at this time to perform adl task with sob, getting better, but still sob!  02/01/18 1044 Done    Acceptance E NR Educated on benefits of activity and bed mobility  01/31/18 1534 Active    Acceptance E,D VU,DU Pt. performing ue exs with more energy and less breaks, no issues with ot tx today!  01/31/18 1444 Done    Acceptance E,D VU,DU,NR Pt. performs ue exs with tactile/vc's from this morris/l, Pt. lost her hearing aid and requires more tactile cues than verbal!  01/29/18 1138 Done    Acceptance E,D VU,DU,NR Pt. tired and required rest between exs to prevent sob! Ue exs performed to increase her act. alva!  01/27/18 1235 Done    Acceptance E,D VU,DU,NR Pt. slow and requires frequent rests of 2-3 min. ea. between exs to prevent sob and fatigue! Vc's from this morris/l on exs and sequence!  01/25/18 1138 Done                      User Key     Initials Effective Dates Name Provider Type Discipline     08/02/16 -  Anup Hui, JOE Physical Therapy Assistant PT     08/02/16 -  Michi Gong MORRIS/L Occupational Therapy Assistant  OT    TS 08/02/16 -  MARISOL Ibarra/L Occupational Therapy Assistant OT    TR 06/22/15 -  NELLY Fernandez/L Occupational Therapist OT    MAKENNA 11/20/17 -  Gudelia Atkins OT Student OT Student OT                  OT Recommendation and Plan  Anticipated Equipment Needs At Discharge:  (SNF will provide.)  Anticipated Discharge Disposition: skilled nursing facility  Planned Therapy Interventions: activity intolerance, adaptive equipment training, ADL retraining, balance training, bed mobility training, edema management, fine motor coordination training, home exercise program, motor coordination training, strengthening, stretching, transfer training, wheelchair fit and training, ROM (Range of Motion)  Therapy Frequency: 3-5 times/wk  Plan of Care Review  Plan Of Care Reviewed With: patient, daughter  Progress: improving  Outcome Summary/Follow up Plan: OT Re-Assessment completed. Pt requires Min to Mod A with UB ADL. Max A for LB ADL. Mod A of 1-2 for bed mobility. Max A of 2 for sit <> stand transfers. Unable to safely transfer to a chair today due to impaired balance and weakness. OT will continue working with pt. Recommend d/c to a SNF.           OT Goals       02/02/18 1342 01/24/18 1451 01/24/18 1010    Transfer Training OT LTG    Transfer Training OT LTG, Date Established   01/24/18  -AC (r) MK (t) AC (c)    Transfer Training OT LTG, Time to Achieve   by discharge  -AC (r) MK (t) AC (c)    Transfer Training OT LTG, Activity Type   toilet;shower chair  -AC (r) MK (t) AC (c)    Transfer Training OT LTG, [place] Level   moderate assist (50% patient effort)  -AC (r) MK (t) AC (c)    Transfer Training OT LTG, Additional Goal   DME as needed  -AC (r) MK (t) AC (c)    Transfer Training OT LTG, Date Goal Reviewed 02/02/18  -TR      Transfer Training OT LTG, Outcome goal ongoing  -TR      Transfer Training OT LTG, Reason Goal Not Met progress slower than expected  -TR      Bathing OT LTG    Bathing Goal OT  LTG, Date Established   01/24/18  -AC (r) MK (t) AC (c)    Bathing Goal OT LTG, Time to Achieve   by discharge  -AC (r) MK (t) AC (c)    Bathing Goal OT LTG, Activity Type lower body bathing  -TR  upper body bathing;lower body bathing  -AC (r) MK (t) AC (c)    Bathing Goal OT LTG, [place] Level   moderate assist (50% patient effort)  -AC (r) MK (t) AC (c)    Bathing Goal OT LTG, Assist Device  shower chair with back;shower head, detachable;sponge, long handled  -AC (r) MK (t) AC (c)     Bathing Goal OT LTG, Additional Goal   DME as needed  -AC (r) MK (t) AC (c)    Bathing Goal OT LTG, Date Goal Reviewed 02/02/18  -TR      Bathing Goal OT LTG, Outcome goal revised  -TR      Bathing Goal OT LTG, Reason Goal Not Met goals revised this date  -TR      ADL OT LTG    ADL OT LTG, Date Established   01/24/18  -AC (r) MK (t) AC (c)    ADL OT LTG, Time to Achieve   by discharge  -AC (r) MK (t) AC (c)    ADL OT LTG, [place] Level   mod assist  -AC (r) MK (t) AC (c)    ADL OT LTG, Additional Goal   UB/LB dressing in sitting with compensatory strategies as needed for vision impairment  -AC (r) MK (t) AC (c)    ADL OT LTG, Date Goal Reviewed 02/02/18  -TR      ADL OT LTG, Outcome goal ongoing  -TR      ADL OT LTG, Reason Goal Not Met progress slower than expected  -TR        User Key  (r) = Recorded By, (t) = Taken By, (c) = Cosigned By    Initials Name Provider Type    PERRY Cabrera, OTR/L Occupational Therapist    MARYJANE Meehan, OTR/L Occupational Therapist    MAKENNA Atkins, OT Student OT Student                Outcome Measures       02/02/18 1300 02/01/18 0933 02/01/18 0904    How much help from another person do you currently need...    Turning from your back to your side while in flat bed without using bedrails?   2  -LG    Moving from lying on back to sitting on the side of a flat bed without bedrails?   2  -LG    Moving to and from a bed to a chair (including a wheelchair)?   2  -LG    Standing up from  a chair using your arms (e.g., wheelchair, bedside chair)?   2  -LG    Climbing 3-5 steps with a railing?   1  -LG    To walk in hospital room?   1  -LG    AM-PAC 6 Clicks Score   10  -LG    How much help from another is currently needed...    Putting on and taking off regular lower body clothing? 2  -TR 2  -CJ     Bathing (including washing, rinsing, and drying) 2  -TR 2  -CJ     Toileting (which includes using toilet bed pan or urinal) 2  -TR 2  -CJ     Putting on and taking off regular upper body clothing 2  -TR 3  -CJ     Taking care of personal grooming (such as brushing teeth) 3  -TR 3  -CJ     Eating meals 4  -TR 4  -CJ     Score 15  -TR 16  -CJ     Functional Assessment    Outcome Measure Options AM-PAC 6 Clicks Daily Activity (OT)  -TR AM-PAC 6 Clicks Daily Activity (OT)  -CJ AM-PAC 6 Clicks Basic Mobility (PT)  -LG      01/31/18 1629 01/31/18 1300       How much help from another person do you currently need...    Turning from your back to your side while in flat bed without using bedrails? 2  -LG      Moving from lying on back to sitting on the side of a flat bed without bedrails? 2  -LG      Moving to and from a bed to a chair (including a wheelchair)? 2  -LG      Standing up from a chair using your arms (e.g., wheelchair, bedside chair)? 2  -LG      Climbing 3-5 steps with a railing? 1  -LG      To walk in hospital room? 1  -LG      AM-PAC 6 Clicks Score 10  -LG      How much help from another is currently needed...    Putting on and taking off regular lower body clothing?  2  -CJ     Bathing (including washing, rinsing, and drying)  2  -CJ     Toileting (which includes using toilet bed pan or urinal)  2  -CJ     Putting on and taking off regular upper body clothing  3  -CJ     Taking care of personal grooming (such as brushing teeth)  3  -CJ     Eating meals  4  -CJ     Score  16  -CJ     Functional Assessment    Outcome Measure Options AM-PAC 6 Clicks Basic Mobility (PT)  -LG AM-PAC 6 Clicks Daily  Activity (OT)  -       User Key  (r) = Recorded By, (t) = Taken By, (c) = Cosigned By    Initials Name Provider Type    LIZZETTE Hui PTA Physical Therapy Assistant    CJ Michi Gong DAMON/L Occupational Therapy Assistant    TR Luz Maria Meehan OTR/L Occupational Therapist          Time Calculation:   OT Start Time: 1101  OT Stop Time: 1204  OT Time Calculation (min): 63 min    Therapy Charges for Today     Code Description Service Date Service Provider Modifiers Qty    06934337654 HC OT SELFCARE CURRENT 2/2/2018 Luz Maria Meehan OTR/L GO, CK 1    96056094384 HC OT SELFCARE PROJECTED 2/2/2018 NELLY Fernandez/L GO, CJ 1    78624897340 HC OT SELF CARE/MGMT/TRAIN EA 15 MIN 2/2/2018 NELLY Fernandez/L GO, KX 4          OT G-codes  OT Professional Judgement Used?: Yes  OT Functional Scales Options: AM-PAC 6 Clicks Daily Activity (OT)  Score: 15  Functional Limitation: Self care  Self Care Current Status (): At least 40 percent but less than 60 percent impaired, limited or restricted  Self Care Goal Status (): At least 20 percent but less than 40 percent impaired, limited or restricted    NELLY Castro/SHAILESH  2/2/2018

## 2018-02-02 NOTE — PLAN OF CARE
Problem: Patient Care Overview (Adult)  Goal: Plan of Care Review   02/02/18 7895   Coping/Psychosocial Response Interventions   Plan Of Care Reviewed With patient   Patient Care Overview   Progress improving   Outcome Evaluation   Outcome Summary/Follow up Plan patient currently on 6L high flow nasal cannula-titrate as able, up to chair with MAX assist, [facility] [place] [facility] [place], prn norco given for leg and hip pain       Problem: Fall Risk (Adult)  Goal: Identify Related Risk Factors and Signs and Symptoms  Outcome: Ongoing (interventions implemented as appropriate)    Goal: Absence of Falls  Outcome: Ongoing (interventions implemented as appropriate)      Problem: Pneumonia (Adult)  Goal: Signs and Symptoms of Listed Potential Problems Will be Absent or Manageable (Pneumonia)  Outcome: Ongoing (interventions implemented as appropriate)      Problem: Infection, Risk/Actual (Adult)  Goal: Identify Related Risk Factors and Signs and Symptoms  Outcome: Ongoing (interventions implemented as appropriate)    Goal: Infection Prevention/Resolution  Outcome: Ongoing (interventions implemented as appropriate)

## 2018-02-02 NOTE — PLAN OF CARE
Problem: Patient Care Overview (Adult)  Goal: Plan of Care Review  Outcome: Ongoing (interventions implemented as appropriate)   02/02/18 1401   Coping/Psychosocial Response Interventions   Plan Of Care Reviewed With patient   Patient Care Overview   Progress progress toward functional goals as expected   Outcome Evaluation   Outcome Summary/Follow up Plan pt trans to EOB MOD-MAX ASSISIT, sat EOB performed BLE X 10 reps, sit-stand from elevated bed mod of 2, pt pivot bed to chair mod-max of 2 with rwx. pt in chair with lift pad under her for the trans back to bed.

## 2018-02-02 NOTE — THERAPY TREATMENT NOTE
Acute Care - Physical Therapy Treatment Note  [facility]     Patient Name: Justina Bingham  : 1925  MRN: [id number]  Today's Date: 2018  Onset of Illness/Injury or Date of Surgery Date: 18  Date of Referral to PT: 18  Referring Physician: Dr. Pfeiffer    Admit Date: 2018    Visit Dx:    ICD-10-CM ICD-9-CM   1. Pneumonia of both lungs due to infectious organism, unspecified part of lung J18.9 483.8   2. Hypoxia R09.02 799.02   3. Elevated troponin R74.8 790.6   4. Sepsis, due to unspecified organism A41.9 038.9     995.91   5. Impaired functional mobility, balance, gait, and endurance Z74.09 V49.89   6. 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   • Closed displaced intertrochanteric fracture of left femur   • Pacemaker   • Pneumonia of both lungs due to infectious organism               Adult Rehabilitation Note       18 1326 18 0933 18 0819    Rehab Assessment/Intervention    Discipline physical therapy assistant  - occupational therapy assistant  -CJ physical therapy assistant  -LG    Document Type therapy note (daily note)  - therapy note (daily note)  - therapy note (daily note)  -LG    Subjective Information agree to therapy;complains of;weakness  - weakness;complains of;agree to therapy;fatigue  - agree to therapy;no complaints  -LG    Patient Effort, Rehab Treatment  good  - good  -LG    Precautions/Limitations fall precautions;oxygen therapy device and L/min  - fall precautions;oxygen therapy device and L/min  - fall precautions;oxygen therapy device and L/min  -    Precautions/Limitations, Hearing  hearing aid, bilaterally;hearing impairment, bilaterally  - hearing aid, bilaterally  -    Specific Treatment Considerations   Extra time spent setting bedside chair, incontenence and lfit  pad.   -LG    Recorded by [] Nelia Cheng PTA [] MARISOL Langston/SHAILESH [LG] Anup Hui PTA    Vital Signs    Pre SpO2 (%) 96  -AH  93  -LG    O2 Delivery Pre Treatment supplemental O2   6L hi sadaf  -AH supplemental O2   10L/o2  -CJ supplemental O2   bipap  -LG    Intra SpO2 (%)   89  -LG    O2 Delivery Intra Treatment  supplemental O2   10L/o2  -CJ supplemental O2   9L  -LG    Post SpO2 (%) 97  -AH  85  -LG    O2 Delivery Post Treatment supplemental O2   6L hiflo  -AH supplemental O2   10L/o2  -CJ supplemental O2   15L, Teri CARTER turned up O2 with pt up eating breakfast.   -LG    Pre Patient Position  Sitting  - Supine  -LG    Intra Patient Position  Sitting  - Sitting  -LG    Post Patient Position  Sitting  - Sitting  -LG    Recorded by [] Nelia Cheng PTA [] AMRISOL Langston/L [LG] Anup Hui PTA    Pain Assessment    Pain Assessment Singh-Doyle FACES  - No/denies pain  - No/denies pain  -    Singh-Doyle FACES Pain Rating 2  -      Pain Type Chronic pain  -      Pain Location Generalized  -      Pain Descriptors Aching  -      Pain Frequency Constant/continuous  -      Pain Intervention(s) Repositioned  -      Response to Interventions tolerated  -      Recorded by [] Nelia Cheng PTA [] MARISOL Langston/L [LG] Anup Hui PTA    Cognitive Assessment/Intervention    Personal Safety Interventions fall prevention program maintained;gait belt;nonskid shoes/slippers when out of bed  -      Recorded by [] Nelia Cheng PTA      Bed Mobility, Assessment/Treatment    Bed Mobility, Roll Left, [place]   verbal cues required;moderate assist (50% patient effort)  -    Bed Mob, Supine to Sit, [place] moderate assist (50% patient effort);maximum assist (25% patient effort);verbal cues required  -  verbal cues required;minimum assist (75% patient effort);moderate assist (50% patient effort);2 person assist required  -    Bed Mob, Sit to  Supine, [place] --   chair  -  not tested;other (see comments)   up in chair  -LG    Bed Mobility, Safety Issues   decreased use of arms for pushing/pulling;decreased use of legs for bridging/pushing  -LG    Bed Mobility, Impairments   strength decreased;impaired balance  -LG    Bed Mobility, Comment  up in chair!  - Pt iniitated legs to eob but needed assist to get legs off bed.   -LG    Recorded by [] Nelia Cheng PTA [CJ] JORGE Langston [LG] Anup Hui PTA    Transfer Assessment/Treatment    Transfers, Bed-Chair [place] moderate assist (50% patient effort);maximum assist (25% patient effort);2 person assist required;verbal cues required  -  moderate assist (50% patient effort);maximum assist (25% patient effort);2 person assist required  -LG    Transfers, Sit-Stand [place] moderate assist (50% patient effort);2 person assist required;verbal cues required  -  moderate assist (50% patient effort);maximum assist (25% patient effort);2 person assist required   stood x 2 reps  -LG    Transfers, Stand-Sit [place] moderate assist (50% patient effort);2 person assist required;verbal cues required  -      Transfers, Sit-Stand-Sit, Assist Device rolling walker  -      Transfer, Safety Issues   balance decreased during turns;step length decreased;weight-shifting ability decreased  -LG    Transfer, Impairments strength decreased;impaired balance;coordination impaired  -  strength decreased;impaired balance  -LG    Transfer, Comment pt stood and scooted feet over to chair, pt did not pick feet up  -  Pt took a few shuffling steps from bed to chair  -LG    Recorded by [] Nelia Cheng PTA  [LG] Anup Hui PTA    Therapy Exercises    Bilateral Lower Extremities AROM:;10 reps;sitting  -  AAROM:;10 reps;sitting  -LG    Bilateral Upper Extremity  AROM:;20 reps;sitting;elbow flexion/extension;shoulder extension/flexion   3lb. bar, 2lb. dumb bells! No shd. flex exs!   -CJ     Recorded by [] Nelia Cheng PTA [CJ] MARISOL Langston/SHAILESH [LG] Anup Hui PTA    Positioning and Restraints    Pre-Treatment Position in bed  - sitting in chair/recliner  -CJ in bed  -LG    Post Treatment Position chair  -AH chair  -CJ chair  -LG    In Chair reclined;call light within reach;encouraged to call for assist;with family/caregiver;notified nsg;on mechanical lift sling  - sitting;call light within reach;encouraged to call for assist  - sitting;call light within reach;encouraged to call for assist;on mechanical lift sling;with nsg   Notified Teri CARTER pt on lift sling for nsg to transfer back  -LG    Recorded by [] Nelia Cheng PTA [CJ] MARISOL Langston/SHAILESH [LG] Anup Hui PTA      01/31/18 1534 01/31/18 1300       Rehab Assessment/Intervention    Discipline physical therapy assistant  - occupational therapy assistant  -     Document Type therapy note (daily note)  - therapy note (daily note)  -     Subjective Information agree to therapy;complains of;weakness;fatigue  - agree to therapy;complains of;weakness;fatigue  -     Patient Effort, Rehab Treatment good  -LG good  -CJ     Precautions/Limitations fall precautions;oxygen therapy device and L/min  -LG fall precautions;oxygen therapy device and L/min  -CJ     Precautions/Limitations, Hearing hearing aid, bilaterally  - hearing aid, bilaterally;hearing impairment, bilaterally  -CJ     Recorded by [LG] Anup Hui PTA [CJ] MARISOL Langston/L     Vital Signs    O2 Delivery Pre Treatment  supplemental O2   10L/o2 hi sadaf  -CJ     O2 Delivery Intra Treatment  supplemental O2   10L/o2 hi sadaf!  -CJ     O2 Delivery Post Treatment  supplemental O2   10L/o2 hi sadaf!  -CJ     Pre Patient Position  Sitting  -CJ     Intra Patient Position  Sitting  -CJ     Post Patient Position  Sitting  -CJ     Recorded by  [CJ] MARISOL Langston/L     Pain Assessment    Pain Assessment No/denies pain  -LG No/denies  pain  -CJ     Recorded by [LG] Anup Hui PTA [CJ] MARISOL Langston/L     Bed Mobility, Assessment/Treatment    Bed Mobility, Assistive Device bed rails;draw sheet  -      Bed Mobility, Roll Left, [place] verbal cues required;moderate assist (50% patient effort)  -      Bed Mobility, Roll Right, [place] verbal cues required;moderate assist (50% patient effort)  -LG      Bed Mobility, Scoot/Bridge, [place] maximum assist (25% patient effort)  -      Bed Mobility, Comment worked on rolling in bed, scooting and repositioning  -LG fowlers in bed!  -CJ     Recorded by [LG] Anup Hui PTA [CJ] MARISOL Langston/L     Therapy Exercises    Bilateral Lower Extremities AAROM:;20 reps;supine;sitting;ankle pumps/circles;heel slides;hip abduction/adduction;quad sets  -      Bilateral Upper Extremity  AROM:;20 reps;sitting;elbow flexion/extension;shoulder extension/flexion    3lb. bar, 2lb. dumb bells! No shd. flex exs!  -CJ     Recorded by [LG] Anup Hui PTA [CJ] MARISOL Langston/SHAILESH     Positioning and Restraints    Pre-Treatment Position in bed  -LG in bed  -CJ     Post Treatment Position bed  - bed  -CJ     In Bed fowlers;call light within reach;encouraged to call for assist;side rails up x2  -LG fowlers;call light within reach;encouraged to call for assist;with family/caregiver;side rails up x2  -CJ     Recorded by [LG] Anup Hui PTA [CJ] MARISOL Langston/L       User Key  (r) = Recorded By, (t) = Taken By, (c) = Cosigned By    Initials Name Effective Dates     Anup Hui PTA 08/02/16 -      Nelia Cheng, JOE 08/02/16 -     CJ JORGE Langston 08/02/16 -                 IP PT Goals       01/24/18 1304          Bed Mobility PT LTG    Bed Mobility PT LTG, Date Established 01/24/18  -PB (r) JM (t) PB (c)      Bed Mobility PT LTG, Time to Achieve by discharge  -PB (r) JM (t) PB (c)      Bed Mobility PT LTG, Activity Type all bed mobility  -PB (r)  JM (t) PB (c)      Bed Mobility PT LTG, [place] Level contact guard assist;conditional independence  -PB (r) JM (t) PB (c)      Bed Mobility PT Goal  LTG, Assist Device bed rails  -PB (r) JM (t) PB (c)      Transfer Training PT LTG    Transfer Training PT LTG, Date Established 01/24/18  -PB (r) JM (t) PB (c)      Transfer Training PT LTG, Time to Achieve by discharge  -PB (r) JM (t) PB (c)      Transfer Training PT LTG, Activity Type bed to chair /chair to bed;sit to stand/stand to sit  -PB (r) JM (t) PB (c)      Transfer Training PT LTG, [place] Level minimum assist (75% patient effort)  -PB (r) JM (t) PB (c)      Transfer Training PT LTG, Assist Device walker, rolling  -PB (r) JM (t) PB (c)      Strength Goal PT LTG    Strength Goal PT LTG, Date Established 01/24/18  -PB (r) JM (t) PB (c)      Strength Goal PT LTG, Time to Achieve by discharge  -PB (r) JM (t) PB (c)      Strength Goal PT LTG, Measure to Achieve 10 reps of LAQs, hip flexion, ankle pumps, ab/adduction sitting EOB against gravity  -PB (r) JM (t) PB (c)        User Key  (r) = Recorded By, (t) = Taken By, (c) = Cosigned By    Initials Name Provider Type    SEVERO Holland, PT DPT Physical Therapist    RITA Vizcarra, PT Student PT Student          Physical Therapy Education     Title: PT OT SLP Therapies (Active)     Topic: Physical Therapy (Active)     Point: Mobility training (Active)    Learning Progress Summary    Learner Readiness Method Response Comment Documented by Status   Patient Acceptance E VU bed mobility  02/02/18 1401 Done    Acceptance E,D NR Educated on benefits of activity. Instructed in transfers, gait, and therapeutic exercises. Also eduated NSG to use mechanical lift to get pt from bedside chair back to bed.  02/01/18 0819 Active    Acceptance E NR Educated on benefits of activity and bed mobility  01/31/18 1534 Active    Acceptance E,D NR Plan of care, exercise, benefit of exercise  01/30/18 0942 Active     Acceptance E,D NR Educated on benefits of activity, instructed in bed moblity, sit to stand transfers, LE strengthening exercises.  01/29/18 1043 Active    Eager E NR  EC 01/27/18 1549 Active    Eager E VU poc, t/f's AE 01/26/18 1221 Done    Acceptance E VU Educated on benefits and role of PT and increased activity  01/24/18 1003 Done   Family Acceptance E,D NR Educated on benefits of activity, instructed in bed moblity, sit to stand transfers, LE strengthening exercises.  01/29/18 1043 Active   Caregiver Acceptance E VU Educated on benefits and role of PT and increased activity  01/24/18 1003 Done                      User Key     Initials Effective Dates Name Provider Type Discipline     08/02/16 -  John Mladonado, PTA Physical Therapy Assistant PT     06/22/15 -  Rachel Griffith, PTA Physical Therapy Assistant PT     08/02/16 -  Anup Hui, PTA Physical Therapy Assistant PT     08/02/16 -  Nelia Cheng, PTA Physical Therapy Assistant PT     06/22/15 -  Esperanza Hutton, PTA Physical Therapy Assistant PT     01/10/18 -  Nadeem Vizcarra, PT Student PT Student PT                    PT Recommendation and Plan  Anticipated Equipment Needs At Discharge: front wheeled walker  Anticipated Discharge Disposition: skilled nursing facility, extended care facility  Planned Therapy Interventions: balance training, bed mobility training, patient/family education, strengthening, transfer training  PT Frequency: daily, 2 times/day, per priority policy  Plan of Care Review  Plan Of Care Reviewed With: patient  Progress: progress toward functional goals as expected  Outcome Summary/Follow up Plan: pt trans to EOB MOD-MAX ASSISIT, sat EOB performed BLE X 10 reps, sit-stand from elevated bed mod of 2, pt pivot bed to chair mod-max of 2 with rwx. pt in chair with lift pad under her for the trans back to bed.           Outcome Measures       02/02/18 1400 02/02/18 1300 02/01/18 0933    How much help from  another person do you currently need...    Turning from your back to your side while in flat bed without using bedrails? 2  -AH      Moving from lying on back to sitting on the side of a flat bed without bedrails? 2  -AH      Moving to and from a bed to a chair (including a wheelchair)? 2  -AH      Standing up from a chair using your arms (e.g., wheelchair, bedside chair)? 2  -AH      Climbing 3-5 steps with a railing? 1  -AH      To walk in hospital room? 1  -AH      AM-PAC 6 Clicks Score 10  -AH      How much help from another is currently needed...    Putting on and taking off regular lower body clothing?  2  -TR 2  -CJ    Bathing (including washing, rinsing, and drying)  2  -TR 2  -CJ    Toileting (which includes using toilet bed pan or urinal)  2  -TR 2  -CJ    Putting on and taking off regular upper body clothing  2  -TR 3  -CJ    Taking care of personal grooming (such as brushing teeth)  3  -TR 3  -CJ    Eating meals  4  -TR 4  -CJ    Score  15  -TR 16  -CJ    Functional Assessment    Outcome Measure Options AM-PAC 6 Clicks Basic Mobility (PT)  - AM-PAC 6 Clicks Daily Activity (OT)  -TR AM-PAC 6 Clicks Daily Activity (OT)  -      02/01/18 0904 01/31/18 1629 01/31/18 1300    How much help from another person do you currently need...    Turning from your back to your side while in flat bed without using bedrails? 2  -LG 2  -LG     Moving from lying on back to sitting on the side of a flat bed without bedrails? 2  -LG 2  -LG     Moving to and from a bed to a chair (including a wheelchair)? 2  -LG 2  -LG     Standing up from a chair using your arms (e.g., wheelchair, bedside chair)? 2  -LG 2  -LG     Climbing 3-5 steps with a railing? 1  -LG 1  -LG     To walk in hospital room? 1  -LG 1  -LG     AM-PAC 6 Clicks Score 10  -LG 10  -LG     How much help from another is currently needed...    Putting on and taking off regular lower body clothing?   2  -CJ    Bathing (including washing, rinsing, and drying)   2   -CJ    Toileting (which includes using toilet bed pan or urinal)   2  -CJ    Putting on and taking off regular upper body clothing   3  -CJ    Taking care of personal grooming (such as brushing teeth)   3  -CJ    Eating meals   4  -CJ    Score   16  -CJ    Functional Assessment    Outcome Measure Options AM-PAC 6 Clicks Basic Mobility (PT)  - AM-PAC 6 Clicks Basic Mobility (PT)  - AM-PAC 6 Clicks Daily Activity (OT)  -      User Key  (r) = Recorded By, (t) = Taken By, (c) = Cosigned By    Initials Name Provider Type     Anup Hui PTA Physical Therapy Assistant     Nelia Cheng PTA Physical Therapy Assistant     Michi Gong, DAMON/L Occupational Therapy Assistant    MARYJANE Meehan, OTR/L Occupational Therapist           Time Calculation:         PT Charges       02/02/18 1403          Time Calculation    Start Time 1326  -      Stop Time 1341  -      Time Calculation (min) 15 min  -      PT Received On 02/02/18  -      PT Goal Re-Cert Due Date 02/03/18  -      Time Calculation- PT    Total Timed Code Minutes- PT 15 minute(s)  -        User Key  (r) = Recorded By, (t) = Taken By, (c) = Cosigned By    Initials Name Provider Type     Nelia Cheng PTA Physical Therapy Assistant          Therapy Charges for Today     Code Description Service Date Service Provider Modifiers Qty    07298575990 HC PT THER SUPP EA 15 MIN 2/2/2018 Nelia Cheng PTA GP 1          PT G-Codes  Outcome Measure Options: AM-PAC 6 Clicks Basic Mobility (PT)  Score: 11  Functional Limitation: Changing and maintaining body position  Changing and Maintaining Body Position Current Status (): At least 60 percent but less than 80 percent impaired, limited or restricted  Changing and Maintaining Body Position Goal Status (): At least 40 percent but less than 60 percent impaired, limited or restricted    Nelia Cheng PTA  2/2/2018

## 2018-02-02 NOTE — PROGRESS NOTES
Continued Stay Note   [facility]     Patient Name: Justina Bingham  MRN: [id number]  Today's Date: 2/2/2018    Admit Date: 1/23/2018          Discharge Plan       02/02/18 1529    Case Management/Social Work Plan    Plan [facility]    Patient/Family In Agreement With Plan yes    Additional Comments Insurance has declined pt for LTAC. Her oxygen need is much better. She will return to [facility] at d/c which will be Monday at the earliest, per Binta. Updated Hannah Del Cid with [facility] and left a message for pt's daughter Brittany.              Discharge Codes     None            TAJ Deng

## 2018-02-02 NOTE — PROGRESS NOTES
[facility]  REGAN Raines M.D.  WU Sawyer      Internal Medicine Progress Note    2/2/2018   10:00 AM    Name:  Justina Bingham  MRN:    [id number]     Acct:     [id number]   Room:  [address] [place] [address] Day: 10     Admit Date: 1/23/2018  5:05 AM  PCP: Ruddy Pfeiffer MD    Subjective:     C/C:   Chief Complaint   Patient presents with   • Shortness of Breath   • Cough       Interval History: Status:  Improved. Resting in bed. Family at bedside. Sats stable with hi flow oxygen at 6 lpm. Tolerating oxygen taper without difficulty. Slow progress with therapy. Remains generally weak.     Review of Systems   Constitution: Positive for weakness and malaise/fatigue. Negative for chills, decreased appetite, weight gain and weight loss.   HENT: Negative for congestion, ear discharge, hoarse voice and tinnitus.    Eyes: Negative for blurred vision, discharge, visual disturbance and visual halos.   Cardiovascular: Negative for chest pain, claudication, dyspnea on exertion, irregular heartbeat, leg swelling, orthopnea and paroxysmal nocturnal dyspnea.   Respiratory: Positive for cough and shortness of breath. Negative for sputum production and wheezing.    Endocrine: Negative for cold intolerance, heat intolerance and polyuria.   Hematologic/Lymphatic: Negative for adenopathy. Does not bruise/bleed easily.   Skin: Negative for dry skin, itching and suspicious lesions.   Musculoskeletal: Negative for arthritis, back pain, falls, joint pain, muscle weakness and myalgias.   Gastrointestinal: Positive for constipation. Negative for abdominal pain, diarrhea, dysphagia and hematemesis.   Genitourinary: Negative for bladder incontinence, dysuria and frequency.   Neurological: Negative for aphonia, disturbances in coordination and dizziness.   Psychiatric/Behavioral: Negative for altered mental status, depression, memory loss and substance abuse. The patient does not have insomnia and is not  nervous/anxious.      Medications:     Allergies:   Allergies   Allergen Reactions   • Ceclor [Cefaclor]    • Eggs Or Egg-Derived Products Itching   • Penicillins    • Sulfa Antibiotics        Current Meds:   Current Facility-Administered Medications:   •  acetaminophen (TYLENOL) tablet 650 mg, 650 mg, Oral, Q4H PRN, Clinton Pfeiffer MD, 650 mg at 01/26/18 2138  •  ALPRAZolam (XANAX) tablet 0.125 mg, 0.125 mg, Oral, Daily, Clinton Pfeiffer MD, 0.125 mg at 02/02/18 0815  •  amLODIPine (NORVASC) tablet 10 mg, 10 mg, Oral, Daily, Binta Lundy APRN, 10 mg at 02/02/18 0814  •  aspirin EC tablet 162 mg, 162 mg, Oral, Daily, Clinton Pfeiffer MD, 162 mg at 02/02/18 0814  •  atorvastatin (LIPITOR) tablet 10 mg, 10 mg, Oral, Nightly, Clinton Pfeiffer MD, 10 mg at 02/01/18 2115  •  cholecalciferol (VITAMIN D3) tablet 1,000 Units, 1,000 Units, Oral, Daily, Clinton Pfeiffer MD, 1,000 Units at 02/02/18 0814  •  CloNIDine (CATAPRES) tablet 0.1 mg, 0.1 mg, Oral, Q4H PRN, Clinton Pfeiffer MD, 0.1 mg at 02/02/18 0814  •  docusate sodium (COLACE) capsule 100 mg, 100 mg, Oral, BID, WU Burt, 100 mg at 02/02/18 0814  •  doxycycline (VIBRAMYCIN) 100 mg/100 mL 0.9% NS MBP, 100 mg, Intravenous, Q12H, Angeles Davidson, APRSEFERINO, Last Rate: 0 mL/hr at 02/02/18 0051, 100 mg at 02/02/18 0923  •  fluticasone (FLONASE) 50 MCG/ACT nasal spray 1 spray, 1 spray, Nasal, BID, Clinton Pfeiffer MD, 1 spray at 02/02/18 0815  •  furosemide (LASIX) tablet 40 mg, 40 mg, Oral, Daily, Clinton Pfeiffer MD, 40 mg at 02/02/18 0814  •  gabapentin (NEURONTIN) capsule 300 mg, 300 mg, Oral, Daily, [place] Pfeiffer MD, 300 mg at 02/02/18 0815  •  gabapentin (NEURONTIN) capsule 600 mg, 600 mg, Oral, Nightly, Clinton Pfeiffer MD, 600 mg at 02/01/18 2115  •  guaiFENesin (MUCINEX) 12 hr tablet 1,200 mg, 1,200 mg, Oral, Q12H, WU Germain, 1,200 mg at 02/02/18 0814  •  hydrALAZINE  (APRESOLINE) tablet 25 mg, 25 mg, Oral, Q8H, Binta Lundy APRN, 25 mg at 02/02/18 0602  •  HYDROcodone-acetaminophen (NORCO) 5-325 MG per tablet 1 tablet, 1 tablet, Oral, Q6H PRN, Clinton Pfeiffer MD, 1 tablet at 02/02/18 0923  •  ipratropium-albuterol (DUO-NEB) nebulizer solution 3 mL, 3 mL, Nebulization, 4x Daily - RT, Clinton Pfeiffer MD, 3 mL at 02/02/18 0735  •  lisinopril (PRINIVIL,ZESTRIL) tablet 40 mg, 40 mg, Oral, Q24H, Binta Lundy APRN, 40 mg at 02/02/18 0814  •  magnesium hydroxide (MILK OF MAGNESIA) suspension 2400 mg/10mL 10 mL, 10 mL, Oral, Nightly PRN, Clinton Pfeiffer MD, 10 mL at 01/27/18 2058  •  magnesium hydroxide (MILK OF MAGNESIA) suspension 2400 mg/10mL 10 mL, 10 mL, Oral, Daily PRN, Clinton Pfeiffer MD, 10 mL at 01/27/18 1401  •  meropenem (MERREM) 1 g/20mL IV PUSH syringe, 1 g, Intravenous, Q8H, Lonny Xavier MD, 1 g at 02/02/18 0602  •  metoprolol tartrate (LOPRESSOR) tablet 50 mg, 50 mg, Oral, Q12H, Binta Lundy APRN, 50 mg at 02/02/18 0814  •  ondansetron (ZOFRAN) tablet 4 mg, 4 mg, Oral, Q6H PRN **OR** ondansetron ODT (ZOFRAN-ODT) disintegrating tablet 4 mg, 4 mg, Oral, Q6H PRN **OR** ondansetron (ZOFRAN) injection 4 mg, 4 mg, Intravenous, Q6H PRN, Clinton Pfeiffer MD, 4 mg at 01/23/18 1851  •  PARoxetine (PAXIL) tablet 20 mg, 20 mg, Oral, QAM, Clinton Pfeiffer MD, 20 mg at 02/02/18 0602  •  potassium chloride (MICRO-K) CR capsule 10 mEq, 10 mEq, Oral, Daily, Clinton Pfeiffer MD, 10 mEq at 02/02/18 0814  •  sodium chloride (OCEAN) nasal spray 1 spray, 1 spray, Nasal, Daily, Clinton Pfeiffer MD, 1 spray at 02/02/18 0815  •  sodium chloride 0.9 % flush 1-10 mL, 1-10 mL, Intravenous, PRN, Clinton Pfeiffer MD  •  sodium chloride 0.9 % flush 10 mL, 10 mL, Intravenous, PRN, Shawn Blount MD  •  tamsulosin (FLOMAX) 24 hr capsule 0.4 mg, 0.4 mg, Oral, Daily, Clinton Pfeiffer MD, 0.4 mg at 02/02/18 0814  •   "traMADol (ULTRAM) tablet 50 mg, 50 mg, Oral, Q4H, Clinton Pfeiffer MD, 50 mg at 18 0815  •  vitamin E capsule 400 Units, 400 Units, Oral, Daily, Clinton Pfeiffer MD, 400 Units at 18 0814  •  Karen's amazing butt cream, , Topical, PRN, Binta Lundy, APRN  •  warfarin (COUMADIN) tablet 5 mg, 5 mg, Oral, Daily, Binta Lundy APRN, 5 mg at 18 1820  •  zolpidem (AMBIEN) tablet 5 mg, 5 mg, Oral, Nightly PRN, Clinton Pfeiffer MD, 5 mg at 18 2115    Data:     Code Status:  Full Code    Family History   Problem Relation Age of Onset   • Heart attack Mother    • No Known Problems Father    • Colon cancer Neg Hx    • Colon polyps Neg Hx        Social History     Social History   • Marital status:      Spouse name: N/A   • Number of children: N/A   • Years of education: N/A     Occupational History   • Not on file.     Social History Main Topics   • Smoking status: Never Smoker   • Smokeless tobacco: Never Used   • Alcohol use No   • Drug use: Yes     Special: Hydrocodone   • Sexual activity: Defer     Other Topics Concern   • Not on file     Social History Narrative       Vitals:  BP (!) 197/75 (BP Location: Right arm, Patient Position: Lying) Comment: Teri CARTER Notified  Pulse 75  Temp 98.9 °F (37.2 °C) (Temporal Artery )   Resp 18  Ht 175.3 cm (69\")  Wt 97.8 kg (215 lb 8 oz)  SpO2 92%  BMI 31.82 kg/m2  Temp (24hrs), Av.4 °F (36.9 °C), Min:97.6 °F (36.4 °C), Max:99.1 °F (37.3 °C)      I/O (24Hr):    Intake/Output Summary (Last 24 hours) at 18 1000  Last data filed at 18 0900   Gross per 24 hour   Intake           438.95 ml   Output                0 ml   Net           438.95 ml       Labs and imaging:      Recent Results (from the past 12 hour(s))   Protime-INR    Collection Time: 18  5:45 AM   Result Value Ref Range    Protime 15.4 (H) 11.9 - 14.6 Seconds    INR 1.18 (H) 0.91 - 1.09   Basic Metabolic Panel    Collection Time: 18  " 5:45 AM   Result Value Ref Range    Glucose 102 (H) 70 - 100 mg/dL    BUN 19 5 - 21 mg/dL    Creatinine 0.67 0.50 - 1.40 mg/dL    Sodium 138 135 - 145 mmol/L    Potassium 4.0 3.5 - 5.3 mmol/L    Chloride 92 (L) 98 - 110 mmol/L    CO2 38.0 (H) 24.0 - 31.0 mmol/L    Calcium 9.5 8.4 - 10.4 mg/dL    eGFR Non African Amer 82 >60 mL/min/1.73    BUN/Creatinine Ratio 28.4 (H) 7.0 - 25.0    Anion Gap 8.0 4.0 - 13.0 mmol/L   CBC Auto Differential    Collection Time: 02/02/18  5:45 AM   Result Value Ref Range    WBC 14.10 (H) 4.80 - 10.80 10*3/mm3    RBC 3.86 (L) 4.20 - 5.40 10*6/mm3    Hemoglobin 10.7 (L) 12.0 - 16.0 g/dL    Hematocrit 33.0 (L) 37.0 - 47.0 %    MCV 85.5 82.0 - 98.0 fL    MCH 27.7 (L) 28.0 - 32.0 pg    MCHC 32.4 (L) 33.0 - 36.0 g/dL    RDW 15.6 (H) 12.0 - 15.0 %    RDW-SD 48.7 40.0 - 54.0 fl    MPV 9.8 6.0 - 12.0 fL    Platelets 360 130 - 400 10*3/mm3   Scan Slide    Collection Time: 02/02/18  5:45 AM   Result Value Ref Range    Scan Slide     Manual Differential    Collection Time: 02/02/18  5:45 AM   Result Value Ref Range    Neutrophil % 75.0 39.0 - 78.0 %    Lymphocyte % 10.0 (L) 15.0 - 45.0 %    Monocyte % 3.0 (L) 4.0 - 12.0 %    Eosinophil % 3.0 0.0 - 4.0 %    Bands %  1.0 0.0 - 10.0 %    Myelocyte % 3.0 (H) 0.0 - 0.0 %    Atypical Lymphocyte % 5.0 0.0 - 5.0 %    Neutrophils Absolute 10.72 (H) 1.87 - 8.40 10*3/mm3    Lymphocytes Absolute 1.41 0.72 - 4.86 10*3/mm3    Monocytes Absolute 0.42 0.19 - 1.30 10*3/mm3    Eosinophils Absolute 0.42 0.00 - 0.70 10*3/mm3    Poikilocytes Slight/1+ None Seen    WBC Morphology Normal Normal    Platelet Estimate Adequate Normal     Physical Examination:        Physical Exam   Constitutional: She is oriented to person, place, and time. She appears well-developed and well-nourished.   HENT:   Head: Normocephalic and atraumatic.   Nose: Nose normal.   Mouth/Throat: Oropharynx is clear and moist.   [place]   Eyes: Conjunctivae and EOM are normal. Pupils are equal, round, and  reactive to light.   Neck: Normal range of motion. Neck supple.   Cardiovascular: Normal rate, regular rhythm, normal heart sounds and intact distal pulses.    Pulmonary/Chest: Effort normal. She has rhonchi. She has no rales.   Abdominal: Soft. Bowel sounds are normal.   Musculoskeletal:   Generalized weakness   Neurological: She is alert and oriented to person, place, and time. She has normal reflexes.   Skin: Skin is warm and dry.   Psychiatric: She has a normal mood and affect. Her behavior is normal.   Nursing note and vitals reviewed.    Assessment:        Active Problems:    Pneumonia of both lungs due to infectious organism    Past Medical History:   Diagnosis Date   • Allergic rhinitis    • Anxiety    • Anxiety disorder    • Arthritis    • Asthma    • Asthma    • Colon polyp    • Degenerative arthritis    • Dementia    • Dementia    • Depression    • Depression    • Diabetes mellitus    • Environmental allergies    • Hx of colonic polyps    • Hyperlipidemia    • Hypertension    • Neuropathy    • Osteoporosis    • Osteoporosis    • Pacemaker    • Peripheral neuropathy         Plan:        1. Bilateral HCAP  2. HTN  3. Anxiety  4. Chronic anticoagulation  5. Insomnia  6. HLD  7. Influenza A  8. Hypokalemia  9. Coumadin toxicity    Continue current treatment. Monitor counts. Increase activity. Continue antibiotics. Continue aggressive pulmonary toilet. Oxygen weaning as tolerated. Titrate coumadin. Increase hydralazine.         Electronically signed by WU Burt on 2/2/2018 at 10:00 AM

## 2018-02-03 LAB
ANION GAP SERPL CALCULATED.3IONS-SCNC: 8 MMOL/L (ref 4–13)
BACTERIA SPEC RESP CULT: ABNORMAL
BASOPHILS # BLD AUTO: 0.09 10*3/MM3 (ref 0–0.2)
BASOPHILS NFR BLD AUTO: 0.6 % (ref 0–2)
BUN BLD-MCNC: 20 MG/DL (ref 5–21)
BUN/CREAT SERPL: 29 (ref 7–25)
CALCIUM SPEC-SCNC: 10 MG/DL (ref 8.4–10.4)
CHLORIDE SERPL-SCNC: 95 MMOL/L (ref 98–110)
CO2 SERPL-SCNC: 37 MMOL/L (ref 24–31)
CREAT BLD-MCNC: 0.69 MG/DL (ref 0.5–1.4)
DEPRECATED RDW RBC AUTO: 49.7 FL (ref 40–54)
EOSINOPHIL # BLD AUTO: 0.47 10*3/MM3 (ref 0–0.7)
EOSINOPHIL NFR BLD AUTO: 3.4 % (ref 0–4)
ERYTHROCYTE [DISTWIDTH] IN BLOOD BY AUTOMATED COUNT: 15.4 % (ref 12–15)
GFR SERPL CREATININE-BSD FRML MDRD: 80 ML/MIN/1.73
GLUCOSE BLD-MCNC: 109 MG/DL (ref 70–100)
GRAM STN SPEC: ABNORMAL
HCT VFR BLD AUTO: 35 % (ref 37–47)
HGB BLD-MCNC: 11.1 G/DL (ref 12–16)
IMM GRANULOCYTES # BLD: 0.68 10*3/MM3 (ref 0–0.03)
IMM GRANULOCYTES NFR BLD: 4.9 % (ref 0–5)
INR PPP: 1.18 (ref 0.91–1.09)
LYMPHOCYTES # BLD AUTO: 2.58 10*3/MM3 (ref 0.72–4.86)
LYMPHOCYTES NFR BLD AUTO: 18.4 % (ref 15–45)
MCH RBC QN AUTO: 27.5 PG (ref 28–32)
MCHC RBC AUTO-ENTMCNC: 31.7 G/DL (ref 33–36)
MCV RBC AUTO: 86.6 FL (ref 82–98)
MONOCYTES # BLD AUTO: 1.09 10*3/MM3 (ref 0.19–1.3)
MONOCYTES NFR BLD AUTO: 7.8 % (ref 4–12)
NEUTROPHILS # BLD AUTO: 9.11 10*3/MM3 (ref 1.87–8.4)
NEUTROPHILS NFR BLD AUTO: 64.9 % (ref 39–78)
NRBC BLD MANUAL-RTO: 0 /100 WBC (ref 0–0)
PLATELET # BLD AUTO: 346 10*3/MM3 (ref 130–400)
PMV BLD AUTO: 9.6 FL (ref 6–12)
POTASSIUM BLD-SCNC: 4.2 MMOL/L (ref 3.5–5.3)
PROTHROMBIN TIME: 15.4 SECONDS (ref 11.9–14.6)
RBC # BLD AUTO: 4.04 10*6/MM3 (ref 4.2–5.4)
SODIUM BLD-SCNC: 140 MMOL/L (ref 135–145)
WBC NRBC COR # BLD: 14.02 10*3/MM3 (ref 4.8–10.8)

## 2018-02-03 PROCEDURE — 94799 UNLISTED PULMONARY SVC/PX: CPT

## 2018-02-03 PROCEDURE — 85025 COMPLETE CBC W/AUTO DIFF WBC: CPT | Performed by: INTERNAL MEDICINE

## 2018-02-03 PROCEDURE — 94669 MECHANICAL CHEST WALL OSCILL: CPT

## 2018-02-03 PROCEDURE — 94660 CPAP INITIATION&MGMT: CPT

## 2018-02-03 PROCEDURE — 80048 BASIC METABOLIC PNL TOTAL CA: CPT | Performed by: INTERNAL MEDICINE

## 2018-02-03 PROCEDURE — 36415 COLL VENOUS BLD VENIPUNCTURE: CPT | Performed by: INTERNAL MEDICINE

## 2018-02-03 PROCEDURE — 85610 PROTHROMBIN TIME: CPT | Performed by: INTERNAL MEDICINE

## 2018-02-03 PROCEDURE — 25010000002 MEROPENEM PER 100 MG: Performed by: INTERNAL MEDICINE

## 2018-02-03 RX ADMIN — PAROXETINE 20 MG: 20 TABLET, FILM COATED ORAL at 06:38

## 2018-02-03 RX ADMIN — GABAPENTIN 300 MG: 300 CAPSULE ORAL at 09:51

## 2018-02-03 RX ADMIN — GUAIFENESIN 1200 MG: 600 TABLET, EXTENDED RELEASE ORAL at 20:39

## 2018-02-03 RX ADMIN — MEROPENEM 1 G: 1 INJECTION, POWDER, FOR SOLUTION INTRAVENOUS at 06:38

## 2018-02-03 RX ADMIN — TAMSULOSIN HYDROCHLORIDE 0.4 MG: 0.4 CAPSULE ORAL at 09:51

## 2018-02-03 RX ADMIN — HYDRALAZINE HYDROCHLORIDE 50 MG: 50 TABLET ORAL at 20:40

## 2018-02-03 RX ADMIN — METOPROLOL TARTRATE 50 MG: 50 TABLET ORAL at 09:50

## 2018-02-03 RX ADMIN — FUROSEMIDE 40 MG: 40 TABLET ORAL at 09:51

## 2018-02-03 RX ADMIN — POTASSIUM CHLORIDE 10 MEQ: 750 CAPSULE, EXTENDED RELEASE ORAL at 09:51

## 2018-02-03 RX ADMIN — DOXYCYCLINE 100 MG: 100 INJECTION, POWDER, LYOPHILIZED, FOR SOLUTION INTRAVENOUS at 09:52

## 2018-02-03 RX ADMIN — DOCUSATE SODIUM 100 MG: 100 CAPSULE ORAL at 09:51

## 2018-02-03 RX ADMIN — VITAMIN E CAP 400 UNIT 400 UNITS: 400 CAP at 09:51

## 2018-02-03 RX ADMIN — IPRATROPIUM BROMIDE AND ALBUTEROL SULFATE 3 ML: 2.5; .5 SOLUTION RESPIRATORY (INHALATION) at 15:29

## 2018-02-03 RX ADMIN — ALPRAZOLAM 0.12 MG: 0.25 TABLET ORAL at 09:52

## 2018-02-03 RX ADMIN — HYDRALAZINE HYDROCHLORIDE 50 MG: 50 TABLET ORAL at 06:38

## 2018-02-03 RX ADMIN — TRAMADOL HYDROCHLORIDE 50 MG: 50 TABLET, COATED ORAL at 20:40

## 2018-02-03 RX ADMIN — HYDRALAZINE HYDROCHLORIDE 50 MG: 50 TABLET ORAL at 15:26

## 2018-02-03 RX ADMIN — LISINOPRIL 40 MG: 20 TABLET ORAL at 09:50

## 2018-02-03 RX ADMIN — Medication 1 SPRAY: at 09:51

## 2018-02-03 RX ADMIN — TRAMADOL HYDROCHLORIDE 50 MG: 50 TABLET, COATED ORAL at 15:26

## 2018-02-03 RX ADMIN — METOPROLOL TARTRATE 50 MG: 50 TABLET ORAL at 20:40

## 2018-02-03 RX ADMIN — HYDROCODONE BITARTRATE AND ACETAMINOPHEN 1 TABLET: 5; 325 TABLET ORAL at 17:09

## 2018-02-03 RX ADMIN — DOXYCYCLINE 100 MG: 100 INJECTION, POWDER, LYOPHILIZED, FOR SOLUTION INTRAVENOUS at 20:58

## 2018-02-03 RX ADMIN — MEROPENEM 1 G: 1 INJECTION, POWDER, FOR SOLUTION INTRAVENOUS at 23:11

## 2018-02-03 RX ADMIN — DOCUSATE SODIUM 100 MG: 100 CAPSULE ORAL at 20:40

## 2018-02-03 RX ADMIN — ZOLPIDEM TARTRATE 5 MG: 5 TABLET ORAL at 23:28

## 2018-02-03 RX ADMIN — GABAPENTIN 600 MG: 300 CAPSULE ORAL at 20:40

## 2018-02-03 RX ADMIN — IPRATROPIUM BROMIDE AND ALBUTEROL SULFATE 3 ML: 2.5; .5 SOLUTION RESPIRATORY (INHALATION) at 07:58

## 2018-02-03 RX ADMIN — WARFARIN SODIUM 5 MG: 5 TABLET ORAL at 17:09

## 2018-02-03 RX ADMIN — ATORVASTATIN CALCIUM 10 MG: 10 TABLET, FILM COATED ORAL at 20:40

## 2018-02-03 RX ADMIN — GUAIFENESIN 1200 MG: 600 TABLET, EXTENDED RELEASE ORAL at 09:51

## 2018-02-03 RX ADMIN — AMLODIPINE BESYLATE 10 MG: 10 TABLET ORAL at 09:51

## 2018-02-03 RX ADMIN — CHOLECALCIFEROL (VITAMIN D3) 25 MCG (1,000 UNIT) TABLET 1000 UNITS: TABLET at 09:51

## 2018-02-03 RX ADMIN — Medication 162 MG: at 09:50

## 2018-02-03 RX ADMIN — FLUTICASONE PROPIONATE 1 SPRAY: 50 SPRAY, METERED NASAL at 09:51

## 2018-02-03 RX ADMIN — NYSTATIN: 100000 CREAM TOPICAL at 17:09

## 2018-02-03 RX ADMIN — MEROPENEM 1 G: 1 INJECTION, POWDER, FOR SOLUTION INTRAVENOUS at 15:26

## 2018-02-03 RX ADMIN — HYDROCODONE BITARTRATE AND ACETAMINOPHEN 1 TABLET: 5; 325 TABLET ORAL at 09:51

## 2018-02-03 RX ADMIN — FLUTICASONE PROPIONATE 1 SPRAY: 50 SPRAY, METERED NASAL at 20:39

## 2018-02-03 RX ADMIN — TRAMADOL HYDROCHLORIDE 50 MG: 50 TABLET, COATED ORAL at 23:28

## 2018-02-03 RX ADMIN — TRAMADOL HYDROCHLORIDE 50 MG: 50 TABLET, COATED ORAL at 03:53

## 2018-02-03 RX ADMIN — HYDROCODONE BITARTRATE AND ACETAMINOPHEN 1 TABLET: 5; 325 TABLET ORAL at 00:28

## 2018-02-03 RX ADMIN — IPRATROPIUM BROMIDE AND ALBUTEROL SULFATE 3 ML: 2.5; .5 SOLUTION RESPIRATORY (INHALATION) at 11:36

## 2018-02-03 RX ADMIN — TRAMADOL HYDROCHLORIDE 50 MG: 50 TABLET, COATED ORAL at 09:51

## 2018-02-03 NOTE — PLAN OF CARE
Problem: Patient Care Overview (Adult)  Goal: Plan of Care Review  Outcome: Ongoing (interventions implemented as appropriate)   02/03/18 0418   Coping/Psychosocial Response Interventions   Plan Of Care Reviewed With patient   Patient Care Overview   Progress no change   Outcome Evaluation   Outcome Summary/Follow up Plan O2 at 3L; VSS. Pain meds given with relief. IV ABX continue.       Problem: Fall Risk (Adult)  Goal: Identify Related Risk Factors and Signs and Symptoms  Outcome: Ongoing (interventions implemented as appropriate)   02/03/18 0418   Fall Risk   Fall Risk: Related Risk Factors age-related changes;gait/mobility problems;history of falls   Fall Risk: Signs and Symptoms presence of risk factors     Goal: Absence of Falls  Outcome: Ongoing (interventions implemented as appropriate)   02/03/18 0418   Fall Risk (Adult)   Absence of Falls achieves outcome       Problem: Pneumonia (Adult)  Goal: Signs and Symptoms of Listed Potential Problems Will be Absent or Manageable (Pneumonia)  Outcome: Ongoing (interventions implemented as appropriate)   02/03/18 0418   Pneumonia   Problems Assessed (Pneumonia) all   Problems Present (Pneumonia) respiratory compromise       Problem: Infection, Risk/Actual (Adult)  Goal: Identify Related Risk Factors and Signs and Symptoms  Outcome: Ongoing (interventions implemented as appropriate)   02/03/18 0418   Infection, Risk/Actual   Infection, Risk/Actual: Related Risk Factors age extremes;exposure to microbes   Signs and Symptoms (Infection, Risk/Actual) lab value changes     Goal: Infection Prevention/Resolution  Outcome: Ongoing (interventions implemented as appropriate)   02/03/18 0418   Infection, Risk/Actual (Adult)   Infection Prevention/Resolution making progress toward outcome

## 2018-02-03 NOTE — PROGRESS NOTES
[facility]  REGAN Raines M.D.  WU Sawyer      Internal Medicine Progress Note    2/3/2018   11:50 AM    Name:  Justina Bingham  MRN:    [id number]     Acct:     [id number]   Room:  [address] [place] [address] Day: 11     Admit Date: 1/23/2018  5:05 AM  PCP: Ruddy Pfeiffer MD    Subjective:     C/C:   Chief Complaint   Patient presents with   • Shortness of Breath   • Cough       Interval History: Status:  Improved. Resting in bed. Less congestion. No family at bedside. Discussed with pulmonology.    Review of Systems   Constitution: Positive for weakness and malaise/fatigue. Negative for chills, decreased appetite, weight gain and weight loss.   HENT: Negative for congestion, ear discharge, hoarse voice and tinnitus.    Eyes: Negative for blurred vision, discharge, visual disturbance and visual halos.   Cardiovascular: Negative for chest pain, claudication, dyspnea on exertion, irregular heartbeat, leg swelling, orthopnea and paroxysmal nocturnal dyspnea.   Respiratory: Positive for cough and shortness of breath. Negative for sputum production and wheezing.    Endocrine: Negative for cold intolerance, heat intolerance and polyuria.   Hematologic/Lymphatic: Negative for adenopathy. Does not bruise/bleed easily.   Skin: Negative for dry skin, itching and suspicious lesions.   Musculoskeletal: Negative for arthritis, back pain, falls, joint pain, muscle weakness and myalgias.   Gastrointestinal: Positive for constipation. Negative for abdominal pain, diarrhea, dysphagia and hematemesis.   Genitourinary: Negative for bladder incontinence, dysuria and frequency.   Neurological: Negative for aphonia, disturbances in coordination and dizziness.   Psychiatric/Behavioral: Negative for altered mental status, depression, memory loss and substance abuse. The patient does not have insomnia and is not nervous/anxious.      Medications:     Allergies:   Allergies   Allergen Reactions   • Rachele  [Cefaclor]    • Eggs Or Egg-Derived Products Itching   • Penicillins    • Sulfa Antibiotics        Current Meds:   Current Facility-Administered Medications:   •  acetaminophen (TYLENOL) tablet 650 mg, 650 mg, Oral, Q4H PRN, Clinton Pfeiffer MD, 650 mg at 02/02/18 2301  •  ALPRAZolam (XANAX) tablet 0.125 mg, 0.125 mg, Oral, Daily, Clinton Pfeiffer MD, 0.125 mg at 02/03/18 0952  •  amLODIPine (NORVASC) tablet 10 mg, 10 mg, Oral, Daily, Binta Lundy APRN, 10 mg at 02/03/18 0951  •  aspirin EC tablet 162 mg, 162 mg, Oral, Daily, Clinton Pfeiffer MD, 162 mg at 02/03/18 0950  •  atorvastatin (LIPITOR) tablet 10 mg, 10 mg, Oral, Nightly, Clinton Pfeiffer MD, 10 mg at 02/02/18 2130  •  cholecalciferol (VITAMIN D3) tablet 1,000 Units, 1,000 Units, Oral, Daily, Clinton Pfeiffer MD, 1,000 Units at 02/03/18 0951  •  CloNIDine (CATAPRES) tablet 0.1 mg, 0.1 mg, Oral, Q4H PRN, Clinton Pfeiffer MD, 0.1 mg at 02/02/18 0814  •  docusate sodium (COLACE) capsule 100 mg, 100 mg, Oral, BID, Binta Lundy APRN, 100 mg at 02/03/18 0951  •  doxycycline (VIBRAMYCIN) 100 mg/100 mL 0.9% NS MBP, 100 mg, Intravenous, Q12H, Angeles Davidson, APRN, Last Rate: 0 mL/hr at 02/03/18 0009, 100 mg at 02/03/18 0952  •  fluticasone (FLONASE) 50 MCG/ACT nasal spray 1 spray, 1 spray, Nasal, BID, Clinton Pfeiffer MD, 1 spray at 02/03/18 0951  •  furosemide (LASIX) tablet 40 mg, 40 mg, Oral, Daily, Clinton Pfeiffer MD, 40 mg at 02/03/18 0951  •  gabapentin (NEURONTIN) capsule 300 mg, 300 mg, Oral, Daily, Clinton Pfeiffer MD, 300 mg at 02/03/18 0951  •  gabapentin (NEURONTIN) capsule 600 mg, 600 mg, Oral, Nightly, Clinton Pfeiffer MD, 600 mg at 02/02/18 2130  •  guaiFENesin (MUCINEX) 12 hr tablet 1,200 mg, 1,200 mg, Oral, Q12H, Jeff Judd, APRN, 1,200 mg at 02/03/18 0951  •  hydrALAZINE (APRESOLINE) tablet 50 mg, 50 mg, Oral, Q8H, Binta Lundy, APRN, 50 mg at 02/03/18  0638  •  HYDROcodone-acetaminophen (NORCO) 5-325 MG per tablet 1 tablet, 1 tablet, Oral, Q6H PRN, Clinton Pfeiffer MD, 1 tablet at 02/03/18 0951  •  ipratropium-albuterol (DUO-NEB) nebulizer solution 3 mL, 3 mL, Nebulization, 4x Daily - RT, Clinton Pfeiffer MD, 3 mL at 02/03/18 1136  •  lisinopril (PRINIVIL,ZESTRIL) tablet 40 mg, 40 mg, Oral, Q24H, Binta Lundy APRN, 40 mg at 02/03/18 0950  •  magnesium hydroxide (MILK OF MAGNESIA) suspension 2400 mg/10mL 10 mL, 10 mL, Oral, Nightly PRN, Clinton Pfeiffer MD, 10 mL at 01/27/18 2058  •  magnesium hydroxide (MILK OF MAGNESIA) suspension 2400 mg/10mL 10 mL, 10 mL, Oral, Daily PRN, Clinton Pfeiffer MD, 10 mL at 01/27/18 1401  •  meropenem (MERREM) 1 g/20mL IV PUSH syringe, 1 g, Intravenous, Q8H, Lonny Xavier MD, 1 g at 02/03/18 0638  •  metoprolol tartrate (LOPRESSOR) tablet 50 mg, 50 mg, Oral, Q12H, Binta Lundy, APRN, 50 mg at 02/03/18 0950  •  ondansetron (ZOFRAN) tablet 4 mg, 4 mg, Oral, Q6H PRN **OR** ondansetron ODT (ZOFRAN-ODT) disintegrating tablet 4 mg, 4 mg, Oral, Q6H PRN **OR** ondansetron (ZOFRAN) injection 4 mg, 4 mg, Intravenous, Q6H PRN, Clinton Pfeiffer MD, 4 mg at 01/23/18 1851  •  PARoxetine (PAXIL) tablet 20 mg, 20 mg, Oral, QAM, Clinton Pfeiffer MD, 20 mg at 02/03/18 0638  •  potassium chloride (MICRO-K) CR capsule 10 mEq, 10 mEq, Oral, Daily, Clinton Pfeiffer MD, 10 mEq at 02/03/18 0951  •  sodium chloride (OCEAN) nasal spray 1 spray, 1 spray, Nasal, Daily, Clinton Pfeiffer MD, 1 spray at 02/03/18 0951  •  sodium chloride 0.9 % flush 1-10 mL, 1-10 mL, Intravenous, PRN, Clinton Pfeiffer MD  •  sodium chloride 0.9 % flush 10 mL, 10 mL, Intravenous, PRN, Shawn Blount MD  •  tamsulosin (FLOMAX) 24 hr capsule 0.4 mg, 0.4 mg, Oral, Daily, Clinton Pfeiffer MD, 0.4 mg at 02/03/18 0951  •  traMADol (ULTRAM) tablet 50 mg, 50 mg, Oral, Q4H, Clinton Pfeiffer MD, 50 mg at  "18 0951  •  vitamin E capsule 400 Units, 400 Units, Oral, Daily, Clinton Pfeiffer MD, 400 Units at 18 0951  •  Karen's amazing butt cream, , Topical, PRN, Binta Lundy APRN  •  warfarin (COUMADIN) tablet 5 mg, 5 mg, Oral, Daily, Binta Lundy APRN, 5 mg at 18 1819  •  zolpidem (AMBIEN) tablet 5 mg, 5 mg, Oral, Nightly PRN, Clinton Pfeiffer MD, 5 mg at 18 2130    Data:     Code Status:  Full Code    Family History   Problem Relation Age of Onset   • Heart attack Mother    • No Known Problems Father    • Colon cancer Neg Hx    • Colon polyps Neg Hx        Social History     Social History   • Marital status:      Spouse name: N/A   • Number of children: N/A   • Years of education: N/A     Occupational History   • Not on file.     Social History Main Topics   • Smoking status: Never Smoker   • Smokeless tobacco: Never Used   • Alcohol use No   • Drug use: Yes     Special: Hydrocodone   • Sexual activity: Defer     Other Topics Concern   • Not on file     Social History Narrative       Vitals:  /56 (BP Location: Right arm, Patient Position: Lying)  Pulse 73  Temp 98.5 °F (36.9 °C) (Tympanic)   Resp 18  Ht 175.3 cm (69\")  Wt 97.8 kg (215 lb 8 oz)  SpO2 91%  BMI 31.82 kg/m2  Temp (24hrs), Av.6 °F (37 °C), Min:97.8 °F (36.6 °C), Max:99.4 °F (37.4 °C)      I/O (24Hr):    Intake/Output Summary (Last 24 hours) at 18 1150  Last data filed at 18 0946   Gross per 24 hour   Intake           180.63 ml   Output             1200 ml   Net         -1019.37 ml       Labs and imaging:      Recent Results (from the past 12 hour(s))   Protime-INR    Collection Time: 18  5:58 AM   Result Value Ref Range    Protime 15.4 (H) 11.9 - 14.6 Seconds    INR 1.18 (H) 0.91 - 1.09   Basic Metabolic Panel    Collection Time: 18  5:58 AM   Result Value Ref Range    Glucose 109 (H) 70 - 100 mg/dL    BUN 20 5 - 21 mg/dL    Creatinine 0.69 0.50 - 1.40 mg/dL    " Sodium 140 135 - 145 mmol/L    Potassium 4.2 3.5 - 5.3 mmol/L    Chloride 95 (L) 98 - 110 mmol/L    CO2 37.0 (H) 24.0 - 31.0 mmol/L    Calcium 10.0 8.4 - 10.4 mg/dL    eGFR Non African Amer 80 >60 mL/min/1.73    BUN/Creatinine Ratio 29.0 (H) 7.0 - 25.0    Anion Gap 8.0 4.0 - 13.0 mmol/L   CBC Auto Differential    Collection Time: 02/03/18  5:58 AM   Result Value Ref Range    WBC 14.02 (H) 4.80 - 10.80 10*3/mm3    RBC 4.04 (L) 4.20 - 5.40 10*6/mm3    Hemoglobin 11.1 (L) 12.0 - 16.0 g/dL    Hematocrit 35.0 (L) 37.0 - 47.0 %    MCV 86.6 82.0 - 98.0 fL    MCH 27.5 (L) 28.0 - 32.0 pg    MCHC 31.7 (L) 33.0 - 36.0 g/dL    RDW 15.4 (H) 12.0 - 15.0 %    RDW-SD 49.7 40.0 - 54.0 fl    MPV 9.6 6.0 - 12.0 fL    Platelets 346 130 - 400 10*3/mm3    Neutrophil % 64.9 39.0 - 78.0 %    Lymphocyte % 18.4 15.0 - 45.0 %    Monocyte % 7.8 4.0 - 12.0 %    Eosinophil % 3.4 0.0 - 4.0 %    Basophil % 0.6 0.0 - 2.0 %    Immature Grans % 4.9 0.0 - 5.0 %    Neutrophils, Absolute 9.11 (H) 1.87 - 8.40 10*3/mm3    Lymphocytes, Absolute 2.58 0.72 - 4.86 10*3/mm3    Monocytes, Absolute 1.09 0.19 - 1.30 10*3/mm3    Eosinophils, Absolute 0.47 0.00 - 0.70 10*3/mm3    Basophils, Absolute 0.09 0.00 - 0.20 10*3/mm3    Immature Grans, Absolute 0.68 (H) 0.00 - 0.03 10*3/mm3    nRBC 0.0 0.0 - 0.0 /100 WBC     Physical Examination:        Physical Exam   Constitutional: She is oriented to person, place, and time. She appears well-developed and well-nourished.   HENT:   Head: Normocephalic and atraumatic.   Nose: Nose normal.   Mouth/Throat: Oropharynx is clear and moist.   [place]   Eyes: Conjunctivae and EOM are normal. Pupils are equal, round, and reactive to light.   Neck: Normal range of motion. Neck supple.   Cardiovascular: Normal rate, regular rhythm, normal heart sounds and intact distal pulses.    Pulmonary/Chest: Effort normal. She has rhonchi. She has no rales.   Abdominal: Soft. Bowel sounds are normal.   Musculoskeletal:   Generalized weakness    Neurological: She is alert and oriented to person, place, and time. She has normal reflexes.   Skin: Skin is warm and dry.   Psychiatric: She has a normal mood and affect. Her behavior is normal.   Nursing note and vitals reviewed.    Assessment:        Active Problems:    Pneumonia of both lungs due to infectious organism    Past Medical History:   Diagnosis Date   • Allergic rhinitis    • Anxiety    • Anxiety disorder    • Arthritis    • Asthma    • Asthma    • Colon polyp    • Degenerative arthritis    • Dementia    • Dementia    • Depression    • Depression    • Diabetes mellitus    • Environmental allergies    • Hx of colonic polyps    • Hyperlipidemia    • Hypertension    • Neuropathy    • Osteoporosis    • Osteoporosis    • Pacemaker    • Peripheral neuropathy         Plan:        1. Bilateral HCAP  2. HTN  3. Anxiety  4. Chronic anticoagulation  5. Insomnia  6. HLD  7. Influenza A  8. Hypokalemia  9. Coumadin toxicity    Continue current treatment. Aggressive pulmonary toilet. Work on mobilizing patient. DC isolation. Wean fio2.         Electronically signed by Clinton Pfeiffer MD on 2/3/2018 at 11:50 AM

## 2018-02-03 NOTE — PROGRESS NOTES
PULMONARY AND CRITICAL CARE PROGRESS NOTE - [facility]    Patient: Justina MARIE Mix  4/4/1925   MR# [id number]   Acct# [id number]  02/03/18   9:18 AM  Referring Provider: Clinton Pfeiffer MD    Chief Complaint: Dyspnea    Interval history: Patient continues to improve. She is sitting up in bed on 4L NC with O2 sat 92%.  HR 78.  She has a very strong cough.  No new complaints. No family present. No other aggravating or allevitating factors.     Meds:    ALPRAZolam 0.125 mg Oral Daily   amLODIPine 10 mg Oral Daily   aspirin 162 mg Oral Daily   atorvastatin 10 mg Oral Nightly   cholecalciferol 1,000 Units Oral Daily   docusate sodium 100 mg Oral BID   doxycycline 100 mg Intravenous Q12H   fluticasone 1 spray Nasal BID   furosemide 40 mg Oral Daily   gabapentin 300 mg Oral Daily   gabapentin 600 mg Oral Nightly   guaiFENesin 1,200 mg Oral Q12H   hydrALAZINE 50 mg Oral Q8H   ipratropium-albuterol 3 mL Nebulization 4x Daily - RT   lisinopril 40 mg Oral Q24H   meropenem 1 g Intravenous Q8H   metoprolol tartrate 50 mg Oral Q12H   PARoxetine 20 mg Oral QAM   potassium chloride 10 mEq Oral Daily   sodium chloride 1 spray Nasal Daily   tamsulosin 0.4 mg Oral Daily   traMADol 50 mg Oral Q4H   vitamin E 400 Units Oral Daily   warfarin 5 mg Oral Daily        Review of Systems:   Review of Systems   Constitutional: Positive for fatigue. Negative for chills and fever.   Cardiovascular: Negative for chest pain.   Gastrointestinal: Negative for diarrhea, nausea and vomiting.   :    Very hard of hearing but non-verbal at the moment    Physical Exam:  SpO2 Readings from Last 3 Encounters:   02/03/18 91%   10/30/17 92%   09/11/17 97%     Temp:  [97.8 °F (36.6 °C)-99.4 °F (37.4 °C)] 98.5 °F (36.9 °C)  Heart Rate:  [63-81] 72  Resp:  [18-20] 18  BP: (158-182)/(56-71) 168/56    Intake/Output Summary (Last 24 hours) at 02/03/18 0918  Last data filed at 02/03/18 0569   Gross per 24 hour   Intake           180.63 ml    Output              400 ml   Net          -219.37 ml     Physical Exam:    Constitutional: She appears well-developed and well-nourished. She appears awake and alert. She has a sickly appearance.    HENT: NC in place. Very hard of hearing  Head: Normocephalic and atraumatic.   Eyes: Pupils are equal, round, and reactive to light. No scleral icterus.   Neck: Neck supple.   Cardiovascular: Normal rate and regular rhythm.    Pulmonary/Chest: Central rhonci   Abdominal: Soft. Bowel sounds are normal. She exhibits no distension.   Musculoskeletal: Normal range of motion. She exhibits edema (bilateral LE).   Lymphadenopathy:     She has no cervical adenopathy.   Neurological: She appears awake and alert but non-verbal   Skin: Skin is warm and dry.   Nursing note and vitals reviewed.    Laboratory Data:    Results from last 7 days  Lab Units 02/03/18  0558 02/02/18  0545 02/01/18  0700   WBC 10*3/mm3 14.02* 14.10* 13.86*   HEMOGLOBIN g/dL 11.1* 10.7* 11.1*   PLATELETS 10*3/mm3 346 360 356       Results from last 7 days  Lab Units 02/03/18  0558 02/02/18  0545 02/01/18  0700   SODIUM mmol/L 140 138 139   POTASSIUM mmol/L 4.2 4.0 4.0   BUN mg/dL 20 19 15   CREATININE mg/dL 0.69 0.67 0.60   INR  1.18* 1.18* 1.07       Results from last 7 days  Lab Units 01/28/18  1108   PH, ARTERIAL pH units 7.469*   PCO2, ARTERIAL mm Hg 48.6*   PO2 ART mm Hg 73.1*   FIO2 % 60     Blood Culture   Date Value Ref Range Status   01/23/2018 No growth at 3 days  Preliminary   01/23/2018 No growth at 3 days  Preliminary     Urine Culture   Date Value Ref Range Status   01/23/2018 >100,000 CFU/mL Streptococcus agalactiae (Group B) (A)  Final     Comment:     This organism is considered to be universally susceptible to penicillin.  No further antibiotic testing will be performed.     Recent films:  Imaging Results (last 24 hours)     ** No results found for the last 24 hours. **        Films reviewed personally by me.  My interpretation:  none    Pulmonary Assessment:    1. Acute hypoxemic/hypercapnic respiratory failure  2. Influenza A  3. Bilateral pneumonia  4. High risk for post viral complications with advanced age  5. Chronic Coumadin therapy  6. Mucous plugging on right, better  7. Full code status  8. Supratherapeutic INR normalized  9. Streptococcus UTI     Recommend:     · No longer requiring high flow oxygen. Currently 92% on 4L  · Continue merrem and vibramycin for now. Should be able to discontinue when she transitions to rehab  · Continue duonebs, mucinex  · Continue CPT, IS/flutter   · Much better from a pulmonary standpoint.     Electronically signed by WU Armstrong, 02/03/18, 9:18 AM     Physician substantive contribution:  Pertinent symptoms/interval history include: nonverbal, hard of hearing, has trouble communicating  Respiratory exam shows pertinent findings of:diminished breath sounds, improved.  Plan includes: continue oxygen, iv antibiotics, RT; anticipate home early in week.  Titrate oxygen.  I have seen and examined patient personally, performing a face-to-face diagnostic evaluation with plan of care reviewed and developed with APRN and nursing staff. I have addended and/or modified the above history of present illness, physical examination, and assessment and plan to reflect my findings and impressions. Essential elements of the care plan were discussed with APRN above.  Agree with findings and assessment/plan as documented above.    Electronically signed by Joseph Reynoso MD, on 2/3/2018, 11:34 AM

## 2018-02-04 LAB
ANION GAP SERPL CALCULATED.3IONS-SCNC: 8 MMOL/L (ref 4–13)
BASOPHILS # BLD AUTO: 0.08 10*3/MM3 (ref 0–0.2)
BASOPHILS NFR BLD AUTO: 0.6 % (ref 0–2)
BUN BLD-MCNC: 20 MG/DL (ref 5–21)
BUN/CREAT SERPL: 29.9 (ref 7–25)
CALCIUM SPEC-SCNC: 10 MG/DL (ref 8.4–10.4)
CHLORIDE SERPL-SCNC: 95 MMOL/L (ref 98–110)
CO2 SERPL-SCNC: 35 MMOL/L (ref 24–31)
CREAT BLD-MCNC: 0.67 MG/DL (ref 0.5–1.4)
DEPRECATED RDW RBC AUTO: 50.3 FL (ref 40–54)
EOSINOPHIL # BLD AUTO: 0.55 10*3/MM3 (ref 0–0.7)
EOSINOPHIL NFR BLD AUTO: 3.9 % (ref 0–4)
ERYTHROCYTE [DISTWIDTH] IN BLOOD BY AUTOMATED COUNT: 15.9 % (ref 12–15)
GFR SERPL CREATININE-BSD FRML MDRD: 82 ML/MIN/1.73
GLUCOSE BLD-MCNC: 110 MG/DL (ref 70–100)
HCT VFR BLD AUTO: 35.5 % (ref 37–47)
HGB BLD-MCNC: 11.1 G/DL (ref 12–16)
IMM GRANULOCYTES # BLD: 0.6 10*3/MM3 (ref 0–0.03)
IMM GRANULOCYTES NFR BLD: 4.2 % (ref 0–5)
INR PPP: 1.29 (ref 0.91–1.09)
LYMPHOCYTES # BLD AUTO: 2.58 10*3/MM3 (ref 0.72–4.86)
LYMPHOCYTES NFR BLD AUTO: 18.1 % (ref 15–45)
MCH RBC QN AUTO: 27.3 PG (ref 28–32)
MCHC RBC AUTO-ENTMCNC: 31.3 G/DL (ref 33–36)
MCV RBC AUTO: 87.2 FL (ref 82–98)
MONOCYTES # BLD AUTO: 1.08 10*3/MM3 (ref 0.19–1.3)
MONOCYTES NFR BLD AUTO: 7.6 % (ref 4–12)
NEUTROPHILS # BLD AUTO: 9.39 10*3/MM3 (ref 1.87–8.4)
NEUTROPHILS NFR BLD AUTO: 65.6 % (ref 39–78)
NRBC BLD MANUAL-RTO: 0 /100 WBC (ref 0–0)
PLATELET # BLD AUTO: 380 10*3/MM3 (ref 130–400)
PMV BLD AUTO: 9.6 FL (ref 6–12)
POTASSIUM BLD-SCNC: 4.2 MMOL/L (ref 3.5–5.3)
PROTHROMBIN TIME: 16.5 SECONDS (ref 11.9–14.6)
RBC # BLD AUTO: 4.07 10*6/MM3 (ref 4.2–5.4)
SODIUM BLD-SCNC: 138 MMOL/L (ref 135–145)
WBC NRBC COR # BLD: 14.28 10*3/MM3 (ref 4.8–10.8)

## 2018-02-04 PROCEDURE — 85025 COMPLETE CBC W/AUTO DIFF WBC: CPT | Performed by: INTERNAL MEDICINE

## 2018-02-04 PROCEDURE — 94799 UNLISTED PULMONARY SVC/PX: CPT

## 2018-02-04 PROCEDURE — 97110 THERAPEUTIC EXERCISES: CPT

## 2018-02-04 PROCEDURE — 25010000002 MEROPENEM PER 100 MG: Performed by: INTERNAL MEDICINE

## 2018-02-04 PROCEDURE — 94660 CPAP INITIATION&MGMT: CPT

## 2018-02-04 PROCEDURE — G8981 BODY POS CURRENT STATUS: HCPCS | Performed by: PHYSICAL THERAPIST

## 2018-02-04 PROCEDURE — 97164 PT RE-EVAL EST PLAN CARE: CPT | Performed by: PHYSICAL THERAPIST

## 2018-02-04 PROCEDURE — G8982 BODY POS GOAL STATUS: HCPCS | Performed by: PHYSICAL THERAPIST

## 2018-02-04 PROCEDURE — 97530 THERAPEUTIC ACTIVITIES: CPT | Performed by: PHYSICAL THERAPIST

## 2018-02-04 PROCEDURE — 80048 BASIC METABOLIC PNL TOTAL CA: CPT | Performed by: INTERNAL MEDICINE

## 2018-02-04 PROCEDURE — 85610 PROTHROMBIN TIME: CPT | Performed by: INTERNAL MEDICINE

## 2018-02-04 RX ADMIN — DOCUSATE SODIUM 100 MG: 100 CAPSULE ORAL at 19:54

## 2018-02-04 RX ADMIN — IPRATROPIUM BROMIDE AND ALBUTEROL SULFATE 3 ML: 2.5; .5 SOLUTION RESPIRATORY (INHALATION) at 08:14

## 2018-02-04 RX ADMIN — POTASSIUM CHLORIDE 10 MEQ: 750 CAPSULE, EXTENDED RELEASE ORAL at 08:54

## 2018-02-04 RX ADMIN — GUAIFENESIN 1200 MG: 600 TABLET, EXTENDED RELEASE ORAL at 19:54

## 2018-02-04 RX ADMIN — TRAMADOL HYDROCHLORIDE 50 MG: 50 TABLET, COATED ORAL at 23:33

## 2018-02-04 RX ADMIN — MEROPENEM 1 G: 1 INJECTION, POWDER, FOR SOLUTION INTRAVENOUS at 05:49

## 2018-02-04 RX ADMIN — ZOLPIDEM TARTRATE 5 MG: 5 TABLET ORAL at 19:54

## 2018-02-04 RX ADMIN — METOPROLOL TARTRATE 50 MG: 50 TABLET ORAL at 08:55

## 2018-02-04 RX ADMIN — TRAMADOL HYDROCHLORIDE 50 MG: 50 TABLET, COATED ORAL at 19:54

## 2018-02-04 RX ADMIN — Medication 1 SPRAY: at 08:55

## 2018-02-04 RX ADMIN — DOXYCYCLINE 100 MG: 100 INJECTION, POWDER, LYOPHILIZED, FOR SOLUTION INTRAVENOUS at 19:53

## 2018-02-04 RX ADMIN — GABAPENTIN 300 MG: 300 CAPSULE ORAL at 08:54

## 2018-02-04 RX ADMIN — PAROXETINE 20 MG: 20 TABLET, FILM COATED ORAL at 05:49

## 2018-02-04 RX ADMIN — IPRATROPIUM BROMIDE AND ALBUTEROL SULFATE 3 ML: 2.5; .5 SOLUTION RESPIRATORY (INHALATION) at 11:56

## 2018-02-04 RX ADMIN — Medication 162 MG: at 08:55

## 2018-02-04 RX ADMIN — GUAIFENESIN 1200 MG: 600 TABLET, EXTENDED RELEASE ORAL at 08:55

## 2018-02-04 RX ADMIN — TRAMADOL HYDROCHLORIDE 50 MG: 50 TABLET, COATED ORAL at 12:53

## 2018-02-04 RX ADMIN — TAMSULOSIN HYDROCHLORIDE 0.4 MG: 0.4 CAPSULE ORAL at 08:55

## 2018-02-04 RX ADMIN — FLUTICASONE PROPIONATE 1 SPRAY: 50 SPRAY, METERED NASAL at 19:53

## 2018-02-04 RX ADMIN — CHOLECALCIFEROL (VITAMIN D3) 25 MCG (1,000 UNIT) TABLET 1000 UNITS: TABLET at 08:55

## 2018-02-04 RX ADMIN — TRAMADOL HYDROCHLORIDE 50 MG: 50 TABLET, COATED ORAL at 08:54

## 2018-02-04 RX ADMIN — WARFARIN SODIUM 5 MG: 5 TABLET ORAL at 17:39

## 2018-02-04 RX ADMIN — HYDRALAZINE HYDROCHLORIDE 50 MG: 50 TABLET ORAL at 16:05

## 2018-02-04 RX ADMIN — VITAMIN E CAP 400 UNIT 400 UNITS: 400 CAP at 08:55

## 2018-02-04 RX ADMIN — FLUTICASONE PROPIONATE 1 SPRAY: 50 SPRAY, METERED NASAL at 08:55

## 2018-02-04 RX ADMIN — HYDRALAZINE HYDROCHLORIDE 50 MG: 50 TABLET ORAL at 21:30

## 2018-02-04 RX ADMIN — ATORVASTATIN CALCIUM 10 MG: 10 TABLET, FILM COATED ORAL at 19:54

## 2018-02-04 RX ADMIN — HYDRALAZINE HYDROCHLORIDE 50 MG: 50 TABLET ORAL at 05:49

## 2018-02-04 RX ADMIN — AMLODIPINE BESYLATE 10 MG: 10 TABLET ORAL at 08:54

## 2018-02-04 RX ADMIN — METOPROLOL TARTRATE 50 MG: 50 TABLET ORAL at 21:30

## 2018-02-04 RX ADMIN — ACETAMINOPHEN 650 MG: 325 TABLET, FILM COATED ORAL at 16:06

## 2018-02-04 RX ADMIN — FUROSEMIDE 40 MG: 40 TABLET ORAL at 08:55

## 2018-02-04 RX ADMIN — TRAMADOL HYDROCHLORIDE 50 MG: 50 TABLET, COATED ORAL at 16:05

## 2018-02-04 RX ADMIN — GABAPENTIN 600 MG: 300 CAPSULE ORAL at 19:55

## 2018-02-04 RX ADMIN — ALPRAZOLAM 0.12 MG: 0.25 TABLET ORAL at 08:56

## 2018-02-04 RX ADMIN — LISINOPRIL 40 MG: 20 TABLET ORAL at 08:55

## 2018-02-04 RX ADMIN — DOXYCYCLINE 100 MG: 100 INJECTION, POWDER, LYOPHILIZED, FOR SOLUTION INTRAVENOUS at 08:54

## 2018-02-04 RX ADMIN — IPRATROPIUM BROMIDE AND ALBUTEROL SULFATE 3 ML: 2.5; .5 SOLUTION RESPIRATORY (INHALATION) at 15:38

## 2018-02-04 RX ADMIN — DOCUSATE SODIUM 100 MG: 100 CAPSULE ORAL at 08:55

## 2018-02-04 NOTE — PLAN OF CARE
Problem: Patient Care Overview (Adult)  Goal: Plan of Care Review   02/03/18 1923   Coping/Psychosocial Response Interventions   Plan Of Care Reviewed With patient   Patient Care Overview   Progress improving   Outcome Evaluation   Outcome Summary/Follow up Plan currently on 1.5L/NC with O2 sat at 90%, pure wick in place, up in chair for most of the day, prn norco given with relief       Problem: Fall Risk (Adult)  Goal: Absence of Falls  Outcome: Ongoing (interventions implemented as appropriate)      Problem: Pneumonia (Adult)  Goal: Signs and Symptoms of Listed Potential Problems Will be Absent or Manageable (Pneumonia)  Outcome: Ongoing (interventions implemented as appropriate)      Problem: Infection, Risk/Actual (Adult)  Goal: Identify Related Risk Factors and Signs and Symptoms  Outcome: Ongoing (interventions implemented as appropriate)    Goal: Infection Prevention/Resolution  Outcome: Ongoing (interventions implemented as appropriate)

## 2018-02-04 NOTE — PLAN OF CARE
Problem: Patient Care Overview (Adult)  Goal: Plan of Care Review  Outcome: Ongoing (interventions implemented as appropriate)   02/04/18 1504   Coping/Psychosocial Response Interventions   Plan Of Care Reviewed With patient;family   Patient Care Overview   Progress progress toward functional goals is gradual   Outcome Evaluation   Outcome Summary/Follow up Plan Pt. didn't want to get out of bed, ue exs performed , no problems with ot tx!

## 2018-02-04 NOTE — PLAN OF CARE
Problem: Patient Care Overview (Adult)  Goal: Plan of Care Review  Outcome: Ongoing (interventions implemented as appropriate)   02/04/18 1522   Coping/Psychosocial Response Interventions   Plan Of Care Reviewed With patient;family   Patient Care Overview   Progress progress towards functional goals is fair   Outcome Evaluation   Outcome Summary/Follow up Plan Able to stand and pivot to chair. Needed strong cues to stand erect and not try to sit before ready. She is able to move her legs well in bed but fear tends to make her freeze. I feel she can progress once she starts to overcome this.       Problem: Inpatient Physical Therapy  Goal: Bed Mobility Goal LTG- PT  Outcome: Ongoing (interventions implemented as appropriate)   01/24/18 1304 02/04/18 1522   Bed Mobility PT LTG   Bed Mobility PT LTG, Date Established 01/24/18 --    Bed Mobility PT LTG, Time to Achieve by discharge --    Bed Mobility PT LTG, Activity Type all bed mobility --    Bed Mobility PT LTG, Wilkinson Level contact guard assist;conditional independence --    Bed Mobility PT Goal LTG, Assist Device bed rails --    Bed Mobility PT LTG, Outcome --  goal ongoing   Bed Mobility PT LTG, Reason Goal Not Met --  progress slower than expected     Goal: Transfer Training Goal 1 LTG- PT  Outcome: Ongoing (interventions implemented as appropriate)   01/24/18 1304 02/04/18 1522   Transfer Training PT LTG   Transfer Training PT LTG, Date Established 01/24/18 --    Transfer Training PT LTG, Time to Achieve by discharge --    Transfer Training PT LTG, Activity Type bed to chair /chair to bed;sit to stand/stand to sit --    Transfer Training PT LTG, Wilkinson Level minimum assist (75% patient effort) --    Transfer Training PT LTG, Assist Device walker, rolling --    Transfer Training PT LTG, Additional Goal --  mod asst x 2 with rwx; heavy cues to stand erect adn move feet and not try to sit before ready   Transfer Training PT LTG, Outcome --  goal ongoing    Transfer Training PT LTG, Reason Goal Not Met --  progress slower than expected     Goal: Strength Goal LTG- PT  Outcome: Ongoing (interventions implemented as appropriate)   01/24/18 1304 02/04/18 1522   Strength Goal PT LTG   Strength Goal PT LTG, Date Established 01/24/18 --    Strength Goal PT LTG, Time to Achieve by discharge --    Strength Goal PT LTG, Measure to Achieve 10 reps of LAQs, hip flexion, ankle pumps, ab/adduction sitting EOB against gravity --    Strength Goal PT LTG, Outcome --  goal ongoing   Strength Goal PT LTG, Reason Goal Not Met --  progress slower than expected

## 2018-02-04 NOTE — PLAN OF CARE
Problem: Patient Care Overview (Adult)  Goal: Plan of Care Review  Outcome: Ongoing (interventions implemented as appropriate)   02/04/18 0323   Coping/Psychosocial Response Interventions   Plan Of Care Reviewed With patient   Patient Care Overview   Progress progress toward functional goals as expected   Outcome Evaluation   Outcome Summary/Follow up Plan c/o pain. meds admin with relief. purewick still in place. cont to monitor     Goal: Adult Individualization and Mutuality  Outcome: Ongoing (interventions implemented as appropriate)      Problem: Fall Risk (Adult)  Goal: Identify Related Risk Factors and Signs and Symptoms  Outcome: Ongoing (interventions implemented as appropriate)    Goal: Absence of Falls  Outcome: Ongoing (interventions implemented as appropriate)      Problem: Pneumonia (Adult)  Goal: Signs and Symptoms of Listed Potential Problems Will be Absent or Manageable (Pneumonia)  Outcome: Ongoing (interventions implemented as appropriate)      Problem: Infection, Risk/Actual (Adult)  Goal: Identify Related Risk Factors and Signs and Symptoms  Outcome: Ongoing (interventions implemented as appropriate)    Goal: Infection Prevention/Resolution  Outcome: Ongoing (interventions implemented as appropriate)

## 2018-02-04 NOTE — PROGRESS NOTES
[facility]  REGAN Raines M.D.  WU Sawyer      Internal Medicine Progress Note    2/4/2018   2:20 PM    Name:  Justina Bingham  MRN:    [id number]     Acct:     [id number]   Room:  [address] [place] [address] Day: 12     Admit Date: 1/23/2018  5:05 AM  PCP: Ruddy Pfeiffer MD    Subjective:     C/C:   Chief Complaint   Patient presents with   • Shortness of Breath   • Cough       Interval History: Status:  Improved. Resting in bed. Breathing easier and tolerating weaning fiO2.    Review of Systems   Constitution: Positive for weakness and malaise/fatigue. Negative for chills, decreased appetite, weight gain and weight loss.   HENT: Negative for congestion, ear discharge, hoarse voice and tinnitus.    Eyes: Negative for blurred vision, discharge, visual disturbance and visual halos.   Cardiovascular: Negative for chest pain, claudication, dyspnea on exertion, irregular heartbeat, leg swelling, orthopnea and paroxysmal nocturnal dyspnea.   Respiratory: Positive for cough and shortness of breath. Negative for sputum production and wheezing.    Endocrine: Negative for cold intolerance, heat intolerance and polyuria.   Hematologic/Lymphatic: Negative for adenopathy. Does not bruise/bleed easily.   Skin: Negative for dry skin, itching and suspicious lesions.   Musculoskeletal: Negative for arthritis, back pain, falls, joint pain, muscle weakness and myalgias.   Gastrointestinal: Positive for constipation. Negative for abdominal pain, diarrhea, dysphagia and hematemesis.   Genitourinary: Negative for bladder incontinence, dysuria and frequency.   Neurological: Negative for aphonia, disturbances in coordination and dizziness.   Psychiatric/Behavioral: Negative for altered mental status, depression, memory loss and substance abuse. The patient does not have insomnia and is not nervous/anxious.      Medications:     Allergies:   Allergies   Allergen Reactions   • Ceclor [Cefaclor]    • Eggs Or  Egg-Derived Products Itching   • Penicillins    • Sulfa Antibiotics        Current Meds:   Current Facility-Administered Medications:   •  acetaminophen (TYLENOL) tablet 650 mg, 650 mg, Oral, Q4H PRN, Clinton Pfeiffer MD, 650 mg at 02/02/18 2301  •  ALPRAZolam (XANAX) tablet 0.125 mg, 0.125 mg, Oral, Daily, Clinton Pfeiffer MD, 0.125 mg at 02/04/18 0856  •  amLODIPine (NORVASC) tablet 10 mg, 10 mg, Oral, Daily, Binta Lundy APRN, 10 mg at 02/04/18 0854  •  aspirin EC tablet 162 mg, 162 mg, Oral, Daily, Clinton Pfeiffer MD, 162 mg at 02/04/18 0855  •  atorvastatin (LIPITOR) tablet 10 mg, 10 mg, Oral, Nightly, Clinton Pfeiffer MD, 10 mg at 02/03/18 2040  •  cholecalciferol (VITAMIN D3) tablet 1,000 Units, 1,000 Units, Oral, Daily, Clinton Pfeiffer MD, 1,000 Units at 02/04/18 0855  •  CloNIDine (CATAPRES) tablet 0.1 mg, 0.1 mg, Oral, Q4H PRN, Clinton Pfeiffer MD, 0.1 mg at 02/02/18 0814  •  docusate sodium (COLACE) capsule 100 mg, 100 mg, Oral, BID, WU Burt, 100 mg at 02/04/18 0855  •  doxycycline (VIBRAMYCIN) 100 mg/100 mL 0.9% NS MBP, 100 mg, Intravenous, Q12H, WU Mendoza, Last Rate: 0 mL/hr at 02/03/18 2230, 100 mg at 02/04/18 0854  •  fluticasone (FLONASE) 50 MCG/ACT nasal spray 1 spray, 1 spray, Nasal, BID, Clinton Pfeiffer MD, 1 spray at 02/04/18 0855  •  furosemide (LASIX) tablet 40 mg, 40 mg, Oral, Daily, Clinton Pfeiffer MD, 40 mg at 02/04/18 0855  •  gabapentin (NEURONTIN) capsule 300 mg, 300 mg, Oral, Daily, Clinton Pfeiffer MD, 300 mg at 02/04/18 0854  •  gabapentin (NEURONTIN) capsule 600 mg, 600 mg, Oral, Nightly, Clinton Pfeiffer MD, 600 mg at 02/03/18 2040  •  guaiFENesin (MUCINEX) 12 hr tablet 1,200 mg, 1,200 mg, Oral, Q12H, WU Germain, 1,200 mg at 02/04/18 0855  •  hydrALAZINE (APRESOLINE) tablet 50 mg, 50 mg, Oral, Q8H, WU Burt, 50 mg at 02/04/18 0549  •   HYDROcodone-acetaminophen (NORCO) 5-325 MG per tablet 1 tablet, 1 tablet, Oral, Q6H PRN, Clinton Pfeiffer MD, 1 tablet at 02/03/18 1709  •  ipratropium-albuterol (DUO-NEB) nebulizer solution 3 mL, 3 mL, Nebulization, 4x Daily - RT, Clinton Pfeiffer MD, 3 mL at 02/04/18 1156  •  lisinopril (PRINIVIL,ZESTRIL) tablet 40 mg, 40 mg, Oral, Q24H, Binta Lundy, APRN, 40 mg at 02/04/18 0855  •  magnesium hydroxide (MILK OF MAGNESIA) suspension 2400 mg/10mL 10 mL, 10 mL, Oral, Nightly PRN, Clinton Pfeiffer MD, 10 mL at 01/27/18 2058  •  magnesium hydroxide (MILK OF MAGNESIA) suspension 2400 mg/10mL 10 mL, 10 mL, Oral, Daily PRN, Clinton Pfeiffer MD, 10 mL at 01/27/18 1401  •  metoprolol tartrate (LOPRESSOR) tablet 50 mg, 50 mg, Oral, Q12H, Binta Lundy, APRN, 50 mg at 02/04/18 0855  •  ondansetron (ZOFRAN) tablet 4 mg, 4 mg, Oral, Q6H PRN **OR** ondansetron ODT (ZOFRAN-ODT) disintegrating tablet 4 mg, 4 mg, Oral, Q6H PRN **OR** ondansetron (ZOFRAN) injection 4 mg, 4 mg, Intravenous, Q6H PRN, Clinton Pfeiffer MD, 4 mg at 01/23/18 1851  •  PARoxetine (PAXIL) tablet 20 mg, 20 mg, Oral, QAM, Clinton Pfeiffer MD, 20 mg at 02/04/18 0549  •  potassium chloride (MICRO-K) CR capsule 10 mEq, 10 mEq, Oral, Daily, Clinton Pfeiffer MD, 10 mEq at 02/04/18 0854  •  sodium chloride (OCEAN) nasal spray 1 spray, 1 spray, Nasal, Daily, Clinton Pfeiffer MD, 1 spray at 02/04/18 0855  •  sodium chloride 0.9 % flush 1-10 mL, 1-10 mL, Intravenous, PRN, Clinton Pfeiffer MD  •  sodium chloride 0.9 % flush 10 mL, 10 mL, Intravenous, PRN, Shawn Blount MD  •  tamsulosin (FLOMAX) 24 hr capsule 0.4 mg, 0.4 mg, Oral, Daily, Clinton Pfeiffer MD, 0.4 mg at 02/04/18 0855  •  traMADol (ULTRAM) tablet 50 mg, 50 mg, Oral, Q4H, Clinton Pfeiffer MD, 50 mg at 02/04/18 1253  •  vitamin E capsule 400 Units, 400 Units, Oral, Daily, Clinton Pfeiffer MD, 400 Units at 02/04/18 0855  •   "Karen's amazing butt cream, , Topical, PRN, Binta Lundy, APRN  •  warfarin (COUMADIN) tablet 5 mg, 5 mg, Oral, Daily, Binta Lundy, APRN, 5 mg at 18 1709  •  zolpidem (AMBIEN) tablet 5 mg, 5 mg, Oral, Nightly PRN, Clinton Pfeiffer MD, 5 mg at 18 5038    Data:     Code Status:  Full Code    Family History   Problem Relation Age of Onset   • Heart attack Mother    • No Known Problems Father    • Colon cancer Neg Hx    • Colon polyps Neg Hx        Social History     Social History   • Marital status:      Spouse name: N/A   • Number of children: N/A   • Years of education: N/A     Occupational History   • Not on file.     Social History Main Topics   • Smoking status: Never Smoker   • Smokeless tobacco: Never Used   • Alcohol use No   • Drug use: Yes     Special: Hydrocodone   • Sexual activity: Defer     Other Topics Concern   • Not on file     Social History Narrative       Vitals:  /49 (BP Location: Left arm, Patient Position: Lying)  Pulse 66  Temp 98.2 °F (36.8 °C) (Temporal Artery )   Resp 18  Ht 175.3 cm (69\")  Wt 95.3 kg (210 lb)  SpO2 92%  BMI 31.01 kg/m2  Temp (24hrs), Av.3 °F (36.8 °C), Min:98.1 °F (36.7 °C), Max:98.6 °F (37 °C)      I/O (24Hr):    Intake/Output Summary (Last 24 hours) at 18 1420  Last data filed at 18 1156   Gross per 24 hour   Intake              700 ml   Output             2550 ml   Net            -1850 ml       Labs and imaging:      Recent Results (from the past 12 hour(s))   Protime-INR    Collection Time: 18  5:21 AM   Result Value Ref Range    Protime 16.5 (H) 11.9 - 14.6 Seconds    INR 1.29 (H) 0.91 - 1.09   Basic Metabolic Panel    Collection Time: 18  5:21 AM   Result Value Ref Range    Glucose 110 (H) 70 - 100 mg/dL    BUN 20 5 - 21 mg/dL    Creatinine 0.67 0.50 - 1.40 mg/dL    Sodium 138 135 - 145 mmol/L    Potassium 4.2 3.5 - 5.3 mmol/L    Chloride 95 (L) 98 - 110 mmol/L    CO2 35.0 (H) 24.0 - 31.0 " mmol/L    Calcium 10.0 8.4 - 10.4 mg/dL    eGFR Non African Amer 82 >60 mL/min/1.73    BUN/Creatinine Ratio 29.9 (H) 7.0 - 25.0    Anion Gap 8.0 4.0 - 13.0 mmol/L   CBC Auto Differential    Collection Time: 02/04/18  5:21 AM   Result Value Ref Range    WBC 14.28 (H) 4.80 - 10.80 10*3/mm3    RBC 4.07 (L) 4.20 - 5.40 10*6/mm3    Hemoglobin 11.1 (L) 12.0 - 16.0 g/dL    Hematocrit 35.5 (L) 37.0 - 47.0 %    MCV 87.2 82.0 - 98.0 fL    MCH 27.3 (L) 28.0 - 32.0 pg    MCHC 31.3 (L) 33.0 - 36.0 g/dL    RDW 15.9 (H) 12.0 - 15.0 %    RDW-SD 50.3 40.0 - 54.0 fl    MPV 9.6 6.0 - 12.0 fL    Platelets 380 130 - 400 10*3/mm3    Neutrophil % 65.6 39.0 - 78.0 %    Lymphocyte % 18.1 15.0 - 45.0 %    Monocyte % 7.6 4.0 - 12.0 %    Eosinophil % 3.9 0.0 - 4.0 %    Basophil % 0.6 0.0 - 2.0 %    Immature Grans % 4.2 0.0 - 5.0 %    Neutrophils, Absolute 9.39 (H) 1.87 - 8.40 10*3/mm3    Lymphocytes, Absolute 2.58 0.72 - 4.86 10*3/mm3    Monocytes, Absolute 1.08 0.19 - 1.30 10*3/mm3    Eosinophils, Absolute 0.55 0.00 - 0.70 10*3/mm3    Basophils, Absolute 0.08 0.00 - 0.20 10*3/mm3    Immature Grans, Absolute 0.60 (H) 0.00 - 0.03 10*3/mm3    nRBC 0.0 0.0 - 0.0 /100 WBC     Physical Examination:        Physical Exam   Constitutional: She is oriented to person, place, and time. She appears well-developed and well-nourished.   HENT:   Head: Normocephalic and atraumatic.   Nose: Nose normal.   Mouth/Throat: Oropharynx is clear and moist.   [place]   Eyes: Conjunctivae and EOM are normal. Pupils are equal, round, and reactive to light.   Neck: Normal range of motion. Neck supple.   Cardiovascular: Normal rate, regular rhythm, normal heart sounds and intact distal pulses.    Pulmonary/Chest: Effort normal. She has rhonchi. She has no rales.   Abdominal: Soft. Bowel sounds are normal.   Musculoskeletal:   Generalized weakness   Neurological: She is alert and oriented to person, place, and time. She has normal reflexes.   Skin: Skin is warm and dry.    Psychiatric: She has a normal mood and affect. Her behavior is normal.   Nursing note and vitals reviewed.    Assessment:        Active Problems:    Pneumonia of both lungs due to infectious organism    Past Medical History:   Diagnosis Date   • Allergic rhinitis    • Anxiety    • Anxiety disorder    • Arthritis    • Asthma    • Asthma    • Colon polyp    • Degenerative arthritis    • Dementia    • Dementia    • Depression    • Depression    • Diabetes mellitus    • Environmental allergies    • Hx of colonic polyps    • Hyperlipidemia    • Hypertension    • Neuropathy    • Osteoporosis    • Osteoporosis    • Pacemaker    • Peripheral neuropathy         Plan:        1. Bilateral HCAP  2. HTN  3. Anxiety  4. Chronic anticoagulation  5. Insomnia  6. HLD  7. Influenza A  8. Hypokalemia  9. Coumadin toxicity    Continue current treatment. Agree with dc of abx. Encourage activity. May need snf at dc.        Electronically signed by Clinton Pfeiffer MD on 2/4/2018 at 2:20 PM

## 2018-02-04 NOTE — PROGRESS NOTES
PULMONARY AND CRITICAL CARE PROGRESS NOTE - [facility]    Patient: Justina MARIE Mix  4/4/1925   MR# [id number]   Acct# [id number]  02/04/18   9:37 AM  Referring Provider: Clinton Pfeiffer MD    Chief Complaint: Dyspnea    Interval history: Patient c/o less shortness of breath in chest.  No additional complaints.  She is off bipap.  Review of Systems:   Review of Systems   Constitutional: Positive for fatigue. Negative for chills and fever.   Cardiovascular: Negative for chest pain.   Gastrointestinal: Negative for diarrhea, nausea and vomiting.   :    Physical Exam:  SpO2 Readings from Last 3 Encounters:   02/04/18 92%   10/30/17 92%   09/11/17 97%     Temp:  [97.7 °F (36.5 °C)-98.6 °F (37 °C)] 98.5 °F (36.9 °C)  Heart Rate:  [61-74] 72  Resp:  [18-20] 18  BP: (129-178)/(51-70) 178/61    Intake/Output Summary (Last 24 hours) at 02/04/18 0937  Last data filed at 02/04/18 0502   Gross per 24 hour   Intake              700 ml   Output             2550 ml   Net            -1850 ml     Physical Exam   Constitutional: She appears well-developed.   frail   HENT:   Nose: Nose normal.   Neck: Neck supple.   Pulmonary/Chest: Effort normal. No respiratory distress. She has no wheezes.   Abdominal: Soft.   :    Laboratory Data:    Results from last 7 days  Lab Units 02/04/18  0521 02/03/18  0558 02/02/18  0545   WBC 10*3/mm3 14.28* 14.02* 14.10*   HEMOGLOBIN g/dL 11.1* 11.1* 10.7*   PLATELETS 10*3/mm3 380 346 360       Results from last 7 days  Lab Units 02/04/18  0521 02/03/18  0558 02/02/18  0545   SODIUM mmol/L 138 140 138   POTASSIUM mmol/L 4.2 4.2 4.0   BUN mg/dL 20 20 19   CREATININE mg/dL 0.67 0.69 0.67   INR  1.29* 1.18* 1.18*       Results from last 7 days  Lab Units 01/28/18  1108   PH, ARTERIAL pH units 7.469*   PCO2, ARTERIAL mm Hg 48.6*   PO2 ART mm Hg 73.1*   FIO2 % 60     Blood Culture   Date Value Ref Range Status   01/23/2018 No growth at 3 days  Preliminary   01/23/2018 No growth at 3 days   Preliminary     Urine Culture   Date Value Ref Range Status   01/23/2018 >100,000 CFU/mL Streptococcus agalactiae (Group B) (A)  Final     Comment:     This organism is considered to be universally susceptible to penicillin.  No further antibiotic testing will be performed.     Pulmonary Assessment:    1. Acute hypoxemic/hypercapnic respiratory failure improved  2. Influenza A improved  3. Bilateral pneumonia improved    Recommend:     · Continue oxygen as needed nasal cannulae  · D/c antibiotics  · Signing off, available    Electronically signed by Joseph Reynoso MD, 02/04/18, 9:37 AM

## 2018-02-04 NOTE — PLAN OF CARE
Problem: Patient Care Overview (Adult)  Goal: Plan of Care Review  Outcome: Ongoing (interventions implemented as appropriate)   02/04/18 1833   Coping/Psychosocial Response Interventions   Plan Of Care Reviewed With patient   Patient Care Overview   Progress improving   Outcome Evaluation   Outcome Summary/Follow up Plan Pt up in chair, tolerated therapy well. Pain being controlled with scheduled and PRN PO pain medication, oxygen at 1L nasal cannula, tolerating well. Multiple visits from family members, none have any further questions or complaints at this time, will continue to monitor.      Goal: Adult Individualization and Mutuality  Outcome: Ongoing (interventions implemented as appropriate)    Goal: Discharge Needs Assessment  Outcome: Ongoing (interventions implemented as appropriate)      Problem: Fall Risk (Adult)  Goal: Identify Related Risk Factors and Signs and Symptoms  Outcome: Outcome(s) achieved Date Met: 02/04/18    Goal: Absence of Falls  Outcome: Ongoing (interventions implemented as appropriate)      Problem: Pneumonia (Adult)  Goal: Signs and Symptoms of Listed Potential Problems Will be Absent or Manageable (Pneumonia)  Outcome: Ongoing (interventions implemented as appropriate)      Problem: Infection, Risk/Actual (Adult)  Goal: Identify Related Risk Factors and Signs and Symptoms  Outcome: Ongoing (interventions implemented as appropriate)    Goal: Infection Prevention/Resolution  Outcome: Ongoing (interventions implemented as appropriate)

## 2018-02-04 NOTE — THERAPY PROGRESS REPORT/RE-CERT
Acute Care - Physical Therapy Progress Note  [facility]     Patient Name: Justina Bingham  : 1925  MRN: [id number]  Today's Date: 2018  Onset of Illness/Injury or Date of Surgery Date: 18  Date of Referral to PT: 18  Referring Physician: Brian    Admit Date: 2018    Visit Dx:    ICD-10-CM ICD-9-CM   1. Pneumonia of both lungs due to infectious organism, unspecified part of lung J18.9 483.8   2. Hypoxia R09.02 799.02   3. Elevated troponin R74.8 790.6   4. Sepsis, due to unspecified organism A41.9 038.9     995.91   5. Impaired functional mobility, balance, gait, and endurance Z74.09 V49.89   6. 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   • Closed displaced intertrochanteric fracture of left femur   • Pacemaker   • Pneumonia of both lungs due to infectious organism               Adult Rehabilitation Note       18 1412 18 1159 18 1326    Rehab Assessment/Intervention    Discipline occupational therapy assistant  -CJ physical therapy assistant  - physical therapy assistant  -    Document Type therapy note (daily note)  - therapy note (daily note)  - therapy note (daily note)  -    Subjective Information agree to therapy;complains of;weakness;fatigue;pain  -CJ agree to therapy;complains of;weakness;pain  - agree to therapy;complains of;weakness  -    Patient Effort, Rehab Treatment good  -CJ      Precautions/Limitations fall precautions;oxygen therapy device and L/min   Pt. now on 1.5-2L/o2 in room!  - fall precautions;oxygen therapy device and L/min  - fall precautions;oxygen therapy device and L/min  -    Precautions/Limitations, Hearing hearing aid, bilaterally  -CJ      Recorded by [CJ] MARISOL Langston/SHAILESH [LG] Anup Hui PTA [] Nelia Cheng, PTA    Vital Signs    Pre SpO2 (%)    96  -AH    O2 Delivery Pre Treatment   supplemental O2   6L hi sadaf  -AH    Post SpO2 (%)   97  -AH    O2 Delivery Post Treatment   supplemental O2   6L hiflo  -AH    Recorded by   [] Nelia Cheng PTA    Pain Assessment    Pain Assessment 0-10  - Singh-Doyle FACES  - Singh-Baker FACES  -AH    Singh-Doyle FACES Pain Rating 2  - 2  -LG 2  -AH    Pain Score 2  -      Pain Type Chronic pain  -CJ Chronic pain  - Chronic pain  -AH    Pain Location Back  - Generalized  - Generalized  -AH    Pain Descriptors Aching  -CJ Aching  - Aching  -AH    Pain Frequency Constant/continuous  -CJ Constant/continuous  -LG Constant/continuous  -    Pain Intervention(s) Rest  - Repositioned  - Repositioned  -    Response to Interventions  tolerated  - tolerated  -    Recorded by [] MARISOL Langston/SHAILESH [LG] Anup Hui PTA [] Nelia Cheng PTA    Cognitive Assessment/Intervention    Personal Safety Interventions  fall prevention program maintained;gait belt;nonskid shoes/slippers when out of bed  - fall prevention program maintained;gait belt;nonskid shoes/slippers when out of bed  -    Recorded by  [LG] Anup Hui PTA [] Nelia Cheng PTA    Bed Mobility, Assessment/Treatment    Bed Mob, Supine to Sit, [place]  verbal cues required;moderate assist (50% patient effort)  - moderate assist (50% patient effort);maximum assist (25% patient effort);verbal cues required  -    Bed Mob, Sit to Supine, [place]  not tested;other (see comments)   up in bedside chair  - --   chair  -    Bed Mobility, Safety Issues  decreased use of arms for pushing/pulling;decreased use of legs for bridging/pushing  -     Bed Mobility, Impairments  strength decreased;impaired balance  -     Bed Mobility, Comment fowlers in bed!  -      Recorded by [] MARISOL Langston/SHAILESH [LG] Anup Hui PTA [] Nelia Cheng PTA    Transfer Assessment/Treatment    Transfers, Bed-Chair  [place]   moderate assist (50% patient effort);maximum assist (25% patient effort);2 person assist required;verbal cues required  -    Transfers, Sit-Stand [place]  maximum assist (25% patient effort);2 person assist required  -LG moderate assist (50% patient effort);2 person assist required;verbal cues required  -    Transfers, Stand-Sit [place]  maximum assist (25% patient effort);2 person assist required  -LG moderate assist (50% patient effort);2 person assist required;verbal cues required  -    Transfers, Sit-Stand-Sit, Assist Device   rolling walker  -    Transfer, Impairments  strength decreased;impaired balance  -LG strength decreased;impaired balance;coordination impaired  -    Transfer, Comment  Stood and scooted feet to chair  - pt stood and scooted feet over to chair, pt did not pick feet up  -    Recorded by  [] Anup Hui PTA [] Nelia Cheng PTA    Therapy Exercises    Bilateral Lower Extremities   AROM:;10 reps;sitting  -    Bilateral Upper Extremity AROM:;20 reps;sitting;elbow flexion/extension;shoulder extension/flexion   3lb.bar, 2lb. dumb bells! No shd. flex exs!  -      Recorded by [] MARISOL Langston/SHAILESH  [] Nelia Cheng PTA    Positioning and Restraints    Pre-Treatment Position in bed  - sitting in chair/recliner  - in bed  -    Post Treatment Position bed  - chair  - chair  -    In Bed fowlers;call light within reach;encouraged to call for assist;with family/caregiver;side rails up x2  -      In Chair  reclined;legs elevated;call light within reach;encouraged to call for assist;notified nsg  - reclined;call light within reach;encouraged to call for assist;with family/caregiver;notified nsg;on mechanical lift sling  -    Recorded by [] MARISOL Langston/SHAILESH [] Anup Hui PTA [] Nelia Cheng PTA      User Key  (r) = Recorded By, (t) = Taken By, (c) = Cosigned By    Initials Name Effective Dates     Anup CRISOSTOMO  Korina, PTA 08/02/16 -      Nelia Cheng, PTA 08/02/16 -     CJ Michi CRISOSTOMO Beltran, DAMON/L 08/02/16 -                 IP PT Goals       02/04/18 1522 01/24/18 1304       Bed Mobility PT LTG    Bed Mobility PT LTG, Date Established  01/24/18  -PB (r) JM (t) PB (c)     Bed Mobility PT LTG, Time to Achieve  by discharge  -PB (r) JM (t) PB (c)     Bed Mobility PT LTG, Activity Type  all bed mobility  -PB (r) JM (t) PB (c)     Bed Mobility PT LTG, [place] Level  contact guard assist;conditional independence  -PB (r) JM (t) PB (c)     Bed Mobility PT Goal  LTG, Assist Device  bed rails  -PB (r) JM (t) PB (c)     Bed Mobility PT LTG, Outcome goal ongoing  -TB      Bed Mobility PT LTG, Reason Goal Not Met progress slower than expected  -TB      Transfer Training PT LTG    Transfer Training PT LTG, Date Established  01/24/18  -PB (r) JM (t) PB (c)     Transfer Training PT LTG, Time to Achieve  by discharge  -PB (r) JM (t) PB (c)     Transfer Training PT LTG, Activity Type  bed to chair /chair to bed;sit to stand/stand to sit  -PB (r) JM (t) PB (c)     Transfer Training PT LTG, [place] Level  minimum assist (75% patient effort)  -PB (r) JM (t) PB (c)     Transfer Training PT LTG, Assist Device  walker, rolling  -PB (r) JM (t) PB (c)     Transfer Training PT LTG, Additional Goal mod asst x 2 with rwx; heavy cues to stand erect adn move feet and not try to sit before ready  -TB      Transfer Training PT LTG, Outcome goal ongoing  -TB      Transfer Training PT LTG, Reason Goal Not Met progress slower than expected  -TB      Strength Goal PT LTG    Strength Goal PT LTG, Date Established  01/24/18  -PB (r) JM (t) PB (c)     Strength Goal PT LTG, Time to Achieve  by discharge  -PB (r) JM (t) PB (c)     Strength Goal PT LTG, Measure to Achieve  10 reps of LAQs, hip flexion, ankle pumps, ab/adduction sitting EOB against gravity  -PB (r) JM (t) PB (c)     Strength Goal PT LTG, Outcome goal ongoing  -TB      Strength Goal  PT LTG, Reason Goal Not Met progress slower than expected  -TB        User Key  (r) = Recorded By, (t) = Taken By, (c) = Cosigned By    Initials Name Provider Type    TB Julio Cesar Arzate, PT Physical Therapist    SEVERO Holland, PT DPT Physical Therapist    RITA Vizcarra, PT Student PT Student          Physical Therapy Education     Title: PT OT SLP Therapies (Active)     Topic: Physical Therapy (Active)     Point: Mobility training (Active)    Learning Progress Summary    Learner Readiness Method Response Comment Documented by Status   Patient Acceptance E NR  TB 02/04/18 1522 Active    Acceptance E,D NR Educated in benefits of activity, instructed in bed mobility, transfers LG 02/03/18 1159 Active    Acceptance E VU bed mobility  02/02/18 1401 Done    Acceptance E,D NR Educated on benefits of activity. Instructed in transfers, gait, and therapeutic exercises. Also eduated NSG to use mechanical lift to get pt from bedside chair back to bed.  02/01/18 0819 Active    Acceptance E NR Educated on benefits of activity and bed mobility  01/31/18 1534 Active    Acceptance E,D NR Plan of care, exercise, benefit of exercise  01/30/18 0942 Active    Acceptance E,D NR Educated on benefits of activity, instructed in bed moblity, sit to stand transfers, LE strengthening exercises.  01/29/18 1043 Active    Eager E NR  EC 01/27/18 1549 Active    Eager E VU poc, t/f's AE 01/26/18 1221 Done    Acceptance E VU Educated on benefits and role of PT and increased activity  01/24/18 1003 Done   Family Acceptance E NR  TB 02/04/18 1522 Active    Acceptance E,D NR Educated on benefits of activity, instructed in bed moblity, sit to stand transfers, LE strengthening exercises.  01/29/18 1043 Active   Caregiver Acceptance E VU Educated on benefits and role of PT and increased activity  01/24/18 1003 Done                      User Key     Initials Effective Dates Name Provider Type Discipline     08/02/16 -  Julio Cesar CASH  Huey, PT Physical Therapist PT    EC 08/02/16 -  John Maldonado, PTA Physical Therapy Assistant PT    AE 06/22/15 -  Rachel Griffith, PTA Physical Therapy Assistant PT    LG 08/02/16 -  Anup Hui, PTA Physical Therapy Assistant PT    AH 08/02/16 -  Nelia Cheng, PTA Physical Therapy Assistant PT    CW 06/22/15 -  Esperanza Hutton, PTA Physical Therapy Assistant PT    JM 01/10/18 -  Nadeem Vizcarra, PT Student PT Student PT                    PT Recommendation and Plan  Anticipated Equipment Needs At Discharge: front wheeled walker  Anticipated Discharge Disposition: skilled nursing facility, extended care facility  Planned Therapy Interventions: balance training, transfer training, bed mobility training, gait training, patient/family education (gait if able)  PT Frequency: daily, 2 times/day, per priority policy  Plan of Care Review  Plan Of Care Reviewed With: patient, family  Progress: progress towards functional goals is fair  Outcome Summary/Follow up Plan: Able to stand and pivot to chair. Needed strong cues to stand erect and not try to sit before ready. She is able to move her legs well in bed but fear tends to make her freeze. I feel she can progress once she starts to overcome this.          Outcome Measures       02/04/18 1500 02/04/18 1412 02/02/18 1400    How much help from another person do you currently need...    Turning from your back to your side while in flat bed without using bedrails? 3  -TB  2  -AH    Moving from lying on back to sitting on the side of a flat bed without bedrails? 3  -TB  2  -AH    Moving to and from a bed to a chair (including a wheelchair)? 2  -TB  2  -AH    Standing up from a chair using your arms (e.g., wheelchair, bedside chair)? 2  -TB  2  -AH    Climbing 3-5 steps with a railing? 1  -TB  1  -AH    To walk in hospital room? 1  -TB  1  -AH    AM-PAC 6 Clicks Score 12  -TB  10  -AH    How much help from another is currently needed...    Putting on and taking  off regular lower body clothing?  2  -CJ     Bathing (including washing, rinsing, and drying)  2  -CJ     Toileting (which includes using toilet bed pan or urinal)  2  -CJ     Putting on and taking off regular upper body clothing  2  -CJ     Taking care of personal grooming (such as brushing teeth)  3  -CJ     Eating meals  4  -CJ     Score  15  -     Functional Assessment    Outcome Measure Options AM-PAC 6 Clicks Basic Mobility (PT)  - AM-PAC 6 Clicks Daily Activity (OT)  - AM-PAC 6 Clicks Basic Mobility (PT)  -      02/02/18 1300          How much help from another is currently needed...    Putting on and taking off regular lower body clothing? 2  -TR      Bathing (including washing, rinsing, and drying) 2  -TR      Toileting (which includes using toilet bed pan or urinal) 2  -TR      Putting on and taking off regular upper body clothing 2  -TR      Taking care of personal grooming (such as brushing teeth) 3  -TR      Eating meals 4  -TR      Score 15  -TR      Functional Assessment    Outcome Measure Options AM-PAC 6 Clicks Daily Activity (OT)  -TR        User Key  (r) = Recorded By, (t) = Taken By, (c) = Cosigned By    Initials Name Provider Type    RHONA Arzate, PT Physical Therapist     Nelia Cheng PTA Physical Therapy Assistant    MARISOL Szymanski/SHAILESH Occupational Therapy Assistant    TR Luz Maria Meehan, OTR/L Occupational Therapist           Time Calculation:         PT Charges       02/04/18 1527          Time Calculation    Start Time 1445  -TB      Stop Time 1535  -TB      Time Calculation (min) 50 min  -TB      PT Received On 02/04/18  -TB      PT Goal Re-Cert Due Date 02/14/18  -TB      Time Calculation- PT    Total Timed Code Minutes- PT 30 minute(s)  -TB        User Key  (r) = Recorded By, (t) = Taken By, (c) = Cosigned By    Initials Name Provider Type    RHONA Arzate, PT Physical Therapist          Therapy Charges for Today     Code Description Service Date  Service Provider Modifiers Qty    80482414423 HC PT [facility] MAIN POS CURRENT 2/4/2018 Julio Cesar Arzate, PT GP, CL 1    82253405262 HC PT [facility] MAIN POS PROJECTED 2/4/2018 Julio Cesar Arzate, PT GP, CK 1    64798828134 HC PT RE-EVAL ESTABLISHED PLAN 2 2/4/2018 Julio Cesar Arzate, PT GP, KX 1    07406785050 HC PT THERAPEUTIC ACT EA 15 MIN 2/4/2018 Julio Cesar Arzate, PT GP, KX 2          PT G-Codes  Outcome Measure Options: AM-PAC 6 Clicks Basic Mobility (PT)  Score: 12  Functional Limitation: Changing and maintaining body position  Changing and Maintaining Body Position Current Status (): At least 60 percent but less than 80 percent impaired, limited or restricted  Changing and Maintaining Body Position Goal Status (): At least 40 percent but less than 60 percent impaired, limited or restricted    Julio Cesar Arzate, PT  2/4/2018

## 2018-02-05 VITALS
HEART RATE: 66 BPM | OXYGEN SATURATION: 94 % | SYSTOLIC BLOOD PRESSURE: 179 MMHG | TEMPERATURE: 98.9 F | HEIGHT: 69 IN | DIASTOLIC BLOOD PRESSURE: 60 MMHG | WEIGHT: 217 LBS | BODY MASS INDEX: 32.14 KG/M2 | RESPIRATION RATE: 18 BRPM

## 2018-02-05 LAB
ANION GAP SERPL CALCULATED.3IONS-SCNC: 8 MMOL/L (ref 4–13)
BASOPHILS # BLD AUTO: 0.08 10*3/MM3 (ref 0–0.2)
BASOPHILS NFR BLD AUTO: 0.6 % (ref 0–2)
BUN BLD-MCNC: 26 MG/DL (ref 5–21)
BUN/CREAT SERPL: 35.6 (ref 7–25)
CALCIUM SPEC-SCNC: 10 MG/DL (ref 8.4–10.4)
CHLORIDE SERPL-SCNC: 95 MMOL/L (ref 98–110)
CO2 SERPL-SCNC: 36 MMOL/L (ref 24–31)
CREAT BLD-MCNC: 0.73 MG/DL (ref 0.5–1.4)
DEPRECATED RDW RBC AUTO: 50.9 FL (ref 40–54)
EOSINOPHIL # BLD AUTO: 0.58 10*3/MM3 (ref 0–0.7)
EOSINOPHIL NFR BLD AUTO: 4.1 % (ref 0–4)
ERYTHROCYTE [DISTWIDTH] IN BLOOD BY AUTOMATED COUNT: 16 % (ref 12–15)
GFR SERPL CREATININE-BSD FRML MDRD: 75 ML/MIN/1.73
GLUCOSE BLD-MCNC: 113 MG/DL (ref 70–100)
HCT VFR BLD AUTO: 36.5 % (ref 37–47)
HGB BLD-MCNC: 11.5 G/DL (ref 12–16)
IMM GRANULOCYTES # BLD: 0.41 10*3/MM3 (ref 0–0.03)
IMM GRANULOCYTES NFR BLD: 2.9 % (ref 0–5)
INR PPP: 1.29 (ref 0.91–1.09)
LYMPHOCYTES # BLD AUTO: 2.85 10*3/MM3 (ref 0.72–4.86)
LYMPHOCYTES NFR BLD AUTO: 20 % (ref 15–45)
MCH RBC QN AUTO: 27.6 PG (ref 28–32)
MCHC RBC AUTO-ENTMCNC: 31.5 G/DL (ref 33–36)
MCV RBC AUTO: 87.5 FL (ref 82–98)
MONOCYTES # BLD AUTO: 1.05 10*3/MM3 (ref 0.19–1.3)
MONOCYTES NFR BLD AUTO: 7.4 % (ref 4–12)
NEUTROPHILS # BLD AUTO: 9.3 10*3/MM3 (ref 1.87–8.4)
NEUTROPHILS NFR BLD AUTO: 65 % (ref 39–78)
NRBC BLD MANUAL-RTO: 0 /100 WBC (ref 0–0)
PLATELET # BLD AUTO: 404 10*3/MM3 (ref 130–400)
PMV BLD AUTO: 10 FL (ref 6–12)
POTASSIUM BLD-SCNC: 4.4 MMOL/L (ref 3.5–5.3)
PROTHROMBIN TIME: 16.5 SECONDS (ref 11.9–14.6)
RBC # BLD AUTO: 4.17 10*6/MM3 (ref 4.2–5.4)
SODIUM BLD-SCNC: 139 MMOL/L (ref 135–145)
WBC NRBC COR # BLD: 14.27 10*3/MM3 (ref 4.8–10.8)

## 2018-02-05 PROCEDURE — 85025 COMPLETE CBC W/AUTO DIFF WBC: CPT | Performed by: INTERNAL MEDICINE

## 2018-02-05 PROCEDURE — 97110 THERAPEUTIC EXERCISES: CPT

## 2018-02-05 PROCEDURE — 85610 PROTHROMBIN TIME: CPT | Performed by: INTERNAL MEDICINE

## 2018-02-05 PROCEDURE — 97530 THERAPEUTIC ACTIVITIES: CPT

## 2018-02-05 PROCEDURE — 94799 UNLISTED PULMONARY SVC/PX: CPT

## 2018-02-05 PROCEDURE — 80048 BASIC METABOLIC PNL TOTAL CA: CPT | Performed by: INTERNAL MEDICINE

## 2018-02-05 PROCEDURE — 97535 SELF CARE MNGMENT TRAINING: CPT

## 2018-02-05 PROCEDURE — 94669 MECHANICAL CHEST WALL OSCILL: CPT

## 2018-02-05 PROCEDURE — 97116 GAIT TRAINING THERAPY: CPT

## 2018-02-05 PROCEDURE — 94660 CPAP INITIATION&MGMT: CPT

## 2018-02-05 RX ORDER — GUAIFENESIN 600 MG/1
1200 TABLET, EXTENDED RELEASE ORAL EVERY 12 HOURS SCHEDULED
Start: 2018-02-05 | End: 2019-05-03

## 2018-02-05 RX ORDER — WARFARIN SODIUM 5 MG/1
5 TABLET ORAL
Status: DISCONTINUED | OUTPATIENT
Start: 2018-02-06 | End: 2018-02-05 | Stop reason: HOSPADM

## 2018-02-05 RX ORDER — PSEUDOEPHEDRINE HCL 30 MG
100 TABLET ORAL 2 TIMES DAILY
Start: 2018-02-05 | End: 2019-05-03

## 2018-02-05 RX ORDER — WARFARIN SODIUM 7.5 MG/1
7.5 TABLET ORAL
Status: DISCONTINUED | OUTPATIENT
Start: 2018-02-05 | End: 2018-02-05 | Stop reason: HOSPADM

## 2018-02-05 RX ORDER — METOPROLOL TARTRATE 50 MG/1
50 TABLET, FILM COATED ORAL EVERY 12 HOURS SCHEDULED
Start: 2018-02-05 | End: 2020-06-08

## 2018-02-05 RX ORDER — AMLODIPINE BESYLATE 10 MG/1
10 TABLET ORAL DAILY
Start: 2018-02-06 | End: 2020-03-23

## 2018-02-05 RX ADMIN — PAROXETINE 20 MG: 20 TABLET, FILM COATED ORAL at 05:54

## 2018-02-05 RX ADMIN — DOXYCYCLINE 100 MG: 100 INJECTION, POWDER, LYOPHILIZED, FOR SOLUTION INTRAVENOUS at 08:56

## 2018-02-05 RX ADMIN — TRAMADOL HYDROCHLORIDE 50 MG: 50 TABLET, COATED ORAL at 09:12

## 2018-02-05 RX ADMIN — POTASSIUM CHLORIDE 10 MEQ: 750 CAPSULE, EXTENDED RELEASE ORAL at 08:57

## 2018-02-05 RX ADMIN — LISINOPRIL 40 MG: 20 TABLET ORAL at 08:57

## 2018-02-05 RX ADMIN — IPRATROPIUM BROMIDE AND ALBUTEROL SULFATE 3 ML: 2.5; .5 SOLUTION RESPIRATORY (INHALATION) at 10:39

## 2018-02-05 RX ADMIN — AMLODIPINE BESYLATE 10 MG: 10 TABLET ORAL at 08:56

## 2018-02-05 RX ADMIN — GABAPENTIN 300 MG: 300 CAPSULE ORAL at 08:58

## 2018-02-05 RX ADMIN — DOCUSATE SODIUM 100 MG: 100 CAPSULE ORAL at 08:56

## 2018-02-05 RX ADMIN — Medication 1 SPRAY: at 08:56

## 2018-02-05 RX ADMIN — ALPRAZOLAM 0.12 MG: 0.25 TABLET ORAL at 08:57

## 2018-02-05 RX ADMIN — METOPROLOL TARTRATE 50 MG: 50 TABLET ORAL at 08:57

## 2018-02-05 RX ADMIN — Medication 162 MG: at 08:56

## 2018-02-05 RX ADMIN — HYDRALAZINE HYDROCHLORIDE 50 MG: 50 TABLET ORAL at 05:54

## 2018-02-05 RX ADMIN — IPRATROPIUM BROMIDE AND ALBUTEROL SULFATE 3 ML: 2.5; .5 SOLUTION RESPIRATORY (INHALATION) at 07:37

## 2018-02-05 RX ADMIN — FUROSEMIDE 40 MG: 40 TABLET ORAL at 08:57

## 2018-02-05 RX ADMIN — TAMSULOSIN HYDROCHLORIDE 0.4 MG: 0.4 CAPSULE ORAL at 08:57

## 2018-02-05 RX ADMIN — FLUTICASONE PROPIONATE 1 SPRAY: 50 SPRAY, METERED NASAL at 08:56

## 2018-02-05 RX ADMIN — GUAIFENESIN 1200 MG: 600 TABLET, EXTENDED RELEASE ORAL at 08:57

## 2018-02-05 RX ADMIN — CHOLECALCIFEROL (VITAMIN D3) 25 MCG (1,000 UNIT) TABLET 1000 UNITS: TABLET at 08:56

## 2018-02-05 RX ADMIN — VITAMIN E CAP 400 UNIT 400 UNITS: 400 CAP at 08:57

## 2018-02-05 NOTE — NURSING NOTE
Report called to [facility] and given to EMMA Loo without further questions or concerns at this time.  This RN gave her my number in case questions later.Anabell Dugan RN

## 2018-02-05 NOTE — PROGRESS NOTES
Continued Stay Note  [facility]     Patient Name: Justina Bingham  MRN: [id number]  Today's Date: 2/5/2018    Admit Date: 1/23/2018          Discharge Plan       02/05/18 1110    Case Management/Social Work Plan    Plan [facility]    Final Note    Final Note PT IS BEING DISCHARGED TO [facility] [phone].  FAMILY IN ROOM AWARE AND PREFER PT TRAVEL VIA [facility] [phone].  SPOKE WITH KRYSTAL AT NH AND PT WILL BE ADMITTED AT [facility].               Discharge Codes     None        Expected Discharge Date and Time     Expected Discharge Date Expected Discharge Time    Feb 5, 2018             TAJ Mortensen

## 2018-02-05 NOTE — THERAPY TREATMENT NOTE
Acute Care - Physical Therapy Treatment Note  [facility]     Patient Name: Justina Bingham  : 1925  MRN: [id number]  Today's Date: 2018  Onset of Illness/Injury or Date of Surgery Date: 18  Date of Referral to PT: 18  Referring Physician: Brian    Admit Date: 2018    Visit Dx:    ICD-10-CM ICD-9-CM   1. Pneumonia of both lungs due to infectious organism, unspecified part of lung J18.9 483.8   2. Hypoxia R09.02 799.02   3. Elevated troponin R74.8 790.6   4. Sepsis, due to unspecified organism A41.9 038.9     995.91   5. Impaired functional mobility, balance, gait, and endurance Z74.09 V49.89   6. 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   • Closed displaced intertrochanteric fracture of left femur   • Pacemaker   • Pneumonia of both lungs due to infectious organism               Adult Rehabilitation Note       18 0901 18 0843 18 1412    Rehab Assessment/Intervention    Discipline physical therapy assistant  -LG occupational therapy assistant  -CJ occupational therapy assistant  -CJ    Document Type therapy note (daily note)  -LG therapy note (daily note)  -CJ therapy note (daily note)  -CJ    Subjective Information agree to therapy  -LG agree to therapy;complains of;weakness;fatigue;pain  -CJ agree to therapy;complains of;weakness;fatigue;pain  -CJ    Patient Effort, Rehab Treatment adequate  -LG adequate  -CJ good  -CJ    Precautions/Limitations fall precautions;oxygen therapy device and L/min  -LG fall precautions;oxygen therapy device and L/min  -CJ fall precautions;oxygen therapy device and L/min   Pt. now on 1.5-2L/o2 in room!  -CJ    Precautions/Limitations, Vision  corrective lenses needed at all times  -CJ     Precautions/Limitations, Hearing  hearing aid, bilaterally  -CJ hearing aid, bilaterally  -CJ     Recorded by [LG] Anup Hui PTA [CJ] MARISOL Langston/SHAILESH [CJ] JONAS LangstonA/L    Vital Signs    O2 Delivery Pre Treatment  supplemental O2   2L/o2  -CJ     O2 Delivery Intra Treatment  supplemental O2   2L/o2  -CJ     O2 Delivery Post Treatment  supplemental O2   2L/o2  -CJ     Pre Patient Position  Sitting  -CJ     Intra Patient Position  Sitting  -CJ     Post Patient Position  Sitting  -CJ     Recorded by  [CJ] MARISOL Langston/L     Pain Assessment    Pain Assessment 0-10  -LG 0-10  -CJ 0-10  -CJ    Singh-Doyle FACES Pain Rating 2  -LG 2  -CJ 2  -CJ    Pain Score 2  -LG 2  -CJ 2  -CJ    Pain Type Chronic pain  -LG Chronic pain  -CJ Chronic pain  -CJ    Pain Location Back  -LG Back  -CJ Back  -CJ    Pain Orientation  Left  -CJ     Pain Descriptors  Aching;Discomfort  -CJ Aching  -CJ    Pain Frequency  Constant/continuous  -CJ Constant/continuous  -CJ    Pain Intervention(s) Repositioned  -LG Repositioned;Rest  -CJ Rest  -CJ    Recorded by [LG] Anup Hui PTA [CJ] MARISOL Langston/SHAILESH [CJ] JONAS LangstonA/L    Bed Mobility, Assessment/Treatment    Bed Mob, Supine to Sit, [place] verbal cues required;moderate assist (50% patient effort)  -LG set up required;verbal cues required;moderate assist (50% patient effort)  -CJ     Bed Mobility, Comment   fowlers in bed!  -CJ    Recorded by [LG] Anup Hui PTA [CJ] MARISOL Langston/SHAILESH [CJ] JONAS LangstonA/L    Transfer Assessment/Treatment    Transfers, Sit-Stand [place] verbal cues required;minimum assist (75% patient effort);2 person assist required  -LG set up required;verbal cues required;minimum assist (75% patient effort);2 person assist required  -CJ     Transfers, Stand-Sit [place] verbal cues required;minimum assist (75% patient effort);2 person assist required  -LG set up required;verbal cues required;minimum assist (75% patient effort);2 person assist required  -CJ     Transfers,  Sit-Stand-Sit, Assist Device rolling walker  - rolling walker  -     Recorded by [LG] Anup Hui PTA [CJ] JORGE Langston     Gait Assessment/Treatment    Gait, [place] Level verbal cues required;minimum assist (75% patient effort);moderate assist (50% patient effort);2 person assist required  -      Gait, Assistive Device rolling walker  -      Gait, Distance (Feet) 2  -LG      Gait, Gait Pattern Analysis swing-to gait  -LG      Gait, Gait Deviations forward flexed posture;kyle decreased;step length decreased;stride length decreased;toe-to-floor clearance decreased  -      Gait, Safety Issues balance decreased during turns;step length decreased;weight-shifting ability decreased;supplemental O2  -      Gait, Impairments strength decreased;impaired balance;postural control impaired  -      Gait, Comment Pt able to stand and take short scooting steps from bed to chair  -      Recorded by [LG] Anup Hui PTA      Functional Mobility    Functional Mobility- Ind. Level  set up required;verbal cues required;minimum assist (75% patient effort);2 person assist required  -     Functional Mobility- Device  rolling walker  -     Functional Mobility-Distance (Feet)  2  -CJ     Recorded by  [CJ] MARISOL Langston/L     Lower Body Dressing Assessment/Training    LB Dressing Assess/Train, Clothing Type  doffing:;donning:;slipper socks  -     LB Dressing Assess/Train, Assist Device  reacher;sock-aid  -     LB Dressing Assess/Train, Position  sitting  -     LB Dressing Assess/Train, [place]  set up required;verbal cues required;moderate assist (50% patient effort)   Decreased vision inhibits pt. from being better!  -     Recorded by  [CJ] JORGE Langston     Therapy Exercises    Bilateral Lower Extremities AAROM:;20 reps;sitting;ankle pumps/circles;LAQ;hip flexion;hip abduction/adduction  -      Bilateral Upper Extremity   AROM:;20 reps;sitting;elbow  flexion/extension;shoulder extension/flexion   3lb.bar, 2lb. dumb bells! No shd. flex exs!  -    Recorded by [LG] Anup Hui PTA  [] MARISOL Langston/L    Positioning and Restraints    Pre-Treatment Position in bed  -LG in bed  -CJ in bed  -CJ    Post Treatment Position chair  -LG chair  -CJ bed  -CJ    In Bed   fowlers;call light within reach;encouraged to call for assist;with family/caregiver;side rails up x2  -CJ    In Chair reclined;legs elevated;call light within reach;encouraged to call for assist;notified nsg  -LG reclined;sitting;call light within reach;encouraged to call for assist;legs elevated  -     Recorded by [LG] Anup Hui PTA [CJ] MARISOL Langston/SHAILESH [CJ] MARISOL Langston/SHAILESH      02/03/18 1159 02/02/18 1326       Rehab Assessment/Intervention    Discipline physical therapy assistant  - physical therapy assistant  -     Document Type therapy note (daily note)  - therapy note (daily note)  -     Subjective Information agree to therapy;complains of;weakness;pain  - agree to therapy;complains of;weakness  -     Precautions/Limitations fall precautions;oxygen therapy device and L/min  - fall precautions;oxygen therapy device and L/min  -     Recorded by [LG] Anup Hui PTA [] Nelia Cheng PTA     Vital Signs    Pre SpO2 (%)  96  -AH     O2 Delivery Pre Treatment  supplemental O2   6L hi sadaf  -AH     Post SpO2 (%)  97  -AH     O2 Delivery Post Treatment  supplemental O2   6L hiflo  -     Recorded by  [] Nelia Cheng PTA     Pain Assessment    Pain Assessment Singh-Doyle FACES  -LG Singh-Baker FACES  -AH     Singh-Doyle FACES Pain Rating 2  -LG 2  -AH     Pain Type Chronic pain  -LG Chronic pain  -AH     Pain Location Generalized  -LG Generalized  -AH     Pain Descriptors Aching  -LG Aching  -     Pain Frequency Constant/continuous  -LG Constant/continuous  -AH     Pain Intervention(s) Repositioned  -LG Repositioned  -     Response to  Interventions tolerated  -LG tolerated  -     Recorded by [LG] Anup Hui PTA [] Nelia Cheng PTA     Cognitive Assessment/Intervention    Personal Safety Interventions fall prevention program maintained;gait belt;nonskid shoes/slippers when out of bed  - fall prevention program maintained;gait belt;nonskid shoes/slippers when out of bed  -     Recorded by [LG] Anup Hui PTA [] Nelia Cheng PTA     Bed Mobility, Assessment/Treatment    Bed Mob, Supine to Sit, [place] verbal cues required;moderate assist (50% patient effort)  - moderate assist (50% patient effort);maximum assist (25% patient effort);verbal cues required  -     Bed Mob, Sit to Supine, [place] not tested;other (see comments)   up in bedside chair  - --   chair  -     Bed Mobility, Safety Issues decreased use of arms for pushing/pulling;decreased use of legs for bridging/pushing  -      Bed Mobility, Impairments strength decreased;impaired balance  -      Recorded by [] Anup Hui PTA [] Nelia Cheng PTA     Transfer Assessment/Treatment    Transfers, Bed-Chair [place]  moderate assist (50% patient effort);maximum assist (25% patient effort);2 person assist required;verbal cues required  -     Transfers, Sit-Stand [place] maximum assist (25% patient effort);2 person assist required  - moderate assist (50% patient effort);2 person assist required;verbal cues required  -     Transfers, Stand-Sit [place] maximum assist (25% patient effort);2 person assist required  - moderate assist (50% patient effort);2 person assist required;verbal cues required  -     Transfers, Sit-Stand-Sit, Assist Device  rolling walker  -     Transfer, Impairments strength decreased;impaired balance  - strength decreased;impaired balance;coordination impaired  -     Transfer, Comment Stood and scooted feet to chair  - pt stood and scooted feet over to chair, pt did not pick feet up  -      Recorded by [] Anup Hui PTA [] Nelia Cheng PTA     Therapy Exercises    Bilateral Lower Extremities  AROM:;10 reps;sitting  -AH     Recorded by  [] Nelia Cheng PTA     Positioning and Restraints    Pre-Treatment Position sitting in chair/recliner  -LG in bed  -AH     Post Treatment Position chair  -LG chair  -AH     In Chair reclined;legs elevated;call light within reach;encouraged to call for assist;notified nsg  -LG reclined;call light within reach;encouraged to call for assist;with family/caregiver;notified nsg;on mechanical lift sling  -AH     Recorded by [] Anup Hui PTA [] Nelia Cheng PTA       User Key  (r) = Recorded By, (t) = Taken By, (c) = Cosigned By    Initials Name Effective Dates     Anup Hui, JOE 08/02/16 -      Nelia Cheng PTA 08/02/16 -      MARISOL Langston/SHAILESH 08/02/16 -                 IP PT Goals       02/04/18 1522 01/24/18 1304       Bed Mobility PT LTG    Bed Mobility PT LTG, Date Established  01/24/18  -PB (r) JM (t) PB (c)     Bed Mobility PT LTG, Time to Achieve  by discharge  -PB (r) JM (t) PB (c)     Bed Mobility PT LTG, Activity Type  all bed mobility  -PB (r) JM (t) PB (c)     Bed Mobility PT LTG, [place] Level  contact guard assist;conditional independence  -PB (r) JM (t) PB (c)     Bed Mobility PT Goal  LTG, Assist Device  bed rails  -PB (r) JM (t) PB (c)     Bed Mobility PT LTG, Outcome goal ongoing  -TB      Bed Mobility PT LTG, Reason Goal Not Met progress slower than expected  -TB      Transfer Training PT LTG    Transfer Training PT LTG, Date Established  01/24/18  -PB (r) JM (t) PB (c)     Transfer Training PT LTG, Time to Achieve  by discharge  -PB (r) JM (t) PB (c)     Transfer Training PT LTG, Activity Type  bed to chair /chair to bed;sit to stand/stand to sit  -PB (r) JM (t) PB (c)     Transfer Training PT LTG, [place] Level  minimum assist (75% patient effort)  -PB (r) JM (t) PB (c)     Transfer Training PT LTG,  Assist Device  walker, rolling  -PB (r) JM (t) PB (c)     Transfer Training PT LTG, Additional Goal mod asst x 2 with rwx; heavy cues to stand erect adn move feet and not try to sit before ready  -TB      Transfer Training PT LTG, Outcome goal ongoing  -TB      Transfer Training PT LTG, Reason Goal Not Met progress slower than expected  -TB      Strength Goal PT LTG    Strength Goal PT LTG, Date Established  01/24/18  -PB (r) JM (t) PB (c)     Strength Goal PT LTG, Time to Achieve  by discharge  -PB (r) JM (t) PB (c)     Strength Goal PT LTG, Measure to Achieve  10 reps of LAQs, hip flexion, ankle pumps, ab/adduction sitting EOB against gravity  -PB (r) JM (t) PB (c)     Strength Goal PT LTG, Outcome goal ongoing  -TB      Strength Goal PT LTG, Reason Goal Not Met progress slower than expected  -TB        User Key  (r) = Recorded By, (t) = Taken By, (c) = Cosigned By    Initials Name Provider Type    TB Julio Cesar Arzate, PT Physical Therapist    SEVERO Holland, PT DPT Physical Therapist    RITA Vizcarra, PT Student PT Student          Physical Therapy Education     Title: PT OT SLP Therapies (Active)     Topic: Physical Therapy (Resolved)     Point: Mobility training (Resolved)    Learning Progress Summary    Learner Readiness Method Response Comment Documented by Status   Patient Acceptance E NR  TB 02/04/18 1522 Active    Acceptance E,D NR Educated in benefits of activity, instructed in bed mobility, transfers LG 02/03/18 1159 Active    Acceptance E VU bed mobility  02/02/18 1401 Done    Acceptance E,D NR Educated on benefits of activity. Instructed in transfers, gait, and therapeutic exercises. Also eduated NSG to use mechanical lift to get pt from bedside chair back to bed. LG 02/01/18 0819 Active    Acceptance E NR Educated on benefits of activity and bed mobility LG 01/31/18 1534 Active    Acceptance E,D NR Plan of care, exercise, benefit of exercise  01/30/18 0942 Active    Acceptance E,D NR  Educated on benefits of activity, instructed in bed moblity, sit to stand transfers, LE strengthening exercises. LG 01/29/18 1043 Active    Eager E NR  EC 01/27/18 1549 Active    Eager E VU poc, t/f's AE 01/26/18 1221 Done    Acceptance E VU Educated on benefits and role of PT and increased activity  01/24/18 1003 Done   Family Acceptance E NR  TB 02/04/18 1522 Active    Acceptance E,D NR Educated on benefits of activity, instructed in bed moblity, sit to stand transfers, LE strengthening exercises. LG 01/29/18 1043 Active   Caregiver Acceptance E VU Educated on benefits and role of PT and increased activity  01/24/18 1003 Done                      User Key     Initials Effective Dates Name Provider Type Discipline     08/02/16 -  Julio Cesar Arzate, PT Physical Therapist PT    EC 08/02/16 -  John Maldonado, PTA Physical Therapy Assistant PT    AE 06/22/15 -  Rachel Griffith, PTA Physical Therapy Assistant PT    LG 08/02/16 -  Anup Hui, PTA Physical Therapy Assistant PT     08/02/16 -  Nelia Cheng, PTA Physical Therapy Assistant PT     06/22/15 -  Esperanza Hutton, PTA Physical Therapy Assistant PT     01/10/18 -  Nadeem Vizcarra, PT Student PT Student PT                    PT Recommendation and Plan  Anticipated Equipment Needs At Discharge: front wheeled walker  Anticipated Discharge Disposition: skilled nursing facility, extended care facility  Planned Therapy Interventions: balance training, transfer training, bed mobility training, gait training, patient/family education (gait if able)  PT Frequency: daily, 2 times/day, per priority policy  Plan of Care Review  Plan Of Care Reviewed With: patient, daughter  Progress: improving  Outcome Summary/Follow up Plan: Pt Mod A x 2 for Bed Mobility, Min x 2 for Sit to Stand, Min-Mod x 2 steps from bed to bedside chair, completed 20 reps  B LE exx. pt going to rehab today.          Outcome Measures       02/05/18 0941 02/05/18 0843 02/04/18 1500     How much help from another person do you currently need...    Turning from your back to your side while in flat bed without using bedrails? 3  -LG  3  -TB    Moving from lying on back to sitting on the side of a flat bed without bedrails? 3  -LG  3  -TB    Moving to and from a bed to a chair (including a wheelchair)? 2  -LG  2  -TB    Standing up from a chair using your arms (e.g., wheelchair, bedside chair)? 2  -LG  2  -TB    Climbing 3-5 steps with a railing? 1  -LG  1  -TB    To walk in hospital room? 1  -LG  1  -TB    AM-PAC 6 Clicks Score 12  -LG  12  -TB    How much help from another is currently needed...    Putting on and taking off regular lower body clothing?  2  -CJ     Bathing (including washing, rinsing, and drying)  2  -CJ     Toileting (which includes using toilet bed pan or urinal)  2  -CJ     Putting on and taking off regular upper body clothing  2  -CJ     Taking care of personal grooming (such as brushing teeth)  3  -CJ     Eating meals  4  -CJ     Score  15  -CJ     Functional Assessment    Outcome Measure Options AM-PAC 6 Clicks Basic Mobility (PT)  -LG AM-PAC 6 Clicks Daily Activity (OT)  - AM-PAC 6 Clicks Basic Mobility (PT)  -TB      02/04/18 1412 02/02/18 1400 02/02/18 1300    How much help from another person do you currently need...    Turning from your back to your side while in flat bed without using bedrails?  2  -AH     Moving from lying on back to sitting on the side of a flat bed without bedrails?  2  -AH     Moving to and from a bed to a chair (including a wheelchair)?  2  -AH     Standing up from a chair using your arms (e.g., wheelchair, bedside chair)?  2  -AH     Climbing 3-5 steps with a railing?  1  -AH     To walk in hospital room?  1  -AH     AM-PAC 6 Clicks Score  10  -AH     How much help from another is currently needed...    Putting on and taking off regular lower body clothing? 2  -CJ  2  -TR    Bathing (including washing, rinsing, and drying) 2  -CJ  2  -TR     Toileting (which includes using toilet bed pan or urinal) 2  -  2  -TR    Putting on and taking off regular upper body clothing 2  -  2  -TR    Taking care of personal grooming (such as brushing teeth) 3  -  3  -TR    Eating meals 4  -  4  -TR    Score 15  -  15  -TR    Functional Assessment    Outcome Measure Options AM-PAC 6 Clicks Daily Activity (OT)  - AM-PAC 6 Clicks Basic Mobility (PT)  - AM-PAC 6 Clicks Daily Activity (OT)  -      User Key  (r) = Recorded By, (t) = Taken By, (c) = Cosigned By    Initials Name Provider Type     Julio Cesar Arzate, PT Physical Therapist     Anup Hui PTA Physical Therapy Assistant     Nelia Cheng, PTA Physical Therapy Assistant     Michi Gong, DAMON/L Occupational Therapy Assistant    TR Luz Maria Meehan, OTR/L Occupational Therapist           Time Calculation:         PT Charges       02/05/18 0901          Time Calculation    Start Time 0901  -      Stop Time 0941  -      Time Calculation (min) 40 min  -      PT Received On 02/05/18  -      PT Goal Re-Cert Due Date 02/14/18  -      Time Calculation- PT    Total Timed Code Minutes- PT 40 minute(s)  -        User Key  (r) = Recorded By, (t) = Taken By, (c) = Cosigned By    Initials Name Provider Type    LG Anup Hui PTA Physical Therapy Assistant          Therapy Charges for Today     Code Description Service Date Service Provider Modifiers Qty    31981356806 HC PT THER SUPP EA 15 MIN 2/4/2018 Anup Hui PTA GP 2    49014711823 HC GAIT TRAINING EA 15 MIN 2/5/2018 Anup Hui PTA GP, KX 1    72947218804 HC PT THERAPEUTIC ACT EA 15 MIN 2/5/2018 Anup Hui PTA GP, KX 1    75351220589 HC PT THER PROC EA 15 MIN 2/5/2018 Anup Hui PTA GP, KX 1          PT G-Codes  Outcome Measure Options: AM-PAC 6 Clicks Basic Mobility (PT)  Score: 12  Functional Limitation: Changing and maintaining body position  Changing and Maintaining Body Position Current Status (): At least 60  percent but less than 80 percent impaired, limited or restricted  Changing and Maintaining Body Position Goal Status (): At least 40 percent but less than 60 percent impaired, limited or restricted    Anup Hui, PTA  2/5/2018

## 2018-02-05 NOTE — PLAN OF CARE
Problem: Patient Care Overview (Adult)  Goal: Plan of Care Review  Outcome: Ongoing (interventions implemented as appropriate)    Goal: Adult Individualization and Mutuality  Outcome: Ongoing (interventions implemented as appropriate)      Problem: Fall Risk (Adult)  Goal: Absence of Falls  Outcome: Ongoing (interventions implemented as appropriate)      Problem: Pneumonia (Adult)  Goal: Signs and Symptoms of Listed Potential Problems Will be Absent or Manageable (Pneumonia)  Outcome: Ongoing (interventions implemented as appropriate)      Problem: Infection, Risk/Actual (Adult)  Goal: Identify Related Risk Factors and Signs and Symptoms  Outcome: Ongoing (interventions implemented as appropriate)    Goal: Infection Prevention/Resolution  Outcome: Ongoing (interventions implemented as appropriate)

## 2018-02-05 NOTE — PLAN OF CARE
Problem: Patient Care Overview (Adult)  Goal: Plan of Care Review  Outcome: Ongoing (interventions implemented as appropriate)   02/05/18 0901   Coping/Psychosocial Response Interventions   Plan Of Care Reviewed With patient;daughter   Patient Care Overview   Progress improving   Outcome Evaluation   Outcome Summary/Follow up Plan Pt Mod A x 2 for Bed Mobility, Min x 2 for Sit to Stand, Min-Mod x 2 steps from bed to bedside chair, completed 20 reps B LE exx. pt going to rehab today.

## 2018-02-05 NOTE — PLAN OF CARE
Problem: Patient Care Overview (Adult)  Goal: Plan of Care Review  Outcome: Ongoing (interventions implemented as appropriate)   02/05/18 1058   Coping/Psychosocial Response Interventions   Plan Of Care Reviewed With patient   Patient Care Overview   Progress progress toward functional goals as expected   Outcome Evaluation   Outcome Summary/Follow up Plan Pt. is progressing as expected, no issues with tx, decreased vision causes problems with ability!

## 2018-02-05 NOTE — DISCHARGE SUMMARY
[facility]  Ruddy Pfeiffer M.D.  TIMOTHY Pfeiffer M.D.  WU Sawyer    Internal Medicine Discharge Summary    Patient ID: Justina Bingham  MRN: [id number]     Acct:  [id number]       Patient's PCP: Ruddy Pfeiffer MD    Admit Date: 1/23/2018     Discharge Date:   2/5/18    Admitting Physician: Clinton Pfeiffer MD    Discharge Physician: WU Burt     Active Discharge Diagnoses:  1. Bilateral HCAP  2. HTN  3. Anxiety  4. Chronic anticoagulation  5. Insomnia  6. HLD  7. Influenza A  8. Hypokalemia  9. Coumadin toxicity        Hospital Problems  Active Problems:    Pneumonia of both lungs due to infectious organism     Past Medical History:   Diagnosis Date   • Allergic rhinitis    • Anxiety    • Anxiety disorder    • Arthritis    • Asthma    • Asthma    • Colon polyp    • Degenerative arthritis    • Dementia    • Dementia    • Depression    • Depression    • Diabetes mellitus    • Environmental allergies    • Hx of colonic polyps    • Hyperlipidemia    • Hypertension    • Neuropathy    • Osteoporosis    • Osteoporosis    • Pacemaker    • Peripheral neuropathy        The patient was seen and examined on the day of discharge and this discharge summary is in conjunction with any daily progress note from day of discharge.    Code Status:  Full Code    Hospital Course: The patient had been in her usual state of health at the local SNF where she resides. She developed worsening cough and shortness of breath associated with confusion and hypoxia. She was transferred to ER for evaluation and treatment. Workup revealed leukocytosis and chest x ray evidence of pneumonia and possible volume overload. She has been treated with nebulizers and antibiotics as well as supplemental oxygen to which she has responded fairly well. She was admitted to our service and despite aggressive treatment, patient continued to decline. She required bipap therapy and hi flow oxygen. Pulmonology was  consulted. Aggressive pulmonary toilet and broad spectrum antibiotics were started. Initial flu swab on admission was negative, but repeat on hospital day 2 was positive for influenza A. Patient completed tamiflu therapy without difficulty. Patient developed coumadin toxicity with supratherapeutic INR. Coumadin was held and counts corrected. Patient is now tolerating supplemental oxygen at 2-3 lpm with sats in the 90s. She is progressing with therapy and is off antibiotics. She is now felt stable for return to SNF.     Consults:  Dr. Reynoso (pulmonology)    Disposition: SNF    Discharged Condition: Stable    Follow Up: Ruddy Pfeiffer MD  [address] [place] [address]  [phone]      1 week hospital follow up      Diet: Diet Regular; Cardiac    Discharge Medications:    Justina Bingham   Home Medication Instructions ELLEN:[id number]    Printed on:02/05/18 7585   Medication Information                      acetaminophen (TYLENOL) 325 MG tablet  Take 650 mg by mouth Every 4 (Four) Hours As Needed for Mild Pain .             ALPRAZolam (XANAX) 0.25 MG tablet  Take 0.125 mg by mouth Daily.             amLODIPine (NORVASC) 10 MG tablet  Take 1 tablet by mouth Daily.             aspirin 81 MG EC tablet  Take 162 mg by mouth Daily.             cholecalciferol (VITAMIN D3) 1000 units tablet  Take 1,000 Units by mouth Daily.             CloNIDine (CATAPRES) 0.1 MG tablet  Take 0.1 mg by mouth Every 4 (Four) Hours As Needed for High Blood Pressure (BP >180/100).             docusate sodium 100 MG capsule  Take 100 mg by mouth 2 (Two) Times a Day.             fluticasone (FLONASE) 50 MCG/ACT nasal spray  1 spray into each nostril 2 (Two) Times a Day.             furosemide (LASIX) 40 MG tablet  Take 40 mg by mouth Daily.             gabapentin (NEURONTIN) 300 MG capsule  Take 600 mg by mouth Every Night.             gabapentin (NEURONTIN) 300 MG capsule  Take 300 mg by mouth Daily.             guaiFENesin  (MUCINEX) 600 MG 12 hr tablet  Take 2 tablets by mouth Every 12 (Twelve) Hours.             hydrALAZINE (APRESOLINE) 25 MG tablet  Take 25 mg by mouth 2 (Two) Times a Day.             HYDROcodone-acetaminophen (NORCO) 5-325 MG per tablet  Take 1 tablet by mouth Every 6 (Six) Hours As Needed for Moderate Pain .             Hydrocortisone (ZOE'S AMAZING BUTT) cream  Apply 1 application topically As Needed (irritation).             lisinopril (PRINIVIL,ZESTRIL) 40 MG tablet  Take 40 mg by mouth Daily.             magnesium hydroxide (MILK OF MAGNESIA) 400 MG/5ML suspension  Take 30 mL by mouth At Night As Needed for Constipation.             melatonin 5 MG tablet tablet  Take 5 mg by mouth Every Night.             methylcellulose oral powder  Take 2 g by mouth Daily.             metoprolol tartrate (LOPRESSOR) 50 MG tablet  Take 1 tablet by mouth Every 12 (Twelve) Hours.             Multiple Vitamins-Minerals (MULTIVITAMIN ADULT PO)  Take 1 tablet by mouth Daily.             Omega-3 Fatty Acids (FISH OIL) 1000 MG capsule capsule  Take 1,000 mg by mouth 2 (Two) Times a Day With Meals.             PARoxetine (PAXIL) 20 MG tablet  Take 20 mg by mouth Every Morning.             potassium chloride (MICRO-K) 10 MEQ CR capsule  Take 10 mEq by mouth Daily.             simvastatin (ZOCOR) 20 MG tablet  Take 20 mg by mouth Every Night.             sodium chloride (OCEAN) 0.65 % nasal spray  1 spray into each nostril Daily.             tamsulosin (FLOMAX) 0.4 MG capsule 24 hr capsule  Take 1 capsule by mouth Daily.             traMADol (ULTRAM) 50 MG tablet  Take 50 mg by mouth Every 4 (Four) Hours.             vitamin E 400 UNIT capsule  Take 400 Units by mouth Daily.             warfarin (COUMADIN) 5 MG tablet  Take 5 mg by mouth Every Night.             zolpidem (AMBIEN) 5 MG tablet  Take 5 mg by mouth At Night As Needed for Sleep.                 Time Spent on discharge is  32 minutes in patient examination, evaluation,  patient/family counseling as well as medication reconciliation, prescriptions for required medications, discharge plan and follow up.     Electronically signed by WU Burt on 2/5/2018 at 9:31 AM   I have discussed the care of Justina Bingham, including pertinent history and exam findings, with the nurse practitioner.    I have seen and examined the patient and the key elements of all parts of the encounter have been performed by me.  I agree with the assessment, plan and orders as documented by Binta WASHBURN, after I modified the exam findings and the plan of treatments and the final version is my approved version of the assessment.        Electronically signed by Clinton Pfeiffer MD on 2/5/2018 at 12:11 PM

## 2018-02-06 NOTE — THERAPY DISCHARGE NOTE
Acute Care - Physical Therapy Discharge Summary  [facility]       Patient Name: Justina Bingham  : 1925  MRN: [id number]    Today's Date: 2018  Onset of Illness/Injury or Date of Surgery Date: 18    Date of Referral to PT: 18  Referring Physician: Brian      Admit Date: 2018      PT Recommendation and Plan    Visit Dx:    ICD-10-CM ICD-9-CM   1. Pneumonia of both lungs due to infectious organism, unspecified part of lung J18.9 483.8   2. Hypoxia R09.02 799.02   3. Elevated troponin R74.8 790.6   4. Sepsis, due to unspecified organism A41.9 038.9     995.91   5. Impaired functional mobility, balance, gait, and endurance Z74.09 V49.89   6. Impaired mobility and ADLs Z74.09 799.89             Outcome Measures       18 0941 18 0843 18 1500    How much help from another person do you currently need...    Turning from your back to your side while in flat bed without using bedrails? 3  -LG  3  -TB    Moving from lying on back to sitting on the side of a flat bed without bedrails? 3  -LG  3  -TB    Moving to and from a bed to a chair (including a wheelchair)? 2  -LG  2  -TB    Standing up from a chair using your arms (e.g., wheelchair, bedside chair)? 2  -LG  2  -TB    Climbing 3-5 steps with a railing? 1  -LG  1  -TB    To walk in hospital room? 1  -LG  1  -TB    AM-PAC 6 Clicks Score 12  -LG  12  -TB    How much help from another is currently needed...    Putting on and taking off regular lower body clothing?  2  -CJ     Bathing (including washing, rinsing, and drying)  2  -CJ     Toileting (which includes using toilet bed pan or urinal)  2  -CJ     Putting on and taking off regular upper body clothing  2  -CJ     Taking care of personal grooming (such as brushing teeth)  3  -CJ     Eating meals  4  -CJ     Score  15  -CJ     Functional Assessment    Outcome Measure Options AM-PAC 6 Clicks Basic Mobility (PT)  -LG AM-PAC 6 Clicks Daily Activity (OT)  -CJ AM-PAC 6 Clicks Basic  Mobility (PT)  -TB      02/04/18 1412          How much help from another is currently needed...    Putting on and taking off regular lower body clothing? 2  -CJ      Bathing (including washing, rinsing, and drying) 2  -CJ      Toileting (which includes using toilet bed pan or urinal) 2  -CJ      Putting on and taking off regular upper body clothing 2  -CJ      Taking care of personal grooming (such as brushing teeth) 3  -CJ      Eating meals 4  -CJ      Score 15  -      Functional Assessment    Outcome Measure Options AM-PAC 6 Clicks Daily Activity (OT)  -        User Key  (r) = Recorded By, (t) = Taken By, (c) = Cosigned By    Initials Name Provider Type    TB Julio Cesar Arzate, PT Physical Therapist    LG Anup Hui, PTA Physical Therapy Assistant    CJ Michi Gong, MARISOL/SHAILESH Occupational Therapy Assistant                      IP PT Goals       02/06/18 0841 02/04/18 1522 01/24/18 1304    Bed Mobility PT LTG    Bed Mobility PT LTG, Date Established   01/24/18  -PB (r) JM (t) PB (c)    Bed Mobility PT LTG, Time to Achieve   by discharge  -PB (r) JM (t) PB (c)    Bed Mobility PT LTG, Activity Type   all bed mobility  -PB (r) JM (t) PB (c)    Bed Mobility PT LTG, [place] Level   contact guard assist;conditional independence  -PB (r) JM (t) PB (c)    Bed Mobility PT Goal  LTG, Assist Device   bed rails  -PB (r) JM (t) PB (c)    Bed Mobility PT LTG, Date Goal Reviewed 02/06/18  -      Bed Mobility PT LTG, Outcome goal not met  - goal ongoing  -TB     Bed Mobility PT LTG, Reason Goal Not Met discharged from facility  - progress slower than expected  -TB     Transfer Training PT LTG    Transfer Training PT LTG, Date Established   01/24/18  -PB (r) JM (t) PB (c)    Transfer Training PT LTG, Time to Achieve   by discharge  -PB (r) JM (t) PB (c)    Transfer Training PT LTG, Activity Type   bed to chair /chair to bed;sit to stand/stand to sit  -PB (r) JM (t) PB (c)    Transfer Training PT LTG, [place]  Level   minimum assist (75% patient effort)  -PB (r) JM (t) PB (c)    Transfer Training PT LTG, Assist Device   walker, rolling  -PB (r) JM (t) PB (c)    Transfer Training PT LTG, Additional Goal  mod asst x 2 with rwx; heavy cues to stand erect adn move feet and not try to sit before ready  -TB     Transfer Training PT  LTG, Date Goal Reviewed 02/06/18  -      Transfer Training PT LTG, Outcome goal not met  - goal ongoing  -TB     Transfer Training PT LTG, Reason Goal Not Met discharged from facility  - progress slower than expected  -TB     Strength Goal PT LTG    Strength Goal PT LTG, Date Established   01/24/18  -PB (r) JM (t) PB (c)    Strength Goal PT LTG, Time to Achieve   by discharge  -PB (r) JM (t) PB (c)    Strength Goal PT LTG, Measure to Achieve   10 reps of LAQs, hip flexion, ankle pumps, ab/adduction sitting EOB against gravity  -PB (r) JM (t) PB (c)    Strength Goal PT LTG, Date Goal Reviewed 02/06/18  -      Strength Goal PT LTG, Outcome goal not met  - goal ongoing  -TB     Strength Goal PT LTG, Reason Goal Not Met discharged from facility  - progress slower than expected  -TB       User Key  (r) = Recorded By, (t) = Taken By, (c) = Cosigned By    Initials Name Provider Type    TB Julio Cesar Arzate, PT Physical Therapist     Zenia Kumar, PTA Physical Therapy Assistant    PB Cristi Holland, PT DPT Physical Therapist    RITA Vizcarra, PT Student PT Student              PT Discharge Summary  Anticipated Discharge Disposition: skilled nursing facility  Reason for Discharge: Discharge from facility  Outcomes Achieved: Unable to make functional progress toward goals at this time  Discharge Destination: SNF      Zenia Kumar, PTA   2/6/2018

## 2018-02-06 NOTE — THERAPY DISCHARGE NOTE
Acute Care - Occupational Therapy Discharge Summary  [facility]     Patient Name: Justina Bingham  : 1925  MRN: [id number]    Today's Date: 2018  Onset of Illness/Injury or Date of Surgery Date: 18    Date of Referral to OT: 18  Referring Physician: Brian      Admit Date: 2018        OT Recommendation and Plan    Visit Dx:    ICD-10-CM ICD-9-CM   1. Pneumonia of both lungs due to infectious organism, unspecified part of lung J18.9 483.8   2. Hypoxia R09.02 799.02   3. Elevated troponin R74.8 790.6   4. Sepsis, due to unspecified organism A41.9 038.9     995.91   5. Impaired functional mobility, balance, gait, and endurance Z74.09 V49.89   6. Impaired mobility and ADLs Z74.09 799.89                     OT Goals       18 0621 18 1342 18 1451    Transfer Training OT LTG    Transfer Training OT LTG, Date Goal Reviewed 18  -CJ 18  -TR     Transfer Training OT LTG, Outcome goal not met  - goal ongoing  -TR     Transfer Training OT LTG, Reason Goal Not Met discharged from facility  - progress slower than expected  -TR     Bathing OT LTG    Bathing Goal OT LTG, Activity Type  lower body bathing  -TR     Bathing Goal OT LTG, Assist Device   shower chair with back;shower head, detachable;sponge, long handled  -AC (r) MK (t) AC (c)    Bathing Goal OT LTG, Date Goal Reviewed 18  -CJ 18  -TR     Bathing Goal OT LTG, Outcome goal not met  - goal revised  -TR     Bathing Goal OT LTG, Reason Goal Not Met discharged from facility  - goals revised this date  -TR     ADL OT LTG    ADL OT LTG, Date Goal Reviewed 18  -CJ 18  -TR     ADL OT LTG, Outcome goal not met  - goal ongoing  -TR     ADL OT LTG, Reason Goal Not Met discharged from facility  - progress slower than expected  -TR       18 1010          Transfer Training OT LTG    Transfer Training OT LTG, Date Established 18  -AC (r) MK (t) AC (c)      Transfer Training OT LTG,  Time to Achieve by discharge  -AC (r) MK (t) AC (c)      Transfer Training OT LTG, Activity Type toilet;shower chair  -AC (r) MK (t) AC (c)      Transfer Training OT LTG, [place] Level moderate assist (50% patient effort)  -AC (r) MK (t) AC (c)      Transfer Training OT LTG, Additional Goal DME as needed  -AC (r) MK (t) AC (c)      Bathing OT LTG    Bathing Goal OT LTG, Date Established 01/24/18  -AC (r) MK (t) AC (c)      Bathing Goal OT LTG, Time to Achieve by discharge  -AC (r) MK (t) AC (c)      Bathing Goal OT LTG, Activity Type upper body bathing;lower body bathing  -AC (r) MK (t) AC (c)      Bathing Goal OT LTG, [place] Level moderate assist (50% patient effort)  -AC (r) MK (t) AC (c)      Bathing Goal OT LTG, Additional Goal DME as needed  -AC (r) MK (t) AC (c)      ADL OT LTG    ADL OT LTG, Date Established 01/24/18  -AC (r) MK (t) AC (c)      ADL OT LTG, Time to Achieve by discharge  -AC (r) MK (t) AC (c)      ADL OT LTG, [place] Level mod assist  -AC (r) MK (t) AC (c)      ADL OT LTG, Additional Goal UB/LB dressing in sitting with compensatory strategies as needed for vision impairment  -AC (r) MK (t) AC (c)        User Key  (r) = Recorded By, (t) = Taken By, (c) = Cosigned By    Initials Name Provider Type    AC Stuart Cabrera, OTR/L Occupational Therapist    JONAS SzymanskiA/L Occupational Therapy Assistant    MARYJANE Meehan, OTR/L Occupational Therapist    MAKENNA Atkins, OT Student OT Student                Outcome Measures       02/05/18 0941 02/05/18 0843 02/04/18 1500    How much help from another person do you currently need...    Turning from your back to your side while in flat bed without using bedrails? 3  -LG  3  -TB    Moving from lying on back to sitting on the side of a flat bed without bedrails? 3  -LG  3  -TB    Moving to and from a bed to a chair (including a wheelchair)? 2  -LG  2  -TB    Standing up from a chair using your arms (e.g., wheelchair, bedside  chair)? 2  -LG  2  -TB    Climbing 3-5 steps with a railing? 1  -LG  1  -TB    To walk in hospital room? 1  -LG  1  -TB    AM-PAC 6 Clicks Score 12  -LG  12  -TB    How much help from another is currently needed...    Putting on and taking off regular lower body clothing?  2  -CJ     Bathing (including washing, rinsing, and drying)  2  -CJ     Toileting (which includes using toilet bed pan or urinal)  2  -CJ     Putting on and taking off regular upper body clothing  2  -CJ     Taking care of personal grooming (such as brushing teeth)  3  -CJ     Eating meals  4  -     Score  15  -     Functional Assessment    Outcome Measure Options AM-PAC 6 Clicks Basic Mobility (PT)  -LG AM-PAC 6 Clicks Daily Activity (OT)  -CJ AM-PAC 6 Clicks Basic Mobility (PT)  -TB      02/04/18 1412          How much help from another is currently needed...    Putting on and taking off regular lower body clothing? 2  -CJ      Bathing (including washing, rinsing, and drying) 2  -CJ      Toileting (which includes using toilet bed pan or urinal) 2  -CJ      Putting on and taking off regular upper body clothing 2  -CJ      Taking care of personal grooming (such as brushing teeth) 3  -CJ      Eating meals 4  -      Score 15  -      Functional Assessment    Outcome Measure Options AM-PAC 6 Clicks Daily Activity (OT)  -        User Key  (r) = Recorded By, (t) = Taken By, (c) = Cosigned By    Initials Name Provider Type    TB Julio Cesar Arzate, PT Physical Therapist    LG Anup Hui, PTA Physical Therapy Assistant     MARISOL Langston/SHAILESH Occupational Therapy Assistant          Therapy Charges for Today     Code Description Service Date Service Provider Modifiers Qty    [id number]  OT SELF CARE/MGMT/TRAIN EA 15 MIN 2/5/2018 MARISOL Langston/L GO, KX 2          OT Discharge Summary  Reason for Discharge: Discharge from facility  Outcomes Achieved: Refer to plan of care for updates on goals achieved  Discharge Destination:  [facility]      Michi Gong, MARISOL/SHIALESH  2/6/2018

## 2018-02-06 NOTE — PLAN OF CARE
Problem: Inpatient Occupational Therapy  Goal: Transfer Training Goal 1 LTG- OT  Outcome: Unable to achieve outcome(s) by discharge Date Met: 02/06/18 01/24/18 1010 02/06/18 0621   Transfer Training OT LTG   Transfer Training OT LTG, Date Established 01/24/18 --    Transfer Training OT LTG, Time to Achieve by discharge --    Transfer Training OT LTG, Activity Type toilet;shower chair --    Transfer Training OT LTG, [place] Level moderate assist (50% patient effort) --    Transfer Training OT LTG, Additional Goal DME as needed --    Transfer Training OT LTG, Date Goal Reviewed --  02/06/18   Transfer Training OT LTG, Outcome --  goal not met   Transfer Training OT LTG, Reason Goal Not Met --  discharged from facility     Goal: Bathing Goal LTG- OT  Outcome: Unable to achieve outcome(s) by discharge Date Met: 02/06/18 01/24/18 1010 01/24/18 1451 02/02/18 1342   Bathing OT LTG   Bathing Goal OT LTG, Date Established 01/24/18 --  --    Bathing Goal OT LTG, Time to Achieve by discharge --  --    Bathing Goal OT LTG, Activity Type --  --  lower body bathing   Bathing Goal OT LTG, [place] Level moderate assist (50% patient effort) --  --    Bathing Goal OT LTG, Assist Device --  shower chair with back;shower head, detachable;sponge, long handled --    Bathing Goal OT LTG, Additional Goal DME as needed --  --    Bathing Goal OT LTG, Date Goal Reviewed --  --  --    Bathing Goal OT LTG, Outcome --  --  --    Bathing Goal OT LTG, Reason Goal Not Met --  --  --     02/06/18 0621   Bathing OT LTG   Bathing Goal OT LTG, Date Established --    Bathing Goal OT LTG, Time to Achieve --    Bathing Goal OT LTG, Activity Type --    Bathing Goal OT LTG, [place] Level --    Bathing Goal OT LTG, Assist Device --    Bathing Goal OT LTG, Additional Goal --    Bathing Goal OT LTG, Date Goal Reviewed 02/06/18   Bathing Goal OT LTG, Outcome goal not met   Bathing Goal OT LTG, Reason Goal Not Met discharged from facility      Goal: ADL Goal LTG- OT  Outcome: Unable to achieve outcome(s) by discharge Date Met: 02/06/18 01/24/18 1010 02/06/18 0621   ADL OT LTG   ADL OT LTG, Date Established 01/24/18 --    ADL OT LTG, Time to Achieve by discharge --    ADL OT LTG, [place] Level mod assist --    ADL OT LTG, Additional Goal UB/LB dressing in sitting with compensatory strategies as needed for vision impairment --    ADL OT LTG, Date Goal Reviewed --  02/06/18   ADL OT LTG, Outcome --  goal not met   ADL OT LTG, Reason Goal Not Met --  discharged from facility

## 2018-02-06 NOTE — PLAN OF CARE
Problem: Inpatient Physical Therapy  Goal: Bed Mobility Goal LTG- PT  Outcome: Unable to achieve outcome(s) by discharge Date Met: 02/06/18 02/06/18 0841   Bed Mobility PT LTG   Bed Mobility PT LTG, Date Goal Reviewed 02/06/18   Bed Mobility PT LTG, Outcome goal not met   Bed Mobility PT LTG, Reason Goal Not Met discharged from facility     Goal: Transfer Training Goal 1 LTG- PT  Outcome: Unable to achieve outcome(s) by discharge Date Met: 02/06/18 02/06/18 0841   Transfer Training PT LTG   Transfer Training PT LTG, Date Goal Reviewed 02/06/18   Transfer Training PT LTG, Outcome goal not met   Transfer Training PT LTG, Reason Goal Not Met discharged from facility     Goal: Strength Goal LTG- PT  Outcome: Unable to achieve outcome(s) by discharge Date Met: 02/06/18 02/06/18 0841   Strength Goal PT LTG   Strength Goal PT LTG, Date Goal Reviewed 02/06/18   Strength Goal PT LTG, Outcome goal not met   Strength Goal PT LTG, Reason Goal Not Met discharged from facility

## 2018-03-06 ENCOUNTER — HOSPITAL ENCOUNTER (INPATIENT)
Age: 83
LOS: 2 days | Discharge: HOME | DRG: 871 | End: 2018-03-09
Attending: EMERGENCY MEDICINE | Admitting: INTERNAL MEDICINE
Payer: MEDICARE

## 2018-03-06 ENCOUNTER — APPOINTMENT (OUTPATIENT)
Dept: GENERAL RADIOLOGY | Age: 83
DRG: 871 | End: 2018-03-06
Payer: MEDICARE

## 2018-03-06 DIAGNOSIS — R09.02 HYPOXIA: ICD-10-CM

## 2018-03-06 DIAGNOSIS — J18.9 PNEUMONIA OF LEFT LOWER LOBE DUE TO INFECTIOUS ORGANISM: Primary | ICD-10-CM

## 2018-03-06 LAB
ALBUMIN SERPL-MCNC: 3.4 G/DL (ref 3.5–5.2)
ALP BLD-CCNC: 84 U/L (ref 35–104)
ALT SERPL-CCNC: 9 U/L (ref 5–33)
ANION GAP SERPL CALCULATED.3IONS-SCNC: 14 MMOL/L (ref 7–19)
APTT: 53.2 SEC (ref 26–36.2)
AST SERPL-CCNC: 12 U/L (ref 5–32)
BASE EXCESS ARTERIAL: 4.1 MMOL/L (ref -2–2)
BASOPHILS ABSOLUTE: 0.1 K/UL (ref 0–0.2)
BASOPHILS RELATIVE PERCENT: 0.5 % (ref 0–1)
BILIRUB SERPL-MCNC: 0.3 MG/DL (ref 0.2–1.2)
BUN BLDV-MCNC: 19 MG/DL (ref 8–23)
CALCIUM SERPL-MCNC: 9.7 MG/DL (ref 8.2–9.6)
CARBOXYHEMOGLOBIN ARTERIAL: 2.9 % (ref 0–5)
CHLORIDE BLD-SCNC: 92 MMOL/L (ref 98–111)
CO2: 29 MMOL/L (ref 22–29)
CREAT SERPL-MCNC: 0.9 MG/DL (ref 0.5–0.9)
EOSINOPHILS ABSOLUTE: 0.5 K/UL (ref 0–0.6)
EOSINOPHILS RELATIVE PERCENT: 2.3 % (ref 0–5)
GFR NON-AFRICAN AMERICAN: 58
GLUCOSE BLD-MCNC: 169 MG/DL (ref 74–109)
HCO3 ARTERIAL: 30.2 MMOL/L (ref 22–26)
HCT VFR BLD CALC: 37.4 % (ref 37–47)
HEMOGLOBIN, ART, EXTENDED: 11.7 G/DL (ref 12–16)
HEMOGLOBIN: 11.7 G/DL (ref 12–16)
INR BLD: 1.92 (ref 0.88–1.18)
LYMPHOCYTES ABSOLUTE: 3.7 K/UL (ref 1.1–4.5)
LYMPHOCYTES RELATIVE PERCENT: 19.1 % (ref 20–40)
MCH RBC QN AUTO: 28.1 PG (ref 27–31)
MCHC RBC AUTO-ENTMCNC: 31.3 G/DL (ref 33–37)
MCV RBC AUTO: 89.7 FL (ref 81–99)
METHEMOGLOBIN ARTERIAL: 1 %
MONOCYTES ABSOLUTE: 1.6 K/UL (ref 0–0.9)
MONOCYTES RELATIVE PERCENT: 8.3 % (ref 0–10)
NEUTROPHILS ABSOLUTE: 13.2 K/UL (ref 1.5–7.5)
NEUTROPHILS RELATIVE PERCENT: 68.9 % (ref 50–65)
O2 CONTENT ARTERIAL: 14.6 ML/DL
O2 SAT, ARTERIAL: 88.8 %
O2 THERAPY: ABNORMAL
PCO2 ARTERIAL: 51 MMHG (ref 35–45)
PDW BLD-RTO: 16.2 % (ref 11.5–14.5)
PERFORMED ON: NORMAL
PH ARTERIAL: 7.38 (ref 7.35–7.45)
PLATELET # BLD: 224 K/UL (ref 130–400)
PMV BLD AUTO: 9.8 FL (ref 9.4–12.3)
PO2 ARTERIAL: 54 MMHG (ref 80–100)
POC TROPONIN I: 0.04 NG/ML (ref 0–0.08)
POTASSIUM SERPL-SCNC: 4.3 MMOL/L (ref 3.5–5)
POTASSIUM, WHOLE BLOOD: 4.1
PRO-BNP: 515 PG/ML (ref 0–1800)
PROTHROMBIN TIME: 22 SEC (ref 12–14.6)
RBC # BLD: 4.17 M/UL (ref 4.2–5.4)
SODIUM BLD-SCNC: 135 MMOL/L (ref 136–145)
TOTAL PROTEIN: 7.5 G/DL (ref 6.6–8.7)
WBC # BLD: 19.2 K/UL (ref 4.8–10.8)

## 2018-03-06 PROCEDURE — 2700000000 HC OXYGEN THERAPY PER DAY

## 2018-03-06 PROCEDURE — 6360000002 HC RX W HCPCS: Performed by: EMERGENCY MEDICINE

## 2018-03-06 PROCEDURE — 85610 PROTHROMBIN TIME: CPT

## 2018-03-06 PROCEDURE — 94640 AIRWAY INHALATION TREATMENT: CPT

## 2018-03-06 PROCEDURE — 80053 COMPREHEN METABOLIC PANEL: CPT

## 2018-03-06 PROCEDURE — 99285 EMERGENCY DEPT VISIT HI MDM: CPT

## 2018-03-06 PROCEDURE — 96375 TX/PRO/DX INJ NEW DRUG ADDON: CPT

## 2018-03-06 PROCEDURE — 36600 WITHDRAWAL OF ARTERIAL BLOOD: CPT

## 2018-03-06 PROCEDURE — 96365 THER/PROPH/DIAG IV INF INIT: CPT

## 2018-03-06 PROCEDURE — 94660 CPAP INITIATION&MGMT: CPT

## 2018-03-06 PROCEDURE — 71045 X-RAY EXAM CHEST 1 VIEW: CPT

## 2018-03-06 PROCEDURE — 84132 ASSAY OF SERUM POTASSIUM: CPT

## 2018-03-06 PROCEDURE — 99285 EMERGENCY DEPT VISIT HI MDM: CPT | Performed by: EMERGENCY MEDICINE

## 2018-03-06 PROCEDURE — 36415 COLL VENOUS BLD VENIPUNCTURE: CPT

## 2018-03-06 PROCEDURE — 83880 ASSAY OF NATRIURETIC PEPTIDE: CPT

## 2018-03-06 PROCEDURE — 82803 BLOOD GASES ANY COMBINATION: CPT

## 2018-03-06 PROCEDURE — 87040 BLOOD CULTURE FOR BACTERIA: CPT

## 2018-03-06 PROCEDURE — 84484 ASSAY OF TROPONIN QUANT: CPT

## 2018-03-06 PROCEDURE — 85025 COMPLETE CBC W/AUTO DIFF WBC: CPT

## 2018-03-06 PROCEDURE — 93005 ELECTROCARDIOGRAM TRACING: CPT

## 2018-03-06 PROCEDURE — 85730 THROMBOPLASTIN TIME PARTIAL: CPT

## 2018-03-06 RX ORDER — ASPIRIN 81 MG/1
162 TABLET, CHEWABLE ORAL DAILY
Status: ON HOLD | COMMUNITY
End: 2021-01-27 | Stop reason: SDUPTHER

## 2018-03-06 RX ORDER — DOCUSATE SODIUM 100 MG/1
100 CAPSULE, LIQUID FILLED ORAL 2 TIMES DAILY
COMMUNITY

## 2018-03-06 RX ORDER — LISINOPRIL 40 MG/1
40 TABLET ORAL DAILY
COMMUNITY

## 2018-03-06 RX ORDER — FUROSEMIDE 40 MG/1
40 TABLET ORAL DAILY
COMMUNITY
End: 2021-01-24 | Stop reason: ALTCHOICE

## 2018-03-06 RX ORDER — FLUTICASONE PROPIONATE 50 MCG
1 SPRAY, SUSPENSION (ML) NASAL PRN
COMMUNITY

## 2018-03-06 RX ORDER — SIMVASTATIN 20 MG
20 TABLET ORAL NIGHTLY
COMMUNITY
End: 2021-02-05

## 2018-03-06 RX ORDER — TAMSULOSIN HYDROCHLORIDE 0.4 MG/1
0.4 CAPSULE ORAL DAILY
COMMUNITY
End: 2021-01-24 | Stop reason: ALTCHOICE

## 2018-03-06 RX ORDER — TRAMADOL HYDROCHLORIDE 50 MG/1
50 TABLET ORAL EVERY 4 HOURS PRN
Status: ON HOLD | COMMUNITY
End: 2021-01-27 | Stop reason: HOSPADM

## 2018-03-06 RX ORDER — ACETAMINOPHEN 325 MG/1
650 TABLET ORAL EVERY 6 HOURS PRN
COMMUNITY

## 2018-03-06 RX ORDER — METHYLCELLULOSE 4000CPS 30 %
2 POWDER (GRAM) MISCELLANEOUS DAILY
COMMUNITY

## 2018-03-06 RX ORDER — CLONIDINE HYDROCHLORIDE 0.1 MG/1
0.1 TABLET ORAL EVERY 4 HOURS PRN
COMMUNITY
End: 2021-02-05

## 2018-03-06 RX ORDER — ALPRAZOLAM 0.25 MG/1
0.12 TABLET ORAL DAILY
Status: ON HOLD | COMMUNITY
End: 2021-01-27 | Stop reason: SDUPTHER

## 2018-03-06 RX ORDER — CHOLECALCIFEROL (VITAMIN D3) 125 MCG
5 CAPSULE ORAL NIGHTLY
COMMUNITY
End: 2021-01-24 | Stop reason: ALTCHOICE

## 2018-03-06 RX ORDER — METHYLPREDNISOLONE SODIUM SUCCINATE 125 MG/2ML
125 INJECTION, POWDER, LYOPHILIZED, FOR SOLUTION INTRAMUSCULAR; INTRAVENOUS ONCE
Status: COMPLETED | OUTPATIENT
Start: 2018-03-06 | End: 2018-03-06

## 2018-03-06 RX ORDER — ALBUTEROL SULFATE 2.5 MG/3ML
2.5 SOLUTION RESPIRATORY (INHALATION) EVERY 4 HOURS PRN
Status: DISCONTINUED | OUTPATIENT
Start: 2018-03-06 | End: 2018-03-09 | Stop reason: HOSPADM

## 2018-03-06 RX ORDER — GUAIFENESIN 600 MG/1
1200 TABLET, EXTENDED RELEASE ORAL 2 TIMES DAILY
COMMUNITY

## 2018-03-06 RX ORDER — METOPROLOL TARTRATE 50 MG/1
50 TABLET, FILM COATED ORAL 2 TIMES DAILY
COMMUNITY

## 2018-03-06 RX ORDER — PAROXETINE HYDROCHLORIDE 20 MG/1
20 TABLET, FILM COATED ORAL EVERY MORNING
COMMUNITY

## 2018-03-06 RX ORDER — IPRATROPIUM BROMIDE AND ALBUTEROL SULFATE 2.5; .5 MG/3ML; MG/3ML
1 SOLUTION RESPIRATORY (INHALATION) EVERY 6 HOURS PRN
COMMUNITY

## 2018-03-06 RX ORDER — AMLODIPINE BESYLATE 10 MG/1
10 TABLET ORAL DAILY
COMMUNITY

## 2018-03-06 RX ORDER — HYDRALAZINE HYDROCHLORIDE 25 MG/1
25 TABLET, FILM COATED ORAL 2 TIMES DAILY
COMMUNITY

## 2018-03-06 RX ORDER — POTASSIUM CHLORIDE 750 MG/1
10 CAPSULE, EXTENDED RELEASE ORAL DAILY
COMMUNITY
End: 2021-01-24 | Stop reason: ALTCHOICE

## 2018-03-06 RX ORDER — HYDROCODONE BITARTRATE AND ACETAMINOPHEN 5; 325 MG/1; MG/1
1 TABLET ORAL EVERY 6 HOURS PRN
Status: ON HOLD | COMMUNITY
End: 2021-01-27 | Stop reason: HOSPADM

## 2018-03-06 RX ORDER — LEVOFLOXACIN 5 MG/ML
750 INJECTION, SOLUTION INTRAVENOUS ONCE
Status: COMPLETED | OUTPATIENT
Start: 2018-03-06 | End: 2018-03-07

## 2018-03-06 RX ORDER — GABAPENTIN 300 MG/1
CAPSULE ORAL
COMMUNITY
End: 2021-01-24

## 2018-03-06 RX ORDER — CHLORAL HYDRATE 500 MG
CAPSULE ORAL 2 TIMES DAILY
COMMUNITY

## 2018-03-06 RX ORDER — VITAMIN E 268 MG
400 CAPSULE ORAL DAILY
COMMUNITY

## 2018-03-06 RX ORDER — ZOLPIDEM TARTRATE 5 MG/1
5 TABLET ORAL NIGHTLY PRN
COMMUNITY

## 2018-03-06 RX ADMIN — IPRATROPIUM BROMIDE 0.5 MG: 0.5 SOLUTION RESPIRATORY (INHALATION) at 21:33

## 2018-03-06 RX ADMIN — ALBUTEROL SULFATE 2.5 MG: 2.5 SOLUTION RESPIRATORY (INHALATION) at 21:33

## 2018-03-06 RX ADMIN — METHYLPREDNISOLONE SODIUM SUCCINATE 125 MG: 125 INJECTION, POWDER, FOR SOLUTION INTRAMUSCULAR; INTRAVENOUS at 21:30

## 2018-03-06 RX ADMIN — LEVOFLOXACIN 750 MG: 5 INJECTION, SOLUTION INTRAVENOUS at 23:13

## 2018-03-06 ASSESSMENT — ENCOUNTER SYMPTOMS
ABDOMINAL DISTENTION: 0
COLOR CHANGE: 0
SORE THROAT: 0
VOMITING: 0
CONSTIPATION: 0
RHINORRHEA: 0
SHORTNESS OF BREATH: 1
NAUSEA: 0
COUGH: 1
WHEEZING: 0
EYE PAIN: 0
DIARRHEA: 0
BACK PAIN: 0
PHOTOPHOBIA: 0
CHEST TIGHTNESS: 0
TROUBLE SWALLOWING: 0
ABDOMINAL PAIN: 0

## 2018-03-07 PROBLEM — J15.9 HEALTHCARE ASSOCIATED BACTERIAL PNEUMONIA: Status: ACTIVE | Noted: 2018-03-07

## 2018-03-07 PROBLEM — J18.9 PNEUMONIA: Status: ACTIVE | Noted: 2018-03-07

## 2018-03-07 PROCEDURE — 1210000000 HC MED SURG R&B

## 2018-03-07 PROCEDURE — 2580000003 HC RX 258: Performed by: INTERNAL MEDICINE

## 2018-03-07 PROCEDURE — 6370000000 HC RX 637 (ALT 250 FOR IP): Performed by: INTERNAL MEDICINE

## 2018-03-07 PROCEDURE — 6360000002 HC RX W HCPCS: Performed by: INTERNAL MEDICINE

## 2018-03-07 PROCEDURE — 94660 CPAP INITIATION&MGMT: CPT

## 2018-03-07 PROCEDURE — 2700000000 HC OXYGEN THERAPY PER DAY

## 2018-03-07 RX ORDER — IPRATROPIUM BROMIDE AND ALBUTEROL SULFATE 2.5; .5 MG/3ML; MG/3ML
1 SOLUTION RESPIRATORY (INHALATION) EVERY 6 HOURS PRN
Status: DISCONTINUED | OUTPATIENT
Start: 2018-03-07 | End: 2018-03-09 | Stop reason: HOSPADM

## 2018-03-07 RX ORDER — ASPIRIN 81 MG/1
162 TABLET, CHEWABLE ORAL DAILY
Status: DISCONTINUED | OUTPATIENT
Start: 2018-03-07 | End: 2018-03-09 | Stop reason: HOSPADM

## 2018-03-07 RX ORDER — FUROSEMIDE 40 MG/1
40 TABLET ORAL DAILY
Status: DISCONTINUED | OUTPATIENT
Start: 2018-03-07 | End: 2018-03-09 | Stop reason: HOSPADM

## 2018-03-07 RX ORDER — TRAMADOL HYDROCHLORIDE 50 MG/1
50 TABLET ORAL EVERY 4 HOURS PRN
Status: DISCONTINUED | OUTPATIENT
Start: 2018-03-07 | End: 2018-03-09 | Stop reason: HOSPADM

## 2018-03-07 RX ORDER — CLONIDINE HYDROCHLORIDE 0.1 MG/1
0.1 TABLET ORAL EVERY 4 HOURS PRN
Status: DISCONTINUED | OUTPATIENT
Start: 2018-03-07 | End: 2018-03-09 | Stop reason: HOSPADM

## 2018-03-07 RX ORDER — LEVOFLOXACIN 5 MG/ML
500 INJECTION, SOLUTION INTRAVENOUS EVERY 24 HOURS
Status: DISCONTINUED | OUTPATIENT
Start: 2018-03-07 | End: 2018-03-09 | Stop reason: HOSPADM

## 2018-03-07 RX ORDER — GUAIFENESIN 600 MG/1
1200 TABLET, EXTENDED RELEASE ORAL 2 TIMES DAILY
Status: DISCONTINUED | OUTPATIENT
Start: 2018-03-07 | End: 2018-03-09 | Stop reason: HOSPADM

## 2018-03-07 RX ORDER — ACETAMINOPHEN 325 MG/1
650 TABLET ORAL EVERY 4 HOURS PRN
Status: DISCONTINUED | OUTPATIENT
Start: 2018-03-07 | End: 2018-03-09 | Stop reason: HOSPADM

## 2018-03-07 RX ORDER — POTASSIUM CHLORIDE 750 MG/1
10 TABLET, EXTENDED RELEASE ORAL DAILY
Status: DISCONTINUED | OUTPATIENT
Start: 2018-03-07 | End: 2018-03-09 | Stop reason: HOSPADM

## 2018-03-07 RX ORDER — SODIUM CHLORIDE 0.9 % (FLUSH) 0.9 %
10 SYRINGE (ML) INJECTION EVERY 12 HOURS SCHEDULED
Status: DISCONTINUED | OUTPATIENT
Start: 2018-03-07 | End: 2018-03-09 | Stop reason: HOSPADM

## 2018-03-07 RX ORDER — GABAPENTIN 300 MG/1
300 CAPSULE ORAL 2 TIMES DAILY
Status: DISCONTINUED | OUTPATIENT
Start: 2018-03-07 | End: 2018-03-09 | Stop reason: HOSPADM

## 2018-03-07 RX ORDER — TAMSULOSIN HYDROCHLORIDE 0.4 MG/1
0.4 CAPSULE ORAL DAILY
Status: DISCONTINUED | OUTPATIENT
Start: 2018-03-07 | End: 2018-03-09 | Stop reason: HOSPADM

## 2018-03-07 RX ORDER — HYDRALAZINE HYDROCHLORIDE 50 MG/1
25 TABLET, FILM COATED ORAL 2 TIMES DAILY
Status: DISCONTINUED | OUTPATIENT
Start: 2018-03-07 | End: 2018-03-09 | Stop reason: HOSPADM

## 2018-03-07 RX ORDER — ZOLPIDEM TARTRATE 5 MG/1
5 TABLET ORAL NIGHTLY PRN
Status: DISCONTINUED | OUTPATIENT
Start: 2018-03-07 | End: 2018-03-09 | Stop reason: HOSPADM

## 2018-03-07 RX ORDER — SODIUM CHLORIDE 0.9 % (FLUSH) 0.9 %
10 SYRINGE (ML) INJECTION EVERY 12 HOURS SCHEDULED
Status: DISCONTINUED | OUTPATIENT
Start: 2018-03-07 | End: 2018-03-07 | Stop reason: SDUPTHER

## 2018-03-07 RX ORDER — SODIUM CHLORIDE 0.9 % (FLUSH) 0.9 %
10 SYRINGE (ML) INJECTION PRN
Status: DISCONTINUED | OUTPATIENT
Start: 2018-03-07 | End: 2018-03-07 | Stop reason: SDUPTHER

## 2018-03-07 RX ORDER — PAROXETINE HYDROCHLORIDE 20 MG/1
20 TABLET, FILM COATED ORAL EVERY MORNING
Status: DISCONTINUED | OUTPATIENT
Start: 2018-03-08 | End: 2018-03-09 | Stop reason: HOSPADM

## 2018-03-07 RX ORDER — DOCUSATE SODIUM 100 MG/1
100 CAPSULE, LIQUID FILLED ORAL 2 TIMES DAILY
Status: DISCONTINUED | OUTPATIENT
Start: 2018-03-07 | End: 2018-03-09 | Stop reason: HOSPADM

## 2018-03-07 RX ORDER — FLUTICASONE PROPIONATE 50 MCG
1 SPRAY, SUSPENSION (ML) NASAL 2 TIMES DAILY
Status: DISCONTINUED | OUTPATIENT
Start: 2018-03-07 | End: 2018-03-09 | Stop reason: HOSPADM

## 2018-03-07 RX ORDER — SODIUM CHLORIDE 0.9 % (FLUSH) 0.9 %
10 SYRINGE (ML) INJECTION PRN
Status: DISCONTINUED | OUTPATIENT
Start: 2018-03-07 | End: 2018-03-09 | Stop reason: HOSPADM

## 2018-03-07 RX ORDER — LISINOPRIL 20 MG/1
40 TABLET ORAL DAILY
Status: DISCONTINUED | OUTPATIENT
Start: 2018-03-07 | End: 2018-03-09 | Stop reason: HOSPADM

## 2018-03-07 RX ORDER — ACETAMINOPHEN 325 MG/1
650 TABLET ORAL EVERY 4 HOURS PRN
Status: DISCONTINUED | OUTPATIENT
Start: 2018-03-07 | End: 2018-03-07 | Stop reason: SDUPTHER

## 2018-03-07 RX ORDER — SIMVASTATIN 20 MG
20 TABLET ORAL NIGHTLY
Status: DISCONTINUED | OUTPATIENT
Start: 2018-03-07 | End: 2018-03-09 | Stop reason: HOSPADM

## 2018-03-07 RX ORDER — HYDROCODONE BITARTRATE AND ACETAMINOPHEN 5; 325 MG/1; MG/1
1 TABLET ORAL EVERY 6 HOURS PRN
Status: DISCONTINUED | OUTPATIENT
Start: 2018-03-07 | End: 2018-03-09 | Stop reason: HOSPADM

## 2018-03-07 RX ORDER — ACETAMINOPHEN 325 MG/1
650 TABLET ORAL EVERY 6 HOURS PRN
Status: DISCONTINUED | OUTPATIENT
Start: 2018-03-07 | End: 2018-03-09 | Stop reason: HOSPADM

## 2018-03-07 RX ORDER — UREA 10 %
5 LOTION (ML) TOPICAL NIGHTLY
Status: DISCONTINUED | OUTPATIENT
Start: 2018-03-07 | End: 2018-03-09 | Stop reason: HOSPADM

## 2018-03-07 RX ORDER — AMLODIPINE BESYLATE 10 MG/1
10 TABLET ORAL DAILY
Status: DISCONTINUED | OUTPATIENT
Start: 2018-03-07 | End: 2018-03-09 | Stop reason: HOSPADM

## 2018-03-07 RX ORDER — METOPROLOL TARTRATE 50 MG/1
50 TABLET, FILM COATED ORAL 2 TIMES DAILY
Status: DISCONTINUED | OUTPATIENT
Start: 2018-03-07 | End: 2018-03-09 | Stop reason: HOSPADM

## 2018-03-07 RX ORDER — ONDANSETRON 2 MG/ML
4 INJECTION INTRAMUSCULAR; INTRAVENOUS EVERY 6 HOURS PRN
Status: DISCONTINUED | OUTPATIENT
Start: 2018-03-07 | End: 2018-03-09 | Stop reason: HOSPADM

## 2018-03-07 RX ORDER — ALPRAZOLAM 0.25 MG/1
0.12 TABLET ORAL DAILY
Status: DISCONTINUED | OUTPATIENT
Start: 2018-03-07 | End: 2018-03-09 | Stop reason: HOSPADM

## 2018-03-07 RX ADMIN — ACETAMINOPHEN 650 MG: 325 TABLET, FILM COATED ORAL at 13:20

## 2018-03-07 RX ADMIN — GABAPENTIN 300 MG: 300 CAPSULE ORAL at 21:27

## 2018-03-07 RX ADMIN — ASPIRIN 81 MG CHEWABLE TABLET 162 MG: 81 TABLET CHEWABLE at 16:51

## 2018-03-07 RX ADMIN — LISINOPRIL 40 MG: 20 TABLET ORAL at 16:52

## 2018-03-07 RX ADMIN — Medication 10 ML: at 09:11

## 2018-03-07 RX ADMIN — SALINE NASAL SPRAY 1 SPRAY: 1.5 SOLUTION NASAL at 16:51

## 2018-03-07 RX ADMIN — FUROSEMIDE 40 MG: 40 TABLET ORAL at 16:52

## 2018-03-07 RX ADMIN — GUAIFENESIN 1200 MG: 600 TABLET, EXTENDED RELEASE ORAL at 21:27

## 2018-03-07 RX ADMIN — SIMVASTATIN 20 MG: 20 TABLET, FILM COATED ORAL at 21:27

## 2018-03-07 RX ADMIN — METOPROLOL TARTRATE 50 MG: 50 TABLET ORAL at 21:29

## 2018-03-07 RX ADMIN — Medication 5 MG: at 21:44

## 2018-03-07 RX ADMIN — TAMSULOSIN HYDROCHLORIDE 0.4 MG: 0.4 CAPSULE ORAL at 16:51

## 2018-03-07 RX ADMIN — DOCUSATE SODIUM 100 MG: 100 CAPSULE, LIQUID FILLED ORAL at 21:27

## 2018-03-07 RX ADMIN — HYDRALAZINE HYDROCHLORIDE 25 MG: 50 TABLET, FILM COATED ORAL at 21:27

## 2018-03-07 RX ADMIN — POTASSIUM CHLORIDE 10 MEQ: 10 TABLET, EXTENDED RELEASE ORAL at 16:51

## 2018-03-07 RX ADMIN — FLUTICASONE PROPIONATE 1 SPRAY: 50 SPRAY, METERED NASAL at 16:51

## 2018-03-07 RX ADMIN — AMLODIPINE BESYLATE 10 MG: 10 TABLET ORAL at 16:52

## 2018-03-07 RX ADMIN — ALPRAZOLAM 0.12 MG: 0.25 TABLET ORAL at 16:52

## 2018-03-07 RX ADMIN — WARFARIN SODIUM 7 MG: 5 TABLET ORAL at 16:56

## 2018-03-07 RX ADMIN — Medication 10 ML: at 21:31

## 2018-03-07 RX ADMIN — ENOXAPARIN SODIUM 40 MG: 100 INJECTION SUBCUTANEOUS at 09:11

## 2018-03-07 RX ADMIN — HYDROCORTISONE: 10 CREAM TOPICAL at 21:30

## 2018-03-07 ASSESSMENT — ENCOUNTER SYMPTOMS
BLURRED VISION: 0
DOUBLE VISION: 0
COUGH: 1
SHORTNESS OF BREATH: 1
DIARRHEA: 0
NAUSEA: 0
VOMITING: 0

## 2018-03-07 ASSESSMENT — PAIN SCALES - GENERAL
PAINLEVEL_OUTOF10: 3
PAINLEVEL_OUTOF10: 0
PAINLEVEL_OUTOF10: 10

## 2018-03-07 NOTE — ED NOTES
MD notified of pts O2 sat.  Consulted RT for bipap per MD request.     Rajesh Griffin RN  03/06/18 0231

## 2018-03-07 NOTE — ED PROVIDER NOTES
GABAPENTIN (NEURONTIN) 300 MG CAPSULE    Take by mouth. GUAIFENESIN (MUCINEX) 600 MG EXTENDED RELEASE TABLET    Take 1,200 mg by mouth 2 times daily    HYDRALAZINE (APRESOLINE) 25 MG TABLET    Take 25 mg by mouth 2 times daily    HYDROCODONE-ACETAMINOPHEN (NORCO) 5-325 MG PER TABLET    Take 1 tablet by mouth every 6 hours as needed for Pain. HYDROCORTISONE 1 % CREAM    Apply topically as needed Apply topically 2 times daily. IPRATROPIUM-ALBUTEROL (DUONEB) 0.5-2.5 (3) MG/3ML SOLN NEBULIZER SOLUTION    Inhale 1 vial into the lungs every 6 hours as needed for Shortness of Breath    LISINOPRIL (PRINIVIL;ZESTRIL) 40 MG TABLET    Take 40 mg by mouth daily    MAGNESIUM HYDROXIDE (MILK OF MAGNESIA) 400 MG/5ML SUSPENSION    Take 30 mLs by mouth nightly as needed for Constipation    MELATONIN 5 MG TABS TABLET    Take 5 mg by mouth nightly    METHYLCELLULOSE POWD    2 g by Does not apply route daily    METOPROLOL TARTRATE (LOPRESSOR) 50 MG TABLET    Take 50 mg by mouth 2 times daily    MULTIPLE VITAMIN PO    Take by mouth    OMEGA-3 1000 MG CAPS    Take by mouth 2 times daily    OXYGEN    Inhale into the lungs    PAROXETINE (PAXIL) 20 MG TABLET    Take 20 mg by mouth every morning    POTASSIUM CHLORIDE (MICRO-K) 10 MEQ EXTENDED RELEASE CAPSULE    Take 10 mEq by mouth daily    SIMVASTATIN (ZOCOR) 20 MG TABLET    Take 20 mg by mouth nightly    SODIUM CHLORIDE (OCEAN, BABY AYR) 0.65 % NASAL SPRAY    1 spray by Nasal route daily    TAMSULOSIN (FLOMAX) 0.4 MG CAPSULE    Take 0.4 mg by mouth daily    TRAMADOL (ULTRAM) 50 MG TABLET    Take 50 mg by mouth every 4 hours as needed for Pain. VITAMIN D 1000 UNITS CAPS    Take by mouth daily    VITAMIN E 400 UNIT CAPSULE    Take 400 Units by mouth daily    WARFARIN SODIUM (COUMADIN PO)    Take 7 mg by mouth daily    ZOLPIDEM (AMBIEN) 5 MG TABLET    Take 5 mg by mouth nightly as needed for Sleep. ALLERGIES     Ceclor [cefaclor]; Eggs or egg-derived products;  Penicillins;

## 2018-03-07 NOTE — H&P
0.4 MG capsule Take 0.4 mg by mouth daily   Yes Historical Provider, MD   vitamin E 400 UNIT capsule Take 400 Units by mouth daily   Yes Historical Provider, MD   cloNIDine (CATAPRES) 0.1 MG tablet Take 0.1 mg by mouth every 4 hours as needed for High Blood Pressure    Historical Provider, MD   ipratropium-albuterol (DUONEB) 0.5-2.5 (3) MG/3ML SOLN nebulizer solution Inhale 1 vial into the lungs every 6 hours as needed for Shortness of Breath    Historical Provider, MD   HYDROcodone-acetaminophen (NORCO) 5-325 MG per tablet Take 1 tablet by mouth every 6 hours as needed for Pain. Historical Provider, MD   hydrocortisone 1 % cream Apply topically as needed Apply topically 2 times daily. Historical Provider, MD   magnesium hydroxide (MILK OF MAGNESIA) 400 MG/5ML suspension Take 30 mLs by mouth nightly as needed for Constipation    Historical Provider, MD   OXYGEN Inhale into the lungs 3/5/18   Historical Provider, MD   traMADol (ULTRAM) 50 MG tablet Take 50 mg by mouth every 4 hours as needed for Pain. Historical Provider, MD   zolpidem (AMBIEN) 5 MG tablet Take 5 mg by mouth nightly as needed for Sleep. Historical Provider, MD        Allergies:       Ceclor [cefaclor]; Eggs or egg-derived products; Penicillins; and Sulfa antibiotics    Social History:     Tobacco:    reports that she has quit smoking. She has never used smokeless tobacco.  Alcohol:      reports that she does not drink alcohol. Drug Use:  reports that she does not use drugs. Family History:     History reviewed. No pertinent family history. Review of Systems:     Review of Systems   Constitutional: Negative for chills and fever. HENT: Positive for congestion. Eyes: Negative for blurred vision and double vision. Respiratory: Positive for cough and shortness of breath. Cardiovascular: Negative for chest pain and leg swelling. Gastrointestinal: Negative for diarrhea, nausea and vomiting.    Genitourinary: Negative for

## 2018-03-07 NOTE — PROGRESS NOTES
3/6/2018  9:38 PM - Kei Kraft Incoming Lab Results From [employer]     Component Results     Component Value Ref Range & Units Status Collected Lab   pH, Arterial 7.380  7.350 - 7.450 Final 03/06/2018  9:37 PM [address] [place] [address] Lab   pCO2, Arterial 51.0   35.0 - 45.0 mmHg Final 03/06/2018  9:37 PM [facility] Lab   pO2, Arterial 54.0   80.0 - 100.0 mmHg Final 03/06/2018  9:37 PM [facility] Lab   HCO3, Arterial 30.2   22.0 - 26.0 mmol/L Final 03/06/2018  9:37 PM [facility] Excess, Arterial 4.1   -2.0 - 2.0 mmol/L Final 03/06/2018  9:37 PM [address] [place] [address] Lab   Hemoglobin, Art, Extended 11.7   12.0 - 16.0 g/dL Final 03/06/2018  9:37 PM [address] [place] [address] Lab   O2 Sat, Arterial 88.8   >92 % Final 03/06/2018  9:37 PM [facility] Lab   Carboxyhgb, Arterial 2.9  0.0 - 5.0 % Final 03/06/2018  9:37 PM [facility] Lab        0.0-1.5   (Smokers 1.5-5.0)    Methemoglobin, Arterial 1.0  <1.5 % Final 03/06/2018  9:37 PM [address] [place] [address] Lab   O2 Content, Arterial 14.6  Not Established mL/dL Final 03/06/2018  9:37 PM [address] [place] [address] Lab   O2 Therapy Unknown   Final 03/06/2018  9:37 PM [address] [place] [address] Performed By     [address]Bel[address] Hodge Name Director Address Valid Date Range   [address] [address] LAB Stevo Hoffman  Sukhdeep Hodge,[address]  [address] [address] 08/30/17 1233-Present   Lab and Collection     Blood Gas, Arterial on 3/6/2018   Result History     Blood Gas, Arterial on 3/6/2018     Nasal cannula @4lpm, resp rate 14, right brachial

## 2018-03-07 NOTE — ED NOTES
ASSESSMENT:    Pt alert & oriented x4. Pupils equal & reactive. Skin: Warm, dry, & pink. Capillary refill less than 2 seconds. Cardiac: S1 & S2 noted. Lungs: Lung sounds diminished, especially in lower lobes, respirations even & labored. Abdomen: Bowel sounds noted upper and lower quadrants. Soft and nontender. Extremities: Bilateral pedal pulses & 3+ pitting edema noted to bilateral lower extremities. No distress noted. Side rails up and call light in reach. Pt c/o cough, runny nose, and head congestion since Thursday. Cough is productive and pt reports mucus is clear.      Leola Estrada RN  03/06/18 [place], [place]  03/07/18 [id number]

## 2018-03-08 LAB
ANION GAP SERPL CALCULATED.3IONS-SCNC: 13 MMOL/L (ref 7–19)
BUN BLDV-MCNC: 23 MG/DL (ref 8–23)
CALCIUM SERPL-MCNC: 9.9 MG/DL (ref 8.2–9.6)
CHLORIDE BLD-SCNC: 98 MMOL/L (ref 98–111)
CO2: 30 MMOL/L (ref 22–29)
CREAT SERPL-MCNC: 0.5 MG/DL (ref 0.5–0.9)
GFR NON-AFRICAN AMERICAN: >60
GLUCOSE BLD-MCNC: 141 MG/DL (ref 74–109)
HCT VFR BLD CALC: 37.7 % (ref 37–47)
HEMOGLOBIN: 12.1 G/DL (ref 12–16)
INR BLD: 1.82 (ref 0.88–1.18)
MCH RBC QN AUTO: 27.7 PG (ref 27–31)
MCHC RBC AUTO-ENTMCNC: 32.1 G/DL (ref 33–37)
MCV RBC AUTO: 86.3 FL (ref 81–99)
PDW BLD-RTO: 15.8 % (ref 11.5–14.5)
PLATELET # BLD: 297 K/UL (ref 130–400)
PMV BLD AUTO: 10.1 FL (ref 9.4–12.3)
POTASSIUM REFLEX MAGNESIUM: 3.9 MMOL/L (ref 3.5–5)
PROTHROMBIN TIME: 21.1 SEC (ref 12–14.6)
RBC # BLD: 4.37 M/UL (ref 4.2–5.4)
SODIUM BLD-SCNC: 141 MMOL/L (ref 136–145)
WBC # BLD: 16.1 K/UL (ref 4.8–10.8)

## 2018-03-08 PROCEDURE — 2580000003 HC RX 258: Performed by: INTERNAL MEDICINE

## 2018-03-08 PROCEDURE — 85610 PROTHROMBIN TIME: CPT

## 2018-03-08 PROCEDURE — G8978 MOBILITY CURRENT STATUS: HCPCS

## 2018-03-08 PROCEDURE — 1210000000 HC MED SURG R&B

## 2018-03-08 PROCEDURE — 97162 PT EVAL MOD COMPLEX 30 MIN: CPT

## 2018-03-08 PROCEDURE — G8979 MOBILITY GOAL STATUS: HCPCS

## 2018-03-08 PROCEDURE — G8988 SELF CARE GOAL STATUS: HCPCS

## 2018-03-08 PROCEDURE — 94660 CPAP INITIATION&MGMT: CPT

## 2018-03-08 PROCEDURE — 85027 COMPLETE CBC AUTOMATED: CPT

## 2018-03-08 PROCEDURE — 80048 BASIC METABOLIC PNL TOTAL CA: CPT

## 2018-03-08 PROCEDURE — 6360000002 HC RX W HCPCS: Performed by: INTERNAL MEDICINE

## 2018-03-08 PROCEDURE — 2700000000 HC OXYGEN THERAPY PER DAY

## 2018-03-08 PROCEDURE — G8987 SELF CARE CURRENT STATUS: HCPCS

## 2018-03-08 PROCEDURE — 6370000000 HC RX 637 (ALT 250 FOR IP): Performed by: INTERNAL MEDICINE

## 2018-03-08 PROCEDURE — 97166 OT EVAL MOD COMPLEX 45 MIN: CPT

## 2018-03-08 PROCEDURE — 36415 COLL VENOUS BLD VENIPUNCTURE: CPT

## 2018-03-08 RX ADMIN — AMLODIPINE BESYLATE 10 MG: 10 TABLET ORAL at 09:35

## 2018-03-08 RX ADMIN — SALINE NASAL SPRAY 1 SPRAY: 1.5 SOLUTION NASAL at 09:33

## 2018-03-08 RX ADMIN — LISINOPRIL 40 MG: 20 TABLET ORAL at 09:32

## 2018-03-08 RX ADMIN — METOPROLOL TARTRATE 50 MG: 50 TABLET ORAL at 09:33

## 2018-03-08 RX ADMIN — GABAPENTIN 300 MG: 300 CAPSULE ORAL at 21:16

## 2018-03-08 RX ADMIN — HYDRALAZINE HYDROCHLORIDE 25 MG: 50 TABLET, FILM COATED ORAL at 21:16

## 2018-03-08 RX ADMIN — GUAIFENESIN 1200 MG: 600 TABLET, EXTENDED RELEASE ORAL at 09:31

## 2018-03-08 RX ADMIN — FUROSEMIDE 40 MG: 40 TABLET ORAL at 09:30

## 2018-03-08 RX ADMIN — PAROXETINE 20 MG: 20 TABLET, FILM COATED ORAL at 09:36

## 2018-03-08 RX ADMIN — GUAIFENESIN 1200 MG: 600 TABLET, EXTENDED RELEASE ORAL at 21:17

## 2018-03-08 RX ADMIN — DOCUSATE SODIUM 100 MG: 100 CAPSULE, LIQUID FILLED ORAL at 09:36

## 2018-03-08 RX ADMIN — DOCUSATE SODIUM 100 MG: 100 CAPSULE, LIQUID FILLED ORAL at 21:17

## 2018-03-08 RX ADMIN — FLUTICASONE PROPIONATE 1 SPRAY: 50 SPRAY, METERED NASAL at 18:14

## 2018-03-08 RX ADMIN — LEVOFLOXACIN 500 MG: 5 INJECTION, SOLUTION INTRAVENOUS at 23:14

## 2018-03-08 RX ADMIN — POTASSIUM CHLORIDE 10 MEQ: 10 TABLET, EXTENDED RELEASE ORAL at 09:29

## 2018-03-08 RX ADMIN — ALPRAZOLAM 0.12 MG: 0.25 TABLET ORAL at 09:32

## 2018-03-08 RX ADMIN — HYDROCORTISONE: 10 CREAM TOPICAL at 21:17

## 2018-03-08 RX ADMIN — ENOXAPARIN SODIUM 40 MG: 100 INJECTION SUBCUTANEOUS at 09:29

## 2018-03-08 RX ADMIN — GABAPENTIN 300 MG: 300 CAPSULE ORAL at 09:30

## 2018-03-08 RX ADMIN — WARFARIN SODIUM 7 MG: 5 TABLET ORAL at 18:15

## 2018-03-08 RX ADMIN — HYDRALAZINE HYDROCHLORIDE 25 MG: 50 TABLET, FILM COATED ORAL at 09:30

## 2018-03-08 RX ADMIN — ASPIRIN 81 MG CHEWABLE TABLET 162 MG: 81 TABLET CHEWABLE at 09:36

## 2018-03-08 RX ADMIN — METOPROLOL TARTRATE 50 MG: 50 TABLET ORAL at 21:17

## 2018-03-08 RX ADMIN — FLUTICASONE PROPIONATE 1 SPRAY: 50 SPRAY, METERED NASAL at 09:33

## 2018-03-08 RX ADMIN — Medication 10 ML: at 21:16

## 2018-03-08 RX ADMIN — Medication 5 MG: at 21:17

## 2018-03-08 RX ADMIN — HYDROCORTISONE: 10 CREAM TOPICAL at 09:46

## 2018-03-08 RX ADMIN — SIMVASTATIN 20 MG: 20 TABLET, FILM COATED ORAL at 21:16

## 2018-03-08 RX ADMIN — TAMSULOSIN HYDROCHLORIDE 0.4 MG: 0.4 CAPSULE ORAL at 09:35

## 2018-03-08 ASSESSMENT — ENCOUNTER SYMPTOMS
BLURRED VISION: 0
DIARRHEA: 0
VOMITING: 0
NAUSEA: 0
DOUBLE VISION: 0
SHORTNESS OF BREATH: 1
COUGH: 0

## 2018-03-08 NOTE — PROGRESS NOTES
Occupational Therapy   Occupational Therapy Initial Assessment  Date: 3/8/2018   Patient Name: Eri May  MRN: [id number]     : 1925    Patient Diagnosis(es): The primary encounter diagnosis was Pneumonia of left lower lobe due to infectious organism [facility]). A diagnosis of Hypoxia was also pertinent to this visit. has a past medical history of Anxiety; Asthma; Dementia; Difficulty walking; Essential hypertension; Fracture lumbar vertebra-closed ([address] Hyperlipidemia; Hypoxemia; Insomnia; Macular degeneration; Major depressive disorder; Mononeuropathy; Pneumonia; Polyosteoarthritis; Repeated falls; and Rhabdomyolysis. has a past surgical history that includes pacemaker placement; Hip fusion (10/2017); hip surgery (); colectomy; Cholecystectomy; back surgery; and Hysterectomy.     Treatment Diagnosis: Pneumonia      Restrictions  Restrictions/Precautions  Restrictions/Precautions: Fall Risk    Subjective   General  Chart Reviewed: Yes  Patient assessed for rehabilitation services?: Yes  Family / Caregiver Present: Yes (dtr)  Pain Assessment  Patient Currently in Pain: No  Pain Assessment: 0-10  Pain Level: 0  Pre Treatment Pain Screening  Pain at present: 0  Social/Functional History  Social/Functional History  Type of Home: Facility  ADL Assistance: Needs assistance  Homemaking Responsibilities: No  Ambulation Assistance: Needs assistance  Transfer Assistance:  (AMB WITH RW AND ASSIST )  Active : No  Additional Comments: has used AE but was limited by vision, patient states she had trouble picking up left foot and has to scoot it along       Objective   Vision: Impaired  Vision Exceptions:  (macular degeneration)  Hearing: Exceptions to [facility]  Hearing Exceptions: Right hearing aid;Left hearing aid;Hard of hearing/hearing concerns    Orientation  Overall Orientation Status: Within Functional Limits     Balance  Sitting Balance: Supervision  Standing Balance: Minimal assistance (to mod A)  ADL  Feeding: Independent  Grooming: Independent;Setup  UE Bathing: Minimal assistance  LE Bathing: Minimal assistance  UE Dressing: Independent;Setup  LE Dressing: Moderate assistance  Toileting: Moderate assistance        Bed mobility  Supine to Sit: Contact guard assistance  Transfers  Stand Step Transfers: Minimal assistance (to moderate assist with RW)  Transfer Comments: Incontinent of urine upon standing. Needs brief. Cognition  Cognition Comment: Awake, alert, verbal        Sensation  Overall Sensation Status:  (c/o peripheral neuropathy in BLE)        LUE PROM (degrees)  LUE PROM: WNL  LUE AROM (degrees)  LUE AROM : WNL  RUE PROM (degrees)  RUE PROM: WNL  RUE AROM (degrees)  RUE AROM : WNL  LUE Strength  Gross LUE Strength: WNL  RUE Strength  Gross RUE Strength: WNL                  Assessment   Performance deficits / Impairments: Decreased functional mobility ; Decreased ADL status  Assessment: Patient would benefit from further therapy to upgrade functional independence. Treatment Diagnosis: Pneumonia  Patient Education: fall protocol  Barriers to Learning: none  Discharge Recommendations: Patient would benefit from continued therapy after discharge  REQUIRES OT FOLLOW UP: Yes  Activity Tolerance  Activity Tolerance: Patient Tolerated treatment well  Safety Devices  Safety Devices in place: Yes  Type of devices: Call light within reach; Left in chair;Nurse notified        Discharge Recommendations:  Patient would benefit from continued therapy after discharge     Plan   Plan  Times per week: 4-8 visits weekly for qd to bid tx  Current Treatment Recommendations: Functional Mobility Training, Patient/Caregiver Education & Training, Equipment Evaluation, Education, & procurement, Self-Care / ADL, Safety Education & Training    G-Code  OT G-codes  Functional Assessment Tool Used: bathe dress toilet  Functional Limitation: Self care  Self Care Current Status ():  At least 40 percent but less than

## 2018-03-08 NOTE — PLAN OF CARE
Problem: Falls - Risk of  Goal: Absence of falls  Outcome: Not Met This Shift      Problem: Risk for Impaired Skin Integrity  Goal: Tissue integrity - skin and mucous membranes  Structural intactness and normal physiological function of skin and  mucous membranes.    Outcome: Ongoing

## 2018-03-08 NOTE — PROGRESS NOTES
YOSVANY Soliz M.D. [place], APRN      Internal Medicine Progress Note    3/8/2018   10:03 AM    Name:  Nena Desai  MRN:    [id number]     Acct:     [de-identified]   Room:  [facility]   Day: 1     Admit Date: 3/6/2018  9:04 PM  PCP: Sergo Triana MD    Subjective:     C/C:   Chief Complaint   Patient presents with    Cough     productive cough and SOA for a few days, worse for a few hours per [facility] report. Congested cough noted here in ED.  Shortness of Breath       Interval History: Status: improved. With less congestion and easier breathing. Review of Systems   Constitutional: Negative for chills and fever. HENT: Positive for congestion. Eyes: Negative for blurred vision and double vision. Respiratory: Positive for shortness of breath. Negative for cough. Cardiovascular: Negative for chest pain and leg swelling. Gastrointestinal: Negative for diarrhea, nausea and vomiting. Genitourinary: Negative for dysuria and urgency. Musculoskeletal: Negative for joint pain and myalgias. Skin: Negative for itching and rash. Neurological: Negative for dizziness, focal weakness and headaches. Psychiatric/Behavioral: Negative for depression. The patient is not nervous/anxious. Medications: Allergies:    Allergies   Allergen Reactions    Ceclor [Cefaclor]     Eggs Or Egg-Derived Products     Penicillins     Sulfa Antibiotics        Current Meds:   acetaminophen (TYLENOL) tablet 650 mg Q4H PRN   enoxaparin (LOVENOX) injection 40 mg Daily   acetaminophen (TYLENOL) tablet 650 mg Q6H PRN   ALPRAZolam (XANAX) tablet 0.125 mg Daily   amLODIPine (NORVASC) tablet 10 mg Daily   aspirin chewable tablet 162 mg Daily   cloNIDine (CATAPRES) tablet 0.1 mg Q4H PRN   docusate sodium (COLACE) capsule 100 mg BID   fluticasone (FLONASE) 50 MCG/ACT nasal spray 1 spray BID   furosemide (LASIX) tablet 40 mg Daily   gabapentin

## 2018-03-09 VITALS
DIASTOLIC BLOOD PRESSURE: 87 MMHG | SYSTOLIC BLOOD PRESSURE: 194 MMHG | TEMPERATURE: 98.3 F | RESPIRATION RATE: 20 BRPM | BODY MASS INDEX: 27.61 KG/M2 | OXYGEN SATURATION: 94 % | WEIGHT: 186.44 LBS | HEART RATE: 76 BPM | HEIGHT: 69 IN

## 2018-03-09 LAB
EKG P AXIS: 24 DEGREES
EKG P-R INTERVAL: 144 MS
EKG Q-T INTERVAL: 388 MS
EKG QRS DURATION: 114 MS
EKG QTC CALCULATION (BAZETT): 419 MS
EKG T AXIS: 25 DEGREES
HCT VFR BLD CALC: 37.5 % (ref 37–47)
HEMOGLOBIN: 11.6 G/DL (ref 12–16)
INR BLD: 2.13 (ref 0.88–1.18)
MCH RBC QN AUTO: 27.6 PG (ref 27–31)
MCHC RBC AUTO-ENTMCNC: 30.9 G/DL (ref 33–37)
MCV RBC AUTO: 89.1 FL (ref 81–99)
PDW BLD-RTO: 16 % (ref 11.5–14.5)
PLATELET # BLD: 326 K/UL (ref 130–400)
PMV BLD AUTO: 9.7 FL (ref 9.4–12.3)
PROTHROMBIN TIME: 23.9 SEC (ref 12–14.6)
RBC # BLD: 4.21 M/UL (ref 4.2–5.4)
WBC # BLD: 14.4 K/UL (ref 4.8–10.8)

## 2018-03-09 PROCEDURE — 85027 COMPLETE CBC AUTOMATED: CPT

## 2018-03-09 PROCEDURE — 85610 PROTHROMBIN TIME: CPT

## 2018-03-09 PROCEDURE — 6370000000 HC RX 637 (ALT 250 FOR IP): Performed by: INTERNAL MEDICINE

## 2018-03-09 PROCEDURE — 36415 COLL VENOUS BLD VENIPUNCTURE: CPT

## 2018-03-09 PROCEDURE — 6360000002 HC RX W HCPCS: Performed by: INTERNAL MEDICINE

## 2018-03-09 PROCEDURE — 2700000000 HC OXYGEN THERAPY PER DAY

## 2018-03-09 RX ORDER — LEVOFLOXACIN 500 MG/1
500 TABLET, FILM COATED ORAL DAILY
Qty: 7 TABLET | Refills: 0 | OUTPATIENT
Start: 2018-03-09 | End: 2018-03-16

## 2018-03-09 RX ADMIN — FUROSEMIDE 40 MG: 40 TABLET ORAL at 09:20

## 2018-03-09 RX ADMIN — POTASSIUM CHLORIDE 10 MEQ: 10 TABLET, EXTENDED RELEASE ORAL at 09:20

## 2018-03-09 RX ADMIN — DOCUSATE SODIUM 100 MG: 100 CAPSULE, LIQUID FILLED ORAL at 09:22

## 2018-03-09 RX ADMIN — ENOXAPARIN SODIUM 40 MG: 100 INJECTION SUBCUTANEOUS at 09:20

## 2018-03-09 RX ADMIN — LISINOPRIL 40 MG: 20 TABLET ORAL at 09:22

## 2018-03-09 RX ADMIN — METOPROLOL TARTRATE 50 MG: 50 TABLET ORAL at 09:22

## 2018-03-09 RX ADMIN — SALINE NASAL SPRAY 1 SPRAY: 1.5 SOLUTION NASAL at 09:20

## 2018-03-09 RX ADMIN — GABAPENTIN 300 MG: 300 CAPSULE ORAL at 09:20

## 2018-03-09 RX ADMIN — ALPRAZOLAM 0.12 MG: 0.25 TABLET ORAL at 09:21

## 2018-03-09 RX ADMIN — ASPIRIN 81 MG CHEWABLE TABLET 162 MG: 81 TABLET CHEWABLE at 09:20

## 2018-03-09 RX ADMIN — PAROXETINE 20 MG: 20 TABLET, FILM COATED ORAL at 09:22

## 2018-03-09 RX ADMIN — GUAIFENESIN 1200 MG: 600 TABLET, EXTENDED RELEASE ORAL at 09:21

## 2018-03-09 RX ADMIN — TAMSULOSIN HYDROCHLORIDE 0.4 MG: 0.4 CAPSULE ORAL at 09:22

## 2018-03-09 RX ADMIN — HYDROCORTISONE: 10 CREAM TOPICAL at 09:20

## 2018-03-09 RX ADMIN — HYDRALAZINE HYDROCHLORIDE 25 MG: 50 TABLET, FILM COATED ORAL at 09:21

## 2018-03-09 RX ADMIN — AMLODIPINE BESYLATE 10 MG: 10 TABLET ORAL at 09:22

## 2018-03-09 NOTE — PROGRESS NOTES
Palliative Care: Met with pt, who has been at [facility] for rehab She was admitted here to be treated for pneumonia. She is resting in bed and waiting for discharge today. She states her legs are hurting, but normal for her with neuropathy. It is relieved with repositioning. Pt is [place]. She states she wears oxygen at the facility and is up daily for rehab. She praises the staff at [facility] for their hard work to try and get her back on her feet. Past Medical History:      Anxiety, Dementia, hypoxemia, macular degeneration, major depressive d/o, polyosteoarthritis     Advance Directives:    Full code      Pain/Other Symptoms:   Denies acute pain or SOA      Activity:        As tolerated     Psychological/Spiritual:       Good spiritual support, She is Orthodox.  Attends [facility] services at the facility    Patient/Family Discussion: Pt says she has worked with PT and OT at the  to try and walk again, but has not been successful. Her goal of care now is to safely transfer from bed to chair and hopes to return home with her daughter when safe to do so. She states, \"I am 89 and realize I am not going to be walking again, so I have changed my direction. \"        Plan/expectations:  Return to facility, work on safe transfers      Long term goals:     Set up equipment at her daughters home and transition home to her house.     Offered encouragement     Electronically signed by Walker Nye RN on 3/9/2018 at 4:13 PM

## 2018-03-12 LAB
BLOOD CULTURE, ROUTINE: NORMAL
CULTURE, BLOOD 2: NORMAL

## 2018-04-27 ENCOUNTER — OFFICE VISIT (OUTPATIENT)
Dept: CARDIOLOGY | Facility: CLINIC | Age: 83
End: 2018-04-27

## 2018-04-27 ENCOUNTER — CLINICAL SUPPORT NO REQUIREMENTS (OUTPATIENT)
Dept: CARDIOLOGY | Facility: CLINIC | Age: 83
End: 2018-04-27

## 2018-04-27 VITALS
DIASTOLIC BLOOD PRESSURE: 66 MMHG | BODY MASS INDEX: 29.1 KG/M2 | HEIGHT: 68 IN | WEIGHT: 192 LBS | SYSTOLIC BLOOD PRESSURE: 136 MMHG | HEART RATE: 60 BPM | OXYGEN SATURATION: 95 %

## 2018-04-27 DIAGNOSIS — E78.5 HYPERLIPIDEMIA, UNSPECIFIED HYPERLIPIDEMIA TYPE: ICD-10-CM

## 2018-04-27 DIAGNOSIS — I10 ESSENTIAL HYPERTENSION: Primary | ICD-10-CM

## 2018-04-27 DIAGNOSIS — Z95.0 PACEMAKER: ICD-10-CM

## 2018-04-27 DIAGNOSIS — Z95.0 PACEMAKER: Primary | ICD-10-CM

## 2018-04-27 DIAGNOSIS — I49.5 SSS (SICK SINUS SYNDROME) (HCC): ICD-10-CM

## 2018-04-27 PROCEDURE — 93280 PM DEVICE PROGR EVAL DUAL: CPT | Performed by: INTERNAL MEDICINE

## 2018-04-27 PROCEDURE — 93000 ELECTROCARDIOGRAM COMPLETE: CPT | Performed by: INTERNAL MEDICINE

## 2018-04-27 PROCEDURE — 99214 OFFICE O/P EST MOD 30 MIN: CPT | Performed by: INTERNAL MEDICINE

## 2018-04-27 RX ORDER — CETIRIZINE HYDROCHLORIDE 10 MG/1
10 TABLET ORAL DAILY
COMMUNITY
End: 2022-01-01

## 2018-04-27 NOTE — PROGRESS NOTES
Referring Provider: Ruddy Pfeiffer MD    Reason for Follow-up Visit: pacemaker    Subjective .   Chief Complaint:   Chief Complaint   Patient presents with   • Follow-up     yearly.   • Pacemaker Check     no problems or pain.  doing well.   • Hypertension     doing well.  follwed by pcp   • Hyperlipidemia     runs high with triglycerides.  pcp follows this.  labs in chart.       History of present illness:  Justina Bingham is a 89 y.o. yo female with history of SSS, s/p pacemaker with recent change out. No current complaints. No CP or SOB.       History  Past Medical History:   Diagnosis Date   • Allergic rhinitis    • Anxiety    • Anxiety disorder    • Arthritis    • Asthma    • Asthma    • Colon polyp    • Degenerative arthritis    • Dementia    • Dementia    • Depression    • Depression    • Diabetes mellitus    • Environmental allergies    • Hx of colonic polyps    • Hyperlipidemia    • Hypertension    • Neuropathy    • Osteoporosis    • Osteoporosis    • Pacemaker    • Peripheral neuropathy    ,   Past Surgical History:   Procedure Laterality Date   • BREAST SURGERY      Biopsy   • BREAST SURGERY     • CARDIAC ELECTROPHYSIOLOGY PROCEDURE N/A 9/11/2017    Procedure: PPM battery change;  Surgeon: Miguel Bagley MD;  Location: [facility] CATH INVASIVE LOCATION;  Service:    • CATARACT EXTRACTION     • CATARACT EXTRACTION     • CHOLECYSTECTOMY     • COLONOSCOPY  06/23/2011    EXAM TO ANAST SNARE X2 RECALL PRN   • COLONOSCOPY W/ POLYPECTOMY  06/23/2011    2 Adenomatous polyps, DIverticulosis, Hemorrhoids   • ENDOSCOPY  02/29/2008    HH   • ENDOSCOPY  02/29/2008    HIATAL HERNIA   • HEMICOLECTOMY      Right   • HEMICOLECTOMY     • HIP FRACTURE SURGERY     • HIP TROCANTERIC NAILING WITH INTRAMEDULLARY HIP SCREW Left 10/26/2017    Procedure: HIP TROCANTERIC NAILING SHORT WITH INTRAMEDULLARY HIP SCREW;  Surgeon: Ulises Molina MD;  Location: [facility] OR;  Service:    • HYSTERECTOMY     • INSERT / REPLACE / REMOVE  PACEMAKER     • PACEMAKER IMPLANTATION     • SINUS SURGERY     ,   Family History   Problem Relation Age of Onset   • Heart attack Mother    • No Known Problems Father    • Hypertension Brother    • Colon cancer Neg Hx    • Colon polyps Neg Hx    ,   Social History   Substance Use Topics   • Smoking status: Never Smoker   • Smokeless tobacco: Never Used   • Alcohol use No   ,     Medications  Current Outpatient Prescriptions   Medication Sig Dispense Refill   • acetaminophen (TYLENOL) 325 MG tablet Take 650 mg by mouth Every 4 (Four) Hours As Needed for Mild Pain .     • ALPRAZolam (XANAX) 0.25 MG tablet Take 0.125 mg by mouth 2 (Two) Times a Day.     • amLODIPine (NORVASC) 10 MG tablet Take 1 tablet by mouth Daily.     • aspirin 81 MG EC tablet Take 162 mg by mouth Daily.     • cetirizine (zyrTEC) 10 MG tablet Take 10 mg by mouth Daily.     • cholecalciferol (VITAMIN D3) 1000 units tablet Take 1,000 Units by mouth Daily.     • CloNIDine (CATAPRES) 0.1 MG tablet Take 0.1 mg by mouth Every 4 (Four) Hours As Needed for High Blood Pressure (BP >180/100).     • docusate sodium 100 MG capsule Take 100 mg by mouth 2 (Two) Times a Day.     • fluticasone (FLONASE) 50 MCG/ACT nasal spray 1 spray into each nostril 2 (Two) Times a Day.     • furosemide (LASIX) 40 MG tablet Take 40 mg by mouth Daily.     • gabapentin (NEURONTIN) 300 MG capsule Take 300 mg by mouth 2 (Two) Times a Day. Takes 2 pills at night     • gabapentin (NEURONTIN) 300 MG capsule Take 300 mg by mouth Daily.     • guaiFENesin (MUCINEX) 600 MG 12 hr tablet Take 2 tablets by mouth Every 12 (Twelve) Hours.     • hydrALAZINE (APRESOLINE) 25 MG tablet Take 25 mg by mouth 2 (Two) Times a Day.     • HYDROcodone-acetaminophen (NORCO) 5-325 MG per tablet Take 1 tablet by mouth Every 6 (Six) Hours As Needed for Moderate Pain .     • lisinopril (PRINIVIL,ZESTRIL) 40 MG tablet Take 40 mg by mouth Daily.     • magnesium hydroxide (MILK OF MAGNESIA) 400 MG/5ML suspension  Take 30 mL by mouth At Night As Needed for Constipation.     • melatonin 5 MG tablet tablet Take 5 mg by mouth Every Night.     • methylcellulose oral powder Take 2 g by mouth Daily.     • metoprolol tartrate (LOPRESSOR) 50 MG tablet Take 1 tablet by mouth Every 12 (Twelve) Hours.     • Multiple Vitamins-Minerals (MULTIVITAMIN ADULT PO) Take 1 tablet by mouth Daily.     • Omega-3 Fatty Acids (FISH OIL) 1000 MG capsule capsule Take 1,000 mg by mouth 2 (Two) Times a Day With Meals.     • PARoxetine (PAXIL) 20 MG tablet Take 20 mg by mouth Every Morning.     • potassium chloride (MICRO-K) 10 MEQ CR capsule Take 10 mEq by mouth Daily.     • simvastatin (ZOCOR) 20 MG tablet Take 20 mg by mouth Every Night.     • sodium chloride (OCEAN) 0.65 % nasal spray 1 spray into each nostril Daily.     • tamsulosin (FLOMAX) 0.4 MG capsule 24 hr capsule Take 1 capsule by mouth Daily. 30 capsule    • traMADol (ULTRAM) 50 MG tablet Take 50 mg by mouth Every 4 (Four) Hours.     • vitamin E 400 UNIT capsule Take 400 Units by mouth Daily.     • zolpidem (AMBIEN) 5 MG tablet Take 5 mg by mouth At Night As Needed for Sleep.     • Hydrocortisone (ZOE'S AMAZING BUTT) cream Apply 1 application topically As Needed (irritation).     • warfarin (COUMADIN) 5 MG tablet Take 5 mg by mouth Every Night.       No current facility-administered medications for this visit.        Allergies:  Sulfa antibiotics; Ceclor [cefaclor]; Eggs or egg-derived products; and Penicillins    Review of Systems  Review of Systems   HENT: Negative for nosebleeds.    Cardiovascular: Negative for chest pain, claudication, dyspnea on exertion, irregular heartbeat, leg swelling, near-syncope, orthopnea, palpitations, paroxysmal nocturnal dyspnea and syncope.   Respiratory: Negative for cough, hemoptysis and shortness of breath.    Gastrointestinal: Negative for dysphagia, hematemesis and melena.   Genitourinary: Negative for hematuria.   All other systems reviewed and are  "negative.      Objective     Physical Exam:  /66 (BP Location: Left arm, Patient Position: Sitting, Cuff Size: Adult)   Pulse 60   Ht 172.7 cm (68\")   Wt 87.1 kg (192 lb)   SpO2 95%   BMI 29.19 kg/m²   Physical Exam   Constitutional: She is oriented to person, place, and time. She appears well-developed and well-nourished. No distress.   HENT:   Head: Normocephalic and atraumatic.   Eyes: No scleral icterus.   Neck: Normal range of motion.   Cardiovascular: Normal rate, regular rhythm and normal heart sounds.  Exam reveals no gallop and no friction rub.    No murmur heard.  Pulmonary/Chest: Effort normal and breath sounds normal. No respiratory distress. She has no wheezes. She has no rales.   Abdominal: Soft. Bowel sounds are normal. She exhibits no distension. There is no tenderness.   Musculoskeletal: She exhibits no edema.   Neurological: She is alert and oriented to person, place, and time. No cranial nerve deficit.   Skin: Skin is warm and dry. She is not diaphoretic. No erythema.   Psychiatric: She has a normal mood and affect. Her behavior is normal.       Results Review:    ECG 12 Lead  Date/Time: 4/27/2018 11:27 AM  Performed by: DANIEL MITCHELL  Authorized by: DANIEL MITCHELL   Comparison: compared with previous ECG   Similar to previous ECG  Rhythm: sinus rhythm  Rate: normal  Conduction: incomplete RBBB  ST Segments: ST segments normal  T Waves: T waves normal  QRS axis: normal  Clinical impression: non-specific ECG            Admission on 01/23/2018, Discharged on 02/05/2018   No results displayed because visit has over 200 results.          Assessment/Plan   Justina was seen today for follow-up, pacemaker check, hypertension and hyperlipidemia.    Diagnoses and all orders for this visit:    Essential hypertension, good control    SSS (sick sinus syndrome), doing well after pacemaker    Hyperlipidemia, unspecified hyperlipidemia type, Patient's statin therapy is followed by PCP.    Pacemaker, " well healed    Other orders  -     ECG 12 Lead

## 2018-12-11 ENCOUNTER — CLINICAL SUPPORT NO REQUIREMENTS (OUTPATIENT)
Dept: CARDIOLOGY | Facility: CLINIC | Age: 83
End: 2018-12-11

## 2018-12-11 DIAGNOSIS — Z95.0 PACEMAKER: Primary | ICD-10-CM

## 2018-12-11 DIAGNOSIS — I49.5 SSS (SICK SINUS SYNDROME) (HCC): ICD-10-CM

## 2018-12-11 PROCEDURE — 93288 INTERROG EVL PM/LDLS PM IP: CPT | Performed by: INTERNAL MEDICINE

## 2018-12-19 ENCOUNTER — TELEPHONE (OUTPATIENT)
Dept: CARDIOLOGY | Facility: CLINIC | Age: 83
End: 2018-12-19

## 2018-12-19 NOTE — PROGRESS NOTES
Dual Chamber Pacemaker Evaluation Report  IN OFFICE INTERROGATION    December 19, 2018     Interrogation Date:  12/11/2018    Primary Cardiologist: Kevyn  : Guidant Model: Essentio [id number]  Implant date: 9/11/2017    Reason for evaluation: routine  Indication for pacemaker: sick sinus syndrome and tachy-petr syndrome    Measurements  Atrial sensing - P wave: 3.4 mV  Atrial threshold: 0.6V@ 0.4ms  Atrial lead impedance: 579 ohms  Ventricular sensing - R wave: 16.8 mV  Ventricular threshold: 0.8 V @ 0.4 ms  Ventricular lead impedance:   589 ohms     Diagnostic Data  Atrial paced: 99 %  Ventricular paced: 6 %    Episodes recorded since 4/27/2018  No episodes recorded.    Battery status: satisfactory, estimated 9 years remaining      Final Parameters  Mode:  DDDR  Lower rate: 60 bpm   Upper rate: 120 bpm  AV Delay: paced- 250-300 ms  Ozcobv-690-907 ms  Atrial - Amplitude: 1.5 V   Pulse width: 0.4 ms   Sensitivity: 0.25 mV     Ventricular - Amplitude: 2 V  Pulse width: 0.4 ms  Sensitivity: 0.6 mV    Changes made: No changes made.  Conclusions: normal pacemaker function, stable pacing and sensing thresholds and adequate battery reserve    Follow up: Every 3 months via latitude, annually in office

## 2019-05-03 ENCOUNTER — CLINICAL SUPPORT NO REQUIREMENTS (OUTPATIENT)
Dept: CARDIOLOGY | Facility: CLINIC | Age: 84
End: 2019-05-03

## 2019-05-03 ENCOUNTER — OFFICE VISIT (OUTPATIENT)
Dept: CARDIOLOGY | Facility: CLINIC | Age: 84
End: 2019-05-03

## 2019-05-03 VITALS
HEIGHT: 69 IN | OXYGEN SATURATION: 93 % | SYSTOLIC BLOOD PRESSURE: 140 MMHG | DIASTOLIC BLOOD PRESSURE: 76 MMHG | BODY MASS INDEX: 28.88 KG/M2 | HEART RATE: 60 BPM | WEIGHT: 195 LBS

## 2019-05-03 DIAGNOSIS — Z95.0 PACEMAKER: ICD-10-CM

## 2019-05-03 DIAGNOSIS — E78.2 MIXED HYPERLIPIDEMIA: ICD-10-CM

## 2019-05-03 DIAGNOSIS — I49.5 SSS (SICK SINUS SYNDROME) (HCC): Primary | ICD-10-CM

## 2019-05-03 DIAGNOSIS — I10 ESSENTIAL HYPERTENSION: ICD-10-CM

## 2019-05-03 DIAGNOSIS — I49.5 SSS (SICK SINUS SYNDROME) (HCC): ICD-10-CM

## 2019-05-03 DIAGNOSIS — Z95.0 PACEMAKER: Primary | ICD-10-CM

## 2019-05-03 PROCEDURE — 93288 INTERROG EVL PM/LDLS PM IP: CPT | Performed by: INTERNAL MEDICINE

## 2019-05-03 PROCEDURE — 93000 ELECTROCARDIOGRAM COMPLETE: CPT | Performed by: INTERNAL MEDICINE

## 2019-05-03 PROCEDURE — 99213 OFFICE O/P EST LOW 20 MIN: CPT | Performed by: INTERNAL MEDICINE

## 2019-05-03 RX ORDER — OXYMETAZOLINE HYDROCHLORIDE 0.05 G/100ML
2 SPRAY NASAL 2 TIMES DAILY
COMMUNITY
End: 2020-07-21 | Stop reason: HOSPADM

## 2019-05-03 NOTE — PROGRESS NOTES
Dual Chamber Pacemaker Evaluation Report  IN OFFICE INTERROGATION    May 3, 2019    Primary Cardiologist:  Kevyn  : Guidant Model: Essentio [id number]   Implant date: 9/11/2017    Reason for evaluation: routine  Indication for pacemaker: sick sinus syndrome and tachy-petr syndrome    Measurements  Atrial sensing - P wave: 3.4 mV  Atrial threshold: 0.6V@ 0.4ms  Atrial lead impedance: 559 ohms  Ventricular sensing - R wave: 17.4 mV  Ventricular threshold: 0.7 V @ 0.4 ms  Ventricular lead impedance:   584 ohms     Diagnostic Data  Atrial paced: 99 %  Ventricular paced: 9 %    Episodes recorded since 12/11/2018:    No episodes recorded.    Battery status: satisfactory   Estimated 8.5 years remaining      Final Parameters  Mode:  DDDR  Lower rate: 60 bpm   Upper rate: 120 bpm  AV Delay: paced- 250-300 ms  Eivnvk-003-347 ms  Atrial - Amplitude: 1.5 V   Pulse width: 0.4 ms   Sensitivity: 0.25 mV     Ventricular - Amplitude: 2 V  Pulse width: 0.4 ms  Sensitivity: 0.6 mV    Changes made: No changes made.    Conclusions: normal pacemaker function, stable pacing and sensing thresholds and adequate battery reserve    Follow up: Every 3 months via latitude, annually in office    Note:  Monitor is showing up as disconnected.  Patient provided with instructions on how to reset monitor and instructed to do so when she returns home from  today.  Understanding verbalized.

## 2019-05-03 NOTE — PROGRESS NOTES
Referring Provider: Ruddy Pfeiffer MD    Reason for Follow-up Visit: SSS    Subjective .   Chief Complaint:   Chief Complaint   Patient presents with   • Follow-up     yearly   • Hypertension     pts daughter states that pt has been doing well.  some swelling in the legs and BP stays good.  pt takes fluid pill daily.   • Hyperlipidemia     has checked by PCP.  pts daughter states this always runs high. pt takes Zocor 20 mg  with no complaints.   • Pacemaker Problem       History of present illness:  Justina Bingham is a 89 y.o. yo female with history of SSS, s/p pacemaker placement. In for routine follow up. Denies CP.        History  Past Medical History:   Diagnosis Date   • Allergic rhinitis    • Anxiety    • Anxiety disorder    • Arthritis    • Asthma    • Asthma    • Colon polyp    • Degenerative arthritis    • Dementia    • Dementia    • Depression    • Depression    • Diabetes mellitus (CMS/HCC)    • Environmental allergies    • Hx of colonic polyps    • Hyperlipidemia    • Hypertension    • Neuropathy    • Osteoporosis    • Osteoporosis    • Pacemaker    • Peripheral neuropathy    • Pneumonia    ,   Past Surgical History:   Procedure Laterality Date   • BREAST SURGERY      Biopsy   • BREAST SURGERY     • CARDIAC ELECTROPHYSIOLOGY PROCEDURE N/A 9/11/2017    Procedure: PPM battery change;  Surgeon: Miguel Bagley MD;  Location: [facility] INVASIVE LOCATION;  Service:    • CATARACT EXTRACTION     • CATARACT EXTRACTION     • CHOLECYSTECTOMY     • COLONOSCOPY  06/23/2011    EXAM TO ANAST SNARE X2 RECALL PRN   • COLONOSCOPY W/ POLYPECTOMY  06/23/2011    2 Adenomatous polyps, DIverticulosis, Hemorrhoids   • ENDOSCOPY  02/29/2008    HH   • ENDOSCOPY  02/29/2008    HIATAL HERNIA   • HEMICOLECTOMY      Right   • HEMICOLECTOMY     • HIP FRACTURE SURGERY     • HIP TROCANTERIC NAILING WITH INTRAMEDULLARY HIP SCREW Left 10/26/2017    Procedure: HIP TROCANTERIC NAILING SHORT WITH INTRAMEDULLARY HIP SCREW;  Surgeon:  Ulises Molina MD;  Location: [facility] OR;  Service:    • HYSTERECTOMY     • INSERT / REPLACE / REMOVE PACEMAKER     • PACEMAKER IMPLANTATION     • SINUS SURGERY     ,   Family History   Problem Relation Age of Onset   • Heart attack Mother    • No Known Problems Father    • Hypertension Brother    • Colon cancer Neg Hx    • Colon polyps Neg Hx    ,   Social History     Tobacco Use   • Smoking status: Former Smoker     Start date:      Last attempt to quit:      Years since quittin.3   • Smokeless tobacco: Never Used   Substance Use Topics   • Alcohol use: No   • Drug use: No   ,     Medications  Current Outpatient Medications   Medication Sig Dispense Refill   • acetaminophen (TYLENOL) 325 MG tablet Take 650 mg by mouth Every 4 (Four) Hours As Needed for Mild Pain .     • ALPRAZOLAM PO Take 0.125 mg by mouth 2 (Two) Times a Day.     • amLODIPine (NORVASC) 10 MG tablet Take 1 tablet by mouth Daily.     • aspirin 81 MG EC tablet Take 81 mg by mouth Daily.     • cetirizine (zyrTEC) 10 MG tablet Take 10 mg by mouth Daily.     • cholecalciferol (VITAMIN D3) 1000 units tablet Take 1,000 Units by mouth Daily.     • CloNIDine (CATAPRES) 0.1 MG tablet Take 0.1 mg by mouth 2 (Two) Times a Day.     • fluticasone (FLONASE) 50 MCG/ACT nasal spray 1 spray into the nostril(s) as directed by provider 2 (Two) Times a Day. As needed     • furosemide (LASIX) 40 MG tablet Take 40 mg by mouth Daily.     • gabapentin (NEURONTIN) 300 MG capsule Take 300 mg by mouth 2 (Two) Times a Day.     • hydrALAZINE (APRESOLINE) 25 MG tablet Take 25 mg by mouth 2 (Two) Times a Day.     • HYDROcodone-acetaminophen (NORCO) 5-325 MG per tablet Take 1 tablet by mouth Every 6 (Six) Hours As Needed for Moderate Pain .     • lisinopril (PRINIVIL,ZESTRIL) 40 MG tablet Take 40 mg by mouth Daily.     • magnesium hydroxide (MILK OF MAGNESIA) 400 MG/5ML suspension Take 30 mL by mouth At Night As Needed for Constipation.     • melatonin 5 MG tablet  "tablet Take 5 mg by mouth Every Night.     • metoprolol tartrate (LOPRESSOR) 50 MG tablet Take 1 tablet by mouth Every 12 (Twelve) Hours.     • Multiple Vitamins-Minerals (EYE VITAMINS PO) Take  by mouth.     • Multiple Vitamins-Minerals (MULTIVITAMIN ADULT PO) Take 1 tablet by mouth Daily.     • Omega-3 Fatty Acids (FISH OIL) 1000 MG capsule capsule Take 1,000 mg by mouth Daily With Breakfast.     • oxymetazoline (AFRIN) 0.05 % nasal spray 2 sprays into the nostril(s) as directed by provider 2 (Two) Times a Day.     • PARoxetine (PAXIL) 20 MG tablet Take 20 mg by mouth Every Morning.     • potassium chloride (MICRO-K) 10 MEQ CR capsule Take 10 mEq by mouth Daily.     • simvastatin (ZOCOR) 20 MG tablet Take 20 mg by mouth Every Night.     • traMADol (ULTRAM) 50 MG tablet Take 50 mg by mouth Every 4 (Four) Hours.     • vitamin E 400 UNIT capsule Take 400 Units by mouth Daily.     • zolpidem (AMBIEN) 5 MG tablet Take 5 mg by mouth At Night As Needed for Sleep.       No current facility-administered medications for this visit.        Allergies:  Sulfa antibiotics; Ceclor [cefaclor]; Eggs or egg-derived products; and Penicillins    Review of Systems  Review of Systems   HENT: Negative for nosebleeds.    Cardiovascular: Negative for chest pain, claudication, dyspnea on exertion, irregular heartbeat, leg swelling, near-syncope, orthopnea, palpitations, paroxysmal nocturnal dyspnea and syncope.   Respiratory: Negative for cough, hemoptysis and shortness of breath.    Gastrointestinal: Negative for dysphagia, hematemesis and melena.   Genitourinary: Negative for hematuria.   All other systems reviewed and are negative.      Objective     Physical Exam:  /76 (BP Location: Left arm, Patient Position: Sitting, Cuff Size: Adult)   Pulse 60   Ht 175.3 cm (69\")   Wt 88.5 kg (195 lb)   SpO2 93%   BMI 28.80 kg/m²   Physical Exam   Constitutional: She is oriented to person, place, and time. She appears well-developed and " well-nourished. No distress.   HENT:   Head: Normocephalic and atraumatic.   Eyes: No scleral icterus.   Neck: Normal range of motion.   Cardiovascular: Normal rate, regular rhythm and normal heart sounds. Exam reveals no gallop and no friction rub.   No murmur heard.  Pulmonary/Chest: Effort normal and breath sounds normal. No respiratory distress. She has no wheezes. She has no rales.   Abdominal: Soft. Bowel sounds are normal. She exhibits no distension. There is no tenderness.   Musculoskeletal: She exhibits no edema.   Neurological: She is alert and oriented to person, place, and time. No cranial nerve deficit.   Skin: Skin is warm and dry. She is not diaphoretic. No erythema.   Psychiatric: She has a normal mood and affect. Her behavior is normal.       Results Review:    ECG 12 Lead  Date/Time: 5/3/2019 11:06 AM  Performed by: Miguel Bagley MD  Authorized by: Miguel Bagley MD   Comparison: compared with previous ECG   Similar to previous ECG  Rhythm: sinus rhythm  Rate: normal  Conduction: right bundle branch block, left anterior fascicular block and bifascicular block  ST Segments: ST segments normal  T Waves: T waves normal  QRS axis: left    Clinical impression: abnormal EKG            Admission on 01/23/2018, Discharged on 02/05/2018   No results displayed because visit has over 200 results.          Assessment/Plan   Justina was seen today for follow-up, hypertension, hyperlipidemia and pacemaker problem.    Diagnoses and all orders for this visit:    SSS (sick sinus syndrome) (CMS/HCC), s/p pacemaker    Essential hypertension, good control    Mixed hyperlipidemia, Patient's statin therapy is followed by PCP.    Pacemaker, needs to be checked    Other orders  -     ECG 12 Lead        Patient's Body mass index is 28.8 kg/m². BMI is within normal parameters. No follow-up required..

## 2019-06-21 ENCOUNTER — TELEPHONE (OUTPATIENT)
Dept: CARDIOLOGY | Facility: CLINIC | Age: 84
End: 2019-06-21

## 2019-06-21 NOTE — TELEPHONE ENCOUNTER
RN noted that patient's remote latitude monitor for pacemaker is still not connected and needs reset.  Message left for patient to return call.

## 2019-06-21 NOTE — TELEPHONE ENCOUNTER
Patients daughter called back. She was asked to call Saeid because she stated they have tried getting the monitor to work but have been unsuccessful. I informed her that if the monitor can not be used the patient would have to come into the office every six months. She verbalized understanding.    She was asked to call our office after speaking with saeid.

## 2020-02-05 ENCOUNTER — OFFICE VISIT (OUTPATIENT)
Dept: INTERNAL MEDICINE | Facility: CLINIC | Age: 85
End: 2020-02-05

## 2020-02-05 VITALS
HEART RATE: 64 BPM | WEIGHT: 197 LBS | DIASTOLIC BLOOD PRESSURE: 86 MMHG | SYSTOLIC BLOOD PRESSURE: 170 MMHG | BODY MASS INDEX: 29.86 KG/M2 | HEIGHT: 68 IN

## 2020-02-05 DIAGNOSIS — E11.9 TYPE 2 DIABETES MELLITUS WITHOUT COMPLICATION, WITHOUT LONG-TERM CURRENT USE OF INSULIN (HCC): Primary | ICD-10-CM

## 2020-02-05 DIAGNOSIS — G89.29 CHRONIC MIDLINE LOW BACK PAIN, UNSPECIFIED WHETHER SCIATICA PRESENT: ICD-10-CM

## 2020-02-05 DIAGNOSIS — E78.00 PURE HYPERCHOLESTEROLEMIA: ICD-10-CM

## 2020-02-05 DIAGNOSIS — I10 ESSENTIAL HYPERTENSION: ICD-10-CM

## 2020-02-05 DIAGNOSIS — M54.50 CHRONIC MIDLINE LOW BACK PAIN, UNSPECIFIED WHETHER SCIATICA PRESENT: ICD-10-CM

## 2020-02-05 LAB — HBA1C MFR BLD: 5.7 %

## 2020-02-05 PROCEDURE — 99214 OFFICE O/P EST MOD 30 MIN: CPT | Performed by: INTERNAL MEDICINE

## 2020-02-05 PROCEDURE — 83036 HEMOGLOBIN GLYCOSYLATED A1C: CPT | Performed by: INTERNAL MEDICINE

## 2020-02-05 RX ORDER — HYDROCODONE BITARTRATE AND ACETAMINOPHEN 5; 325 MG/1; MG/1
1 TABLET ORAL 2 TIMES DAILY PRN
Qty: 80 TABLET | Refills: 0 | Status: SHIPPED | OUTPATIENT
Start: 2020-02-05 | End: 2020-04-22

## 2020-02-05 NOTE — PROGRESS NOTES
Ayala Bingham is a 89 y.o. female.   Chief Complaint   Patient presents with   • Hypertension     6 mos. FU - no new complaints per dgt.   • Diabetes     Today's A1C -        Patient is here with her daughter she is extremely hard of hearing the only complaint we have is that she did not take her Lasix this morning       The following portions of the patient's history were reviewed and updated as appropriate: allergies, current medications, past family history, past medical history, past social history, past surgical history and problem list.    Review of Systems   Constitutional: Negative for activity change, appetite change, fatigue, fever, unexpected weight gain and unexpected weight loss.   HENT: Negative for swollen glands, trouble swallowing and voice change.    Eyes: Negative for blurred vision and visual disturbance.   Respiratory: Negative for cough and shortness of breath.    Cardiovascular: Negative for chest pain, palpitations and leg swelling.   Gastrointestinal: Negative for abdominal pain, constipation, diarrhea, nausea, vomiting and indigestion.   Endocrine: Negative for cold intolerance, heat intolerance, polydipsia and polyphagia.   Genitourinary: Negative for dysuria and frequency.   Musculoskeletal: Negative for arthralgias, back pain, joint swelling and neck pain.   Skin: Negative for color change, rash and skin lesions.   Neurological: Negative for dizziness, weakness, headache, memory problem and confusion.   Hematological: Does not bruise/bleed easily.   Psychiatric/Behavioral: Negative for agitation, hallucinations and suicidal ideas. The patient is not nervous/anxious.        Objective   Past Medical History:   Diagnosis Date   • Allergic rhinitis    • Anxiety    • Anxiety disorder    • Arthritis    • Asthma    • Asthma    • Colon polyp    • Degenerative arthritis    • Dementia (CMS/HCC)    • Dementia (CMS/HCC)    • Depression    • Depression    • Diabetes mellitus (CMS/[facility])     • Environmental allergies    • Hx of colonic polyps    • Hyperlipidemia    • Hypertension    • Neuropathy    • Osteoporosis    • Osteoporosis    • Pacemaker    • Peripheral neuropathy    • Pneumonia       Past Surgical History:   Procedure Laterality Date   • BREAST SURGERY      Biopsy   • BREAST SURGERY     • CARDIAC ELECTROPHYSIOLOGY PROCEDURE N/A 9/11/2017    Procedure: PPM battery change;  Surgeon: Miguel Bagley MD;  Location:  [facility] CATH INVASIVE LOCATION;  Service:    • CATARACT EXTRACTION     • CATARACT EXTRACTION     • CHOLECYSTECTOMY     • COLONOSCOPY  06/23/2011    EXAM TO ANAST SNARE X2 RECALL PRN   • COLONOSCOPY W/ POLYPECTOMY  06/23/2011    2 Adenomatous polyps, DIverticulosis, Hemorrhoids   • ENDOSCOPY  02/29/2008    HH   • ENDOSCOPY  02/29/2008    HIATAL HERNIA   • HEMICOLECTOMY      Right   • HEMICOLECTOMY     • HIP FRACTURE SURGERY     • HIP TROCHANTERIC NAILING WITH INTRAMEDULLARY HIP SCREW Left 10/26/2017    Procedure: HIP TROCANTERIC NAILING SHORT WITH INTRAMEDULLARY HIP SCREW;  Surgeon: Ulises Molina MD;  Location: [facility] OR;  Service:    • HYSTERECTOMY     • INSERT / REPLACE / REMOVE PACEMAKER     • PACEMAKER IMPLANTATION     • SINUS SURGERY          Current Outpatient Medications:   •  acetaminophen (TYLENOL) 325 MG tablet, Take 650 mg by mouth Every 4 (Four) Hours As Needed for Mild Pain ., Disp: , Rfl:   •  ALPRAZOLAM PO, Take 0.125 mg by mouth every night at bedtime., Disp: , Rfl:   •  amLODIPine (NORVASC) 10 MG tablet, Take 1 tablet by mouth Daily., Disp: , Rfl:   •  aspirin 81 MG EC tablet, Take 81 mg by mouth Daily., Disp: , Rfl:   •  cholecalciferol (VITAMIN D3) 1000 units tablet, Take 1,000 Units by mouth Daily., Disp: , Rfl:   •  CloNIDine (CATAPRES) 0.1 MG tablet, Take 0.1 mg by mouth 2 (Two) Times a Day., Disp: , Rfl:   •  fluticasone (FLONASE) 50 MCG/ACT nasal spray, 1 spray into the nostril(s) as directed by provider 2 (Two) Times a Day. As needed, Disp: , Rfl:   •   furosemide (LASIX) 40 MG tablet, Take 40 mg by mouth Daily., Disp: , Rfl:   •  gabapentin (NEURONTIN) 300 MG capsule, Take 300 mg by mouth Daily., Disp: , Rfl:   •  hydrALAZINE (APRESOLINE) 25 MG tablet, Take 25 mg by mouth 2 (Two) Times a Day., Disp: , Rfl:   •  HYDROcodone-acetaminophen (NORCO) 5-325 MG per tablet, Take 1 tablet by mouth 2 (Two) Times a Day As Needed for Moderate Pain ., Disp: 80 tablet, Rfl: 0  •  lisinopril (PRINIVIL,ZESTRIL) 40 MG tablet, Take 40 mg by mouth Daily., Disp: , Rfl:   •  magnesium hydroxide (MILK OF MAGNESIA) 400 MG/5ML suspension, Take 30 mL by mouth At Night As Needed for Constipation., Disp: , Rfl:   •  melatonin 5 MG tablet tablet, Take 5 mg by mouth Every Night., Disp: , Rfl:   •  metoprolol tartrate (LOPRESSOR) 50 MG tablet, Take 1 tablet by mouth Every 12 (Twelve) Hours., Disp: , Rfl:   •  Multiple Vitamins-Minerals (EYE VITAMINS PO), Take  by mouth., Disp: , Rfl:   •  Omega-3 Fatty Acids (FISH OIL) 1000 MG capsule capsule, Take 1,000 mg by mouth Daily With Breakfast., Disp: , Rfl:   •  oxymetazoline (AFRIN) 0.05 % nasal spray, 2 sprays into the nostril(s) as directed by provider 2 (Two) Times a Day., Disp: , Rfl:   •  PARoxetine (PAXIL) 20 MG tablet, Take 20 mg by mouth Every Morning., Disp: , Rfl:   •  potassium chloride (MICRO-K) 10 MEQ CR capsule, Take 10 mEq by mouth Daily., Disp: , Rfl:   •  simvastatin (ZOCOR) 20 MG tablet, Take 20 mg by mouth Every Night., Disp: , Rfl:   •  traMADol (ULTRAM) 50 MG tablet, Take 50 mg by mouth Every 4 (Four) Hours., Disp: , Rfl:   •  vitamin E 400 UNIT capsule, Take 400 Units by mouth Daily., Disp: , Rfl:   •  zolpidem (AMBIEN) 5 MG tablet, Take 5 mg by mouth At Night As Needed for Sleep., Disp: , Rfl:   •  cetirizine (zyrTEC) 10 MG tablet, Take 10 mg by mouth Daily., Disp: , Rfl:   •  Multiple Vitamins-Minerals (MULTIVITAMIN ADULT PO), Take 1 tablet by mouth Daily., Disp: , Rfl:    Vitals:    02/05/20 1103   BP: 170/86   Pulse: 64      Physical Exam   Constitutional: She is oriented to person, place, and time. She appears well-developed and well-nourished.   HENT:   Head: Normocephalic and atraumatic.   Right Ear: External ear normal.   Left Ear: External ear normal.   Nose: Nose normal.   Mouth/Throat: Oropharynx is clear and moist.   Eyes: Pupils are equal, round, and reactive to light. Conjunctivae and EOM are normal.   Neck: Normal range of motion. Neck supple. No thyromegaly present.   Cardiovascular: Normal rate, regular rhythm, normal heart sounds and intact distal pulses.   She has 2+ hard edema both lower extremities   Pulmonary/Chest: Effort normal and breath sounds normal.   Abdominal: Soft. Bowel sounds are normal.   Musculoskeletal:   Patient is using a walker today she normally would use a wheelchair for longer distances   Lymphadenopathy:     She has no cervical adenopathy.   Neurological: She is alert and oriented to person, place, and time.   Skin: Skin is warm and dry.   She has mild erythema of both lower extremities with some scaling   Psychiatric: She has a normal mood and affect. Her behavior is normal. Thought content normal.   Nursing note and vitals reviewed.        Patient's Body mass index is 29.95 kg/m². BMI is above normal parameters. Recommendations include: nutrition counseling.      Assessment/Plan   Diagnoses and all orders for this visit:    1. Type 2 diabetes mellitus without complication, without long-term current use of insulin (CMS/[facility]) (Primary)  -     POCT glycated hemoglobin, total  -     Basic Metabolic Panel    2. Chronic midline low back pain, unspecified whether sciatica present  -     HYDROcodone-acetaminophen (NORCO) 5-325 MG per tablet; Take 1 tablet by mouth 2 (Two) Times a Day As Needed for Moderate Pain .  Dispense: 80 tablet; Refill: 0    3. Essential hypertension    4. Pure hypercholesterolemia      Patient's blood pressure was elevated today however she has not taken her Lasix.  We are  going to go ahead and and continue her on her current medications as I suspect this is more her walking into the office without a wheelchair in addition to not taking her Lasix her daughter is going to monitor her blood pressures at home and call us if they remain elevated

## 2020-02-06 LAB
BUN SERPL-MCNC: 11 MG/DL (ref 8–23)
BUN/CREAT SERPL: 14.5 (ref 7–25)
CALCIUM SERPL-MCNC: 10.4 MG/DL (ref 8.2–9.6)
CHLORIDE SERPL-SCNC: 95 MMOL/L (ref 98–107)
CO2 SERPL-SCNC: 30.3 MMOL/L (ref 22–29)
CREAT SERPL-MCNC: 0.76 MG/DL (ref 0.57–1)
GLUCOSE SERPL-MCNC: 137 MG/DL (ref 65–99)
POTASSIUM SERPL-SCNC: 4 MMOL/L (ref 3.5–5.2)
SODIUM SERPL-SCNC: 140 MMOL/L (ref 136–145)

## 2020-02-12 DIAGNOSIS — G47.9 TROUBLE IN SLEEPING: Primary | ICD-10-CM

## 2020-02-12 RX ORDER — ZOLPIDEM TARTRATE 5 MG/1
5 TABLET ORAL
Qty: 45 TABLET | Refills: 2 | Status: SHIPPED | OUTPATIENT
Start: 2020-02-12 | End: 2020-06-08

## 2020-03-09 ENCOUNTER — TELEPHONE (OUTPATIENT)
Dept: INTERNAL MEDICINE | Facility: CLINIC | Age: 85
End: 2020-03-09

## 2020-03-09 NOTE — TELEPHONE ENCOUNTER
PT'S DAUGHTER, NATASHA, CALLED IN REGARDS TO PT'S HOSPITAL BED THROUGH [facility]. THE BED IS GOOD BUT THE MATTRESS IS WORN OUT. PT NEEDS A NEW MATTRESS BUT [facility] NEEDS A PRESCRIPTION FROM HER DR. NATASHA REQUESTED THE PRESCRIPTION BE FAXED TO [facility] IS POSSIBLE.    NATASHA CAN BE REACHED [phone]

## 2020-03-10 DIAGNOSIS — F32.A ANXIETY AND DEPRESSION: Primary | ICD-10-CM

## 2020-03-10 DIAGNOSIS — S72.142D CLOSED DISPLACED INTERTROCHANTERIC FRACTURE OF LEFT FEMUR WITH ROUTINE HEALING, SUBSEQUENT ENCOUNTER: Primary | ICD-10-CM

## 2020-03-10 DIAGNOSIS — F41.9 ANXIETY AND DEPRESSION: Primary | ICD-10-CM

## 2020-03-10 RX ORDER — ALPRAZOLAM 0.25 MG/1
TABLET ORAL
Qty: 45 TABLET | Refills: 5 | Status: ON HOLD | OUTPATIENT
Start: 2020-03-10 | End: 2020-07-21 | Stop reason: SDUPTHER

## 2020-03-12 DIAGNOSIS — S72.142D CLOSED DISPLACED INTERTROCHANTERIC FRACTURE OF LEFT FEMUR WITH ROUTINE HEALING, SUBSEQUENT ENCOUNTER: Primary | ICD-10-CM

## 2020-03-23 DIAGNOSIS — G89.29 CHRONIC MIDLINE LOW BACK PAIN, UNSPECIFIED WHETHER SCIATICA PRESENT: Primary | ICD-10-CM

## 2020-03-23 DIAGNOSIS — M54.50 CHRONIC MIDLINE LOW BACK PAIN, UNSPECIFIED WHETHER SCIATICA PRESENT: Primary | ICD-10-CM

## 2020-03-23 RX ORDER — GABAPENTIN 300 MG/1
CAPSULE ORAL
Qty: 270 CAPSULE | Refills: 1 | Status: SHIPPED | OUTPATIENT
Start: 2020-03-23 | End: 2020-07-21 | Stop reason: HOSPADM

## 2020-03-23 RX ORDER — AMLODIPINE BESYLATE 10 MG/1
10 TABLET ORAL DAILY
Qty: 90 TABLET | Refills: 3 | Status: SHIPPED | OUTPATIENT
Start: 2020-03-23 | End: 2022-01-01 | Stop reason: HOSPADM

## 2020-04-13 RX ORDER — CLONIDINE HYDROCHLORIDE 0.2 MG/1
TABLET ORAL
Qty: 180 TABLET | Refills: 3 | Status: SHIPPED | OUTPATIENT
Start: 2020-04-13 | End: 2020-07-21 | Stop reason: HOSPADM

## 2020-04-17 ENCOUNTER — TELEPHONE (OUTPATIENT)
Dept: INTERNAL MEDICINE | Facility: CLINIC | Age: 85
End: 2020-04-17

## 2020-04-17 NOTE — TELEPHONE ENCOUNTER
[facility] called to get clarification on RX: Xanax.     Caller stated pharm received RX with instructions for pt to take 1/2 tablet at night.   Pt use to take 1/2 tablet during the day and 1 tablet at night.    Please call [phone].

## 2020-04-17 NOTE — TELEPHONE ENCOUNTER
Per [employer] patient is suppose to be doing 1/2 qam, 1 at bedtime. Ru[facility] with [facility] informed, Quantity of prescription was correct but the directions were wrong.

## 2020-04-22 DIAGNOSIS — M54.50 CHRONIC MIDLINE LOW BACK PAIN, UNSPECIFIED WHETHER SCIATICA PRESENT: ICD-10-CM

## 2020-04-22 DIAGNOSIS — G89.29 CHRONIC MIDLINE LOW BACK PAIN, UNSPECIFIED WHETHER SCIATICA PRESENT: ICD-10-CM

## 2020-04-23 RX ORDER — HYDROCODONE BITARTRATE AND ACETAMINOPHEN 5; 325 MG/1; MG/1
1 TABLET ORAL 2 TIMES DAILY PRN
Qty: 80 TABLET | Refills: 0 | Status: SHIPPED | OUTPATIENT
Start: 2020-04-23 | End: 2020-07-21 | Stop reason: HOSPADM

## 2020-05-19 RX ORDER — PAROXETINE HYDROCHLORIDE 20 MG/1
TABLET, FILM COATED ORAL
Qty: 90 TABLET | Refills: 3 | Status: SHIPPED | OUTPATIENT
Start: 2020-05-19 | End: 2020-07-21 | Stop reason: HOSPADM

## 2020-06-08 DIAGNOSIS — G47.9 TROUBLE IN SLEEPING: ICD-10-CM

## 2020-06-08 RX ORDER — ZOLPIDEM TARTRATE 5 MG/1
5 TABLET ORAL
Qty: 45 TABLET | Refills: 1 | Status: ON HOLD | OUTPATIENT
Start: 2020-06-08 | End: 2020-07-21 | Stop reason: SDUPTHER

## 2020-06-08 RX ORDER — METOPROLOL TARTRATE 50 MG/1
TABLET, FILM COATED ORAL
Qty: 60 TABLET | Refills: 11 | Status: SHIPPED | OUTPATIENT
Start: 2020-06-08 | End: 2022-01-01 | Stop reason: HOSPADM

## 2020-06-15 RX ORDER — POTASSIUM CHLORIDE 750 MG/1
CAPSULE, EXTENDED RELEASE ORAL
Qty: 30 CAPSULE | Refills: 5 | Status: SHIPPED | OUTPATIENT
Start: 2020-06-15 | End: 2022-01-01

## 2020-06-15 RX ORDER — HYDRALAZINE HYDROCHLORIDE 25 MG/1
TABLET, FILM COATED ORAL
Qty: 60 TABLET | Refills: 5 | Status: SHIPPED | OUTPATIENT
Start: 2020-06-15 | End: 2022-01-01 | Stop reason: HOSPADM

## 2020-06-16 DIAGNOSIS — M13.0 ARTHRITIS OF MULTIPLE SITES: Primary | ICD-10-CM

## 2020-06-17 RX ORDER — TRAMADOL HYDROCHLORIDE 50 MG/1
TABLET ORAL
Qty: 180 TABLET | Refills: 5 | Status: SHIPPED | OUTPATIENT
Start: 2020-06-17 | End: 2020-07-21 | Stop reason: HOSPADM

## 2020-06-29 RX ORDER — FUROSEMIDE 40 MG/1
40 TABLET ORAL EVERY MORNING
Qty: 90 TABLET | Refills: 1 | Status: SHIPPED | OUTPATIENT
Start: 2020-06-29 | End: 2020-12-28

## 2020-06-30 ENCOUNTER — CLINICAL SUPPORT NO REQUIREMENTS (OUTPATIENT)
Dept: CARDIOLOGY | Facility: CLINIC | Age: 85
End: 2020-06-30

## 2020-06-30 DIAGNOSIS — I49.5 TACHY-BRADY SYNDROME (HCC): ICD-10-CM

## 2020-06-30 DIAGNOSIS — Z95.0 PACEMAKER: Primary | ICD-10-CM

## 2020-06-30 PROCEDURE — 93288 INTERROG EVL PM/LDLS PM IP: CPT | Performed by: INTERNAL MEDICINE

## 2020-07-15 DIAGNOSIS — M54.50 CHRONIC MIDLINE LOW BACK PAIN, UNSPECIFIED WHETHER SCIATICA PRESENT: ICD-10-CM

## 2020-07-15 DIAGNOSIS — G89.29 CHRONIC MIDLINE LOW BACK PAIN, UNSPECIFIED WHETHER SCIATICA PRESENT: ICD-10-CM

## 2020-07-15 RX ORDER — HYDROCODONE BITARTRATE AND ACETAMINOPHEN 5; 325 MG/1; MG/1
1 TABLET ORAL 2 TIMES DAILY PRN
Qty: 80 TABLET | OUTPATIENT
Start: 2020-07-15

## 2020-07-15 NOTE — TELEPHONE ENCOUNTER
Must have appt before we can refill, by state guidelines.  Last OV was in Feb.  Her next visit is in August, but will have to be moved up in order to get refill.  Ok to schedule with NP.  Otherwise, can wait until her August FU, if her daughter thinks she can do without it until then.

## 2020-07-16 ENCOUNTER — APPOINTMENT (OUTPATIENT)
Dept: GENERAL RADIOLOGY | Facility: HOSPITAL | Age: 85
End: 2020-07-16

## 2020-07-16 ENCOUNTER — HOSPITAL ENCOUNTER (EMERGENCY)
Facility: HOSPITAL | Age: 85
Discharge: HOME | End: 2020-07-16
Attending: EMERGENCY MEDICINE | Admitting: EMERGENCY MEDICINE

## 2020-07-16 ENCOUNTER — APPOINTMENT (OUTPATIENT)
Dept: CT IMAGING | Facility: HOSPITAL | Age: 85
End: 2020-07-16

## 2020-07-16 VITALS
TEMPERATURE: 98.3 F | BODY MASS INDEX: 29.6 KG/M2 | HEIGHT: 67 IN | HEART RATE: 59 BPM | RESPIRATION RATE: 20 BRPM | WEIGHT: 188.6 LBS | OXYGEN SATURATION: 99 % | SYSTOLIC BLOOD PRESSURE: 138 MMHG | DIASTOLIC BLOOD PRESSURE: 68 MMHG

## 2020-07-16 DIAGNOSIS — S82.831A CLOSED FRACTURE OF PROXIMAL END OF RIGHT FIBULA, UNSPECIFIED FRACTURE MORPHOLOGY, INITIAL ENCOUNTER: ICD-10-CM
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
## 2020-07-16 LAB
ABO GROUP BLD: NORMAL
ALBUMIN SERPL-MCNC: 4.2 G/DL (ref 3.5–5.2)
ALBUMIN/GLOB SERPL: 1.5 G/DL
ALP SERPL-CCNC: 52 U/L (ref 39–117)
ALT SERPL W P-5'-P-CCNC: 12 U/L (ref 1–33)
ANION GAP SERPL CALCULATED.3IONS-SCNC: 11 MMOL/L (ref 5–15)
APTT PPP: 36.7 SECONDS (ref 24.1–35)
AST SERPL-CCNC: 19 U/L (ref 1–32)
BACTERIA UR QL AUTO: ABNORMAL /HPF
BASOPHILS # BLD AUTO: 0.1 10*3/MM3 (ref 0–0.2)
BASOPHILS NFR BLD AUTO: 0.6 % (ref 0–1.5)
BILIRUB SERPL-MCNC: 0.5 MG/DL (ref 0–1.2)
BILIRUB UR QL STRIP: NEGATIVE
BLD GP AB SCN SERPL QL: NEGATIVE
BUN SERPL-MCNC: 15 MG/DL (ref 8–23)
BUN/CREAT SERPL: 19.5 (ref 7–25)
CALCIUM SPEC-SCNC: 10 MG/DL (ref 8.2–9.6)
CHLORIDE SERPL-SCNC: 100 MMOL/L (ref 98–107)
CLARITY UR: CLEAR
CO2 SERPL-SCNC: 31 MMOL/L (ref 22–29)
COLOR UR: ABNORMAL
CREAT SERPL-MCNC: 0.77 MG/DL (ref 0.57–1)
DEPRECATED RDW RBC AUTO: 45.2 FL (ref 37–54)
EOSINOPHIL # BLD AUTO: 0.35 10*3/MM3 (ref 0–0.4)
EOSINOPHIL NFR BLD AUTO: 2.1 % (ref 0.3–6.2)
ERYTHROCYTE [DISTWIDTH] IN BLOOD BY AUTOMATED COUNT: 14 % (ref 12.3–15.4)
GFR SERPL CREATININE-BSD FRML MDRD: 70 ML/MIN/1.73
GLOBULIN UR ELPH-MCNC: 2.8 GM/DL
GLUCOSE SERPL-MCNC: 128 MG/DL (ref 65–99)
GLUCOSE UR STRIP-MCNC: NEGATIVE MG/DL
HCT VFR BLD AUTO: 44.5 % (ref 34–46.6)
HGB BLD-MCNC: 15.2 G/DL (ref 12–15.9)
HGB UR QL STRIP.AUTO: NEGATIVE
HYALINE CASTS UR QL AUTO: ABNORMAL /LPF
IMM GRANULOCYTES # BLD AUTO: 0.12 10*3/MM3 (ref 0–0.05)
IMM GRANULOCYTES NFR BLD AUTO: 0.7 % (ref 0–0.5)
INR PPP: 1.11 (ref 0.91–1.09)
KETONES UR QL STRIP: NEGATIVE
LEUKOCYTE ESTERASE UR QL STRIP.AUTO: NEGATIVE
LYMPHOCYTES # BLD AUTO: 3.24 10*3/MM3 (ref 0.7–3.1)
LYMPHOCYTES NFR BLD AUTO: 19.3 % (ref 19.6–45.3)
MCH RBC QN AUTO: 30.4 PG (ref 26.6–33)
MCHC RBC AUTO-ENTMCNC: 34.2 G/DL (ref 31.5–35.7)
MCV RBC AUTO: 89 FL (ref 79–97)
MONOCYTES # BLD AUTO: 1.13 10*3/MM3 (ref 0.1–0.9)
MONOCYTES NFR BLD AUTO: 6.7 % (ref 5–12)
NEUTROPHILS NFR BLD AUTO: 11.81 10*3/MM3 (ref 1.7–7)
NEUTROPHILS NFR BLD AUTO: 70.6 % (ref 42.7–76)
NITRITE UR QL STRIP: NEGATIVE
NRBC BLD AUTO-RTO: 0 /100 WBC (ref 0–0.2)
PH UR STRIP.AUTO: 5.5 [PH] (ref 5–8)
PLATELET # BLD AUTO: 190 10*3/MM3 (ref 140–450)
PMV BLD AUTO: 10.2 FL (ref 6–12)
POTASSIUM SERPL-SCNC: 4.1 MMOL/L (ref 3.5–5.2)
PROT SERPL-MCNC: 7 G/DL (ref 6–8.5)
PROT UR QL STRIP: ABNORMAL
PROTHROMBIN TIME: 13.9 SECONDS (ref 11.9–14.6)
RBC # BLD AUTO: 5 10*6/MM3 (ref 3.77–5.28)
RBC # UR: ABNORMAL /HPF
REF LAB TEST METHOD: ABNORMAL
RH BLD: POSITIVE
SODIUM SERPL-SCNC: 142 MMOL/L (ref 136–145)
SP GR UR STRIP: 1.02 (ref 1–1.03)
SQUAMOUS #/AREA URNS HPF: ABNORMAL /HPF
T&S EXPIRATION DATE: NORMAL
UROBILINOGEN UR QL STRIP: ABNORMAL
WBC # BLD AUTO: 16.75 10*3/MM3 (ref 3.4–10.8)
WBC UR QL AUTO: ABNORMAL /HPF

## 2020-07-16 PROCEDURE — 25010000002 ONDANSETRON PER 1 MG: Performed by: EMERGENCY MEDICINE

## 2020-07-16 PROCEDURE — 73590 X-RAY EXAM OF LOWER LEG: CPT

## 2020-07-16 PROCEDURE — 80053 COMPREHEN METABOLIC PANEL: CPT | Performed by: EMERGENCY MEDICINE

## 2020-07-16 PROCEDURE — P9612 CATHETERIZE FOR URINE SPEC: HCPCS

## 2020-07-16 PROCEDURE — 96375 TX/PRO/DX INJ NEW DRUG ADDON: CPT

## 2020-07-16 PROCEDURE — 85610 PROTHROMBIN TIME: CPT | Performed by: EMERGENCY MEDICINE

## 2020-07-16 PROCEDURE — 86900 BLOOD TYPING SEROLOGIC ABO: CPT | Performed by: EMERGENCY MEDICINE

## 2020-07-16 PROCEDURE — 85730 THROMBOPLASTIN TIME PARTIAL: CPT | Performed by: EMERGENCY MEDICINE

## 2020-07-16 PROCEDURE — 71045 X-RAY EXAM CHEST 1 VIEW: CPT

## 2020-07-16 PROCEDURE — 81001 URINALYSIS AUTO W/SCOPE: CPT | Performed by: EMERGENCY MEDICINE

## 2020-07-16 PROCEDURE — 99284 EMERGENCY DEPT VISIT MOD MDM: CPT

## 2020-07-16 PROCEDURE — 86850 RBC ANTIBODY SCREEN: CPT | Performed by: EMERGENCY MEDICINE

## 2020-07-16 PROCEDURE — 72125 CT NECK SPINE W/O DYE: CPT

## 2020-07-16 PROCEDURE — 86901 BLOOD TYPING SEROLOGIC RH(D): CPT | Performed by: EMERGENCY MEDICINE

## 2020-07-16 PROCEDURE — 25010000002 FENTANYL CITRATE (PF) 100 MCG/2ML SOLUTION: Performed by: EMERGENCY MEDICINE

## 2020-07-16 PROCEDURE — 85025 COMPLETE CBC W/AUTO DIFF WBC: CPT | Performed by: EMERGENCY MEDICINE

## 2020-07-16 PROCEDURE — 73502 X-RAY EXAM HIP UNI 2-3 VIEWS: CPT

## 2020-07-16 PROCEDURE — 96374 THER/PROPH/DIAG INJ IV PUSH: CPT

## 2020-07-16 PROCEDURE — 70450 CT HEAD/BRAIN W/O DYE: CPT

## 2020-07-16 PROCEDURE — 93010 ELECTROCARDIOGRAM REPORT: CPT | Performed by: INTERNAL MEDICINE

## 2020-07-16 PROCEDURE — 93005 ELECTROCARDIOGRAM TRACING: CPT | Performed by: EMERGENCY MEDICINE

## 2020-07-16 RX ORDER — ONDANSETRON 2 MG/ML
4 INJECTION INTRAMUSCULAR; INTRAVENOUS ONCE
Status: COMPLETED | OUTPATIENT
Start: 2020-07-16 | End: 2020-07-16

## 2020-07-16 RX ORDER — ACETAMINOPHEN AND CODEINE PHOSPHATE 300; 30 MG/1; MG/1
1 TABLET ORAL EVERY 6 HOURS PRN
Qty: 10 TABLET | Refills: 0 | Status: SHIPPED | OUTPATIENT
Start: 2020-07-16 | End: 2020-07-21 | Stop reason: HOSPADM

## 2020-07-16 RX ORDER — FENTANYL CITRATE 50 UG/ML
25 INJECTION, SOLUTION INTRAMUSCULAR; INTRAVENOUS ONCE
Status: COMPLETED | OUTPATIENT
Start: 2020-07-16 | End: 2020-07-16

## 2020-07-16 RX ADMIN — ONDANSETRON HYDROCHLORIDE 4 MG: 2 SOLUTION INTRAMUSCULAR; INTRAVENOUS at 12:30

## 2020-07-16 RX ADMIN — FENTANYL CITRATE 25 MCG: 50 INJECTION, SOLUTION INTRAMUSCULAR; INTRAVENOUS at 12:30

## 2020-07-17 ENCOUNTER — TELEPHONE (OUTPATIENT)
Dept: INTERNAL MEDICINE | Facility: CLINIC | Age: 85
End: 2020-07-17

## 2020-07-17 NOTE — TELEPHONE ENCOUNTER
Caller: Brittany Grayson    Relationship: Emergency Contact    Best call back number: [phone]    What orders are you requesting (i.e. lab or imaging): HOME HEALTH PHYSICAL THERAPY    In what timeframe would the patient need to come in: ASAP    Where will you receive your lab/imaging services: [facility]    Additional notes: FEMALE THERAPIST ONLY

## 2020-07-18 ENCOUNTER — HOSPITAL ENCOUNTER (INPATIENT)
Facility: HOSPITAL | Age: 85
LOS: 3 days | Discharge: NURSING, REHAB OR LONG-TERM CARE | End: 2020-07-21
Attending: EMERGENCY MEDICINE | Admitting: FAMILY MEDICINE

## 2020-07-18 ENCOUNTER — APPOINTMENT (OUTPATIENT)
Dept: CT IMAGING | Facility: HOSPITAL | Age: 85
End: 2020-07-18

## 2020-07-18 ENCOUNTER — NURSE TRIAGE (OUTPATIENT)
Dept: CALL CENTER | Facility: HOSPITAL | Age: 85
End: 2020-07-18

## 2020-07-18 ENCOUNTER — APPOINTMENT (OUTPATIENT)
Dept: GENERAL RADIOLOGY | Facility: HOSPITAL | Age: 85
End: 2020-07-18

## 2020-07-18 DIAGNOSIS — R09.02 HYPOXIA: Primary | ICD-10-CM

## 2020-07-18 DIAGNOSIS — G89.29 CHRONIC MIDLINE LOW BACK PAIN, UNSPECIFIED WHETHER SCIATICA PRESENT: ICD-10-CM

## 2020-07-18 DIAGNOSIS — F32.A ANXIETY AND DEPRESSION: ICD-10-CM

## 2020-07-18 DIAGNOSIS — M54.50 CHRONIC MIDLINE LOW BACK PAIN, UNSPECIFIED WHETHER SCIATICA PRESENT: ICD-10-CM

## 2020-07-18 DIAGNOSIS — S82.831D CLOSED FRACTURE OF DISTAL END OF RIGHT FIBULA WITH ROUTINE HEALING, UNSPECIFIED FRACTURE MORPHOLOGY, SUBSEQUENT ENCOUNTER: ICD-10-CM

## 2020-07-18 DIAGNOSIS — Z78.9 DECREASED ACTIVITIES OF DAILY LIVING (ADL): ICD-10-CM

## 2020-07-18 DIAGNOSIS — S72.142D CLOSED DISPLACED INTERTROCHANTERIC FRACTURE OF LEFT FEMUR WITH ROUTINE HEALING, SUBSEQUENT ENCOUNTER: Primary | ICD-10-CM

## 2020-07-18 DIAGNOSIS — Z74.09 IMPAIRED MOBILITY: ICD-10-CM

## 2020-07-18 DIAGNOSIS — G47.9 TROUBLE IN SLEEPING: ICD-10-CM

## 2020-07-18 DIAGNOSIS — F41.9 ANXIETY AND DEPRESSION: ICD-10-CM

## 2020-07-18 DIAGNOSIS — G62.9 NEUROPATHY: ICD-10-CM

## 2020-07-18 LAB
ALBUMIN SERPL-MCNC: 4.2 G/DL (ref 3.5–5.2)
ALBUMIN/GLOB SERPL: 1.5 G/DL
ALP SERPL-CCNC: 54 U/L (ref 39–117)
ALT SERPL W P-5'-P-CCNC: 22 U/L (ref 1–33)
ANION GAP SERPL CALCULATED.3IONS-SCNC: 11 MMOL/L (ref 5–15)
AST SERPL-CCNC: 29 U/L (ref 1–32)
BASOPHILS # BLD AUTO: 0.19 10*3/MM3 (ref 0–0.2)
BASOPHILS NFR BLD AUTO: 1.1 % (ref 0–1.5)
BILIRUB SERPL-MCNC: 0.6 MG/DL (ref 0–1.2)
BUN SERPL-MCNC: 18 MG/DL (ref 8–23)
BUN/CREAT SERPL: 24.3 (ref 7–25)
CALCIUM SPEC-SCNC: 9.7 MG/DL (ref 8.2–9.6)
CHLORIDE SERPL-SCNC: 97 MMOL/L (ref 98–107)
CO2 SERPL-SCNC: 30 MMOL/L (ref 22–29)
CREAT SERPL-MCNC: 0.74 MG/DL (ref 0.57–1)
D DIMER PPP FEU-MCNC: 0.72 MG/L (FEU) (ref 0–0.5)
DEPRECATED RDW RBC AUTO: 47 FL (ref 37–54)
EOSINOPHIL # BLD AUTO: 0.37 10*3/MM3 (ref 0–0.4)
EOSINOPHIL NFR BLD AUTO: 2.1 % (ref 0.3–6.2)
ERYTHROCYTE [DISTWIDTH] IN BLOOD BY AUTOMATED COUNT: 14.5 % (ref 12.3–15.4)
GFR SERPL CREATININE-BSD FRML MDRD: 73 ML/MIN/1.73
GLOBULIN UR ELPH-MCNC: 2.8 GM/DL
GLUCOSE SERPL-MCNC: 126 MG/DL (ref 65–99)
HCT VFR BLD AUTO: 42 % (ref 34–46.6)
HGB BLD-MCNC: 14.3 G/DL (ref 12–15.9)
IMM GRANULOCYTES # BLD AUTO: 0.28 10*3/MM3 (ref 0–0.05)
IMM GRANULOCYTES NFR BLD AUTO: 1.6 % (ref 0–0.5)
LYMPHOCYTES # BLD AUTO: 3.41 10*3/MM3 (ref 0.7–3.1)
LYMPHOCYTES NFR BLD AUTO: 18.9 % (ref 19.6–45.3)
MCH RBC QN AUTO: 30.2 PG (ref 26.6–33)
MCHC RBC AUTO-ENTMCNC: 34 G/DL (ref 31.5–35.7)
MCV RBC AUTO: 88.8 FL (ref 79–97)
MONOCYTES # BLD AUTO: 1.99 10*3/MM3 (ref 0.1–0.9)
MONOCYTES NFR BLD AUTO: 11 % (ref 5–12)
NEUTROPHILS NFR BLD AUTO: 11.78 10*3/MM3 (ref 1.7–7)
NEUTROPHILS NFR BLD AUTO: 65.3 % (ref 42.7–76)
NRBC BLD AUTO-RTO: 0 /100 WBC (ref 0–0.2)
NT-PROBNP SERPL-MCNC: 1837 PG/ML (ref 0–1800)
PLATELET # BLD AUTO: 185 10*3/MM3 (ref 140–450)
PMV BLD AUTO: 10.7 FL (ref 6–12)
POTASSIUM SERPL-SCNC: 4.4 MMOL/L (ref 3.5–5.2)
PROT SERPL-MCNC: 7 G/DL (ref 6–8.5)
RBC # BLD AUTO: 4.73 10*6/MM3 (ref 3.77–5.28)
SARS-COV-2 RDRP RESP QL NAA+PROBE: NOT DETECTED
SODIUM SERPL-SCNC: 138 MMOL/L (ref 136–145)
TROPONIN T SERPL-MCNC: <0.01 NG/ML (ref 0–0.03)
WBC # BLD AUTO: 18.02 10*3/MM3 (ref 3.4–10.8)

## 2020-07-18 PROCEDURE — 83880 ASSAY OF NATRIURETIC PEPTIDE: CPT | Performed by: EMERGENCY MEDICINE

## 2020-07-18 PROCEDURE — 85379 FIBRIN DEGRADATION QUANT: CPT | Performed by: EMERGENCY MEDICINE

## 2020-07-18 PROCEDURE — 80053 COMPREHEN METABOLIC PANEL: CPT | Performed by: EMERGENCY MEDICINE

## 2020-07-18 PROCEDURE — 71045 X-RAY EXAM CHEST 1 VIEW: CPT

## 2020-07-18 PROCEDURE — 99284 EMERGENCY DEPT VISIT MOD MDM: CPT

## 2020-07-18 PROCEDURE — 93005 ELECTROCARDIOGRAM TRACING: CPT | Performed by: EMERGENCY MEDICINE

## 2020-07-18 PROCEDURE — 87635 SARS-COV-2 COVID-19 AMP PRB: CPT | Performed by: PHYSICIAN ASSISTANT

## 2020-07-18 PROCEDURE — 93010 ELECTROCARDIOGRAM REPORT: CPT | Performed by: INTERNAL MEDICINE

## 2020-07-18 PROCEDURE — 84484 ASSAY OF TROPONIN QUANT: CPT | Performed by: EMERGENCY MEDICINE

## 2020-07-18 PROCEDURE — 0 IOPAMIDOL PER 1 ML: Performed by: EMERGENCY MEDICINE

## 2020-07-18 PROCEDURE — 71275 CT ANGIOGRAPHY CHEST: CPT

## 2020-07-18 PROCEDURE — 85025 COMPLETE CBC W/AUTO DIFF WBC: CPT | Performed by: EMERGENCY MEDICINE

## 2020-07-18 RX ORDER — LISINOPRIL 20 MG/1
40 TABLET ORAL DAILY
Status: DISCONTINUED | OUTPATIENT
Start: 2020-07-19 | End: 2020-07-21 | Stop reason: HOSPADM

## 2020-07-18 RX ORDER — CETIRIZINE HYDROCHLORIDE 10 MG/1
10 TABLET ORAL DAILY
Status: DISCONTINUED | OUTPATIENT
Start: 2020-07-19 | End: 2020-07-21 | Stop reason: HOSPADM

## 2020-07-18 RX ORDER — HYDRALAZINE HYDROCHLORIDE 25 MG/1
25 TABLET, FILM COATED ORAL 2 TIMES DAILY
Status: DISCONTINUED | OUTPATIENT
Start: 2020-07-18 | End: 2020-07-21 | Stop reason: HOSPADM

## 2020-07-18 RX ORDER — METOPROLOL TARTRATE 50 MG/1
50 TABLET, FILM COATED ORAL 2 TIMES DAILY
Status: DISCONTINUED | OUTPATIENT
Start: 2020-07-18 | End: 2020-07-21 | Stop reason: HOSPADM

## 2020-07-18 RX ORDER — SODIUM CHLORIDE 0.9 % (FLUSH) 0.9 %
10 SYRINGE (ML) INJECTION AS NEEDED
Status: DISCONTINUED | OUTPATIENT
Start: 2020-07-18 | End: 2020-07-21 | Stop reason: HOSPADM

## 2020-07-18 RX ORDER — FUROSEMIDE 40 MG/1
40 TABLET ORAL DAILY
Status: DISCONTINUED | OUTPATIENT
Start: 2020-07-19 | End: 2020-07-21 | Stop reason: HOSPADM

## 2020-07-18 RX ORDER — ASPIRIN 81 MG/1
81 TABLET ORAL DAILY
Status: DISCONTINUED | OUTPATIENT
Start: 2020-07-19 | End: 2020-07-21 | Stop reason: HOSPADM

## 2020-07-18 RX ORDER — PAROXETINE HYDROCHLORIDE 20 MG/1
20 TABLET, FILM COATED ORAL DAILY
Status: DISCONTINUED | OUTPATIENT
Start: 2020-07-19 | End: 2020-07-21 | Stop reason: HOSPADM

## 2020-07-18 RX ORDER — POTASSIUM CHLORIDE 750 MG/1
10 CAPSULE, EXTENDED RELEASE ORAL DAILY
Status: DISCONTINUED | OUTPATIENT
Start: 2020-07-19 | End: 2020-07-21 | Stop reason: HOSPADM

## 2020-07-18 RX ORDER — CLONIDINE HYDROCHLORIDE 0.2 MG/1
0.2 TABLET ORAL EVERY 12 HOURS SCHEDULED
Status: DISCONTINUED | OUTPATIENT
Start: 2020-07-18 | End: 2020-07-20

## 2020-07-18 RX ORDER — AMLODIPINE BESYLATE 10 MG/1
10 TABLET ORAL DAILY
Status: DISCONTINUED | OUTPATIENT
Start: 2020-07-19 | End: 2020-07-21 | Stop reason: HOSPADM

## 2020-07-18 RX ORDER — ONDANSETRON 2 MG/ML
4 INJECTION INTRAMUSCULAR; INTRAVENOUS EVERY 6 HOURS PRN
Status: DISCONTINUED | OUTPATIENT
Start: 2020-07-18 | End: 2020-07-21 | Stop reason: HOSPADM

## 2020-07-18 RX ORDER — VITS A,C,E/LUTEIN/MINERALS 300MCG-200
1 TABLET ORAL DAILY
Status: DISCONTINUED | OUTPATIENT
Start: 2020-07-19 | End: 2020-07-21 | Stop reason: HOSPADM

## 2020-07-18 RX ORDER — HYDROCODONE BITARTRATE AND ACETAMINOPHEN 5; 325 MG/1; MG/1
1 TABLET ORAL 2 TIMES DAILY PRN
Status: DISCONTINUED | OUTPATIENT
Start: 2020-07-18 | End: 2020-07-21

## 2020-07-18 RX ORDER — ACETAMINOPHEN 325 MG/1
650 TABLET ORAL EVERY 4 HOURS PRN
Status: DISCONTINUED | OUTPATIENT
Start: 2020-07-18 | End: 2020-07-21 | Stop reason: HOSPADM

## 2020-07-18 RX ORDER — GABAPENTIN 300 MG/1
300 CAPSULE ORAL EVERY 12 HOURS SCHEDULED
Status: DISCONTINUED | OUTPATIENT
Start: 2020-07-18 | End: 2020-07-21 | Stop reason: HOSPADM

## 2020-07-18 RX ORDER — SODIUM CHLORIDE 0.9 % (FLUSH) 0.9 %
10 SYRINGE (ML) INJECTION EVERY 12 HOURS SCHEDULED
Status: DISCONTINUED | OUTPATIENT
Start: 2020-07-18 | End: 2020-07-21 | Stop reason: HOSPADM

## 2020-07-18 RX ORDER — CLONIDINE HYDROCHLORIDE 0.1 MG/1
0.2 TABLET ORAL ONCE
Status: COMPLETED | OUTPATIENT
Start: 2020-07-18 | End: 2020-07-18

## 2020-07-18 RX ORDER — FLUTICASONE PROPIONATE 50 MCG
1 SPRAY, SUSPENSION (ML) NASAL DAILY
Status: DISCONTINUED | OUTPATIENT
Start: 2020-07-19 | End: 2020-07-21 | Stop reason: HOSPADM

## 2020-07-18 RX ORDER — ATORVASTATIN CALCIUM 10 MG/1
10 TABLET, FILM COATED ORAL NIGHTLY
Status: DISCONTINUED | OUTPATIENT
Start: 2020-07-18 | End: 2020-07-21 | Stop reason: HOSPADM

## 2020-07-18 RX ORDER — ALPRAZOLAM 0.25 MG/1
0.25 TABLET ORAL NIGHTLY PRN
Status: DISCONTINUED | OUTPATIENT
Start: 2020-07-18 | End: 2020-07-21 | Stop reason: HOSPADM

## 2020-07-18 RX ORDER — LANOLIN ALCOHOL/MO/W.PET/CERES
6 CREAM (GRAM) TOPICAL NIGHTLY
Status: DISCONTINUED | OUTPATIENT
Start: 2020-07-18 | End: 2020-07-21 | Stop reason: HOSPADM

## 2020-07-18 RX ADMIN — IOPAMIDOL 100 ML: 755 INJECTION, SOLUTION INTRAVENOUS at 19:16

## 2020-07-18 RX ADMIN — HYDRALAZINE HYDROCHLORIDE 25 MG: 25 TABLET, FILM COATED ORAL at 22:11

## 2020-07-18 RX ADMIN — ATORVASTATIN CALCIUM 10 MG: 10 TABLET, FILM COATED ORAL at 22:11

## 2020-07-18 RX ADMIN — CLONIDINE HYDROCHLORIDE 0.2 MG: 0.2 TABLET ORAL at 22:11

## 2020-07-18 RX ADMIN — Medication 6 MG: at 22:11

## 2020-07-18 RX ADMIN — CLONIDINE HYDROCHLORIDE 0.2 MG: 0.1 TABLET ORAL at 20:27

## 2020-07-18 RX ADMIN — METOPROLOL TARTRATE 50 MG: 50 TABLET, FILM COATED ORAL at 22:11

## 2020-07-18 RX ADMIN — GABAPENTIN 300 MG: 300 CAPSULE ORAL at 22:11

## 2020-07-18 NOTE — TELEPHONE ENCOUNTER
She is calling about her Mom. She was seen in the [facility] ER on 07/16/2020 for a closed fracture of the proximal end of the right fibula. She does have a knee immboilizer and pain medication. The daughter feels the patient needs rehab or skilled nursing, in a NH. She states she is having trouble moving, standing and getting out of bed. They need help. She would like the on call notified.     Reason for Disposition  • [1] Caller requests to speak ONLY to PCP AND [2] URGENT question    Additional Information  • Negative: [1] Caller is not with the adult (patient) AND [2] reporting urgent symptoms  • Negative: Lab result questions  • Negative: Medication questions  • Negative: Caller can't be reached by phone  • Negative: Caller has already spoken to PCP or another triager  • Negative: RN needs further essential information from caller in order to complete triage  • Negative: Requesting regular office appointment  • Negative: [1] Caller requesting NON-URGENT health information AND [2] PCP's office is the best resource  • Negative: Health Information question, no triage required and triager able to answer question  • Negative: General information question, no triage required and triager able to answer question  • Negative: Question about upcoming scheduled test, no triage required and triager able to answer question  • Negative: [1] Caller is not with the adult (patient) AND [2] probable NON-URGENT symptoms  • Negative: [1] Follow-up call to recent contact AND [2] information only call, no triage required  • Negative: Lab calling with strep throat test results and triager can call in prescription  • Negative: Lab calling with urinalysis test results and triager can call in prescription  • Negative: Medication questions  • Negative: ED call to PCP  • Negative: Physician call to PCP  • Negative: Call about patient who is currently hospitalized  • Negative: Lab or radiology calling with CRITICAL test results  • Negative:  "[1] Prescription not at pharmacy AND [2] was prescribed today by PCP  • Negative: [1] Follow-up call from patient regarding patient's clinical status AND [2] information urgent    Answer Assessment - Initial Assessment Questions  1. REASON FOR CALL or QUESTION: \"What is your reason for calling today?\" or \"How can I best help you?\" or \"What question do you have that I can help answer?\"      See note    Answer Assessment - Initial Assessment Questions  1. REASON FOR CALL or QUESTION: \"What is your reason for calling today?\" or \"How can I best  help you?\" or \"What question do you have that I can help answer?\"      See note   2. CALLER: Document the source of call. (e.g., laboratory, patient).      See note    Protocols used: PCP CALL - NO TRIAGE-ADULT-, INFORMATION ONLY CALL - NO TRIAGE-ADULT-AH      "

## 2020-07-19 PROBLEM — H91.90 HARD OF HEARING: Status: ACTIVE | Noted: 2020-07-19

## 2020-07-19 PROBLEM — R26.9 GAIT DIFFICULTY: Status: ACTIVE | Noted: 2020-07-19

## 2020-07-19 PROBLEM — G62.9 NEUROPATHY: Status: ACTIVE | Noted: 2020-07-19

## 2020-07-19 PROBLEM — S82.154A: Status: ACTIVE | Noted: 2020-07-19

## 2020-07-19 PROBLEM — M25.461 EFFUSION OF RIGHT KNEE: Status: ACTIVE | Noted: 2020-07-19

## 2020-07-19 PROBLEM — S82.831A CLOSED FRACTURE OF PROXIMAL END OF RIGHT FIBULA: Status: ACTIVE | Noted: 2020-07-19

## 2020-07-19 LAB
ALBUMIN SERPL-MCNC: 3.6 G/DL (ref 3.5–5.2)
ALBUMIN/GLOB SERPL: 1.4 G/DL
ALP SERPL-CCNC: 50 U/L (ref 39–117)
ALT SERPL W P-5'-P-CCNC: 17 U/L (ref 1–33)
ANION GAP SERPL CALCULATED.3IONS-SCNC: 10 MMOL/L (ref 5–15)
AST SERPL-CCNC: 20 U/L (ref 1–32)
BASOPHILS # BLD AUTO: 0.15 10*3/MM3 (ref 0–0.2)
BASOPHILS NFR BLD AUTO: 0.9 % (ref 0–1.5)
BILIRUB SERPL-MCNC: 0.5 MG/DL (ref 0–1.2)
BUN SERPL-MCNC: 19 MG/DL (ref 8–23)
BUN/CREAT SERPL: 24.4 (ref 7–25)
CALCIUM SPEC-SCNC: 9.4 MG/DL (ref 8.2–9.6)
CHLORIDE SERPL-SCNC: 100 MMOL/L (ref 98–107)
CO2 SERPL-SCNC: 31 MMOL/L (ref 22–29)
CREAT SERPL-MCNC: 0.78 MG/DL (ref 0.57–1)
DEPRECATED RDW RBC AUTO: 48 FL (ref 37–54)
EOSINOPHIL # BLD AUTO: 0.38 10*3/MM3 (ref 0–0.4)
EOSINOPHIL NFR BLD AUTO: 2.2 % (ref 0.3–6.2)
ERYTHROCYTE [DISTWIDTH] IN BLOOD BY AUTOMATED COUNT: 14.4 % (ref 12.3–15.4)
GFR SERPL CREATININE-BSD FRML MDRD: 69 ML/MIN/1.73
GLOBULIN UR ELPH-MCNC: 2.5 GM/DL
GLUCOSE SERPL-MCNC: 148 MG/DL (ref 65–99)
HCT VFR BLD AUTO: 39.5 % (ref 34–46.6)
HGB BLD-MCNC: 13.1 G/DL (ref 12–15.9)
IMM GRANULOCYTES # BLD AUTO: 0.21 10*3/MM3 (ref 0–0.05)
IMM GRANULOCYTES NFR BLD AUTO: 1.2 % (ref 0–0.5)
LYMPHOCYTES # BLD AUTO: 3.04 10*3/MM3 (ref 0.7–3.1)
LYMPHOCYTES NFR BLD AUTO: 17.5 % (ref 19.6–45.3)
MCH RBC QN AUTO: 30.1 PG (ref 26.6–33)
MCHC RBC AUTO-ENTMCNC: 33.2 G/DL (ref 31.5–35.7)
MCV RBC AUTO: 90.8 FL (ref 79–97)
MONOCYTES # BLD AUTO: 1.72 10*3/MM3 (ref 0.1–0.9)
MONOCYTES NFR BLD AUTO: 9.9 % (ref 5–12)
NEUTROPHILS NFR BLD AUTO: 11.84 10*3/MM3 (ref 1.7–7)
NEUTROPHILS NFR BLD AUTO: 68.3 % (ref 42.7–76)
NRBC BLD AUTO-RTO: 0 /100 WBC (ref 0–0.2)
PLATELET # BLD AUTO: 177 10*3/MM3 (ref 140–450)
PMV BLD AUTO: 10.6 FL (ref 6–12)
POTASSIUM SERPL-SCNC: 4.2 MMOL/L (ref 3.5–5.2)
PROT SERPL-MCNC: 6.1 G/DL (ref 6–8.5)
RBC # BLD AUTO: 4.35 10*6/MM3 (ref 3.77–5.28)
SODIUM SERPL-SCNC: 141 MMOL/L (ref 136–145)
WBC # BLD AUTO: 17.34 10*3/MM3 (ref 3.4–10.8)

## 2020-07-19 PROCEDURE — 97166 OT EVAL MOD COMPLEX 45 MIN: CPT | Performed by: OCCUPATIONAL THERAPIST

## 2020-07-19 PROCEDURE — 85025 COMPLETE CBC W/AUTO DIFF WBC: CPT | Performed by: INTERNAL MEDICINE

## 2020-07-19 PROCEDURE — 94799 UNLISTED PULMONARY SVC/PX: CPT

## 2020-07-19 PROCEDURE — 94640 AIRWAY INHALATION TREATMENT: CPT

## 2020-07-19 PROCEDURE — 97162 PT EVAL MOD COMPLEX 30 MIN: CPT | Performed by: PHYSICAL THERAPIST

## 2020-07-19 PROCEDURE — 80053 COMPREHEN METABOLIC PANEL: CPT | Performed by: INTERNAL MEDICINE

## 2020-07-19 RX ORDER — IPRATROPIUM BROMIDE AND ALBUTEROL SULFATE 2.5; .5 MG/3ML; MG/3ML
3 SOLUTION RESPIRATORY (INHALATION)
Status: DISCONTINUED | OUTPATIENT
Start: 2020-07-19 | End: 2020-07-21 | Stop reason: HOSPADM

## 2020-07-19 RX ADMIN — AMLODIPINE BESYLATE 10 MG: 10 TABLET ORAL at 08:45

## 2020-07-19 RX ADMIN — HYDRALAZINE HYDROCHLORIDE 25 MG: 25 TABLET, FILM COATED ORAL at 08:45

## 2020-07-19 RX ADMIN — METOPROLOL TARTRATE 50 MG: 50 TABLET, FILM COATED ORAL at 08:45

## 2020-07-19 RX ADMIN — IPRATROPIUM BROMIDE AND ALBUTEROL SULFATE 3 ML: 2.5; .5 SOLUTION RESPIRATORY (INHALATION) at 19:36

## 2020-07-19 RX ADMIN — HYDROCODONE BITARTRATE AND ACETAMINOPHEN 1 TABLET: 5; 325 TABLET ORAL at 15:23

## 2020-07-19 RX ADMIN — SODIUM CHLORIDE, PRESERVATIVE FREE 10 ML: 5 INJECTION INTRAVENOUS at 20:55

## 2020-07-19 RX ADMIN — ASPIRIN 81 MG: 81 TABLET ORAL at 08:45

## 2020-07-19 RX ADMIN — HYDRALAZINE HYDROCHLORIDE 25 MG: 25 TABLET, FILM COATED ORAL at 20:55

## 2020-07-19 RX ADMIN — METOPROLOL TARTRATE 50 MG: 50 TABLET, FILM COATED ORAL at 20:54

## 2020-07-19 RX ADMIN — FUROSEMIDE 40 MG: 40 TABLET ORAL at 08:45

## 2020-07-19 RX ADMIN — CETIRIZINE HYDROCHLORIDE 10 MG: 10 TABLET, FILM COATED ORAL at 08:45

## 2020-07-19 RX ADMIN — Medication 1 TABLET: at 08:45

## 2020-07-19 RX ADMIN — CLONIDINE HYDROCHLORIDE 0.2 MG: 0.2 TABLET ORAL at 20:55

## 2020-07-19 RX ADMIN — POTASSIUM CHLORIDE 10 MEQ: 750 CAPSULE, EXTENDED RELEASE ORAL at 08:45

## 2020-07-19 RX ADMIN — SODIUM CHLORIDE, PRESERVATIVE FREE 10 ML: 5 INJECTION INTRAVENOUS at 08:45

## 2020-07-19 RX ADMIN — ATORVASTATIN CALCIUM 10 MG: 10 TABLET, FILM COATED ORAL at 20:54

## 2020-07-19 RX ADMIN — FLUTICASONE PROPIONATE 1 SPRAY: 50 SPRAY, METERED NASAL at 08:46

## 2020-07-19 RX ADMIN — Medication 6 MG: at 20:55

## 2020-07-19 RX ADMIN — GABAPENTIN 300 MG: 300 CAPSULE ORAL at 20:54

## 2020-07-19 RX ADMIN — GABAPENTIN 300 MG: 300 CAPSULE ORAL at 08:45

## 2020-07-19 RX ADMIN — CLONIDINE HYDROCHLORIDE 0.2 MG: 0.2 TABLET ORAL at 08:45

## 2020-07-19 RX ADMIN — PAROXETINE 20 MG: 20 TABLET, FILM COATED ORAL at 08:45

## 2020-07-19 RX ADMIN — LISINOPRIL 40 MG: 20 TABLET ORAL at 08:45

## 2020-07-20 ENCOUNTER — APPOINTMENT (OUTPATIENT)
Dept: CT IMAGING | Facility: HOSPITAL | Age: 85
End: 2020-07-20

## 2020-07-20 PROBLEM — S72.142A CLOSED DISPLACED INTERTROCHANTERIC FRACTURE OF LEFT FEMUR (HCC): Status: RESOLVED | Noted: 2017-10-24 | Resolved: 2020-07-20

## 2020-07-20 PROCEDURE — 94799 UNLISTED PULMONARY SVC/PX: CPT

## 2020-07-20 PROCEDURE — 97530 THERAPEUTIC ACTIVITIES: CPT

## 2020-07-20 PROCEDURE — 97110 THERAPEUTIC EXERCISES: CPT

## 2020-07-20 PROCEDURE — 73700 CT LOWER EXTREMITY W/O DYE: CPT

## 2020-07-20 PROCEDURE — 0S9C3ZZ DRAINAGE OF RIGHT KNEE JOINT, PERCUTANEOUS APPROACH: ICD-10-PCS | Performed by: ORTHOPAEDIC SURGERY

## 2020-07-20 RX ORDER — METHYLPREDNISOLONE ACETATE 80 MG/ML
80 INJECTION, SUSPENSION INTRA-ARTICULAR; INTRALESIONAL; INTRAMUSCULAR; SOFT TISSUE ONCE
Status: DISCONTINUED | OUTPATIENT
Start: 2020-07-20 | End: 2020-07-21 | Stop reason: HOSPADM

## 2020-07-20 RX ORDER — SIMVASTATIN 20 MG
TABLET ORAL
Qty: 90 TABLET | Refills: 3 | OUTPATIENT
Start: 2020-07-20 | End: 2020-07-21 | Stop reason: HOSPADM

## 2020-07-20 RX ORDER — LIDOCAINE HYDROCHLORIDE 10 MG/ML
30 INJECTION, SOLUTION INFILTRATION; PERINEURAL ONCE
Status: DISCONTINUED | OUTPATIENT
Start: 2020-07-20 | End: 2020-07-21 | Stop reason: HOSPADM

## 2020-07-20 RX ADMIN — ATORVASTATIN CALCIUM 10 MG: 10 TABLET, FILM COATED ORAL at 21:25

## 2020-07-20 RX ADMIN — SODIUM CHLORIDE, PRESERVATIVE FREE 10 ML: 5 INJECTION INTRAVENOUS at 08:52

## 2020-07-20 RX ADMIN — ACETAMINOPHEN 650 MG: 325 TABLET, FILM COATED ORAL at 15:04

## 2020-07-20 RX ADMIN — IPRATROPIUM BROMIDE AND ALBUTEROL SULFATE 3 ML: 2.5; .5 SOLUTION RESPIRATORY (INHALATION) at 19:28

## 2020-07-20 RX ADMIN — CLONIDINE HYDROCHLORIDE 0.3 MG: 0.2 TABLET ORAL at 21:25

## 2020-07-20 RX ADMIN — METOPROLOL TARTRATE 50 MG: 50 TABLET, FILM COATED ORAL at 21:25

## 2020-07-20 RX ADMIN — Medication 6 MG: at 21:25

## 2020-07-20 RX ADMIN — LISINOPRIL 40 MG: 20 TABLET ORAL at 08:51

## 2020-07-20 RX ADMIN — IPRATROPIUM BROMIDE AND ALBUTEROL SULFATE 3 ML: 2.5; .5 SOLUTION RESPIRATORY (INHALATION) at 07:11

## 2020-07-20 RX ADMIN — POTASSIUM CHLORIDE 10 MEQ: 750 CAPSULE, EXTENDED RELEASE ORAL at 08:50

## 2020-07-20 RX ADMIN — HYDRALAZINE HYDROCHLORIDE 25 MG: 25 TABLET, FILM COATED ORAL at 21:25

## 2020-07-20 RX ADMIN — Medication 1 TABLET: at 08:51

## 2020-07-20 RX ADMIN — AMLODIPINE BESYLATE 10 MG: 10 TABLET ORAL at 08:51

## 2020-07-20 RX ADMIN — GABAPENTIN 300 MG: 300 CAPSULE ORAL at 08:54

## 2020-07-20 RX ADMIN — FUROSEMIDE 40 MG: 40 TABLET ORAL at 08:52

## 2020-07-20 RX ADMIN — METOPROLOL TARTRATE 50 MG: 50 TABLET, FILM COATED ORAL at 08:51

## 2020-07-20 RX ADMIN — CETIRIZINE HYDROCHLORIDE 10 MG: 10 TABLET, FILM COATED ORAL at 08:52

## 2020-07-20 RX ADMIN — HYDRALAZINE HYDROCHLORIDE 25 MG: 25 TABLET, FILM COATED ORAL at 08:50

## 2020-07-20 RX ADMIN — CLONIDINE HYDROCHLORIDE 0.2 MG: 0.2 TABLET ORAL at 08:50

## 2020-07-20 RX ADMIN — ASPIRIN 81 MG: 81 TABLET ORAL at 08:52

## 2020-07-20 RX ADMIN — FLUTICASONE PROPIONATE 1 SPRAY: 50 SPRAY, METERED NASAL at 08:52

## 2020-07-20 RX ADMIN — HYDROCODONE BITARTRATE AND ACETAMINOPHEN 1 TABLET: 5; 325 TABLET ORAL at 12:39

## 2020-07-20 RX ADMIN — IPRATROPIUM BROMIDE AND ALBUTEROL SULFATE 3 ML: 2.5; .5 SOLUTION RESPIRATORY (INHALATION) at 11:03

## 2020-07-20 RX ADMIN — ACETAMINOPHEN 650 MG: 325 TABLET, FILM COATED ORAL at 21:25

## 2020-07-20 RX ADMIN — GABAPENTIN 300 MG: 300 CAPSULE ORAL at 21:25

## 2020-07-20 RX ADMIN — PAROXETINE 20 MG: 20 TABLET, FILM COATED ORAL at 08:52

## 2020-07-21 VITALS
HEIGHT: 69 IN | SYSTOLIC BLOOD PRESSURE: 130 MMHG | OXYGEN SATURATION: 95 % | TEMPERATURE: 98.1 F | WEIGHT: 197.38 LBS | DIASTOLIC BLOOD PRESSURE: 61 MMHG | HEART RATE: 68 BPM | RESPIRATION RATE: 17 BRPM | BODY MASS INDEX: 29.23 KG/M2

## 2020-07-21 LAB — SARS-COV-2 RDRP RESP QL NAA+PROBE: NOT DETECTED

## 2020-07-21 PROCEDURE — 94799 UNLISTED PULMONARY SVC/PX: CPT

## 2020-07-21 PROCEDURE — 87635 SARS-COV-2 COVID-19 AMP PRB: CPT | Performed by: FAMILY MEDICINE

## 2020-07-21 PROCEDURE — 97110 THERAPEUTIC EXERCISES: CPT

## 2020-07-21 RX ORDER — HYDROCODONE BITARTRATE AND ACETAMINOPHEN 5; 325 MG/1; MG/1
1 TABLET ORAL EVERY 4 HOURS PRN
Qty: 8 TABLET | Refills: 0 | Status: SHIPPED | OUTPATIENT
Start: 2020-07-21 | End: 2020-12-03 | Stop reason: SDUPTHER

## 2020-07-21 RX ORDER — CLONIDINE HYDROCHLORIDE 0.3 MG/1
0.3 TABLET ORAL EVERY 12 HOURS SCHEDULED
Start: 2020-07-21 | End: 2022-01-01

## 2020-07-21 RX ORDER — ZOLPIDEM TARTRATE 5 MG/1
5 TABLET ORAL
Qty: 2 TABLET | Refills: 0 | Status: SHIPPED | OUTPATIENT
Start: 2020-07-21 | End: 2020-09-14

## 2020-07-21 RX ORDER — ALPRAZOLAM 0.25 MG/1
TABLET ORAL
Qty: 2 TABLET | Refills: 0
Start: 2020-07-21 | End: 2020-07-21 | Stop reason: SDUPTHER

## 2020-07-21 RX ORDER — GABAPENTIN 300 MG/1
300 CAPSULE ORAL EVERY 12 HOURS SCHEDULED
Qty: 6 CAPSULE | Refills: 0 | Status: ON HOLD | OUTPATIENT
Start: 2020-07-21 | End: 2022-01-01 | Stop reason: SDUPTHER

## 2020-07-21 RX ORDER — HYDROCODONE BITARTRATE AND ACETAMINOPHEN 5; 325 MG/1; MG/1
1 TABLET ORAL EVERY 4 HOURS PRN
Status: DISCONTINUED | OUTPATIENT
Start: 2020-07-21 | End: 2020-07-21 | Stop reason: HOSPADM

## 2020-07-21 RX ORDER — ALPRAZOLAM 0.25 MG/1
TABLET ORAL
Qty: 3 TABLET | Refills: 0 | Status: SHIPPED | OUTPATIENT
Start: 2020-07-21 | End: 2020-10-16

## 2020-07-21 RX ORDER — PAROXETINE HYDROCHLORIDE 20 MG/1
20 TABLET, FILM COATED ORAL DAILY
Status: ON HOLD
Start: 2020-07-22 | End: 2022-01-01 | Stop reason: SDUPTHER

## 2020-07-21 RX ORDER — SIMVASTATIN 20 MG
20 TABLET ORAL NIGHTLY
Start: 2020-07-21 | End: 2022-01-01

## 2020-07-21 RX ADMIN — Medication 1 TABLET: at 08:24

## 2020-07-21 RX ADMIN — IPRATROPIUM BROMIDE AND ALBUTEROL SULFATE 3 ML: 2.5; .5 SOLUTION RESPIRATORY (INHALATION) at 14:13

## 2020-07-21 RX ADMIN — IPRATROPIUM BROMIDE AND ALBUTEROL SULFATE 3 ML: 2.5; .5 SOLUTION RESPIRATORY (INHALATION) at 06:25

## 2020-07-21 RX ADMIN — METOPROLOL TARTRATE 50 MG: 50 TABLET, FILM COATED ORAL at 08:24

## 2020-07-21 RX ADMIN — AMLODIPINE BESYLATE 10 MG: 10 TABLET ORAL at 08:24

## 2020-07-21 RX ADMIN — HYDROCODONE BITARTRATE AND ACETAMINOPHEN 1 TABLET: 5; 325 TABLET ORAL at 10:26

## 2020-07-21 RX ADMIN — POTASSIUM CHLORIDE 10 MEQ: 750 CAPSULE, EXTENDED RELEASE ORAL at 08:24

## 2020-07-21 RX ADMIN — CETIRIZINE HYDROCHLORIDE 10 MG: 10 TABLET, FILM COATED ORAL at 08:24

## 2020-07-21 RX ADMIN — FUROSEMIDE 40 MG: 40 TABLET ORAL at 08:24

## 2020-07-21 RX ADMIN — PAROXETINE 20 MG: 20 TABLET, FILM COATED ORAL at 08:24

## 2020-07-21 RX ADMIN — CLONIDINE HYDROCHLORIDE 0.3 MG: 0.2 TABLET ORAL at 08:24

## 2020-07-21 RX ADMIN — IPRATROPIUM BROMIDE AND ALBUTEROL SULFATE 3 ML: 2.5; .5 SOLUTION RESPIRATORY (INHALATION) at 11:08

## 2020-07-21 RX ADMIN — ASPIRIN 81 MG: 81 TABLET ORAL at 08:24

## 2020-07-21 RX ADMIN — HYDROCODONE BITARTRATE AND ACETAMINOPHEN 1 TABLET: 5; 325 TABLET ORAL at 00:35

## 2020-07-21 RX ADMIN — HYDRALAZINE HYDROCHLORIDE 25 MG: 25 TABLET, FILM COATED ORAL at 08:23

## 2020-07-21 RX ADMIN — GABAPENTIN 300 MG: 300 CAPSULE ORAL at 08:30

## 2020-07-21 RX ADMIN — LISINOPRIL 40 MG: 20 TABLET ORAL at 08:24

## 2020-07-21 RX ADMIN — FLUTICASONE PROPIONATE 1 SPRAY: 50 SPRAY, METERED NASAL at 08:23

## 2020-07-29 NOTE — PROGRESS NOTES
Dual Chamber Pacemaker Evaluation Report  IN OFFICE INTERROGATION    June 30, 2020    Primary Cardiologist: Kevyn  : Guidant Model: [email or web address] [id number]  Implant date: 9/11/2017    Reason for evaluation: routine  Indication for pacemaker: sick sinus syndrome and tachy-petr syndrome    Measurements  Atrial sensing - P wave: 3.2 mV  Atrial threshold: 0.6V@ 0.4ms  Atrial lead impedance: 557 ohms  Ventricular sensing - R wave: 15.3 mV  Ventricular threshold: 0.7 V @ 0.4 ms  Ventricular lead impedance:   592 ohms     Diagnostic Data  Atrial paced: 99 %  Ventricular paced: 8 %    Episodes recorded since 5/3/2019:  No episodes.    Battery status: satisfactory   Estimated 7.5 years remaining    Final Parameters  Mode:  DDDR  Lower rate: 60 bpm   Upper rate: 120 bpm  AV Delay: paced- 250-300 ms  Sieyts-602-538 ms  Atrial - Amplitude: 1.5 V   Pulse width: 0.4 ms   Sensitivity: 0.25 mV     Ventricular - Amplitude: 2 V  Pulse width: 0.4 ms  Sensitivity: 0.6 mV    Changes made: No changes made.  Conclusions: normal pacemaker function, stable pacing and sensing thresholds and adequate battery reserve    Follow up: 6 months in office

## 2020-09-11 DIAGNOSIS — G47.9 TROUBLE IN SLEEPING: ICD-10-CM

## 2020-09-11 NOTE — TELEPHONE ENCOUNTER
Radha with HH called stating pt was admitted today for services.  HH will see pt 1X wk for 3 wks.    Any questions please call [phone]

## 2020-09-14 RX ORDER — ZOLPIDEM TARTRATE 5 MG/1
5 TABLET ORAL
Qty: 30 TABLET | Refills: 5 | Status: SHIPPED | OUTPATIENT
Start: 2020-09-14 | End: 2022-01-01

## 2020-09-15 ENCOUNTER — TELEPHONE (OUTPATIENT)
Dept: INTERNAL MEDICINE | Facility: CLINIC | Age: 85
End: 2020-09-15

## 2020-09-21 NOTE — TELEPHONE ENCOUNTER
Caller: Brittany Grayson    Relationship: Emergency Contact    Best call back number: [phone]    Medication needed:   Requested Prescriptions     Pending Prescriptions Disp Refills   • lisinopril (PRINIVIL,ZESTRIL) 40 MG tablet [Pharmacy Med Name: LISINOPRIL TABS 40MG] 90 tablet 3     Sig: TAKE 1 TABLET DAILY       When do you need the refill by:   ASAP    What details did the patient provide when requesting the medication:   N/A    Does the patient have less than a 3 day supply:  [x] Yes  [] No    What is the patient's preferred pharmacy: [facility] - [facility], [address] [phone] [facility] [phone]

## 2020-09-22 RX ORDER — LISINOPRIL 40 MG/1
TABLET ORAL
Qty: 90 TABLET | Refills: 3 | Status: ON HOLD | OUTPATIENT
Start: 2020-09-22 | End: 2022-01-01

## 2020-09-25 LAB — HBA1C MFR BLD: 5.9 % (ref 4–6)

## 2020-10-05 DIAGNOSIS — M13.0 ARTHRITIS OF MULTIPLE SITES: ICD-10-CM

## 2020-10-12 RX ORDER — TRAMADOL HYDROCHLORIDE 50 MG/1
TABLET ORAL
Qty: 180 TABLET | Refills: 5 | OUTPATIENT
Start: 2020-10-12

## 2020-10-12 NOTE — TELEPHONE ENCOUNTER
Was discontinued during last hospital stay.  Dgt would need to call and verify use and where she is currently residing.

## 2020-10-16 DIAGNOSIS — F41.9 ANXIETY AND DEPRESSION: ICD-10-CM

## 2020-10-16 DIAGNOSIS — F32.A ANXIETY AND DEPRESSION: ICD-10-CM

## 2020-10-19 RX ORDER — ALPRAZOLAM 0.25 MG/1
TABLET ORAL
Qty: 45 TABLET | Refills: 5 | Status: SHIPPED | OUTPATIENT
Start: 2020-10-19 | End: 2022-01-01 | Stop reason: HOSPADM

## 2020-10-21 ENCOUNTER — TELEPHONE (OUTPATIENT)
Dept: INTERNAL MEDICINE | Facility: CLINIC | Age: 85
End: 2020-10-21

## 2020-10-21 NOTE — TELEPHONE ENCOUNTER
PATIENT HAS BEEN DISCHARGED FROM HOME HEALTH TODAY FROM [facility].        [facility]   [phone]

## 2020-11-04 ENCOUNTER — OFFICE VISIT (OUTPATIENT)
Dept: INTERNAL MEDICINE | Facility: CLINIC | Age: 85
End: 2020-11-04

## 2020-11-04 VITALS
BODY MASS INDEX: 27.4 KG/M2 | TEMPERATURE: 96.9 F | DIASTOLIC BLOOD PRESSURE: 86 MMHG | WEIGHT: 185 LBS | HEART RATE: 61 BPM | HEIGHT: 69 IN | SYSTOLIC BLOOD PRESSURE: 128 MMHG | OXYGEN SATURATION: 96 %

## 2020-11-04 DIAGNOSIS — I10 ESSENTIAL HYPERTENSION: ICD-10-CM

## 2020-11-04 DIAGNOSIS — E78.00 PURE HYPERCHOLESTEROLEMIA: Primary | ICD-10-CM

## 2020-11-04 DIAGNOSIS — Z23 NEED FOR INFLUENZA VACCINATION: ICD-10-CM

## 2020-11-04 DIAGNOSIS — Z23 NEED FOR VACCINATION WITH 13-POLYVALENT PNEUMOCOCCAL CONJUGATE VACCINE: ICD-10-CM

## 2020-11-04 PROBLEM — F32.A ANXIETY AND DEPRESSION: Status: ACTIVE | Noted: 2020-11-04

## 2020-11-04 PROBLEM — F41.9 ANXIETY AND DEPRESSION: Status: ACTIVE | Noted: 2020-11-04

## 2020-11-04 PROBLEM — L84 PRE-ULCERATIVE CORN OR CALLOUS: Status: ACTIVE | Noted: 2020-11-04

## 2020-11-04 PROBLEM — Z91.81 AT HIGH RISK FOR FALLS: Status: ACTIVE | Noted: 2020-11-04

## 2020-11-04 PROBLEM — M81.0 OSTEOPOROSIS: Status: ACTIVE | Noted: 2020-11-04

## 2020-11-04 PROBLEM — L30.9 ECZEMA: Status: ACTIVE | Noted: 2020-11-04

## 2020-11-04 PROBLEM — E21.3 HYPERPARATHYROIDISM (HCC): Status: ACTIVE | Noted: 2020-11-04

## 2020-11-04 PROBLEM — D47.2 BENIGN MONOCLONAL GAMMOPATHY: Status: ACTIVE | Noted: 2020-11-04

## 2020-11-04 PROBLEM — E78.49 FAMILIAL MULTIPLE LIPOPROTEIN-TYPE HYPERLIPIDEMIA: Status: ACTIVE | Noted: 2017-04-11

## 2020-11-04 PROBLEM — F11.20 CHRONIC NARCOTIC DEPENDENCE (HCC): Status: ACTIVE | Noted: 2020-11-04

## 2020-11-04 PROBLEM — E55.9 VITAMIN D DEFICIENCY: Status: ACTIVE | Noted: 2020-11-04

## 2020-11-04 PROBLEM — M13.0 ARTHRITIS OF MULTIPLE SITES: Status: ACTIVE | Noted: 2020-11-04

## 2020-11-04 PROBLEM — S22.080A T12 COMPRESSION FRACTURE (HCC): Status: ACTIVE | Noted: 2020-11-04

## 2020-11-04 PROBLEM — M17.9 ARTHRITIS OF KNEE, DEGENERATIVE: Status: ACTIVE | Noted: 2020-11-04

## 2020-11-04 PROBLEM — R73.01 IMPAIRED FASTING GLUCOSE: Status: ACTIVE | Noted: 2020-11-04

## 2020-11-04 PROBLEM — S72.009A HIP FRACTURE (HCC): Status: ACTIVE | Noted: 2020-11-04

## 2020-11-04 PROCEDURE — G0438 PPPS, INITIAL VISIT: HCPCS | Performed by: INTERNAL MEDICINE

## 2020-11-04 PROCEDURE — G0009 ADMIN PNEUMOCOCCAL VACCINE: HCPCS | Performed by: INTERNAL MEDICINE

## 2020-11-04 PROCEDURE — 90670 PCV13 VACCINE IM: CPT | Performed by: INTERNAL MEDICINE

## 2020-11-04 NOTE — PROGRESS NOTES
The ABCs of the Annual Wellness Visit  Initial Medicare Wellness Visit    Chief Complaint   Patient presents with   • Medicare Wellness-Initial Visit     a1c:5.9- drawn 2020       Subjective   History of Present Illness:  Justina Bingham is a 89 y.o. female who presents for an Initial Medicare Wellness Visit.    HEALTH RISK ASSESSMENT    Recent Hospitalizations:  Recently treated at the following:  [facility]    Current Medical Providers:  Patient Care Team:  Ruddy Pfeiffer MD as PCP - General    Smoking Status:  Social History     Tobacco Use   Smoking Status Former Smoker   • Start date:    • Quit date:    • Years since quittin.8   Smokeless Tobacco Never Used       Alcohol Consumption:  Social History     Substance and Sexual Activity   Alcohol Use No       Depression Screen:   No flowsheet data found.    Fall Risk Screen:  STEADI Fall Risk Assessment has not been completed.    Health Habits and Functional and Cognitive Screening:  No flowsheet data found.      Does the patient have evidence of cognitive impairment? No    Asprin use counseling:Taking ASA appropriately as indicated    Age-appropriate Screening Schedule:  Refer to the list below for future screening recommendations based on patient's age, sex and/or medical conditions. Orders for these recommended tests are listed in the plan section. The patient has been provided with a written plan.    Health Maintenance   Topic Date Due   • URINE MICROALBUMIN  1925   • TDAP/TD VACCINES (1 - Tdap) 1944   • ZOSTER VACCINE (1 of 2) 1975   • LIPID PANEL  04/10/2017   • DIABETIC EYE EXAM  04/10/2017   • DXA SCAN  04/10/2017   • INFLUENZA VACCINE  2020   • HEMOGLOBIN A1C  2021          The following portions of the patient's history were reviewed and updated as appropriate: allergies, current medications, past family history, past medical history, past social history, past surgical history and problem  list.    Outpatient Medications Prior to Visit   Medication Sig Dispense Refill   • acetaminophen (TYLENOL) 325 MG tablet Take 650 mg by mouth Every 4 (Four) Hours As Needed for Mild Pain .     • ALPRAZolam (XANAX) 0.25 MG tablet TAKE 1/2 TABLET BY MOUTH EVERY MORNING AND 1 TABLET BY MOUTH EVERY NIGHT 45 tablet 5   • amLODIPine (NORVASC) 10 MG tablet TAKE 1 TABLET BY MOUTH DAILY 90 tablet 3   • aspirin 81 MG EC tablet Take 81 mg by mouth Daily.     • cetirizine (zyrTEC) 10 MG tablet Take 10 mg by mouth Daily.     • cholecalciferol (VITAMIN D3) 1000 units tablet Take 1,000 Units by mouth Daily.     • cloNIDine (CATAPRES) 0.3 MG tablet Take 1 tablet by mouth Every 12 (Twelve) Hours.     • fluticasone (FLONASE) 50 MCG/ACT nasal spray 1 spray into the nostril(s) as directed by provider 2 (Two) Times a Day. As needed     • furosemide (LASIX) 40 MG tablet Take 1 tablet by mouth Every Morning. 90 tablet 1   • gabapentin (NEURONTIN) 300 MG capsule Take 1 capsule by mouth Every 12 (Twelve) Hours. 6 capsule 0   • hydrALAZINE (APRESOLINE) 25 MG tablet TAKE 1 TABLET BY MOUTH TWICE DAILY 60 tablet 5   • HYDROcodone-acetaminophen (NORCO) 5-325 MG per tablet Take 1 tablet by mouth Every 4 (Four) Hours As Needed for Moderate Pain . 8 tablet 0   • lisinopril (PRINIVIL,ZESTRIL) 40 MG tablet TAKE 1 TABLET DAILY 90 tablet 3   • magnesium hydroxide (MILK OF MAGNESIA) 400 MG/5ML suspension Take 30 mL by mouth At Night As Needed for Constipation.     • melatonin 5 MG tablet tablet Take 5 mg by mouth Every Night.     • metoprolol tartrate (LOPRESSOR) 50 MG tablet TAKE 1 TABLET BY MOUTH TWICE DAILY 60 tablet 11   • Multiple Vitamins-Minerals (EYE VITAMINS PO) Take  by mouth.     • PARoxetine (PAXIL) 20 MG tablet Take 1 tablet by mouth Daily.     • potassium chloride (MICRO-K) 10 MEQ CR capsule TAKE 1 CAPSULE BY MOUTH EVERY DAY 30 capsule 5   • simvastatin (ZOCOR) 20 MG tablet Take 1 tablet by mouth Every Night.     • traMADol (ULTRAM) 50 MG  tablet Take 2 tablets by mouth 2 (two) times a day. 120 tablet 5   • zolpidem (AMBIEN) 5 MG tablet TAKE 1 TABLET BY MOUTH EVERY NIGHT AT BEDTIME.  30 tablet 5     No facility-administered medications prior to visit.        Patient Active Problem List   Diagnosis   • Essential hypertension   • Hyperlipidemia   • Fall   • Pacemaker   • Hypoxia   • Closed fracture of proximal end of right fibula   • Nondisplaced fracture of right tibial tuberosity, initial encounter for closed fracture   • Effusion of right knee   • Neuropathy   • Gait difficulty   • Hard of hearing       Advanced Care Planning:  ACP discussion was held with the patient during this visit. Patient has an advance directive in EMR which is still valid.     Review of Systems   Constitutional: Negative for activity change, appetite change and chills.   HENT: Positive for hearing loss. Negative for congestion, ear pain and facial swelling.    Eyes: Negative for pain, discharge and itching.   Respiratory: Negative for apnea, cough and shortness of breath.    Cardiovascular: Negative for chest pain, palpitations and leg swelling.   Gastrointestinal: Negative for abdominal distention, abdominal pain and anal bleeding.   Endocrine: Negative for cold intolerance and heat intolerance.   Genitourinary: Negative for difficulty urinating, dysuria and flank pain.   Musculoskeletal: Positive for arthralgias and myalgias. Negative for back pain and joint swelling.   Skin: Negative for color change, pallor and rash.   Allergic/Immunologic: Negative for environmental allergies and food allergies.   Neurological: Negative for dizziness and facial asymmetry.   Hematological: Negative for adenopathy. Does not bruise/bleed easily.   Psychiatric/Behavioral: Negative for agitation, behavioral problems and confusion.       Compared to one year ago, the patient feels her physical health is the same.  Compared to one year ago, the patient feels her mental health is the  same.    Reviewed chart for potential of high risk medication in the elderly: yes  Reviewed chart for potential of harmful drug interactions in the elderly:yes    Objective       There were no vitals filed for this visit.    There is no height or weight on file to calculate BMI.  Discussed the patient's BMI with her. The BMI is in the acceptable range.    Physical Exam  Constitutional:       Appearance: Normal appearance. She is well-developed.   HENT:      Head: Normocephalic and atraumatic.      Right Ear: External ear normal.      Left Ear: External ear normal.   Eyes:      Extraocular Movements: Extraocular movements intact.      Conjunctiva/sclera: Conjunctivae normal.      Pupils: Pupils are equal, round, and reactive to light.   Neck:      Musculoskeletal: Normal range of motion and neck supple. No neck rigidity.      Vascular: No carotid bruit.   Cardiovascular:      Rate and Rhythm: Normal rate and regular rhythm.      Pulses: Normal pulses.      Heart sounds: Normal heart sounds. No murmur. No gallop.    Pulmonary:      Effort: Pulmonary effort is normal.      Breath sounds: Normal breath sounds.   Chest:      Breasts:         Right: Normal.         Left: Normal.   Musculoskeletal: Normal range of motion.         General: No swelling or tenderness.   Skin:     General: Skin is warm and dry.   Neurological:      General: No focal deficit present.      Mental Status: She is alert and oriented to person, place, and time.   Psychiatric:         Mood and Affect: Mood normal.         Behavior: Behavior normal.         Lab Results   Component Value Date    HGBA1C 5.9 09/25/2020        Assessment/Plan   Medicare Risks and Personalized Health Plan  CMS Preventative Services Quick Reference  Advance Directive Discussion  Breast Cancer/Mammogram Screening  Immunizations Discussed/Encouraged (specific immunizations; Pneumococcal 23 and Prevnar )    The above risks/problems have been discussed with the  patient.  Pertinent information has been shared with the patient in the After Visit Summary.  Follow up plans and orders are seen below in the Assessment/Plan Section.    Diagnoses and all orders for this visit:    1. Pure hypercholesterolemia (Primary)  -     CBC & Differential  -     Comprehensive Metabolic Panel  -     Urinalysis With Microscopic - Urine, Clean Catch  -     TSH  -     Lipid Panel  -     Uric Acid  -     MicroAlbumin, Urine, Random - Urine, Clean Catch    2. Essential hypertension  -     CBC & Differential  -     Comprehensive Metabolic Panel  -     Urinalysis With Microscopic - Urine, Clean Catch  -     TSH  -     Lipid Panel  -     Uric Acid  -     MicroAlbumin, Urine, Random - Urine, Clean Catch      Follow Up:  No follow-ups on file.     An After Visit Summary and PPPS were given to the patient.     Patient is doing extremely well for her age.  Her daughter is doing an excellent job caring for her.  She needs lab work I am not doing any further screening I am updating her pneumonia vaccine today.

## 2020-11-05 LAB
ALBUMIN SERPL-MCNC: 5 G/DL (ref 3.5–5.2)
ALBUMIN/GLOB SERPL: 2.2 G/DL
ALP SERPL-CCNC: 72 U/L (ref 39–117)
ALT SERPL-CCNC: 20 U/L (ref 1–33)
APPEARANCE UR: CLEAR
AST SERPL-CCNC: 22 U/L (ref 1–32)
BACTERIA #/AREA URNS HPF: ABNORMAL /HPF
BASOPHILS # BLD AUTO: 0.12 10*3/MM3 (ref 0–0.2)
BASOPHILS NFR BLD AUTO: 1 % (ref 0–1.5)
BILIRUB SERPL-MCNC: 0.6 MG/DL (ref 0–1.2)
BILIRUB UR QL STRIP: NEGATIVE
BUN SERPL-MCNC: 11 MG/DL (ref 8–23)
BUN/CREAT SERPL: 14.3 (ref 7–25)
CALCIUM SERPL-MCNC: 10.5 MG/DL (ref 8.2–9.6)
CASTS URNS MICRO: ABNORMAL
CHLORIDE SERPL-SCNC: 97 MMOL/L (ref 98–107)
CHOLEST SERPL-MCNC: 157 MG/DL (ref 0–200)
CO2 SERPL-SCNC: 32.2 MMOL/L (ref 22–29)
COLOR UR: YELLOW
CREAT SERPL-MCNC: 0.77 MG/DL (ref 0.57–1)
EOSINOPHIL # BLD AUTO: 0.33 10*3/MM3 (ref 0–0.4)
EOSINOPHIL NFR BLD AUTO: 2.7 % (ref 0.3–6.2)
EPI CELLS #/AREA URNS HPF: ABNORMAL /HPF
ERYTHROCYTE [DISTWIDTH] IN BLOOD BY AUTOMATED COUNT: 14.4 % (ref 12.3–15.4)
GLOBULIN SER CALC-MCNC: 2.3 GM/DL
GLUCOSE SERPL-MCNC: 96 MG/DL (ref 65–99)
GLUCOSE UR QL: NEGATIVE
HCT VFR BLD AUTO: 53.2 % (ref 34–46.6)
HDLC SERPL-MCNC: 47 MG/DL (ref 40–60)
HGB BLD-MCNC: 17.6 G/DL (ref 12–15.9)
HGB UR QL STRIP: NEGATIVE
IMM GRANULOCYTES # BLD AUTO: 0.04 10*3/MM3 (ref 0–0.05)
IMM GRANULOCYTES NFR BLD AUTO: 0.3 % (ref 0–0.5)
KETONES UR QL STRIP: NEGATIVE
LDLC SERPL CALC-MCNC: 62 MG/DL (ref 0–100)
LEUKOCYTE ESTERASE UR QL STRIP: NEGATIVE
LYMPHOCYTES # BLD AUTO: 3.7 10*3/MM3 (ref 0.7–3.1)
LYMPHOCYTES NFR BLD AUTO: 30.4 % (ref 19.6–45.3)
MCH RBC QN AUTO: 30.1 PG (ref 26.6–33)
MCHC RBC AUTO-ENTMCNC: 33.1 G/DL (ref 31.5–35.7)
MCV RBC AUTO: 91.1 FL (ref 79–97)
MICROALBUMIN UR-MCNC: 69.9 UG/ML
MONOCYTES # BLD AUTO: 0.78 10*3/MM3 (ref 0.1–0.9)
MONOCYTES NFR BLD AUTO: 6.4 % (ref 5–12)
NEUTROPHILS # BLD AUTO: 7.19 10*3/MM3 (ref 1.7–7)
NEUTROPHILS NFR BLD AUTO: 59.2 % (ref 42.7–76)
NITRITE UR QL STRIP: NEGATIVE
NRBC BLD AUTO-RTO: 0.1 /100 WBC (ref 0–0.2)
PH UR STRIP: 7.5 [PH] (ref 5–8)
PLATELET # BLD AUTO: 184 10*3/MM3 (ref 140–450)
POTASSIUM SERPL-SCNC: 3.9 MMOL/L (ref 3.5–5.2)
PROT SERPL-MCNC: 7.3 G/DL (ref 6–8.5)
PROT UR QL STRIP: NEGATIVE
RBC # BLD AUTO: 5.84 10*6/MM3 (ref 3.77–5.28)
RBC #/AREA URNS HPF: ABNORMAL /HPF
SODIUM SERPL-SCNC: 141 MMOL/L (ref 136–145)
SP GR UR: 1.01 (ref 1–1.03)
TRIGL SERPL-MCNC: 309 MG/DL (ref 0–150)
TSH SERPL DL<=0.005 MIU/L-ACNC: 1.73 UIU/ML (ref 0.27–4.2)
URATE SERPL-MCNC: 6.4 MG/DL (ref 2.4–5.7)
UROBILINOGEN UR STRIP-MCNC: NORMAL MG/DL
VLDLC SERPL CALC-MCNC: 48 MG/DL (ref 5–40)
WBC # BLD AUTO: 12.16 10*3/MM3 (ref 3.4–10.8)
WBC #/AREA URNS HPF: ABNORMAL /HPF

## 2020-11-05 PROCEDURE — G0008 ADMIN INFLUENZA VIRUS VAC: HCPCS | Performed by: INTERNAL MEDICINE

## 2020-11-05 PROCEDURE — 90694 VACC AIIV4 NO PRSRV 0.5ML IM: CPT | Performed by: INTERNAL MEDICINE

## 2020-12-03 DIAGNOSIS — S82.831D CLOSED FRACTURE OF DISTAL END OF RIGHT FIBULA WITH ROUTINE HEALING, UNSPECIFIED FRACTURE MORPHOLOGY, SUBSEQUENT ENCOUNTER: ICD-10-CM

## 2020-12-03 DIAGNOSIS — G89.29 CHRONIC MIDLINE LOW BACK PAIN, UNSPECIFIED WHETHER SCIATICA PRESENT: Primary | ICD-10-CM

## 2020-12-03 DIAGNOSIS — M54.50 CHRONIC MIDLINE LOW BACK PAIN, UNSPECIFIED WHETHER SCIATICA PRESENT: Primary | ICD-10-CM

## 2020-12-03 DIAGNOSIS — M13.0 ARTHRITIS OF MULTIPLE SITES: ICD-10-CM

## 2020-12-03 RX ORDER — HYDROCODONE BITARTRATE AND ACETAMINOPHEN 5; 325 MG/1; MG/1
TABLET ORAL
Qty: 60 TABLET | Refills: 0 | Status: SHIPPED | OUTPATIENT
Start: 2021-01-04 | End: 2022-01-01

## 2020-12-03 RX ORDER — HYDROCODONE BITARTRATE AND ACETAMINOPHEN 5; 325 MG/1; MG/1
TABLET ORAL
Qty: 60 TABLET | Refills: 0 | Status: SHIPPED | OUTPATIENT
Start: 2020-12-03 | End: 2022-01-01

## 2020-12-03 NOTE — TELEPHONE ENCOUNTER
Caller: [email or web address]    Relationship: Emergency Contact    Best call back number: [phone]    Medication needed:   Requested Prescriptions     Pending Prescriptions Disp Refills   • HYDROcodone-acetaminophen (NORCO) 5-325 MG per tablet 8 tablet 0     Sig: Take 1 tablet by mouth Every 4 (Four) Hours As Needed for Moderate Pain .         What details did the patient provide when requesting the medication: PATIENT HAS BEEN IN [facility] AND IS NOW OUT AND NEEDS REFILL OF MEDICATION    Does the patient have less than a 3 day supply:  [x] Yes  [] No    What is the patient's preferred pharmacy: [facility] - [address] - [phone]  - [phone] FX

## 2020-12-18 RX ORDER — FLUTICASONE PROPIONATE 50 MCG
SPRAY, SUSPENSION (ML) NASAL
Qty: 16 G | Refills: 11 | Status: SHIPPED | OUTPATIENT
Start: 2020-12-18 | End: 2022-01-01

## 2020-12-28 RX ORDER — FUROSEMIDE 40 MG/1
TABLET ORAL
Qty: 90 TABLET | Refills: 1 | Status: SHIPPED | OUTPATIENT
Start: 2020-12-28 | End: 2022-01-01 | Stop reason: HOSPADM

## 2021-01-01 ENCOUNTER — TELEPHONE (OUTPATIENT)
Dept: INTERNAL MEDICINE | Facility: CLINIC | Age: 86
End: 2021-01-01

## 2021-01-24 ENCOUNTER — ANESTHESIA EVENT (OUTPATIENT)
Dept: OPERATING ROOM | Age: 86
DRG: 481 | End: 2021-01-24
Payer: MEDICARE

## 2021-01-24 ENCOUNTER — APPOINTMENT (OUTPATIENT)
Dept: GENERAL RADIOLOGY | Age: 86
DRG: 481 | End: 2021-01-24
Payer: MEDICARE

## 2021-01-24 ENCOUNTER — HOSPITAL ENCOUNTER (INPATIENT)
Age: 86
LOS: 3 days | Discharge: NURSING, REHAB OR LONG-TERM CARE | DRG: 481 | End: 2021-01-27
Attending: EMERGENCY MEDICINE
Payer: MEDICARE

## 2021-01-24 ENCOUNTER — ANESTHESIA (OUTPATIENT)
Dept: OPERATING ROOM | Age: 86
DRG: 481 | End: 2021-01-24
Payer: MEDICARE

## 2021-01-24 VITALS
SYSTOLIC BLOOD PRESSURE: 91 MMHG | RESPIRATION RATE: 2 BRPM | OXYGEN SATURATION: 98 % | TEMPERATURE: 99.7 F | DIASTOLIC BLOOD PRESSURE: 58 MMHG

## 2021-01-24 DIAGNOSIS — S42.291A OTHER CLOSED DISPLACED FRACTURE OF PROXIMAL END OF RIGHT HUMERUS, INITIAL ENCOUNTER: Primary | ICD-10-CM

## 2021-01-24 DIAGNOSIS — S72.491A OTHER CLOSED FRACTURE OF DISTAL END OF RIGHT FEMUR, INITIAL ENCOUNTER (HCC): ICD-10-CM

## 2021-01-24 DIAGNOSIS — F41.9 ANXIETY: ICD-10-CM

## 2021-01-24 DIAGNOSIS — S72.401A CLOSED FRACTURE OF DISTAL END OF RIGHT FEMUR, INITIAL ENCOUNTER (HCC): ICD-10-CM

## 2021-01-24 DIAGNOSIS — M25.559 HIP PAIN: ICD-10-CM

## 2021-01-24 DIAGNOSIS — M79.2 NEUROPATHIC PAIN: ICD-10-CM

## 2021-01-24 PROBLEM — J18.9 PNEUMONIA: Status: RESOLVED | Noted: 2018-03-07 | Resolved: 2021-01-24

## 2021-01-24 PROBLEM — J15.9 HEALTHCARE ASSOCIATED BACTERIAL PNEUMONIA: Status: RESOLVED | Noted: 2018-03-07 | Resolved: 2021-01-24

## 2021-01-24 LAB
ABO/RH: NORMAL
ALBUMIN SERPL-MCNC: 4.4 G/DL (ref 3.5–5.2)
ALP BLD-CCNC: 58 U/L (ref 35–104)
ALT SERPL-CCNC: 14 U/L (ref 5–33)
ANION GAP SERPL CALCULATED.3IONS-SCNC: 8 MMOL/L (ref 7–19)
ANTIBODY SCREEN: NORMAL
APTT: 35.9 SEC (ref 26–36.2)
AST SERPL-CCNC: 17 U/L (ref 5–32)
BACTERIA: NEGATIVE /HPF
BASOPHILS ABSOLUTE: 0.1 K/UL (ref 0–0.2)
BASOPHILS RELATIVE PERCENT: 0.6 % (ref 0–1)
BILIRUB SERPL-MCNC: 0.5 MG/DL (ref 0.2–1.2)
BILIRUBIN URINE: NEGATIVE
BLOOD, URINE: NEGATIVE
BUN BLDV-MCNC: 12 MG/DL (ref 8–23)
CALCIUM SERPL-MCNC: 9.7 MG/DL (ref 8.2–9.6)
CHLORIDE BLD-SCNC: 101 MMOL/L (ref 98–111)
CLARITY: CLEAR
CO2: 32 MMOL/L (ref 22–29)
COLOR: YELLOW
CREAT SERPL-MCNC: 0.7 MG/DL (ref 0.5–0.9)
CRYSTALS, UA: ABNORMAL /HPF
EOSINOPHILS ABSOLUTE: 0.2 K/UL (ref 0–0.6)
EOSINOPHILS RELATIVE PERCENT: 1 % (ref 0–5)
EPITHELIAL CELLS, UA: 1 /HPF (ref 0–5)
GFR AFRICAN AMERICAN: >59
GFR NON-AFRICAN AMERICAN: >60
GLUCOSE BLD-MCNC: 150 MG/DL (ref 74–109)
GLUCOSE URINE: NEGATIVE MG/DL
HCT VFR BLD CALC: 47.4 % (ref 37–47)
HEMOGLOBIN: 15.7 G/DL (ref 12–16)
HYALINE CASTS: 0 /HPF (ref 0–8)
IMMATURE GRANULOCYTES #: 0.2 K/UL
INR BLD: 1.07 (ref 0.88–1.18)
KETONES, URINE: NEGATIVE MG/DL
LEUKOCYTE ESTERASE, URINE: ABNORMAL
LYMPHOCYTES ABSOLUTE: 3.7 K/UL (ref 1.1–4.5)
LYMPHOCYTES RELATIVE PERCENT: 16.3 % (ref 20–40)
MCH RBC QN AUTO: 30.3 PG (ref 27–31)
MCHC RBC AUTO-ENTMCNC: 33.1 G/DL (ref 33–37)
MCV RBC AUTO: 91.5 FL (ref 81–99)
MONOCYTES ABSOLUTE: 1.1 K/UL (ref 0–0.9)
MONOCYTES RELATIVE PERCENT: 5 % (ref 0–10)
NEUTROPHILS ABSOLUTE: 17.5 K/UL (ref 1.5–7.5)
NEUTROPHILS RELATIVE PERCENT: 76.2 % (ref 50–65)
NITRITE, URINE: NEGATIVE
PDW BLD-RTO: 14.6 % (ref 11.5–14.5)
PH UA: 7.5 (ref 5–8)
PLATELET # BLD: 183 K/UL (ref 130–400)
PMV BLD AUTO: 10.4 FL (ref 9.4–12.3)
POTASSIUM SERPL-SCNC: 3.6 MMOL/L (ref 3.5–5)
PROTEIN UA: 100 MG/DL
PROTHROMBIN TIME: 13.8 SEC (ref 12–14.6)
RBC # BLD: 5.18 M/UL (ref 4.2–5.4)
RBC UA: 2 /HPF (ref 0–4)
SARS-COV-2, NAAT: NOT DETECTED
SODIUM BLD-SCNC: 141 MMOL/L (ref 136–145)
SPECIFIC GRAVITY UA: 1.01 (ref 1–1.03)
TOTAL PROTEIN: 7.2 G/DL (ref 6.6–8.7)
UROBILINOGEN, URINE: 0.2 E.U./DL
WBC # BLD: 22.9 K/UL (ref 4.8–10.8)
WBC UA: 3 /HPF (ref 0–5)

## 2021-01-24 PROCEDURE — 73030 X-RAY EXAM OF SHOULDER: CPT

## 2021-01-24 PROCEDURE — 3700000000 HC ANESTHESIA ATTENDED CARE: Performed by: ORTHOPAEDIC SURGERY

## 2021-01-24 PROCEDURE — 73560 X-RAY EXAM OF KNEE 1 OR 2: CPT

## 2021-01-24 PROCEDURE — 96374 THER/PROPH/DIAG INJ IV PUSH: CPT

## 2021-01-24 PROCEDURE — 85730 THROMBOPLASTIN TIME PARTIAL: CPT

## 2021-01-24 PROCEDURE — 6370000000 HC RX 637 (ALT 250 FOR IP): Performed by: ORTHOPAEDIC SURGERY

## 2021-01-24 PROCEDURE — 2580000003 HC RX 258

## 2021-01-24 PROCEDURE — 99285 EMERGENCY DEPT VISIT HI MDM: CPT

## 2021-01-24 PROCEDURE — 2500000003 HC RX 250 WO HCPCS

## 2021-01-24 PROCEDURE — 7100000001 HC PACU RECOVERY - ADDTL 15 MIN: Performed by: ORTHOPAEDIC SURGERY

## 2021-01-24 PROCEDURE — 80053 COMPREHEN METABOLIC PANEL: CPT

## 2021-01-24 PROCEDURE — 1210000000 HC MED SURG R&B

## 2021-01-24 PROCEDURE — 86900 BLOOD TYPING SEROLOGIC ABO: CPT

## 2021-01-24 PROCEDURE — 36415 COLL VENOUS BLD VENIPUNCTURE: CPT

## 2021-01-24 PROCEDURE — 71045 X-RAY EXAM CHEST 1 VIEW: CPT

## 2021-01-24 PROCEDURE — 3600000004 HC SURGERY LEVEL 4 BASE: Performed by: ORTHOPAEDIC SURGERY

## 2021-01-24 PROCEDURE — 6360000002 HC RX W HCPCS

## 2021-01-24 PROCEDURE — 6360000002 HC RX W HCPCS: Performed by: EMERGENCY MEDICINE

## 2021-01-24 PROCEDURE — 86850 RBC ANTIBODY SCREEN: CPT

## 2021-01-24 PROCEDURE — C1713 ANCHOR/SCREW BN/BN,TIS/BN: HCPCS | Performed by: ORTHOPAEDIC SURGERY

## 2021-01-24 PROCEDURE — 85610 PROTHROMBIN TIME: CPT

## 2021-01-24 PROCEDURE — 2580000003 HC RX 258: Performed by: ORTHOPAEDIC SURGERY

## 2021-01-24 PROCEDURE — 2709999900 HC NON-CHARGEABLE SUPPLY: Performed by: ORTHOPAEDIC SURGERY

## 2021-01-24 PROCEDURE — 73502 X-RAY EXAM HIP UNI 2-3 VIEWS: CPT

## 2021-01-24 PROCEDURE — 3700000001 HC ADD 15 MINUTES (ANESTHESIA): Performed by: ORTHOPAEDIC SURGERY

## 2021-01-24 PROCEDURE — 93005 ELECTROCARDIOGRAM TRACING: CPT | Performed by: EMERGENCY MEDICINE

## 2021-01-24 PROCEDURE — 73060 X-RAY EXAM OF HUMERUS: CPT

## 2021-01-24 PROCEDURE — 6360000002 HC RX W HCPCS: Performed by: HOSPITALIST

## 2021-01-24 PROCEDURE — C1769 GUIDE WIRE: HCPCS | Performed by: ORTHOPAEDIC SURGERY

## 2021-01-24 PROCEDURE — 86901 BLOOD TYPING SEROLOGIC RH(D): CPT

## 2021-01-24 PROCEDURE — 81001 URINALYSIS AUTO W/SCOPE: CPT

## 2021-01-24 PROCEDURE — 3600000014 HC SURGERY LEVEL 4 ADDTL 15MIN: Performed by: ORTHOPAEDIC SURGERY

## 2021-01-24 PROCEDURE — 6360000002 HC RX W HCPCS: Performed by: ORTHOPAEDIC SURGERY

## 2021-01-24 PROCEDURE — 2700000000 HC OXYGEN THERAPY PER DAY

## 2021-01-24 PROCEDURE — 0QSB04Z REPOSITION RIGHT LOWER FEMUR WITH INTERNAL FIXATION DEVICE, OPEN APPROACH: ICD-10-PCS | Performed by: ORTHOPAEDIC SURGERY

## 2021-01-24 PROCEDURE — 96375 TX/PRO/DX INJ NEW DRUG ADDON: CPT

## 2021-01-24 PROCEDURE — U0002 COVID-19 LAB TEST NON-CDC: HCPCS

## 2021-01-24 PROCEDURE — 85025 COMPLETE CBC W/AUTO DIFF WBC: CPT

## 2021-01-24 PROCEDURE — 2720000010 HC SURG SUPPLY STERILE: Performed by: ORTHOPAEDIC SURGERY

## 2021-01-24 PROCEDURE — 7100000000 HC PACU RECOVERY - FIRST 15 MIN: Performed by: ORTHOPAEDIC SURGERY

## 2021-01-24 DEVICE — IMPLANTABLE DEVICE: Type: IMPLANTABLE DEVICE | Site: FEMUR | Status: FUNCTIONAL

## 2021-01-24 DEVICE — SCREW BNE L36MM DIA4.5MM PROX CORT TIB S STL ST LOK FULL: Type: IMPLANTABLE DEVICE | Site: FEMUR | Status: FUNCTIONAL

## 2021-01-24 DEVICE — SCREW BNE L36MM DIA5MM CNDYL S STL ST VAR ANG LOK COMPR T25: Type: IMPLANTABLE DEVICE | Site: FEMUR | Status: FUNCTIONAL

## 2021-01-24 DEVICE — SCREW BNE L38MM DIA5MM CNDYL S STL ST VAR ANG LOK T25: Type: IMPLANTABLE DEVICE | Site: FEMUR | Status: FUNCTIONAL

## 2021-01-24 DEVICE — SCREW BNE L42MM DIA5MM CNDYL S STL ST VAR ANG LOK T25: Type: IMPLANTABLE DEVICE | Site: FEMUR | Status: FUNCTIONAL

## 2021-01-24 DEVICE — PLATE BNE L195MM 8 H ST R CNDYL S STL CRV LOK COMPR VAR ANG: Type: IMPLANTABLE DEVICE | Site: FEMUR | Status: FUNCTIONAL

## 2021-01-24 RX ORDER — EPHEDRINE SULFATE 50 MG/ML
INJECTION, SOLUTION INTRAVENOUS PRN
Status: DISCONTINUED | OUTPATIENT
Start: 2021-01-24 | End: 2021-01-24 | Stop reason: SDUPTHER

## 2021-01-24 RX ORDER — LIDOCAINE HYDROCHLORIDE 10 MG/ML
INJECTION, SOLUTION EPIDURAL; INFILTRATION; INTRACAUDAL; PERINEURAL PRN
Status: DISCONTINUED | OUTPATIENT
Start: 2021-01-24 | End: 2021-01-24 | Stop reason: SDUPTHER

## 2021-01-24 RX ORDER — PROMETHAZINE HYDROCHLORIDE 25 MG/ML
6.25 INJECTION, SOLUTION INTRAMUSCULAR; INTRAVENOUS
Status: DISCONTINUED | OUTPATIENT
Start: 2021-01-24 | End: 2021-01-24 | Stop reason: HOSPADM

## 2021-01-24 RX ORDER — ZOLPIDEM TARTRATE 5 MG/1
5 TABLET ORAL
COMMUNITY
Start: 2020-09-14 | End: 2021-01-24 | Stop reason: ALTCHOICE

## 2021-01-24 RX ORDER — FUROSEMIDE 40 MG/1
TABLET ORAL
COMMUNITY
Start: 2020-12-28

## 2021-01-24 RX ORDER — MORPHINE SULFATE 4 MG/ML
2 INJECTION, SOLUTION INTRAMUSCULAR; INTRAVENOUS
Status: DISCONTINUED | OUTPATIENT
Start: 2021-01-24 | End: 2021-01-27 | Stop reason: HOSPADM

## 2021-01-24 RX ORDER — GABAPENTIN 300 MG/1
300 CAPSULE ORAL EVERY 12 HOURS SCHEDULED
Status: DISCONTINUED | OUTPATIENT
Start: 2021-01-24 | End: 2021-01-27 | Stop reason: HOSPADM

## 2021-01-24 RX ORDER — ALBUTEROL SULFATE 2.5 MG/3ML
2.5 SOLUTION RESPIRATORY (INHALATION)
Status: DISCONTINUED | OUTPATIENT
Start: 2021-01-24 | End: 2021-01-27 | Stop reason: HOSPADM

## 2021-01-24 RX ORDER — GABAPENTIN 300 MG/1
300 CAPSULE ORAL EVERY 12 HOURS SCHEDULED
Status: ON HOLD | COMMUNITY
Start: 2020-07-21 | End: 2021-01-27 | Stop reason: SDUPTHER

## 2021-01-24 RX ORDER — MEPERIDINE HYDROCHLORIDE 50 MG/ML
12.5 INJECTION INTRAMUSCULAR; INTRAVENOUS; SUBCUTANEOUS EVERY 5 MIN PRN
Status: DISCONTINUED | OUTPATIENT
Start: 2021-01-24 | End: 2021-01-24 | Stop reason: HOSPADM

## 2021-01-24 RX ORDER — MORPHINE SULFATE 4 MG/ML
2 INJECTION, SOLUTION INTRAMUSCULAR; INTRAVENOUS EVERY 4 HOURS PRN
Status: DISCONTINUED | OUTPATIENT
Start: 2021-01-24 | End: 2021-01-27 | Stop reason: HOSPADM

## 2021-01-24 RX ORDER — SODIUM CHLORIDE 0.9 % (FLUSH) 0.9 %
10 SYRINGE (ML) INJECTION EVERY 12 HOURS SCHEDULED
Status: DISCONTINUED | OUTPATIENT
Start: 2021-01-24 | End: 2021-01-27 | Stop reason: HOSPADM

## 2021-01-24 RX ORDER — SENNA AND DOCUSATE SODIUM 50; 8.6 MG/1; MG/1
1 TABLET, FILM COATED ORAL 2 TIMES DAILY
Status: DISCONTINUED | OUTPATIENT
Start: 2021-01-24 | End: 2021-01-27 | Stop reason: HOSPADM

## 2021-01-24 RX ORDER — POLYETHYLENE GLYCOL 3350 17 G/17G
17 POWDER, FOR SOLUTION ORAL DAILY PRN
Status: DISCONTINUED | OUTPATIENT
Start: 2021-01-24 | End: 2021-01-27 | Stop reason: HOSPADM

## 2021-01-24 RX ORDER — HYDROMORPHONE HYDROCHLORIDE 1 MG/ML
0.5 INJECTION, SOLUTION INTRAMUSCULAR; INTRAVENOUS; SUBCUTANEOUS EVERY 5 MIN PRN
Status: DISCONTINUED | OUTPATIENT
Start: 2021-01-24 | End: 2021-01-24 | Stop reason: HOSPADM

## 2021-01-24 RX ORDER — ETOMIDATE 2 MG/ML
INJECTION INTRAVENOUS PRN
Status: DISCONTINUED | OUTPATIENT
Start: 2021-01-24 | End: 2021-01-24 | Stop reason: SDUPTHER

## 2021-01-24 RX ORDER — HYDRALAZINE HYDROCHLORIDE 20 MG/ML
5 INJECTION INTRAMUSCULAR; INTRAVENOUS EVERY 10 MIN PRN
Status: DISCONTINUED | OUTPATIENT
Start: 2021-01-24 | End: 2021-01-24 | Stop reason: HOSPADM

## 2021-01-24 RX ORDER — OXYCODONE HYDROCHLORIDE 5 MG/1
5 TABLET ORAL EVERY 4 HOURS PRN
Status: DISCONTINUED | OUTPATIENT
Start: 2021-01-24 | End: 2021-01-27 | Stop reason: HOSPADM

## 2021-01-24 RX ORDER — NALOXONE HYDROCHLORIDE 0.4 MG/ML
0.4 INJECTION, SOLUTION INTRAMUSCULAR; INTRAVENOUS; SUBCUTANEOUS PRN
Status: DISCONTINUED | OUTPATIENT
Start: 2021-01-24 | End: 2021-01-27 | Stop reason: HOSPADM

## 2021-01-24 RX ORDER — MORPHINE SULFATE 4 MG/ML
1 INJECTION, SOLUTION INTRAMUSCULAR; INTRAVENOUS EVERY 4 HOURS PRN
Status: DISCONTINUED | OUTPATIENT
Start: 2021-01-24 | End: 2021-01-27 | Stop reason: HOSPADM

## 2021-01-24 RX ORDER — HYDRALAZINE HYDROCHLORIDE 25 MG/1
25 TABLET, FILM COATED ORAL 2 TIMES DAILY
Status: DISCONTINUED | OUTPATIENT
Start: 2021-01-24 | End: 2021-01-27 | Stop reason: HOSPADM

## 2021-01-24 RX ORDER — MORPHINE SULFATE 4 MG/ML
4 INJECTION, SOLUTION INTRAMUSCULAR; INTRAVENOUS
Status: DISCONTINUED | OUTPATIENT
Start: 2021-01-24 | End: 2021-01-27 | Stop reason: HOSPADM

## 2021-01-24 RX ORDER — FENTANYL CITRATE 50 UG/ML
INJECTION, SOLUTION INTRAMUSCULAR; INTRAVENOUS PRN
Status: DISCONTINUED | OUTPATIENT
Start: 2021-01-24 | End: 2021-01-24 | Stop reason: SDUPTHER

## 2021-01-24 RX ORDER — SODIUM CHLORIDE 0.9 % (FLUSH) 0.9 %
10 SYRINGE (ML) INJECTION PRN
Status: DISCONTINUED | OUTPATIENT
Start: 2021-01-24 | End: 2021-01-27 | Stop reason: HOSPADM

## 2021-01-24 RX ORDER — CLONIDINE HYDROCHLORIDE 0.2 MG/1
0.2 TABLET ORAL EVERY 12 HOURS SCHEDULED
Status: DISCONTINUED | OUTPATIENT
Start: 2021-01-24 | End: 2021-01-27 | Stop reason: HOSPADM

## 2021-01-24 RX ORDER — ALPRAZOLAM 0.25 MG/1
0.12 TABLET ORAL DAILY
Status: DISCONTINUED | OUTPATIENT
Start: 2021-01-24 | End: 2021-01-27 | Stop reason: HOSPADM

## 2021-01-24 RX ORDER — HYDROMORPHONE HYDROCHLORIDE 1 MG/ML
0.25 INJECTION, SOLUTION INTRAMUSCULAR; INTRAVENOUS; SUBCUTANEOUS EVERY 5 MIN PRN
Status: DISCONTINUED | OUTPATIENT
Start: 2021-01-24 | End: 2021-01-24 | Stop reason: HOSPADM

## 2021-01-24 RX ORDER — ASPIRIN 81 MG/1
162 TABLET, CHEWABLE ORAL DAILY
Status: DISCONTINUED | OUTPATIENT
Start: 2021-01-24 | End: 2021-01-27 | Stop reason: HOSPADM

## 2021-01-24 RX ORDER — POTASSIUM CHLORIDE 7.45 MG/ML
10 INJECTION INTRAVENOUS PRN
Status: DISCONTINUED | OUTPATIENT
Start: 2021-01-24 | End: 2021-01-27 | Stop reason: HOSPADM

## 2021-01-24 RX ORDER — SODIUM CHLORIDE, SODIUM LACTATE, POTASSIUM CHLORIDE, CALCIUM CHLORIDE 600; 310; 30; 20 MG/100ML; MG/100ML; MG/100ML; MG/100ML
INJECTION, SOLUTION INTRAVENOUS CONTINUOUS PRN
Status: DISCONTINUED | OUTPATIENT
Start: 2021-01-24 | End: 2021-01-24 | Stop reason: SDUPTHER

## 2021-01-24 RX ORDER — MORPHINE SULFATE 4 MG/ML
2 INJECTION, SOLUTION INTRAMUSCULAR; INTRAVENOUS ONCE
Status: COMPLETED | OUTPATIENT
Start: 2021-01-24 | End: 2021-01-24

## 2021-01-24 RX ORDER — CLONIDINE HYDROCHLORIDE 0.2 MG/1
0.2 TABLET ORAL EVERY 12 HOURS SCHEDULED
COMMUNITY
Start: 2020-07-21

## 2021-01-24 RX ORDER — CEFAZOLIN SODIUM 1 G/3ML
INJECTION, POWDER, FOR SOLUTION INTRAMUSCULAR; INTRAVENOUS PRN
Status: DISCONTINUED | OUTPATIENT
Start: 2021-01-24 | End: 2021-01-24 | Stop reason: SDUPTHER

## 2021-01-24 RX ORDER — LISINOPRIL 20 MG/1
40 TABLET ORAL DAILY
Status: DISCONTINUED | OUTPATIENT
Start: 2021-01-24 | End: 2021-01-27 | Stop reason: HOSPADM

## 2021-01-24 RX ORDER — VITAMIN E 268 MG
400 CAPSULE ORAL DAILY
Status: DISCONTINUED | OUTPATIENT
Start: 2021-01-24 | End: 2021-01-27 | Stop reason: HOSPADM

## 2021-01-24 RX ORDER — OXYCODONE HYDROCHLORIDE 5 MG/1
10 TABLET ORAL EVERY 4 HOURS PRN
Status: DISCONTINUED | OUTPATIENT
Start: 2021-01-24 | End: 2021-01-27 | Stop reason: HOSPADM

## 2021-01-24 RX ORDER — METOPROLOL TARTRATE 50 MG/1
TABLET, FILM COATED ORAL
COMMUNITY
Start: 2020-06-08 | End: 2021-01-24

## 2021-01-24 RX ORDER — ONDANSETRON 2 MG/ML
4 INJECTION INTRAMUSCULAR; INTRAVENOUS ONCE
Status: COMPLETED | OUTPATIENT
Start: 2021-01-24 | End: 2021-01-24

## 2021-01-24 RX ORDER — METOCLOPRAMIDE HYDROCHLORIDE 5 MG/ML
10 INJECTION INTRAMUSCULAR; INTRAVENOUS
Status: DISCONTINUED | OUTPATIENT
Start: 2021-01-24 | End: 2021-01-24 | Stop reason: HOSPADM

## 2021-01-24 RX ORDER — LABETALOL HYDROCHLORIDE 5 MG/ML
5 INJECTION, SOLUTION INTRAVENOUS EVERY 10 MIN PRN
Status: DISCONTINUED | OUTPATIENT
Start: 2021-01-24 | End: 2021-01-24 | Stop reason: HOSPADM

## 2021-01-24 RX ORDER — DOCUSATE SODIUM 100 MG/1
100 CAPSULE, LIQUID FILLED ORAL 2 TIMES DAILY
Status: DISCONTINUED | OUTPATIENT
Start: 2021-01-24 | End: 2021-01-27 | Stop reason: HOSPADM

## 2021-01-24 RX ORDER — VITAMIN B COMPLEX
1000 TABLET ORAL DAILY
Status: DISCONTINUED | OUTPATIENT
Start: 2021-01-24 | End: 2021-01-27 | Stop reason: HOSPADM

## 2021-01-24 RX ORDER — DIPHENHYDRAMINE HYDROCHLORIDE 50 MG/ML
12.5 INJECTION INTRAMUSCULAR; INTRAVENOUS
Status: DISCONTINUED | OUTPATIENT
Start: 2021-01-24 | End: 2021-01-24 | Stop reason: HOSPADM

## 2021-01-24 RX ORDER — CHLORAL HYDRATE 500 MG
1 CAPSULE ORAL 2 TIMES DAILY
Status: DISCONTINUED | OUTPATIENT
Start: 2021-01-24 | End: 2021-01-24

## 2021-01-24 RX ORDER — ONDANSETRON 2 MG/ML
INJECTION INTRAMUSCULAR; INTRAVENOUS PRN
Status: DISCONTINUED | OUTPATIENT
Start: 2021-01-24 | End: 2021-01-24 | Stop reason: SDUPTHER

## 2021-01-24 RX ORDER — METOPROLOL TARTRATE 50 MG/1
50 TABLET, FILM COATED ORAL 2 TIMES DAILY
Status: DISCONTINUED | OUTPATIENT
Start: 2021-01-24 | End: 2021-01-27 | Stop reason: HOSPADM

## 2021-01-24 RX ORDER — GUAIFENESIN 600 MG/1
1200 TABLET, EXTENDED RELEASE ORAL 2 TIMES DAILY
Status: DISCONTINUED | OUTPATIENT
Start: 2021-01-24 | End: 2021-01-27 | Stop reason: HOSPADM

## 2021-01-24 RX ORDER — SODIUM CHLORIDE, SODIUM LACTATE, POTASSIUM CHLORIDE, CALCIUM CHLORIDE 600; 310; 30; 20 MG/100ML; MG/100ML; MG/100ML; MG/100ML
INJECTION, SOLUTION INTRAVENOUS CONTINUOUS
Status: DISCONTINUED | OUTPATIENT
Start: 2021-01-24 | End: 2021-01-25

## 2021-01-24 RX ORDER — ZOLPIDEM TARTRATE 5 MG/1
5 TABLET ORAL NIGHTLY PRN
Status: DISCONTINUED | OUTPATIENT
Start: 2021-01-24 | End: 2021-01-27 | Stop reason: HOSPADM

## 2021-01-24 RX ORDER — ROCURONIUM BROMIDE 10 MG/ML
INJECTION, SOLUTION INTRAVENOUS PRN
Status: DISCONTINUED | OUTPATIENT
Start: 2021-01-24 | End: 2021-01-24 | Stop reason: SDUPTHER

## 2021-01-24 RX ORDER — MORPHINE SULFATE 4 MG/ML
4 INJECTION, SOLUTION INTRAMUSCULAR; INTRAVENOUS EVERY 4 HOURS PRN
Status: DISCONTINUED | OUTPATIENT
Start: 2021-01-24 | End: 2021-01-27 | Stop reason: HOSPADM

## 2021-01-24 RX ORDER — DEXAMETHASONE SODIUM PHOSPHATE 10 MG/ML
INJECTION, SOLUTION INTRAMUSCULAR; INTRAVENOUS PRN
Status: DISCONTINUED | OUTPATIENT
Start: 2021-01-24 | End: 2021-01-24 | Stop reason: SDUPTHER

## 2021-01-24 RX ORDER — M-VIT,TX,IRON,MINS/CALC/FOLIC 27MG-0.4MG
1 TABLET ORAL DAILY
Status: DISCONTINUED | OUTPATIENT
Start: 2021-01-24 | End: 2021-01-27 | Stop reason: HOSPADM

## 2021-01-24 RX ORDER — AMLODIPINE BESYLATE 10 MG/1
10 TABLET ORAL DAILY
COMMUNITY
Start: 2020-03-23 | End: 2021-01-24 | Stop reason: ALTCHOICE

## 2021-01-24 RX ORDER — ACETAMINOPHEN 325 MG/1
650 TABLET ORAL EVERY 4 HOURS PRN
Status: DISCONTINUED | OUTPATIENT
Start: 2021-01-24 | End: 2021-01-27 | Stop reason: HOSPADM

## 2021-01-24 RX ORDER — ASPIRIN 81 MG/1
81 TABLET ORAL DAILY
COMMUNITY
End: 2021-01-24 | Stop reason: ALTCHOICE

## 2021-01-24 RX ORDER — ATORVASTATIN CALCIUM 10 MG/1
10 TABLET, FILM COATED ORAL NIGHTLY
Status: DISCONTINUED | OUTPATIENT
Start: 2021-01-24 | End: 2021-01-27 | Stop reason: HOSPADM

## 2021-01-24 RX ORDER — ONDANSETRON 4 MG/1
4 TABLET, ORALLY DISINTEGRATING ORAL EVERY 8 HOURS PRN
Status: DISCONTINUED | OUTPATIENT
Start: 2021-01-24 | End: 2021-01-27 | Stop reason: HOSPADM

## 2021-01-24 RX ORDER — FLUTICASONE PROPIONATE 50 MCG
SPRAY, SUSPENSION (ML) NASAL
COMMUNITY
Start: 2020-12-18 | End: 2021-01-24 | Stop reason: ALTCHOICE

## 2021-01-24 RX ORDER — SIMVASTATIN 20 MG
20 TABLET ORAL NIGHTLY
COMMUNITY
Start: 2020-07-21 | End: 2021-01-24 | Stop reason: ALTCHOICE

## 2021-01-24 RX ORDER — PAROXETINE HYDROCHLORIDE 20 MG/1
20 TABLET, FILM COATED ORAL EVERY MORNING
Status: DISCONTINUED | OUTPATIENT
Start: 2021-01-24 | End: 2021-01-27 | Stop reason: HOSPADM

## 2021-01-24 RX ORDER — MAGNESIUM SULFATE IN WATER 40 MG/ML
2000 INJECTION, SOLUTION INTRAVENOUS PRN
Status: DISCONTINUED | OUTPATIENT
Start: 2021-01-24 | End: 2021-01-27 | Stop reason: HOSPADM

## 2021-01-24 RX ORDER — FAMOTIDINE 20 MG/1
20 TABLET, FILM COATED ORAL 2 TIMES DAILY PRN
Status: DISCONTINUED | OUTPATIENT
Start: 2021-01-24 | End: 2021-01-27 | Stop reason: HOSPADM

## 2021-01-24 RX ORDER — TRAMADOL HYDROCHLORIDE 50 MG/1
100 TABLET ORAL 2 TIMES DAILY
COMMUNITY
Start: 2020-10-14 | End: 2021-01-24 | Stop reason: ALTCHOICE

## 2021-01-24 RX ORDER — MORPHINE SULFATE 4 MG/ML
2 INJECTION, SOLUTION INTRAMUSCULAR; INTRAVENOUS EVERY 5 MIN PRN
Status: DISCONTINUED | OUTPATIENT
Start: 2021-01-24 | End: 2021-01-24 | Stop reason: HOSPADM

## 2021-01-24 RX ORDER — MORPHINE SULFATE 4 MG/ML
4 INJECTION, SOLUTION INTRAMUSCULAR; INTRAVENOUS EVERY 5 MIN PRN
Status: DISCONTINUED | OUTPATIENT
Start: 2021-01-24 | End: 2021-01-24 | Stop reason: HOSPADM

## 2021-01-24 RX ORDER — HYDROCODONE BITARTRATE AND ACETAMINOPHEN 5; 325 MG/1; MG/1
TABLET ORAL
COMMUNITY
Start: 2020-12-03 | End: 2021-01-24 | Stop reason: ALTCHOICE

## 2021-01-24 RX ORDER — ONDANSETRON 2 MG/ML
4 INJECTION INTRAMUSCULAR; INTRAVENOUS EVERY 6 HOURS PRN
Status: DISCONTINUED | OUTPATIENT
Start: 2021-01-24 | End: 2021-01-27 | Stop reason: HOSPADM

## 2021-01-24 RX ORDER — HYDRALAZINE HYDROCHLORIDE 25 MG/1
TABLET, FILM COATED ORAL
COMMUNITY
Start: 2020-06-15 | End: 2021-01-24 | Stop reason: ALTCHOICE

## 2021-01-24 RX ORDER — GINSENG 100 MG
CAPSULE ORAL 3 TIMES DAILY
Status: DISCONTINUED | OUTPATIENT
Start: 2021-01-24 | End: 2021-01-27 | Stop reason: HOSPADM

## 2021-01-24 RX ADMIN — DOCUSATE SODIUM 100 MG: 100 CAPSULE ORAL at 22:07

## 2021-01-24 RX ADMIN — ROCURONIUM BROMIDE 50 MG: 10 INJECTION, SOLUTION INTRAVENOUS at 10:56

## 2021-01-24 RX ADMIN — EPHEDRINE SULFATE 20 MG: 50 INJECTION INTRAMUSCULAR; INTRAVENOUS; SUBCUTANEOUS at 11:13

## 2021-01-24 RX ADMIN — GUAIFENESIN 1200 MG: 600 TABLET, EXTENDED RELEASE ORAL at 22:06

## 2021-01-24 RX ADMIN — SUGAMMADEX 300 MG: 100 INJECTION, SOLUTION INTRAVENOUS at 11:45

## 2021-01-24 RX ADMIN — LIDOCAINE HYDROCHLORIDE 50 MG: 10 INJECTION, SOLUTION EPIDURAL; INFILTRATION; INTRACAUDAL; PERINEURAL at 10:56

## 2021-01-24 RX ADMIN — BACITRACIN: 500 OINTMENT TOPICAL at 15:08

## 2021-01-24 RX ADMIN — DEXAMETHASONE SODIUM PHOSPHATE 10 MG: 10 INJECTION, SOLUTION INTRAMUSCULAR; INTRAVENOUS at 11:07

## 2021-01-24 RX ADMIN — ZOLPIDEM TARTRATE 5 MG: 5 TABLET ORAL at 22:06

## 2021-01-24 RX ADMIN — MORPHINE SULFATE 1 MG: 4 INJECTION, SOLUTION INTRAMUSCULAR; INTRAVENOUS at 10:30

## 2021-01-24 RX ADMIN — CEFAZOLIN SODIUM 2000 MG: 1 INJECTION, POWDER, FOR SOLUTION INTRAMUSCULAR; INTRAVENOUS at 11:05

## 2021-01-24 RX ADMIN — OXYCODONE 5 MG: 5 TABLET ORAL at 18:49

## 2021-01-24 RX ADMIN — DOCUSATE SODIUM 50 MG AND SENNOSIDES 8.6 MG 1 TABLET: 8.6; 5 TABLET, FILM COATED ORAL at 22:06

## 2021-01-24 RX ADMIN — Medication 2000 MG: at 22:07

## 2021-01-24 RX ADMIN — GABAPENTIN 300 MG: 300 CAPSULE ORAL at 22:07

## 2021-01-24 RX ADMIN — SODIUM CHLORIDE, PRESERVATIVE FREE 10 ML: 5 INJECTION INTRAVENOUS at 22:08

## 2021-01-24 RX ADMIN — BACITRACIN: 500 OINTMENT TOPICAL at 22:08

## 2021-01-24 RX ADMIN — ONDANSETRON HYDROCHLORIDE 4 MG: 2 INJECTION, SOLUTION INTRAMUSCULAR; INTRAVENOUS at 11:45

## 2021-01-24 RX ADMIN — ETOMIDATE 10 MG: 2 INJECTION, SOLUTION INTRAVENOUS at 10:56

## 2021-01-24 RX ADMIN — MORPHINE SULFATE 2 MG: 4 INJECTION, SOLUTION INTRAMUSCULAR; INTRAVENOUS at 05:46

## 2021-01-24 RX ADMIN — SODIUM CHLORIDE, SODIUM LACTATE, POTASSIUM CHLORIDE, AND CALCIUM CHLORIDE: 600; 310; 30; 20 INJECTION, SOLUTION INTRAVENOUS at 14:00

## 2021-01-24 RX ADMIN — ATORVASTATIN CALCIUM 10 MG: 10 TABLET, FILM COATED ORAL at 22:06

## 2021-01-24 RX ADMIN — FENTANYL CITRATE 50 MCG: 50 INJECTION, SOLUTION INTRAMUSCULAR; INTRAVENOUS at 11:54

## 2021-01-24 RX ADMIN — SODIUM CHLORIDE, SODIUM LACTATE, POTASSIUM CHLORIDE, AND CALCIUM CHLORIDE: 600; 310; 30; 20 INJECTION, SOLUTION INTRAVENOUS at 10:40

## 2021-01-24 RX ADMIN — ONDANSETRON HYDROCHLORIDE 4 MG: 2 SOLUTION INTRAMUSCULAR; INTRAVENOUS at 05:46

## 2021-01-24 ASSESSMENT — ENCOUNTER SYMPTOMS
EYE PAIN: 0
BACK PAIN: 0
NAUSEA: 0
PHOTOPHOBIA: 0
DIARRHEA: 0
RHINORRHEA: 0
ABDOMINAL DISTENTION: 0
COLOR CHANGE: 0
WHEEZING: 0
COUGH: 0
SORE THROAT: 0
SHORTNESS OF BREATH: 1
SHORTNESS OF BREATH: 0
CHEST TIGHTNESS: 0
CONSTIPATION: 0
ABDOMINAL PAIN: 0
TROUBLE SWALLOWING: 0
VOMITING: 0

## 2021-01-24 ASSESSMENT — PAIN SCALES - GENERAL
PAINLEVEL_OUTOF10: 3
PAINLEVEL_OUTOF10: 0
PAINLEVEL_OUTOF10: 6
PAINLEVEL_OUTOF10: 7

## 2021-01-24 ASSESSMENT — LIFESTYLE VARIABLES: SMOKING_STATUS: 0

## 2021-01-24 NOTE — BRIEF OP NOTE
SCREW BNE L36MM DIA4. 5MM PROX RADHA TIB S STL ST AKIN FULL  SCREW BNE L36MM DIA4. 5MM PROX RADHA TIB S STL ST AKIN FULL  DEPUY SYN[employer]ES [employer]  Right 1 Implanted         Drains:   Urethral Catheter Non-latex 16 fr (Active)   Catheter Indications Acute urinary retention/obstruction 01/24/21 0643   Urine Color Yellow 01/24/21 0643   Urine Appearance Clear 01/24/21 0643   Output (mL) 500 mL 01/24/21 0686       Findings: see op note    Electronically signed by Andree Leonard MD on 1/24/2021 at 12:09 PM

## 2021-01-24 NOTE — ANESTHESIA POSTPROCEDURE EVALUATION
Department of Anesthesiology  Postprocedure Note    Patient: Kimberlee Callejas  MRN: [id number]  YOB: 1925  Date of evaluation: 1/24/2021  Time:  12:22 PM     Procedure Summary     Date: 01/24/21 Room / Location: [facility] OR [address] [place] [address]    Anesthesia Start: 9518 Anesthesia Stop: 1903    Procedure: FEMUR OPEN REDUCTION INTERNAL FIXATION (Right Leg Upper) Diagnosis: (fracture of the right distal femur)    Surgeons: Martha Moss MD Responsible Provider: LIA Molina CRNA    Anesthesia Type: general ASA Status: 4          Anesthesia Type: general    Tim Phase I:      Tim Phase II:      Last vitals: Reviewed and per EMR flowsheets.        Anesthesia Post Evaluation

## 2021-01-24 NOTE — CONSULTS
Inpatient consult to Orthopedic Surgery  Consult performed by: Ai Aguilar MD  Consult ordered by: Silvestre Kumar MD  Assessment/Recommendations:   Orthopaedic Inpatient Consultation    NAME:  Bakari Babcock   : 1925  MRN: [id number]    2021  4:28 AM    Requesting Physician: Dr. Anu Olivarestor:  right shoulder and knee pain      HISTORY OF PRESENT ILLNESS:   The patient is a 80 y.o. female satus post fall at home early this morning with pain in the right shoulder and right knee. X-rays in the ED showed fractures of the right proximal humerus and right distal femur for which I am consulted. Pain is located in the these 2 locations and rated a 4/5, constant, dull, worse with movement, better with rest and medication. There are no associated symptoms. She is status post a right hip hemiarthroplasty in the past.    Past Medical History:       No date: Anxiety  No date: Asthma  No date: Dementia ([address]  No date: Difficulty walking  No date: Essential hypertension  No date: Fracture lumbar vertebra-closed (HCC)  No date: Hyperlipidemia  No date: Hypoxemia  No date: Insomnia  No date: Macular degeneration  No date: Major depressive disorder  No date: Mononeuropathy  No date: Pneumonia  No date: Polyosteoarthritis  No date: Repeated falls  No date: Rhabdomyolysis    Past Surgical History:       No date: BACK SURGERY  No date: CHOLECYSTECTOMY  No date: COLECTOMY      Comment:  6 inches removed  10/2017: HIP FUSION      Comment:  pins   : HIP SURGERY  No date: HYSTERECTOMY  No date: PACEMAKER PLACEMENT    Current Medications:   Prior to Admission medications :  Medication vitamin D (CHOLECALCIFEROL) 25 MCG (1000 UT) TABS tablet, Sig Take 1,000 Units by mouth daily, Start Date , End Date , Taking?  Yes, Authorizing Provider Historical Provider, MD Medication cloNIDine (CATAPRES) 0.3 MG tablet, Sig Take 0.2 mg by mouth every 12 hours , Start Date 7/21/20, End Date , Taking? Yes, Authorizing Provider Ammy Woods MD    Medication furosemide (LASIX) 40 MG tablet, Sig TAKE 1 TABLET EVERY MORNING, Start Date 12/28/20, End Date , Taking? Yes, Authorizing Provider Ammy Woods MD    Medication gabapentin (NEURONTIN) 300 MG capsule, Sig Take 300 mg by mouth every 12 hours. , Start Date 7/21/20, End Date , Taking? Yes, Authorizing Provider Historical MD Peggy    Medication ALPRAZolam (XANAX) 0.25 MG tablet, Sig Take 0.125 mg by mouth daily. , Start Date , End Date , Taking? Yes, Authorizing Provider Ammy Woods MD    Medication amLODIPine (NORVASC) 10 MG tablet, Sig Take 10 mg by mouth daily, Start Date , End Date , Taking? Yes, Authorizing Provider Ammy Woods MD    Medication aspirin 81 MG chewable tablet, Sig Take 162 mg by mouth daily, Start Date , End Date , Taking? Yes, Authorizing Provider Ammy Woods MD    Medication cloNIDine (CATAPRES) 0.1 MG tablet, Sig Take 0.1 mg by mouth every 4 hours as needed for High Blood Pressure, Start Date , End Date , Taking? Yes, Authorizing Provider Ammy Woods MD    Medication docusate sodium (COLACE) 100 MG capsule, Sig Take 100 mg by mouth 2 times daily, Start Date , End Date , Taking? Yes, Authorizing Provider Ammy Woods MD    Medication fluticasone (FLONASE) 50 MCG/ACT nasal spray, Sig 1 spray by Nasal route as needed Indications: each nostril , Start Date , End Date , Taking? Yes, Authorizing Provider Historical MD Peggy    Medication guaiFENesin (MUCINEX) 600 MG extended release tablet, Sig Take 1,200 mg by mouth 2 times daily, Start Date , End Date , Taking?  Yes, Authorizing Provider Ammy Woods MD Medication hydrALAZINE (APRESOLINE) 25 MG tablet, Sig Take 25 mg by mouth 2 times daily, Start Date , End Date , Taking? Yes, Authorizing Provider Ammy Woods MD    Medication HYDROcodone-acetaminophen (NORCO) 5-325 MG per tablet, Sig Take 1 tablet by mouth every 6 hours as needed for Pain., Start Date , End Date , Taking? Yes, Authorizing Provider Ammy Woods MD    Medication lisinopril (PRINIVIL;ZESTRIL) 40 MG tablet, Sig Take 40 mg by mouth daily, Start Date , End Date , Taking? Yes, Authorizing Provider Ammy Woods MD    Medication Methylcellulose POWD, Sig 2 g by Does not apply route daily, Start Date , End Date , Taking? Yes, Authorizing Provider Ammy Woods MD    Medication metoprolol tartrate (LOPRESSOR) 50 MG tablet, Sig Take 50 mg by mouth 2 times daily, Start Date , End Date , Taking? Yes, Authorizing Provider Ammy Woods MD    Medication MULTIPLE VITAMIN PO, Sig Take by mouth, Start Date , End Date , Taking? Yes, Authorizing Provider Ammy Woods MD    Medication [place]-3 1000 MG CAPS, Sig Take by mouth 2 times daily, Start Date , End Date , Taking? Yes, Authorizing Provider Ammy Woods MD    Medication OXYGEN, Sig Inhale into the lungs, Start Date 3/5/18, End Date , Taking? Yes, Authorizing Provider Ammy Woods MD    Medication PARoxetine (PAXIL) 20 MG tablet, Sig Take 20 mg by mouth every morning, Start Date , End Date , Taking? Yes, Authorizing Provider Ammy Woods MD    Medication simvastatin (ZOCOR) 20 MG tablet, Sig Take 20 mg by mouth nightly, Start Date , End Date , Taking? Yes, Authorizing Provider Ammy Woods MD    Medication traMADol (ULTRAM) 50 MG tablet, Sig Take 50 mg by mouth every 4 hours as needed for Pain., Start Date , End Date , Taking?  Yes, Authorizing Provider Ammy Woods MD Medication vitamin E 400 UNIT capsule, Sig Take 400 Units by mouth daily, Start Date , End Date , Taking? Yes, Authorizing Provider Historical Provider, MD    Medication zolpidem (AMBIEN) 5 MG tablet, Sig Take 5 mg by mouth nightly as needed for Sleep., Start Date , End Date , Taking? Yes, Authorizing Provider Historical MD Peggy    Medication acetaminophen (TYLENOL) 325 MG tablet, Sig Take 650 mg by mouth every 6 hours as needed for Pain, Start Date , End Date , Taking? , Authorizing Provider Historical Provider, MD    Medication ipratropium-albuterol (DUONEB) 0.5-2.5 (3) MG/3ML SOLN nebulizer solution, Sig Inhale 1 vial into the lungs every 6 hours as needed for Shortness of Breath, Start Date , End Date , Taking? , Authorizing Provider Historical Provider, MD    Medication hydrocortisone 1 % cream, Sig Apply topically as needed Apply topically 2 times daily. , Start Date , End Date , Taking? , Authorizing Provider Historical Provider, MD    Medication sodium chloride (OCEAN, BABY AYR) 0.65 % nasal spray, Sig 1 spray by Nasal route daily, Start Date , End Date , Taking? , Authorizing Provider Historical Provider, MD        Allergies:  Ceclor (cefaclor), Eggs or egg-derived products, Penicillins, and Sulfa antibiotics    Social History:     Socioeconomic History      Marital status:        Tobacco Use      Smoking status: Former Smoker      Smokeless tobacco: Never Used      Tobacco comment: quit 30 years ago        Family History:   History reviewed. No pertinent family history. REVIEW OF SYSTEMS:  14 point review of systems has been reviewed from the patient's emergency room visit, reviewed with the patient on today's date with no new changes.     PHYSICAL EXAM:      Physical Examination:  Vitals: ----------------------------------------------------------            01/24/21 01/24/21 01/24/21 01/24/21               2482      0198        1562         0679 ----------------------------------------------------------   BP:               (!) 144/72    118/76                  Pulse:      60        62          62           62       Resp:                 20          20                    Temp:                       98 °F (36.7 °C)             TempSrc:                                                SpO2:                98%          98%                   Weight:                                                 Height:                                                ----------------------------------------------------------  General:  Appears stated age, no distress. Orientation:  Alert and oriented to time, place, and person. Mood and Affect:  Cooperative and pleasant. Gait:  Resting comfortably in bed. Cardiovascular:  Symmetric 1-2 plus pulses in upper and lower extremities. Lymph:  No cervical or inguinal lymphadenopathy noted. Sensation:  Grossly intact to light touch. DTR:  Normal, no pathologic reflexes. Coordination/balance:  Normal    Musculoskeletal:  Right upper extremity exam:  Tenderness right shoulder with mild deformity, refuses motion/stability/strength, skin bruising but intact. Left upper extremity exam:  There is no tenderness to palpation about the shoulder, elbow, wrist or hand. Unrestricted full function motion is present. Stability is normal with provocative tests, 5/5 strength, and skin is normal.     Right lower extremity exam:  Tenderness right knee with deformity present, large effusion, refuses motion/stability/strength, skin intact    Left lower extremity exam:  There is no tenderness to palpation about the hip, knee, ankle or foot. Unrestricted full function motion is present.   Stability is normal with provocative tests, 5/5 strength, and skin is normal.      DATA:    CBC with Differential:  Lab Results       Component                Value               Date WBC                      22.9                01/24/2021                 RBC                      5.18                01/24/2021                 HGB                      15.7                01/24/2021                 HCT                      47.4                01/24/2021                 PLT                      183                 01/24/2021                 MCV                      91.5                01/24/2021                 MCH                      30.3                01/24/2021                 MCHC                     33.1                01/24/2021                 RDW                      14.6                01/24/2021                 LYMPHOPCT                16.3                01/24/2021                 MONOPCT                  5.0                 01/24/2021                 BASOPCT                  0.6                 01/24/2021                 MONOSABS                 1. 10                01/24/2021                 LYMPHSABS                3.7                 01/24/2021                 EOSABS                   0.20                01/24/2021                 BASOSABS                 0. 10                01/24/2021            CMP:  Lab Results       Component                Value               Date                       NA                       141                 01/24/2021                 K                        3.6                 01/24/2021                 K                        3.9                 03/08/2018                 CL                       101                 01/24/2021                 CO2                      32                  01/24/2021                 BUN                      12                  01/24/2021                 CREATININE               0.7                 01/24/2021                 GFRAA                    >59                 01/24/2021                 LABGLOM                  >60                 01/24/2021 GLUCOSE                  150                 01/24/2021                 PROT                     7.2                 01/24/2021                 CALCIUM                  9.7                 01/24/2021                 BILITOT                  0.5                 01/24/2021                 ALKPHOS                  58                  01/24/2021                 AST                      17                  01/24/2021                 ALT                      14                  01/24/2021            BMP:  Lab Results       Component                Value               Date                       NA                       141                 01/24/2021                 K                        3.6                 01/24/2021                 K                        3.9                 03/08/2018                 CL                       101                 01/24/2021                 CO2                      32                  01/24/2021                 BUN                      12                  01/24/2021                 CREATININE               0.7                 01/24/2021                 CALCIUM                  9.7                 01/24/2021                 GFRAA                    >59                 01/24/2021                 LABGLOM                  >60                 01/24/2021                 GLUCOSE                  150                 01/24/2021                Radiology: I have reviewed the radiology images listed below and agree with the findings of the interpreting radiologist(s).      Xr Shoulder Right (min 2 Views)    Result Date: 1/24/2021 EXAMINATION:  XR SHOULDER RIGHT (MIN 2 VIEWS), XR HUMERUS RIGHT (MIN 2 VIEWS)  1/24/2021 7:36 AM HISTORY: The patient fell. Right arm pain. COMPARISON: No comparison study. TECHNIQUE: AP and transthoracic views of the right shoulder. AP and transthoracic views of the right humerus. FINDINGS: There is a fracture of the proximal metaphysis of the right humerus. The fracture is mildly comminuted. There is medial displacement of the distal fragment on the AP images. There is no significant displacement on the transthoracic views. The remainder of the right humerus appears to be intact. The elbow joint is not assessed very well. Proximal right humerus fracture, as described. Signed by Dr Aiden Aceves on 1/24/2021 7:38 AM    Xr Humerus Right (min 2 Views)    Result Date: 1/24/2021  EXAMINATION:  XR SHOULDER RIGHT (MIN 2 VIEWS), XR HUMERUS RIGHT (MIN 2 VIEWS)  1/24/2021 7:36 AM HISTORY: The patient fell. Right arm pain. COMPARISON: No comparison study. TECHNIQUE: AP and transthoracic views of the right shoulder. AP and transthoracic views of the right humerus. FINDINGS: There is a fracture of the proximal metaphysis of the right humerus. The fracture is mildly comminuted. There is medial displacement of the distal fragment on the AP images. There is no significant displacement on the transthoracic views. The remainder of the right humerus appears to be intact. The elbow joint is not assessed very well. Proximal right humerus fracture, as described.  Signed by Dr Aiden Aceves on 1/24/2021 7:38 AM    Xr Knee Right (1-2 Views)    Result Date: 1/24/2021 EXAMINATION:  XR HIP 2-3 VW W PELVIS RIGHT, XR KNEE RIGHT (1-2 VIEWS) 1/24/2021 7:32 AM HISTORY: The patient fell. Left leg pain. Right hip pain. COMPARISON: No comparison study. TECHNIQUE: AP pelvis; AP and lateral right knee; AP and crosstable lateral right hip. RIGHT HIP AND PELVIS: There has been prior right hip arthroplasty. The appliance is in good position. There has been prior internal repair of a left proximal femur fracture that appears to be healed. The pelvis is intact. LEFT KNEE: There is an oblique impacted fracture of the distal right femoral shaft and metaphysis. There is not appear to be significant displacement or angulation. There is narrowing of all 3 compartments of the right knee. There is chondrocalcinosis of the menisci. There is fluid in the right knee joint space. 1. Status post right hip arthroplasty. The appliance is in proper position. No evidence of acute pelvic or right hip abnormality. 2. Acute fracture of the distal right femoral shaft and metaphysis with impaction. No significant displacement. 3. Prior left proximal femur repair. 4. Narrowing of the right knee joint spaces. Chondrocalcinosis. Signed by Dr Joyce Hussein on 1/24/2021 7:36 AM    Xr Chest Portable    Result Date: 1/24/2021  EXAMINATION:  XR CHEST PORTABLE  1/24/2021 7:30 AM HISTORY: Preoperative chest x-ray. Hypertension. COMPARISON: 3/6/2018. FINDINGS:  There is hypoventilation. There is vascular crowding. There is stable coarse markings and bronchial wall thickening. There is mild atelectasis in the lung bases. There is mild cardiomegaly. There is a pacemaker on the left. There has been prior kyphoplasty at 2 levels. There is an acute fracture of the proximal right humerus. 1. Hypoventilation with vascular crowding. Atelectasis in both lung bases. 2. Coarse markings and bronchial wall thickening, stable. 3. Cardiomegaly. No CHF.  Signed by Dr Joyce Hussein on 1/24/2021 7:31 AM    Xr Hip 2-3 Vw W Pelvis Right Result Date: 1/24/2021  EXAMINATION:  XR HIP 2-3 VW W PELVIS RIGHT, XR KNEE RIGHT (1-2 VIEWS) 1/24/2021 7:32 AM HISTORY: The patient fell. Left leg pain. Right hip pain. COMPARISON: No comparison study. TECHNIQUE: AP pelvis; AP and lateral right knee; AP and crosstable lateral right hip. RIGHT HIP AND PELVIS: There has been prior right hip arthroplasty. The appliance is in good position. There has been prior internal repair of a left proximal femur fracture that appears to be healed. The pelvis is intact. LEFT KNEE: There is an oblique impacted fracture of the distal right femoral shaft and metaphysis. There is not appear to be significant displacement or angulation. There is narrowing of all 3 compartments of the right knee. There is chondrocalcinosis of the menisci. There is fluid in the right knee joint space. 1. Status post right hip arthroplasty. The appliance is in proper position. No evidence of acute pelvic or right hip abnormality. 2. Acute fracture of the distal right femoral shaft and metaphysis with impaction. No significant displacement. 3. Prior left proximal femur repair. 4. Narrowing of the right knee joint spaces. Chondrocalcinosis.  Signed by Dr Júnior Salcedo on 1/24/2021 7:36 AM    --------------------------------------------------------------------------------------------------------------------    Assessment:   1) Acute traumatic displaced supracondylar fracture of the right femur, initial encounter for closed fracture  2) Acute traumatic displaced 2 part fracture of the surgical neck of the right humerus, initial encounter for closed fracture    Plan:  1) to OR for ORIF R distal femur - we discussed risks, benefits, and alternatives and patient wishes to proceed  2) Proximal humerus - plan for closed treatment with serial x-rays until healing, sling, therapy as needed  3) Admit postop for pain control, PT/OT  4) NWBRUE/RLE  Electronically signed by Josseline Carroll MD on 1/24/2021 at 10:34 AM

## 2021-01-24 NOTE — OP NOTE
Patient Name: Roxanna Zayas  MRN: [id number]  : 1925    DATE of SURGERY: 2021    SURGEON: Alda Ho MD    ASSISTANT: NONE     PREOPERATIVE DIAGNOSES:   1) Acute traumatic displaced supracondylar fracture of the right femur without intercondylar extension, initial encounter for closed fracture. 2) Acute traumatic displaced 2 part surgical neck fracture of the right humerus, initial encounter for closed fracture     POSTOPERATIVE DIAGNOSES:   1) Acute traumatic displaced supracondylar fracture of the right femur without intercondylar extension, initial encounter for closed fracture. 2) Acute traumatic displaced 2 part surgical neck fracture of the right humerus, initial encounter for closed fracture    PROCEDURE PERFORMED:   1) Open reduction internal fixation, right supracondylar distal femur fracture without intercondylar extension. 2) Closed treatment without manipulation of right 2 part proximal humerus fracture     IMPLANTS: Synthes distal femoral locking plate. ANESTHESIA: General endotracheal anesthesia. INDICATIONS: This patient is a 80 y.o. female who status post fall at home earlier this morning sustaining injuries to the right shoulder and right distal femur. After reviewing radiographs, I recommended surgical treatment for her femur and nonop treatment for the shoulder. Risks included, but are not limited to that of anesthesia, bleeding, infection, pain, damage to local structures, need for further surgery, malunion, nonunion, m alrotation, and leg length inequality. We also discussed that given her smoking status that she was at risk for developing nonunion, and wound healing problems. ESTIMATED BLOOD LOSS: 200 mL. SPECIMENS: None. DRAINS: None. COMPLICATIONS: None. DESCRIPTION OF PROCEDURE: The patient was seen in the preoperative holding room. Once again, the informed consent form was reviewed with the patient and signed. The site of surgery was marked with her in agreement. She was transported to the operating room where a timeout was performed identifying the correct patient, as well as the operative site. Then 1 g of IV Kefzol was given as perioperative antibiotics. The right lower extremity was prepped and draped in sterile fashion. Tourniquet was placed about her right thigh, inflated to 250 mmHg and total tourniquet time was approximately 2 hours. A Shantz pin was then placed through the proximal tibia and a traction bow was applied to this pin. A sterile rope was utilized and 15 pounds of weight was then attached to the rope and dropped off the edge of the bed to apply longitudinal traction to the fracture. With this maneuver only her fracture appeared to regain length and alignment. There was slight posterior site apex posterior angulation of the fracture, which was then improve with a bump of towels underneath this depressed segment. Again, the fracture was noted to be heavily comminuted. The incision was made on the lateral aspect of the knee it extending approximately. The IT band was split in line with the incision. The vastus lateralis was identified was retracted and preserved. The perforating vessels were identified and cauterized. The fracture was cleared of hematoma and soft tissue interposition and an adequate reduction was achieved. While maintaining this reduction with a point-to-point clamp, a Synthes 8-hole lateral distal femoral locking plate was applied to bone. A series of locking screws were placed distally and proximally after the plate was secured to bone with a cortical screw. Taking care to place the plate directly bone proximally in both the AP and lateral planes, again a series of locking screws were inserted proximally maintaining length, alignment and rotation. Her bone quality was extremely poor and the fracture was heavily comminuted. The incision was irrigated followed by closure in layers. Skin was closed with staples. Distally the traction pin was removed and these small and stab incisions were closed with staples as well. A sterile dressing was placed. Closed treatment of the right proximal humerus fracture will include sling, non-weightbearing, serial x-rays until healing, and therapy if needed. She was at awakened from anesthesia and transported to recovery room stable condition.       PLAN:  1) Non-weightbearing operative extremity x 10-12 weeks  2) DVT prophylaxis  3) PT/OT     Electronically signed by Freeman Yarbrough MD on 1/24/2021 at 12:11 PM

## 2021-01-24 NOTE — ED PROVIDER NOTES
[facility]  eMERGENCY dEPARTMENT eNCOUnter      Pt Name: Melani Wong  MRN: [id number]  [place] 4/4/1925  Date of evaluation: 1/24/2021  Provider: Leigh Rodriguez MD    CHIEF COMPLAINT       Chief Complaint   Patient presents with    Fall     fell over walker going to the br with right side pain. pt is [facility]    Shoulder Pain     right    Knee Pain     right knee swelling         HISTORY OF PRESENT ILLNESS   (Location/Symptom, Timing/Onset,Context/Setting, Quality, Duration, Modifying Factors, Severity)  Note limiting factors. Ricardo Collins is a 80 y.o. female who presents to the emergency department following a fall. Pt had gone to the bathroom. She stood up and lost her balance patient fell over her walker. She sustained injuries to her right shoulder, right hip and right knee. HPI    NursingNotes were reviewed. REVIEW OF SYSTEMS    (2-9 systems for level 4, 10 or more for level 5)     Review of Systems   Constitutional: Negative for activity change, appetite change, chills, fatigue and fever. HENT: Negative for congestion, ear pain, rhinorrhea, sore throat and trouble swallowing. Eyes: Negative for photophobia, pain and visual disturbance. Respiratory: Negative for cough, chest tightness, shortness of breath and wheezing. Cardiovascular: Negative for chest pain, palpitations and leg swelling. Gastrointestinal: Negative for abdominal distention, abdominal pain, constipation, diarrhea, nausea and vomiting. Genitourinary: Negative for difficulty urinating, dysuria, flank pain, urgency, vaginal bleeding and vaginal discharge. Musculoskeletal: Positive for arthralgias and joint swelling (Right knee). Negative for back pain, myalgias and neck stiffness. Right hip, knee and shoulder pain. Skin: Negative for color change, pallor and rash. Neurological: Negative for dizziness, weakness, light-headedness, numbness and headaches. HYDROCODONE-ACETAMINOPHEN (NORCO) 5-325 MG PER TABLET    Take 1 tablet by mouth every 6 hours as needed for Pain. HYDROCORTISONE 1 % CREAM    Apply topically as needed Apply topically 2 times daily. IPRATROPIUM-ALBUTEROL (DUONEB) 0.5-2.5 (3) MG/3ML SOLN NEBULIZER SOLUTION    Inhale 1 vial into the lungs every 6 hours as needed for Shortness of Breath    LISINOPRIL (PRINIVIL;ZESTRIL) 40 MG TABLET    Take 40 mg by mouth daily    METHYLCELLULOSE POWD    2 g by Does not apply route daily    METOPROLOL TARTRATE (LOPRESSOR) 50 MG TABLET    Take 50 mg by mouth 2 times daily    MULTIPLE VITAMIN PO    Take by mouth    OMEGA-3 1000 MG CAPS    Take by mouth 2 times daily    OXYGEN    Inhale into the lungs    PAROXETINE (PAXIL) 20 MG TABLET    Take 20 mg by mouth every morning    SIMVASTATIN (ZOCOR) 20 MG TABLET    Take 20 mg by mouth nightly    SODIUM CHLORIDE (OCEAN, BABY AYR) 0.65 % NASAL SPRAY    1 spray by Nasal route daily    TRAMADOL (ULTRAM) 50 MG TABLET    Take 50 mg by mouth every 4 hours as needed for Pain. VITAMIN D (CHOLECALCIFEROL) 25 MCG (1000 UT) TABS TABLET    Take 1,000 Units by mouth daily    VITAMIN E 400 UNIT CAPSULE    Take 400 Units by mouth daily    ZOLPIDEM (AMBIEN) 5 MG TABLET    Take 5 mg by mouth nightly as needed for Sleep. ALLERGIES     Ceclor [cefaclor], Eggs or egg-derived products, Penicillins, and Sulfa antibiotics    FAMILY HISTORY     History reviewed. No pertinent family history. SOCIAL HISTORY       Social History     Socioeconomic History    Marital status:       Spouse name: None    Number of children: None    Years of education: None    Highest education level: None   Occupational History    None   Social Needs    Financial resource strain: None    Food insecurity     Worry: None     Inability: None    Transportation needs     Medical: None     Non-medical: None   Tobacco Use    Smoking status: Former Smoker    Smokeless tobacco: Never Used  Tobacco comment: quit 30 years ago   Substance and Sexual Activity    Alcohol use: No    Drug use: No    Sexual activity: None   Lifestyle    Physical activity     Days per week: None     Minutes per session: None    Stress: None   Relationships    Social connections     Talks on phone: None     Gets together: None     Attends Christian service: None     Active member of club or organization: None     Attends meetings of clubs or organizations: None     Relationship status: None    Intimate partner violence     Fear of current or ex partner: None     Emotionally abused: None     Physically abused: None     Forced sexual activity: None   Other Topics Concern    None   Social History Narrative    None       SCREENINGS    [place] Coma Scale  Eye Opening: Spontaneous  Best Verbal Response: Oriented  Best Motor Response: Obeys commands  Tami Coma Scale Score: 15        PHYSICAL EXAM    (up to 7 for level 4, 8 or more for level 5)     ED Triage Vitals   BP Temp Temp Source Pulse Resp SpO2 Height Weight   01/24/21 0435 01/24/21 0435 01/24/21 0435 01/24/21 0435 01/24/21 0435 01/24/21 0435 01/24/21 0435 01/24/21 0438   (!) 191/84 98.1 °F (36.7 °C) Oral 60 20 95 % 5' 8\" (1.727 m) 187 lb (84.8 kg)       Physical Exam  Vitals signs and nursing note reviewed. Constitutional:       General: She is not in acute distress. Appearance: Normal appearance. She is well-developed. She is not ill-appearing. HENT:      Head: Normocephalic and atraumatic. Nose: Nose normal.      Mouth/Throat:      Mouth: Mucous membranes are moist.      Pharynx: Oropharynx is clear. Eyes:      Conjunctiva/sclera: Conjunctivae normal.      Pupils: Pupils are equal, round, and reactive to light. Neck:      Musculoskeletal: Normal range of motion and neck supple. Vascular: No JVD. Cardiovascular:      Rate and Rhythm: Normal rate and regular rhythm. Heart sounds: Normal heart sounds. No murmur.    Pulmonary: Non-plain film images such as CT, Ultrasound and MRI are read by the radiologist. Plain radiographic images are visualized and preliminarily interpreted bythe emergency physician with the below findings:    Chest x-ray: Proximal humeral fracture, no other acute process. X-ray right knee: Comminuted fracture of the distal femur. X-ray right shoulder: Proximal fracture of the humerus. X-ray humerus: Proximal fracture of the humerus. X-ray right hip: No acute fracture or dislocation. XR SHOULDER RIGHT (MIN 2 VIEWS)    (Results Pending)   XR HIP 2-3 VW W PELVIS RIGHT    (Results Pending)   XR KNEE RIGHT (1-2 VIEWS)    (Results Pending)   XR HUMERUS RIGHT (MIN 2 VIEWS)    (Results Pending)   XR CHEST PORTABLE    (Results Pending)           LABS:  Labs Reviewed   COMPREHENSIVE METABOLIC PANEL - Abnormal; Notable for the following components:       Result Value    CO2 32 (*)     Glucose 150 (*)     Calcium 9.7 (*)     All other components within normal limits   CBC WITH AUTO DIFFERENTIAL - Abnormal; Notable for the following components:    WBC 22.9 (*)     Hematocrit 47.4 (*)     RDW 14.6 (*)     Neutrophils % 76.2 (*)     Lymphocytes % 16.3 (*)     Neutrophils Absolute 17.5 (*)     Monocytes Absolute 1.10 (*)     All other components within normal limits   APTT   PROTIME-INR   COVID-19   URINE RT REFLEX TO CULTURE   URINE RT REFLEX TO CULTURE       All other labs were within normal range or not returned as of this dictation.     EMERGENCY DEPARTMENT COURSE and DIFFERENTIAL DIAGNOSIS/MDM:   Vitals:    Vitals:    01/24/21 0438 01/24/21 0440 01/24/21 0549 01/24/21 0648   BP:   (!) 144/72 118/76   Pulse:  60 62 62   Resp:   20 20   Temp:    98 °F (36.7 °C)   TempSrc:       SpO2:   98% 98%   Weight: 187 lb (84.8 kg)      Height: 5' 8\" (1.727 m)          MDM  Number of Diagnoses or Management Options  Hip pain: new and requires workup Other closed displaced fracture of proximal end of right humerus, initial encounter: new and requires workup  Other closed fracture of distal end of right femur, initial encounter [facility]): new and requires workup  Diagnosis management comments: Patient found to have a right humerus fracture as well as a right distal femur fracture. I spoke to Dr. Brijesh Garcia, on-call for Ortho, and discussed the case with him. He would like to have the patient admitted to the hospitalist service. Spoke to the hospitalist service and he has agreed to admit this patient for further evaluation and treatment. Patient Progress  Patient progress: stable      Reassessment      CONSULTS:  IP CONSULT TO HOSPITALIST  IP CONSULT TO ORTHOPEDIC SURGERY  IP CONSULT TO ORTHOPEDIC SURGERY    PROCEDURES:  Unless otherwise noted below, none     Procedures    FINAL IMPRESSION      1. Other closed displaced fracture of proximal end of right humerus, initial encounter    2. Other closed fracture of distal end of right femur, initial encounter ([facility] [address]    3. Hip pain          DISPOSITION/PLAN   DISPOSITION Admitted 01/24/2021 06:14:15 AM      PATIENT REFERRED TO:  No follow-up provider specified.     DISCHARGE MEDICATIONS:  New Prescriptions    No medications on file          (Please note that portions of this note were completed with a voice recognition program.  Efforts were made to edit thedictations but occasionally words are mis-transcribed.)    Any Santoyo MD (electronically signed)  Attending Emergency Physician          Eligio Parada MD  01/24/21 4246

## 2021-01-24 NOTE — ED NOTES
Bed: 07  Expected date:   Expected time:   Means of arrival:   Comments:  [facility] [address] [facility], RN  01/24/21 8303

## 2021-01-24 NOTE — ANESTHESIA PRE PROCEDURE
Department of Anesthesiology  Preprocedure Note       Name:  Radhika Martel   Age:  80 y.o.  :  1925                                          MRN:  [id number]         Date:  2021      Surgeon: Néstro Olmedo):  Ml Culp MD    Procedure: Procedure(s): FEMUR OPEN REDUCTION INTERNAL FIXATION    Medications prior to admission:   Prior to Admission medications    Medication Sig Start Date End Date Taking? Authorizing Provider   vitamin D (CHOLECALCIFEROL) 25 MCG (1000 UT) TABS tablet Take 1,000 Units by mouth daily   Yes Historical Provider, MD   cloNIDine (CATAPRES) 0.3 MG tablet Take 0.2 mg by mouth every 12 hours  20  Yes Historical Provider, MD   furosemide (LASIX) 40 MG tablet TAKE 1 TABLET EVERY MORNING 20  Yes Historical Provider, MD   gabapentin (NEURONTIN) 300 MG capsule Take 300 mg by mouth every 12 hours. 20  Yes Historical Provider, MD   ALPRAZolam Lemitar Bliss) 0.25 MG tablet Take 0.125 mg by mouth daily. Yes Historical Provider, MD   amLODIPine (NORVASC) 10 MG tablet Take 10 mg by mouth daily   Yes Historical Provider, MD   aspirin 81 MG chewable tablet Take 162 mg by mouth daily   Yes Historical Provider, MD   cloNIDine (CATAPRES) 0.1 MG tablet Take 0.1 mg by mouth every 4 hours as needed for High Blood Pressure   Yes Historical Provider, MD   docusate sodium (COLACE) 100 MG capsule Take 100 mg by mouth 2 times daily   Yes Historical Provider, MD   fluticasone (FLONASE) 50 MCG/ACT nasal spray 1 spray by Nasal route as needed Indications: each nostril    Yes Historical Provider, MD   guaiFENesin (MUCINEX) 600 MG extended release tablet Take 1,200 mg by mouth 2 times daily   Yes Historical Provider, MD   hydrALAZINE (APRESOLINE) 25 MG tablet Take 25 mg by mouth 2 times daily   Yes Historical Provider, MD   HYDROcodone-acetaminophen (NORCO) 5-325 MG per tablet Take 1 tablet by mouth every 6 hours as needed for Pain.    Yes Historical Provider, MD lisinopril (PRINIVIL;ZESTRIL) 40 MG tablet Take 40 mg by mouth daily   Yes Historical Provider, MD   Methylcellulose POWD 2 g by Does not apply route daily   Yes Historical Provider, MD   metoprolol tartrate (LOPRESSOR) 50 MG tablet Take 50 mg by mouth 2 times daily   Yes Historical Provider, MD   MULTIPLE VITAMIN PO Take by mouth   Yes Historical Provider, MD   [place]-3 1000 MG CAPS Take by mouth 2 times daily   Yes Historical Provider, MD   OXYGEN Inhale into the lungs 3/5/18  Yes Historical Provider, MD   PARoxetine (PAXIL) 20 MG tablet Take 20 mg by mouth every morning   Yes Historical Provider, MD   simvastatin (ZOCOR) 20 MG tablet Take 20 mg by mouth nightly   Yes Historical Provider, MD   traMADol (ULTRAM) 50 MG tablet Take 50 mg by mouth every 4 hours as needed for Pain. Yes Historical Provider, MD   vitamin E 400 UNIT capsule Take 400 Units by mouth daily   Yes Historical Provider, MD   zolpidem (AMBIEN) 5 MG tablet Take 5 mg by mouth nightly as needed for Sleep. Yes Historical Provider, MD   acetaminophen (TYLENOL) 325 MG tablet Take 650 mg by mouth every 6 hours as needed for Pain    Historical Provider, MD   ipratropium-albuterol (DUONEB) 0.5-2.5 (3) MG/3ML SOLN nebulizer solution Inhale 1 vial into the lungs every 6 hours as needed for Shortness of Breath    Historical Provider, MD   hydrocortisone 1 % cream Apply topically as needed Apply topically 2 times daily.     Historical Provider, MD   sodium chloride (OCEAN, BABY AYR) 0.65 % nasal spray 1 spray by Nasal route daily    Historical Provider, MD       Current medications:    Current Facility-Administered Medications   Medication Dose Route Frequency Provider Last Rate Last Admin    naloxone (NARCAN) injection 0.4 mg  0.4 mg Intravenous PRN Iram Wheeler MD        albuterol (PROVENTIL) nebulizer solution 2.5 mg  2.5 mg Nebulization Q1H PRN Iram Wheeler MD        morphine injection 1 mg  1 mg Intravenous Q4H PRN Iram Wheeler MD  morphine injection 4 mg  4 mg Intravenous Q4H PRN Carol Willams MD        morphine injection 2 mg  2 mg Intravenous Q4H PRN Carol Willams MD        sodium chloride flush 0.9 % injection 10 mL  10 mL Intravenous 2 times per day Carol Willams MD        sodium chloride flush 0.9 % injection 10 mL  10 mL Intravenous PRN Carol Willams MD        acetaminophen (TYLENOL) tablet 650 mg  650 mg Oral Q4H PRN Carol Willams MD        potassium chloride 10 mEq/100 mL IVPB (Peripheral Line)  10 mEq Intravenous PRN Carol Willams MD        magnesium sulfate 2000 mg in 50 mL IVPB premix  2,000 mg Intravenous PRN Carol Willams MD        bacitracin ointment   Topical TID Carol Willams MD        ondansetron (ZOFRAN-ODT) disintegrating tablet 4 mg  4 mg Oral Q8H PRN Carol Willams MD        Or    ondansetron [facility]) injection 4 mg  4 mg Intravenous Q6H PRN Carol Willams MD        polyethylene glycol [facility]) packet 17 g  17 g Oral Daily PRN Carol Willams MD        Remona Pee ON 1/25/2021] enoxaparin (LOVENOX) injection 40 mg  40 mg Subcutaneous Daily Carol Willams MD        zolpidem [facility]) tablet 5 mg  5 mg Oral Nightly PRN Carol Willams MD        vitamin E capsule 400 Units  400 Units Oral Daily Carol Willams MD        vitamin D (CHOLECALCIFEROL) tablet 1,000 Units  1,000 Units Oral Daily Carol Willams MD        sodium chloride (OCEAN, BABY AYR) 0.65 % nasal spray 1 spray  1 spray Nasal Daily Carol Willams MD        atorvastatin (LIPITOR) tablet 10 mg  10 mg Oral Nightly Carol Willams MD        PARoxetine (PAXIL) tablet 20 mg  20 mg Oral QAM Carol Willams MD        therapeutic multivitamin-minerals 1 tablet  1 tablet Oral Daily Carol Willams MD        metoprolol tartrate (LOPRESSOR) tablet 50 mg  50 mg Oral BID Carol Willams MD        methylcellulose (CITRUCEL) oral powder 2 g  2 g Oral Daily Carol Willams MD  lisinopril (PRINIVIL;ZESTRIL) tablet 40 mg  40 mg Oral Daily Lizbeth García MD        hydrALAZINE (APRESOLINE) tablet 25 mg  25 mg Oral BID Lizbeth García MD        guaiFENesin [facility]) extended release tablet 1,200 mg  1,200 mg Oral BID Lizbeth García MD        gabapentin (NEURONTIN) capsule 300 mg  300 mg Oral 2 times per day Lizbeth García MD        docusate sodium (COLACE) capsule 100 mg  100 mg Oral BID Lizbeth García MD        cloNIDine (CATAPRES) tablet 0.2 mg  0.2 mg Oral 2 times per day Lizbeth García MD        aspirin chewable tablet 162 mg  162 mg Oral Daily Lizbeth García MD        ALPRAZolam Tomeka Dubonnet) tablet 0.125 mg  0.125 mg Oral Daily Lizbeth García MD           Allergies:     Allergies   Allergen Reactions    Ceclor [Cefaclor]     Eggs Or Egg-Derived Products     Penicillins     Sulfa Antibiotics        Problem List:    Patient Active Problem List   Diagnosis Code    Closed fracture of distal end of right femur, initial encounter ([facility] [address] S72.401A    Difficulty walking R26.2    Sight impaired H54.7    Bilateral hearing loss H91.93    Right humeral fracture S42.301A       Past Medical History:        Diagnosis Date    Anxiety     Asthma     Dementia ([address]     Difficulty walking     Essential hypertension     Fracture lumbar vertebra-closed ([facility] [address]     Hyperlipidemia     Hypoxemia     Insomnia     Macular degeneration     Major depressive disorder     Mononeuropathy     Pneumonia     Polyosteoarthritis     Repeated falls     Rhabdomyolysis        Past Surgical History:        Procedure Laterality Date    BACK SURGERY      CHOLECYSTECTOMY      COLECTOMY      6 inches removed    HIP FUSION  10/2017    pins     HIP SURGERY  2013    HYSTERECTOMY      PACEMAKER PLACEMENT         Social History:    Social History     Tobacco Use    Smoking status: Former Smoker    Smokeless tobacco: Never Used    Tobacco comment: quit 30 years ago   Substance Use Topics  Alcohol use: No                                Counseling given: Not Answered  Comment: quit 30 years ago      Vital Signs (Current):   Vitals:    01/24/21 0440 01/24/21 0549 01/24/21 0648 01/24/21 0729   BP:  (!) 144/72 118/76    Pulse: 60 62 62 62   Resp:  20 20    Temp:   98 °F (36.7 °C)    TempSrc:       SpO2:  98% 98%    Weight:       Height:                                                  BP Readings from Last 3 Encounters:   01/24/21 118/76   03/09/18 (!) 194/87       NPO Status:                                                                                 BMI:   Wt Readings from Last 3 Encounters:   01/24/21 187 lb (84.8 kg)   03/09/18 186 lb 7 oz (84.6 kg)     Body mass index is 28.43 kg/m². CBC:   Lab Results   Component Value Date    WBC 22.9 01/24/2021    RBC 5.18 01/24/2021    HGB 15.7 01/24/2021    HCT 47.4 01/24/2021    MCV 91.5 01/24/2021    RDW 14.6 01/24/2021     01/24/2021       CMP:   Lab Results   Component Value Date     01/24/2021    K 3.6 01/24/2021    K 3.9 03/08/2018     01/24/2021    CO2 32 01/24/2021    BUN 12 01/24/2021    CREATININE 0.7 01/24/2021    GFRAA >59 01/24/2021    LABGLOM >60 01/24/2021    GLUCOSE 150 01/24/2021    PROT 7.2 01/24/2021    CALCIUM 9.7 01/24/2021    BILITOT 0.5 01/24/2021    ALKPHOS 58 01/24/2021    AST 17 01/24/2021    ALT 14 01/24/2021       POC Tests: No results for input(s): POCGLU, POCNA, POCK, POCCL, POCBUN, POCHEMO, POCHCT in the last 72 hours.     Coags:   Lab Results   Component Value Date    PROTIME 13.8 01/24/2021    INR 1.07 01/24/2021    APTT 35.9 01/24/2021       HCG (If Applicable): No results found for: PREGTESTUR, PREGSERUM, HCG, HCGQUANT     ABGs:   Lab Results   Component Value Date    PHART 7.380 03/06/2018    PO2ART 54.0 03/06/2018    JRO3WSY 51.0 03/06/2018    GXJ8JYT 30.2 03/06/2018    BEART 4.1 03/06/2018    A1WXIMNE 88.8 03/06/2018        Type & Screen (If Applicable):  No results found for: Osito Glass Drug/Infectious Status (If Applicable):  No results found for: HIV, HEPCAB    COVID-19 Screening (If Applicable):   Lab Results   Component Value Date    COVID19 Not Detected 01/24/2021         Anesthesia Evaluation  Patient summary reviewed and Nursing notes reviewed no history of anesthetic complications:   Airway: Mallampati: II  TM distance: >3 FB   Neck ROM: full  Mouth opening: > = 3 FB Dental: normal exam         Pulmonary:Negative Pulmonary ROS and normal exam  breath sounds clear to auscultation  (+) pneumonia:  shortness of breath:  asthma:     (-) not a current smoker          Patient did not smoke on day of surgery. ROS comment: On nocturnal oxygen    Cardiovascular:    (+) hypertension:, pacemaker:,     (-) CAD,  angina and  CHF    NYHA Classification: I  ECG reviewed  Rhythm: regular  Rate: normal           Beta Blocker:  Not on Beta Blocker         Neuro/Psych:   Negative Neuro/Psych ROS  (+) neuromuscular disease:, psychiatric history:depression/anxiety    (-) seizures and CVA            ROS comment: [place] GI/Hepatic/Renal: Neg GI/Hepatic/Renal ROS       (-) hiatal hernia and GERD       Endo/Other: Negative Endo/Other ROS   (+) : arthritis:., .          Pt had PAT visit. Abdominal:       Abdomen: soft. Vascular:                                      Anesthesia Plan      general     ASA 4     (Iv zofran within 30 min of closing   No sux Rhabdo hx distant  Chronic hypoxia, on oxygen   Advanced age )  Induction: intravenous. BIS  MIPS: Postoperative opioids intended and Prophylactic antiemetics administered. Anesthetic plan and risks discussed with patient. Use of blood products discussed with patient whom. Plan discussed with CRNA.     Attending anesthesiologist reviewed and agrees with Pre Eval content            Roosevelt Pérez MD   1/24/2021

## 2021-01-24 NOTE — ED NOTES
Patient placed in a gown Patient placed on cardiac monitor, continuous pulse oximeter, and NIBP monitor.  Monitor alarms on.       Carolann Dong RN  01/24/21 9753

## 2021-01-24 NOTE — PROGRESS NOTES
Update:      Refer to H&P for full details. Admitted early this AM on 1/24/21. Patient seen and examined today. 77-year-old female sustained a fall at home overnight with pain in right shoulder and right knee. X-ray showed fractures of right proximal humerus and right distal femur. She does have history of right hip hemiarthroplasty. Patient taken to the OR today for open reduction/internal fixation of right supracondylar distal femur fracture and closed treatment of right proximal humerus fracture by orthopedic surgery.     Postop management  Pain control  PT/OT  Monitor labs  Supportive care    Hold home antihypertensives in setting of relative hypotension      Hany Lobo MD

## 2021-01-24 NOTE — H&P
Patient Information:  Patient: Yaw Rivas  MRN: [id number]   Acct: [de-identified]  YOB: 1925  Admit Date: 1/24/2021      Date of Service: 1/24/2021  Primary Care Physician: Hilaria Thornton MD  Advance Directive: Full Code  Health Care Proxy: her daughter        SUBJECTIVE:    Chief Complaint   Patient presents with    Fall     fell over walker going to the br with right side pain. pt is [address]    Shoulder Pain     right    Knee Pain     right knee swelling     EP Sign Out:  81yo lady fell over her walker, multiple fractures including right shoulder and right distal femur  Dr Janie Palafox to take to OR    HPI:  Mrs. Viky Collins is a pleasant 80year old  [place] lady from home. She presented overnight status post tripping over her walker. She has a fracture of her right humerus and also of her right distal femur. She has a history of nocturnal hypoxemia and uses oxygen QHS art home. She also has severe presbycusis and difficulty seeing. She was referred to Dr. Janie Palafox of Orthopaedics who has agreed to take her for operative repair. Review of Systems:   Review of Systems   Constitutional: Negative for chills and fever. Respiratory: Negative for shortness of breath. Cardiovascular: Negative for chest pain. Gastrointestinal: Negative for abdominal pain, constipation, nausea and vomiting. Musculoskeletal: Positive for arthralgias.         HAS bone Pain   Neurological:        Hard of hearing, Poor eye sight     (following subjective sections as per chart review)    Past Medical History:   Diagnosis Date    Anxiety     Asthma     Dementia ([address]     Difficulty walking     Essential hypertension     Fracture lumbar vertebra-closed (HCC)     Hyperlipidemia     Hypoxemia     Insomnia     Macular degeneration     Major depressive disorder     Mononeuropathy     Pneumonia     Polyosteoarthritis     Repeated falls     Rhabdomyolysis      Past Psychiatric History:  As per chart review Past Surgical History:   Procedure Laterality Date    BACK SURGERY      CHOLECYSTECTOMY      COLECTOMY      6 inches removed    HIP FUSION  10/2017    pins     HIP SURGERY  2013    HYSTERECTOMY      PACEMAKER PLACEMENT       Social History     Tobacco History     Smoking Status  Former Smoker    Smokeless Tobacco Use  Never Used    Tobacco Comment  quit 30 years ago          Alcohol History     Alcohol Use Status  No          Drug Use     Drug Use Status  No          Sexual Activity     Sexually Active  Not Asked           Family History:   History reviewed. No pertinent family history.     Allergies   Allergen Reactions    Ceclor [Cefaclor]     Eggs Or Egg-Derived Products     Penicillins     Sulfa Antibiotics      Current Facility-Administered Medications   Medication Dose Route Frequency Provider Last Rate Last Admin    naloxone (NARCAN) injection 0.4 mg  0.4 mg Intravenous PRN Ko Hinojosa MD        albuterol (PROVENTIL) nebulizer solution 2.5 mg  2.5 mg Nebulization Q1H PRN Ko Hinojosa MD        morphine injection 1 mg  1 mg Intravenous Q4H PRN Ko Hinojosa MD        morphine injection 4 mg  4 mg Intravenous Q4H PRN Ko Hinojosa MD        morphine injection 2 mg  2 mg Intravenous Q4H PRN Ko Hinojosa MD        sodium chloride flush 0.9 % injection 10 mL  10 mL Intravenous 2 times per day Ko Hinojosa MD        sodium chloride flush 0.9 % injection 10 mL  10 mL Intravenous PRN Ko Hinojosa MD        acetaminophen (TYLENOL) tablet 650 mg  650 mg Oral Q4H PRN Ko Hinojosa MD        potassium chloride 10 mEq/100 mL IVPB (Peripheral Line)  10 mEq Intravenous PRN Ko Hinojosa MD        magnesium sulfate 2000 mg in 50 mL IVPB premix  2,000 mg Intravenous PRN Ko Hinojosa MD        bacitracin ointment   Topical TID Ko Hinojosa MD        ondansetron (ZOFRAN-ODT) disintegrating tablet 4 mg  4 mg Oral Q8H PRN Ko Hinojosa MD        Or lisinopril (PRINIVIL;ZESTRIL) 40 MG tablet Take 40 mg by mouth daily   Yes Historical Provider, MD   Methylcellulose POWD 2 g by Does not apply route daily   Yes Historical Provider, MD   metoprolol tartrate (LOPRESSOR) 50 MG tablet Take 50 mg by mouth 2 times daily   Yes Historical Provider, MD   MULTIPLE VITAMIN PO Take by mouth   Yes Historical Provider, MD   Calypso-3 1000 MG CAPS Take by mouth 2 times daily   Yes Historical Provider, MD   OXYGEN Inhale into the lungs 3/5/18  Yes Historical Provider, MD   PARoxetine (PAXIL) 20 MG tablet Take 20 mg by mouth every morning   Yes Historical Provider, MD   simvastatin (ZOCOR) 20 MG tablet Take 20 mg by mouth nightly   Yes Historical Provider, MD   traMADol (ULTRAM) 50 MG tablet Take 50 mg by mouth every 4 hours as needed for Pain. Yes Historical Provider, MD   vitamin E 400 UNIT capsule Take 400 Units by mouth daily   Yes Historical Provider, MD   zolpidem (AMBIEN) 5 MG tablet Take 5 mg by mouth nightly as needed for Sleep. Yes Historical Provider, MD   acetaminophen (TYLENOL) 325 MG tablet Take 650 mg by mouth every 6 hours as needed for Pain    Historical Provider, MD   ipratropium-albuterol (DUONEB) 0.5-2.5 (3) MG/3ML SOLN nebulizer solution Inhale 1 vial into the lungs every 6 hours as needed for Shortness of Breath    Historical Provider, MD   hydrocortisone 1 % cream Apply topically as needed Apply topically 2 times daily.     Historical Provider, MD   sodium chloride (OCEAN, BABY AYR) 0.65 % nasal spray 1 spray by Nasal route daily    Historical Provider, MD         OBJECTIVE:    Vitals:    01/24/21 0648   BP: 118/76   Pulse: 62   Resp: 20   Temp: 98 °F (36.7 °C)   SpO2: 98%   breathing on NC at 4LPM    /76   Pulse 62   Temp 98 °F (36.7 °C)   Resp 20   Ht 5' 8\" (1.727 m)   Wt 187 lb (84.8 kg)   LMP  (LMP Unknown)   SpO2 98%   BMI 28.43 kg/m²       Intake/Output Summary (Last 24 hours) at 1/24/2021 1638  Last data filed at 1/24/2021 6131 Electronically signed by Lety Wing MD on 1/24/21 at 7:35 AM CST.

## 2021-01-25 LAB
ANION GAP SERPL CALCULATED.3IONS-SCNC: 11 MMOL/L (ref 7–19)
BASOPHILS ABSOLUTE: 0 K/UL (ref 0–0.2)
BASOPHILS RELATIVE PERCENT: 0.2 % (ref 0–1)
BUN BLDV-MCNC: 13 MG/DL (ref 8–23)
CALCIUM SERPL-MCNC: 8.9 MG/DL (ref 8.2–9.6)
CHLORIDE BLD-SCNC: 102 MMOL/L (ref 98–111)
CO2: 25 MMOL/L (ref 22–29)
CREAT SERPL-MCNC: 0.8 MG/DL (ref 0.5–0.9)
EKG P AXIS: -3 DEGREES
EKG P-R INTERVAL: 122 MS
EKG Q-T INTERVAL: 454 MS
EKG QRS DURATION: 114 MS
EKG QTC CALCULATION (BAZETT): 454 MS
EKG T AXIS: -8 DEGREES
EOSINOPHILS ABSOLUTE: 0 K/UL (ref 0–0.6)
EOSINOPHILS RELATIVE PERCENT: 0 % (ref 0–5)
GFR AFRICAN AMERICAN: >59
GFR NON-AFRICAN AMERICAN: >60
GLUCOSE BLD-MCNC: 163 MG/DL (ref 74–109)
HCT VFR BLD CALC: 32.3 % (ref 37–47)
HCT VFR BLD CALC: 33 % (ref 37–47)
HEMOGLOBIN: 10.4 G/DL (ref 12–16)
HEMOGLOBIN: 10.6 G/DL (ref 12–16)
IMMATURE GRANULOCYTES #: 0.2 K/UL
LYMPHOCYTES ABSOLUTE: 2.9 K/UL (ref 1.1–4.5)
LYMPHOCYTES RELATIVE PERCENT: 14.8 % (ref 20–40)
MCH RBC QN AUTO: 29.8 PG (ref 27–31)
MCH RBC QN AUTO: 29.9 PG (ref 27–31)
MCHC RBC AUTO-ENTMCNC: 32.1 G/DL (ref 33–37)
MCHC RBC AUTO-ENTMCNC: 32.2 G/DL (ref 33–37)
MCV RBC AUTO: 92.6 FL (ref 81–99)
MCV RBC AUTO: 93 FL (ref 81–99)
MONOCYTES ABSOLUTE: 1.1 K/UL (ref 0–0.9)
MONOCYTES RELATIVE PERCENT: 5.4 % (ref 0–10)
NEUTROPHILS ABSOLUTE: 15.4 K/UL (ref 1.5–7.5)
NEUTROPHILS RELATIVE PERCENT: 78.6 % (ref 50–65)
PDW BLD-RTO: 14.9 % (ref 11.5–14.5)
PDW BLD-RTO: 15.3 % (ref 11.5–14.5)
PLATELET # BLD: 154 K/UL (ref 130–400)
PLATELET # BLD: 169 K/UL (ref 130–400)
PMV BLD AUTO: 11.2 FL (ref 9.4–12.3)
PMV BLD AUTO: 11.9 FL (ref 9.4–12.3)
POTASSIUM REFLEX MAGNESIUM: 4.4 MMOL/L (ref 3.5–5)
RBC # BLD: 3.49 M/UL (ref 4.2–5.4)
RBC # BLD: 3.55 M/UL (ref 4.2–5.4)
SODIUM BLD-SCNC: 138 MMOL/L (ref 136–145)
WBC # BLD: 19.6 K/UL (ref 4.8–10.8)
WBC # BLD: 27 K/UL (ref 4.8–10.8)

## 2021-01-25 PROCEDURE — 97530 THERAPEUTIC ACTIVITIES: CPT

## 2021-01-25 PROCEDURE — 6370000000 HC RX 637 (ALT 250 FOR IP): Performed by: ORTHOPAEDIC SURGERY

## 2021-01-25 PROCEDURE — 2700000000 HC OXYGEN THERAPY PER DAY

## 2021-01-25 PROCEDURE — 36415 COLL VENOUS BLD VENIPUNCTURE: CPT

## 2021-01-25 PROCEDURE — 6360000002 HC RX W HCPCS: Performed by: ORTHOPAEDIC SURGERY

## 2021-01-25 PROCEDURE — 85027 COMPLETE CBC AUTOMATED: CPT

## 2021-01-25 PROCEDURE — 97161 PT EVAL LOW COMPLEX 20 MIN: CPT

## 2021-01-25 PROCEDURE — 93010 ELECTROCARDIOGRAM REPORT: CPT | Performed by: INTERNAL MEDICINE

## 2021-01-25 PROCEDURE — 1210000000 HC MED SURG R&B

## 2021-01-25 PROCEDURE — 85025 COMPLETE CBC W/AUTO DIFF WBC: CPT

## 2021-01-25 PROCEDURE — 80048 BASIC METABOLIC PNL TOTAL CA: CPT

## 2021-01-25 PROCEDURE — 2580000003 HC RX 258: Performed by: ORTHOPAEDIC SURGERY

## 2021-01-25 PROCEDURE — 97166 OT EVAL MOD COMPLEX 45 MIN: CPT

## 2021-01-25 RX ADMIN — OXYCODONE 10 MG: 5 TABLET ORAL at 22:03

## 2021-01-25 RX ADMIN — Medication 400 UNITS: at 09:01

## 2021-01-25 RX ADMIN — GUAIFENESIN 1200 MG: 600 TABLET, EXTENDED RELEASE ORAL at 09:01

## 2021-01-25 RX ADMIN — DOCUSATE SODIUM 50 MG AND SENNOSIDES 8.6 MG 1 TABLET: 8.6; 5 TABLET, FILM COATED ORAL at 21:57

## 2021-01-25 RX ADMIN — GUAIFENESIN 1200 MG: 600 TABLET, EXTENDED RELEASE ORAL at 21:57

## 2021-01-25 RX ADMIN — ATORVASTATIN CALCIUM 10 MG: 10 TABLET, FILM COATED ORAL at 21:57

## 2021-01-25 RX ADMIN — METOPROLOL TARTRATE 50 MG: 50 TABLET, FILM COATED ORAL at 13:01

## 2021-01-25 RX ADMIN — Medication 1000 UNITS: at 09:01

## 2021-01-25 RX ADMIN — CLONIDINE HYDROCHLORIDE 0.2 MG: 0.2 TABLET ORAL at 21:58

## 2021-01-25 RX ADMIN — MULTIPLE VITAMINS W/ MINERALS TAB 1 TABLET: TAB at 09:01

## 2021-01-25 RX ADMIN — HYDRALAZINE HYDROCHLORIDE 25 MG: 25 TABLET, FILM COATED ORAL at 13:01

## 2021-01-25 RX ADMIN — LISINOPRIL 40 MG: 20 TABLET ORAL at 13:01

## 2021-01-25 RX ADMIN — BACITRACIN: 500 OINTMENT TOPICAL at 09:02

## 2021-01-25 RX ADMIN — CLONIDINE HYDROCHLORIDE 0.2 MG: 0.2 TABLET ORAL at 13:01

## 2021-01-25 RX ADMIN — DOCUSATE SODIUM 100 MG: 100 CAPSULE ORAL at 21:57

## 2021-01-25 RX ADMIN — SALINE NASAL SPRAY 1 SPRAY: 1.5 SOLUTION NASAL at 09:01

## 2021-01-25 RX ADMIN — ASPIRIN 162 MG: 81 TABLET, CHEWABLE ORAL at 09:01

## 2021-01-25 RX ADMIN — Medication 2000 MG: at 03:32

## 2021-01-25 RX ADMIN — METHYLCELLULOSE 2 G: 2 POWDER, FOR SOLUTION ORAL at 09:01

## 2021-01-25 RX ADMIN — GABAPENTIN 300 MG: 300 CAPSULE ORAL at 21:57

## 2021-01-25 RX ADMIN — HYDRALAZINE HYDROCHLORIDE 25 MG: 25 TABLET, FILM COATED ORAL at 21:57

## 2021-01-25 RX ADMIN — PAROXETINE HYDROCHLORIDE 20 MG: 20 TABLET, FILM COATED ORAL at 09:00

## 2021-01-25 RX ADMIN — METOPROLOL TARTRATE 50 MG: 50 TABLET, FILM COATED ORAL at 21:57

## 2021-01-25 RX ADMIN — BACITRACIN: 500 OINTMENT TOPICAL at 21:58

## 2021-01-25 RX ADMIN — SODIUM CHLORIDE, PRESERVATIVE FREE 10 ML: 5 INJECTION INTRAVENOUS at 21:59

## 2021-01-25 RX ADMIN — ALPRAZOLAM 0.12 MG: 0.25 TABLET ORAL at 09:00

## 2021-01-25 RX ADMIN — DOCUSATE SODIUM 50 MG AND SENNOSIDES 8.6 MG 1 TABLET: 8.6; 5 TABLET, FILM COATED ORAL at 09:01

## 2021-01-25 RX ADMIN — DOCUSATE SODIUM 100 MG: 100 CAPSULE ORAL at 09:01

## 2021-01-25 RX ADMIN — OXYCODONE 10 MG: 5 TABLET ORAL at 03:32

## 2021-01-25 RX ADMIN — SODIUM CHLORIDE, PRESERVATIVE FREE 10 ML: 5 INJECTION INTRAVENOUS at 21:58

## 2021-01-25 RX ADMIN — GABAPENTIN 300 MG: 300 CAPSULE ORAL at 09:01

## 2021-01-25 ASSESSMENT — PAIN SCALES - GENERAL
PAINLEVEL_OUTOF10: 3
PAINLEVEL_OUTOF10: 8

## 2021-01-25 NOTE — PROGRESS NOTES
Dressing to RLE noted to be saturated by Ginger Hernandez PA-C. Dressing was removed. Quick clot was placed to the bleeding/oozing incision sites. New dressing was placed and wrapped with an ace wrap. Immobilizer was put back on. Pt denies any pain at present time. Lovenox was held and Dr. Rick Lou was notified.

## 2021-01-25 NOTE — CARE COORDINATION
SW spoke with Pt daughter over the phone regarding DC planning options. Pt daughter requested short term rehab placement at [facility].  Awaiting approval/denial.   [facility]   P  [phone] F  Electronically signed by Doreen Owens on 1/25/2021 at 2:06 PM

## 2021-01-25 NOTE — PROGRESS NOTES
Call to pt.  Advise per Dr Grigsby that she has iron deficiency anemia.  Dr Grigsby recommends that she take OTC iron sulfate pills 325 mg one pill twice a day for a few months to build up her blood count.  Also her UA shows some WBC's and some RBC's, but the urine culture grew very little.  Pt to f/u with PCP to have UA rechecked.  Pt verb understanding.   Physical Therapy    Facility/Department: [facility] SURG SERVICES  Initial Assessment    NAME: Monica Wu  : 1925  MRN: [id number]    Date of Service: 2021    Discharge Recommendations:  Continue to assess pending progress, Patient would benefit from continued therapy after discharge(ANTICIPATE SNF PLACEMENT)        Assessment   Body structures, Functions, Activity limitations: Decreased functional mobility ; Decreased endurance; Increased pain;Decreased strength  Assessment: pt WOULD BENEFIT FROM SKILLED PT TO ADDRESS HER MOBILITY ISSUES RELATED TO RECENT FALL WITH R UE/LE FX' S AND NWB STATUS  Prognosis: Good  Decision Making: Low Complexity  PT Education: General Safety; Functional Mobility Training;Transfer Training;Weight-bearing Education  Patient Education: FLOATING R HEEL  REQUIRES PT FOLLOW UP: Yes  Activity Tolerance  Activity Tolerance: Patient Tolerated treatment well       Patient Diagnosis(es): The primary encounter diagnosis was Other closed displaced fracture of proximal end of right humerus, initial encounter. Diagnoses of Other closed fracture of distal end of right femur, initial encounter ([address] and Hip pain were also pertinent to this visit. has a past medical history of Anxiety, Asthma, Dementia ([address] Difficulty walking, Essential hypertension, Fracture lumbar vertebra-closed ([address] Hyperlipidemia, Hypoxemia, Insomnia, Macular degeneration, Major depressive disorder, Mononeuropathy, Pneumonia, Polyosteoarthritis, Repeated falls, and Rhabdomyolysis. has a past surgical history that includes pacemaker placement; Hip fusion (10/2017); hip surgery (); colectomy; Cholecystectomy; back surgery; Hysterectomy; and Femur fracture surgery (Right, 2021).     Restrictions  Restrictions/Precautions  Restrictions/Precautions: Weight Bearing  Required Braces or Orthoses?: Yes  Lower Extremity Weight Bearing Restrictions  Right Lower Extremity Weight Bearing: Non Weight Bearing Plan weeks: 2  Current Treatment Recommendations: Safety Education & Training, Functional Mobility Training, Transfer Training, Positioning, Pain Management  Plan Comment: NWB R UE/LE, KNEE IMMOBILIZER  Safety Devices  Type of devices: Call light within reach, Left in bed, Bed alarm in place    G-Code       OutComes Score                                                  AM-PAC Score             Goals  Short term goals  Time Frame for Short term goals: 2 WKS  Short term goal 1: ROLLING WITH MIN A 1  Short term goal 2: SUP<>SIT, MIN A 2  Short term goal 3: BSS X 10 MIN UNSUPPORTED.        Therapy Time   Individual Concurrent Group Co-treatment   Time In           Time Out           Minutes                   Nereida Logan PT    Electronically signed by Nereida Logan PT on 1/25/2021 at 12:21 PM

## 2021-01-25 NOTE — PROGRESS NOTES
Occupational Therapy   Occupational Therapy Initial Assessment  Date: 2021   Patient Name: Irene South  MRN: [id number]     : 1925    Date of Service: 2021    Discharge Recommendations:          Assessment   Performance deficits / Impairments: Decreased functional mobility ; Decreased endurance;Decreased ADL status; Decreased balance;Decreased ROM; Decreased strength  Assessment: Will require SNF placement and extended therapy before returning home. Treatment Diagnosis: R humerus fx, R hip fx  Prognosis: Good  Decision Making: Medium Complexity  REQUIRES OT FOLLOW UP: Yes  Activity Tolerance  Activity Tolerance: Patient Tolerated treatment well           Patient Diagnosis(es): The primary encounter diagnosis was Other closed displaced fracture of proximal end of right humerus, initial encounter. Diagnoses of Other closed fracture of distal end of right femur, initial encounter ([address] and Hip pain were also pertinent to this visit. has a past medical history of Anxiety, Asthma, Dementia ([address] Difficulty walking, Essential hypertension, Fracture lumbar vertebra-closed ([address] Hyperlipidemia, Hypoxemia, Insomnia, Macular degeneration, Major depressive disorder, Mononeuropathy, Pneumonia, Polyosteoarthritis, Repeated falls, and Rhabdomyolysis. has a past surgical history that includes pacemaker placement; Hip fusion (10/2017); hip surgery (); colectomy; Cholecystectomy; back surgery; Hysterectomy; and Femur fracture surgery (Right, 2021).     Treatment Diagnosis: R humerus fx, R hip fx      Restrictions  Restrictions/Precautions  Restrictions/Precautions: Weight Bearing  Lower Extremity Weight Bearing Restrictions  Right Lower Extremity Weight Bearing: Non Weight Bearing  Upper Extremity Weight Bearing Restrictions  Right Upper Extremity Weight Bearing: Non Weight Bearing    Subjective   General  Chart Reviewed: Yes  Patient assessed for rehabilitation services?: Yes Family / Caregiver Present: No  Diagnosis: R femur fx., R humerus fx. General Comment  Comments: Pt. very [place], RUE in sling  Patient Currently in Pain: Yes  Vital Signs  Temp: 98.4 °F (36.9 °C)  Temp Source: Temporal  Pulse: 74  Heart Rate Source: Monitor  Resp: 16  BP: (!) 174/73  BP Location: Left Arm  Level of Consciousness: Alert (0)  Patient Currently in Pain: Yes  Oxygen Therapy  SpO2: 95 %  O2 Device: Nasal cannula  Social/Functional History  Social/Functional History  Lives With: Family  Type of Home: House  Home Equipment: Rolling walker  ADL Assistance: Needs assistance  Ambulation Assistance: Independent  Transfer Assistance: Independent  Additional Comments: Per pt. report, she walked independently with a walker previously. Objective   Vision: Impaired  Vision Exceptions: Wears glasses at all times  H[facility]: [facility]  Hearing Exceptions: Bilateral hearing aid;Hard of hearing/hearing concerns    Orientation  Overall Orientation Status: Within Functional Limits     Balance  Sitting Balance: Supervision  ADL  Feeding: Setup  Grooming: Minimal assistance  UE Bathing:  Moderate assistance  LE Bathing: Maximum assistance  UE Dressing: Maximum assistance  LE Dressing: Dependent/Total  Toileting: Dependent/Total  Additional Comments: Pt. RUE immobilized in sling and NWB for both RUE and RLE        Bed mobility  Supine to Sit: Maximum assistance  Sit to Supine: Maximum assistance  Scooting: Maximal assistance  Transfers  Sit to stand: Unable to assess     Cognition  Overall Cognitive Status: WFL                 LUE AROM (degrees)  LUE AROM : WFL  RUE AROM (degrees)  RUE AROM : Exceptions  RUE General AROM: immobilized in sling, no AROM at shld or elbow  LUE Strength  Gross LUE Strength: WFL  RUE Strength  Gross RUE Strength: (No AROM performed on eval so no strength measurements)                   Plan   Plan  Times per week: 3-5x/week  Times per day: Daily    G-Code     OutComes Score AM-PAC Score             Goals  Short term goals  Time Frame for Short term goals: 1 week  Short term goal 1: Scooting 2 feet while sitting EOB in order to prepare for sliding board tfers  Short term goal 2: Tolerate elbow AROM RUE with therapist  Long term goals  Long term goal 1: Return to PLOF       Therapy Time   Individual Concurrent Group Co-treatment   Time In           Time Out           Minutes                   Gloria Ga, OT

## 2021-01-25 NOTE — PROGRESS NOTES
Orthopedic Surgery Progress Note    Milagros AMADO Mix  1/25/2021      Subjective:     Systemic or Specific Complaints:  Very [place]. Voices no concerns. Objective:     Patient Vitals for the past 24 hrs:   BP Temp Temp src Pulse Resp SpO2 Weight   01/25/21 0621 (!) 172/72 97.4 °F (36.3 °C) Temporal 76 17 90 %    01/25/21 0515       193 lb 4.8 oz (87.7 kg)   01/25/21 0241 (!) 167/73 97.9 °F (36.6 °C) Temporal 76 16 91 %    01/25/21 0005    82      01/24/21 2229 (!) 150/70 97.7 °F (36.5 °C) Temporal 79 16 90 %    01/24/21 1823 131/64 98.1 °F (36.7 °C) Temporal 66 16 90 %    01/24/21 1512 (!) 86/54 98.6 °F (37 °C)  85 15 90 %    01/24/21 1334 98/66 98.7 °F (37.1 °C) Temporal 72 15 92 %    01/24/21 1258 (!) 98/59 98.3 °F (36.8 °C) Temporal 68 16 92 %    01/24/21 1245 (!) 108/49 99.1 °F (37.3 °C) Temporal 69 18 92 %    01/24/21 1240 (!) 92/44   68 20 92 %    01/24/21 1235 (!) 99/47   65 19 90 %    01/24/21 1230 (!) 99/43   66 21 90 %    01/24/21 1225 (!) 100/45   69 19 90 %    01/24/21 1221 (!) 98/45   71 24 91 %    01/24/21 1216 (!) 97/53   69 21 91 %    01/24/21 1211 (!) 98/46 97.9 °F (36.6 °C) Temporal 72 20 93 %        right upper; right lower  General: alert, appears stated age and cooperative   Wound: draining             Dressing: bloody drainage   Extremity: Distal NVI           DVT Exam: No evidence of DVT seen on physical exam.                   Data Review:  Recent Labs     01/24/21  0533 01/25/21 0135   HGB 15.7 10.4*     Recent Labs     01/25/21  0135      K 4.4   CREATININE 0.8       Assessment:     POD# 1     1) Open reduction internal fixation, right supracondylar distal femur fracture without intercondylar extension.    2) Closed treatment without manipulation of right 2 part proximal humerus fracture      Plan:      1:  DVT prophylaxis, ICE, elevate  2:  Pain control  3:  Physical therapy/Occupational therapy  4:  Placement 5: Non-weight bearing, RLE/ RUE.   6: Begin dressing changes now, as hers this am was blood soaked. 7: Restart BP meds.         Electronically signed by Chriss aBrcenas PA-C on 1/25/2021 at 7:35 AM

## 2021-01-25 NOTE — PROGRESS NOTES
01/24/21 1211 (!) 98/46 97.9 °F (36.6 °C) Temporal 72 20 93 %      Physical exam    General: No acute distress  HEENT: Normocephalic/atraumatic, no scleral icterus  Neck: Supple, nontender  Cardiovascular: Normal rate with regular rhythm, peripheral pulses symmetric  Respiratory: Lungs clear to auscultation bilaterally  Abdomen: Soft, nontender nondistended, bowel sounds present  Musculoskeletal: RLE dressing in place/immobilizer in place  Neurologic: No focal deficits  Psychiatric: Normal mood    Lab Review   Recent Results (from the past 24 hour(s))   Basic Metabolic Panel w/ Reflex to MG    Collection Time: 01/25/21  1:35 AM   Result Value Ref Range    Sodium 138 136 - 145 mmol/L    Potassium reflex Magnesium 4.4 3.5 - 5.0 mmol/L    Chloride 102 98 - 111 mmol/L    CO2 25 22 - 29 mmol/L    Anion Gap 11 7 - 19 mmol/L    Glucose 163 (H) 74 - 109 mg/dL    BUN 13 8 - 23 mg/dL    CREATININE 0.8 0.5 - 0.9 mg/dL    GFR Non-African American >60 >60    GFR African American >59 >59    Calcium 8.9 8.2 - 9.6 mg/dL   CBC auto differential    Collection Time: 01/25/21  1:35 AM   Result Value Ref Range    WBC 19.6 (H) 4.8 - 10.8 K/uL    RBC 3.49 (L) 4.20 - 5.40 M/uL    Hemoglobin 10.4 (L) 12.0 - 16.0 g/dL    Hematocrit 32.3 (L) 37.0 - 47.0 %    MCV 92.6 81.0 - 99.0 fL    MCH 29.8 27.0 - 31.0 pg    MCHC 32.2 (L) 33.0 - 37.0 g/dL    RDW 14.9 (H) 11.5 - 14.5 %    Platelets 787 200 - 562 K/uL    MPV 11.2 9.4 - 12.3 fL    Neutrophils % 78.6 (H) 50.0 - 65.0 %    Lymphocytes % 14.8 (L) 20.0 - 40.0 %    Monocytes % 5.4 0.0 - 10.0 %    Eosinophils % 0.0 0.0 - 5.0 %    Basophils % 0.2 0.0 - 1.0 %    Neutrophils Absolute 15.4 (H) 1.5 - 7.5 K/uL    Immature Granulocytes # 0.2 K/uL    Lymphocytes Absolute 2.9 1.1 - 4.5 K/uL    Monocytes Absolute 1.10 (H) 0.00 - 0.90 K/uL    Eosinophils Absolute 0.00 0.00 - 0.60 K/uL    Basophils Absolute 0.00 0.00 - 0.20 K/uL       I/O last 3 completed shifts: In: 80 [P.O.:550;  I.V.:0738]   sodium chloride (OCEAN, BABY AYR) 0.65 % nasal spray 1 spray, 1 spray, Nasal, Daily, Ruth Ho MD, 1 spray at 01/25/21 0901    atorvastatin (LIPITOR) tablet 10 mg, 10 mg, Oral, Nightly, Ruth Ho MD, 10 mg at 01/24/21 2206    PARoxetine (PAXIL) tablet 20 mg, 20 mg, Oral, QAM, Ruth Ho MD, 20 mg at 01/25/21 0900    therapeutic multivitamin-minerals 1 tablet, 1 tablet, Oral, Daily, Sanchez Srivastava MD, 1 tablet at 01/25/21 0901    metoprolol tartrate (LOPRESSOR) tablet 50 mg, 50 mg, Oral, BID, Sanchez Srivastava MD    methylcellulose (CITRUCEL) oral powder 2 g, 2 g, Oral, Daily, uRth Ho MD, 2 g at 01/25/21 0901    lisinopril (PRINIVIL;ZESTRIL) tablet 40 mg, 40 mg, Oral, Daily, Sanchez Srivastava MD    hydrALAZINE (APRESOLINE) tablet 25 mg, 25 mg, Oral, BID, Sanchez Srivastava MD    guaiFENesin [facility]) extended release tablet 1,200 mg, 1,200 mg, Oral, BID, Ruth Ho MD, 1,200 mg at 01/25/21 0901    gabapentin (NEURONTIN) capsule 300 mg, 300 mg, Oral, 2 times per day, Sanchez Srivastava MD, 300 mg at 01/25/21 0901    docusate sodium (COLACE) capsule 100 mg, 100 mg, Oral, BID, Ruth Ho MD, 100 mg at 01/25/21 0901    cloNIDine (CATAPRES) tablet 0.2 mg, 0.2 mg, Oral, 2 times per day, Sanchez Srivastava MD    aspirin chewable tablet 162 mg, 162 mg, Oral, Daily, Ruth Ho MD, 162 mg at 01/25/21 0901    ALPRAZolam (XANAX) tablet 0.125 mg, 0.125 mg, Oral, Daily, Ruth Ho MD, 0.125 mg at 01/25/21 0900    lactated ringers infusion, , Intravenous, Continuous, Sanchez Srivastava MD, Last Rate: 100 mL/hr at 01/24/21 1400, New Bag at 01/24/21 1400    sodium chloride flush 0.9 % injection 10 mL, 10 mL, Intravenous, 2 times per day, Sanchez Srivastava MD    sodium chloride flush 0.9 % injection 10 mL, 10 mL, Intravenous, PRN, Sanchez Srivastava MD   morphine injection 2 mg, 2 mg, Intravenous, Q2H PRN **OR** morphine injection 4 mg, 4 mg, Intravenous, Q2H PRN, Missy Kiran MD    sennosides-docusate sodium (SENOKOT-S) 8.6-50 MG tablet 1 tablet, 1 tablet, Oral, BID, Missy Kiran MD, 1 tablet at 01/25/21 0901    magnesium hydroxide (MILK OF MAGNESIA) 400 MG/5ML suspension 30 mL, 30 mL, Oral, Daily PRN, Missy Kiran MD    oxyCODONE (ROXICODONE) immediate release tablet 5 mg, 5 mg, Oral, Q4H PRN, 5 mg at 01/24/21 1849 **OR** oxyCODONE (ROXICODONE) immediate release tablet 10 mg, 10 mg, Oral, Q4H PRN, Júnior Ho MD, 10 mg at 01/25/21 0332    famotidine (PEPCID) tablet 20 mg, 20 mg, Oral, BID PRN, Missy Kiran MD        Assessment/plan  Principal Problem:    Closed fracture of distal end of right femur, initial encounter ([facility] [address]  Active Problems:    Difficulty walking    Sight impaired    Bilateral hearing loss    Right humeral fracture  Resolved Problems:    * No resolved hospital problems.  *    Right supracondylar distal femur fracture  Proximal humerus fracture  Status post open reduction internal fixation of distal femur fracture 1/24/2021  Status post closed treatment of proximal humerus fracture  Postoperative care  Pain control  Dressing changes  PT/OT    Anemia, dilutional from IVF and post op acute blood loss  Monitor CBC  Monitor hemodynamics  Transfuse for hemoglobin less than 7    Hypertension  Restart home antihypertensive regimen today  As needed antihypertensives    Continue appropriate home meds for chronic issues    Disposition: Will need SNF placement, hopeful discharge 1-2 days    Nuria Weinstein MD 1/25/2021 11:49 AM

## 2021-01-26 LAB
ANION GAP SERPL CALCULATED.3IONS-SCNC: 7 MMOL/L (ref 7–19)
BASOPHILS ABSOLUTE: 0 K/UL (ref 0–0.2)
BASOPHILS RELATIVE PERCENT: 0 % (ref 0–1)
BUN BLDV-MCNC: 20 MG/DL (ref 8–23)
CALCIUM SERPL-MCNC: 8.8 MG/DL (ref 8.2–9.6)
CHLORIDE BLD-SCNC: 102 MMOL/L (ref 98–111)
CO2: 31 MMOL/L (ref 22–29)
CREAT SERPL-MCNC: 0.7 MG/DL (ref 0.5–0.9)
EOSINOPHILS ABSOLUTE: 0 K/UL (ref 0–0.6)
EOSINOPHILS RELATIVE PERCENT: 0 % (ref 0–5)
GFR AFRICAN AMERICAN: >59
GFR NON-AFRICAN AMERICAN: >60
GLUCOSE BLD-MCNC: 143 MG/DL (ref 74–109)
HCT VFR BLD CALC: 28.5 % (ref 37–47)
HEMOGLOBIN: 9.2 G/DL (ref 12–16)
IMMATURE GRANULOCYTES #: 0.3 K/UL
LYMPHOCYTES ABSOLUTE: 3.5 K/UL (ref 1.1–4.5)
LYMPHOCYTES RELATIVE PERCENT: 15 % (ref 20–40)
MCH RBC QN AUTO: 30.3 PG (ref 27–31)
MCHC RBC AUTO-ENTMCNC: 32.3 G/DL (ref 33–37)
MCV RBC AUTO: 93.8 FL (ref 81–99)
MONOCYTES ABSOLUTE: 1.8 K/UL (ref 0–0.9)
MONOCYTES RELATIVE PERCENT: 8 % (ref 0–10)
NEUTROPHILS ABSOLUTE: 17.8 K/UL (ref 1.5–7.5)
NEUTROPHILS RELATIVE PERCENT: 77 % (ref 50–65)
PDW BLD-RTO: 15.5 % (ref 11.5–14.5)
PLATELET # BLD: 110 K/UL (ref 130–400)
PLATELET SLIDE REVIEW: ABNORMAL
PMV BLD AUTO: 12.1 FL (ref 9.4–12.3)
POTASSIUM REFLEX MAGNESIUM: 4.9 MMOL/L (ref 3.5–5)
RBC # BLD: 3.04 M/UL (ref 4.2–5.4)
SODIUM BLD-SCNC: 140 MMOL/L (ref 136–145)
WBC # BLD: 23.1 K/UL (ref 4.8–10.8)

## 2021-01-26 PROCEDURE — 80048 BASIC METABOLIC PNL TOTAL CA: CPT

## 2021-01-26 PROCEDURE — 97530 THERAPEUTIC ACTIVITIES: CPT

## 2021-01-26 PROCEDURE — 36415 COLL VENOUS BLD VENIPUNCTURE: CPT

## 2021-01-26 PROCEDURE — 85025 COMPLETE CBC W/AUTO DIFF WBC: CPT

## 2021-01-26 PROCEDURE — 2580000003 HC RX 258: Performed by: ORTHOPAEDIC SURGERY

## 2021-01-26 PROCEDURE — 2700000000 HC OXYGEN THERAPY PER DAY

## 2021-01-26 PROCEDURE — 1210000000 HC MED SURG R&B

## 2021-01-26 PROCEDURE — 6360000002 HC RX W HCPCS: Performed by: ORTHOPAEDIC SURGERY

## 2021-01-26 PROCEDURE — 6370000000 HC RX 637 (ALT 250 FOR IP): Performed by: ORTHOPAEDIC SURGERY

## 2021-01-26 RX ADMIN — MULTIPLE VITAMINS W/ MINERALS TAB 1 TABLET: TAB at 07:50

## 2021-01-26 RX ADMIN — BACITRACIN: 500 OINTMENT TOPICAL at 14:21

## 2021-01-26 RX ADMIN — PAROXETINE HYDROCHLORIDE 20 MG: 20 TABLET, FILM COATED ORAL at 07:49

## 2021-01-26 RX ADMIN — CLONIDINE HYDROCHLORIDE 0.2 MG: 0.2 TABLET ORAL at 20:51

## 2021-01-26 RX ADMIN — SODIUM CHLORIDE, PRESERVATIVE FREE 10 ML: 5 INJECTION INTRAVENOUS at 20:52

## 2021-01-26 RX ADMIN — HYDRALAZINE HYDROCHLORIDE 25 MG: 25 TABLET, FILM COATED ORAL at 20:51

## 2021-01-26 RX ADMIN — OXYCODONE 5 MG: 5 TABLET ORAL at 11:05

## 2021-01-26 RX ADMIN — BACITRACIN: 500 OINTMENT TOPICAL at 07:51

## 2021-01-26 RX ADMIN — Medication 400 UNITS: at 07:51

## 2021-01-26 RX ADMIN — ENOXAPARIN SODIUM 40 MG: 40 INJECTION SUBCUTANEOUS at 07:52

## 2021-01-26 RX ADMIN — BACITRACIN: 500 OINTMENT TOPICAL at 20:52

## 2021-01-26 RX ADMIN — SALINE NASAL SPRAY 1 SPRAY: 1.5 SOLUTION NASAL at 07:52

## 2021-01-26 RX ADMIN — OXYCODONE 5 MG: 5 TABLET ORAL at 18:19

## 2021-01-26 RX ADMIN — DOCUSATE SODIUM 100 MG: 100 CAPSULE ORAL at 20:51

## 2021-01-26 RX ADMIN — CLONIDINE HYDROCHLORIDE 0.2 MG: 0.2 TABLET ORAL at 07:50

## 2021-01-26 RX ADMIN — HYDRALAZINE HYDROCHLORIDE 25 MG: 25 TABLET, FILM COATED ORAL at 07:49

## 2021-01-26 RX ADMIN — METOPROLOL TARTRATE 50 MG: 50 TABLET, FILM COATED ORAL at 07:50

## 2021-01-26 RX ADMIN — SODIUM CHLORIDE, PRESERVATIVE FREE 10 ML: 5 INJECTION INTRAVENOUS at 07:52

## 2021-01-26 RX ADMIN — LISINOPRIL 40 MG: 20 TABLET ORAL at 07:51

## 2021-01-26 RX ADMIN — Medication 1000 UNITS: at 07:51

## 2021-01-26 RX ADMIN — METOPROLOL TARTRATE 50 MG: 50 TABLET, FILM COATED ORAL at 20:51

## 2021-01-26 RX ADMIN — ZOLPIDEM TARTRATE 5 MG: 5 TABLET ORAL at 20:58

## 2021-01-26 RX ADMIN — ASPIRIN 162 MG: 81 TABLET, CHEWABLE ORAL at 07:49

## 2021-01-26 RX ADMIN — ATORVASTATIN CALCIUM 10 MG: 10 TABLET, FILM COATED ORAL at 20:51

## 2021-01-26 RX ADMIN — GABAPENTIN 300 MG: 300 CAPSULE ORAL at 20:56

## 2021-01-26 RX ADMIN — SODIUM CHLORIDE, PRESERVATIVE FREE 10 ML: 5 INJECTION INTRAVENOUS at 07:53

## 2021-01-26 RX ADMIN — DOCUSATE SODIUM 50 MG AND SENNOSIDES 8.6 MG 1 TABLET: 8.6; 5 TABLET, FILM COATED ORAL at 20:50

## 2021-01-26 RX ADMIN — GUAIFENESIN 1200 MG: 600 TABLET, EXTENDED RELEASE ORAL at 20:51

## 2021-01-26 RX ADMIN — GUAIFENESIN 1200 MG: 600 TABLET, EXTENDED RELEASE ORAL at 07:49

## 2021-01-26 RX ADMIN — DOCUSATE SODIUM 50 MG AND SENNOSIDES 8.6 MG 1 TABLET: 8.6; 5 TABLET, FILM COATED ORAL at 07:51

## 2021-01-26 RX ADMIN — DOCUSATE SODIUM 100 MG: 100 CAPSULE ORAL at 07:50

## 2021-01-26 RX ADMIN — GABAPENTIN 300 MG: 300 CAPSULE ORAL at 07:49

## 2021-01-26 RX ADMIN — ALPRAZOLAM 0.12 MG: 0.25 TABLET ORAL at 07:50

## 2021-01-26 ASSESSMENT — PAIN DESCRIPTION - PAIN TYPE: TYPE: ACUTE PAIN

## 2021-01-26 ASSESSMENT — PAIN - FUNCTIONAL ASSESSMENT: PAIN_FUNCTIONAL_ASSESSMENT: PREVENTS OR INTERFERES WITH ALL ACTIVE AND SOME PASSIVE ACTIVITIES

## 2021-01-26 ASSESSMENT — PAIN SCALES - WONG BAKER
WONGBAKER_NUMERICALRESPONSE: 8
WONGBAKER_NUMERICALRESPONSE: 8

## 2021-01-26 ASSESSMENT — PAIN DESCRIPTION - LOCATION: LOCATION: LEG

## 2021-01-26 ASSESSMENT — PAIN DESCRIPTION - PROGRESSION: CLINICAL_PROGRESSION: NOT CHANGED

## 2021-01-26 ASSESSMENT — PAIN DESCRIPTION - ORIENTATION: ORIENTATION: RIGHT

## 2021-01-26 NOTE — PROGRESS NOTES
Occupational Therapy  Facility/Department: [facility] SURG SERVICES  Daily Treatment Note  NAME: Yaw Rivas  : 1925  MRN: [id number]    Date of Service: 2021    Discharge Recommendations:          Assessment   Performance deficits / Impairments: Decreased functional mobility ; Decreased endurance;Decreased ADL status; Decreased balance;Decreased ROM; Decreased strength  Assessment: Pt was very lethargic, sat EOB and was dependent for all activity. Discussed lethargy with MD when he came in room. Will continue to follow. Treatment Diagnosis: R humerus fx, R hip fx  Prognosis: Fair  REQUIRES OT FOLLOW UP: Yes  Activity Tolerance  Activity Tolerance: Patient limited by fatigue         Patient Diagnosis(es): The primary encounter diagnosis was Other closed displaced fracture of proximal end of right humerus, initial encounter. Diagnoses of Other closed fracture of distal end of right femur, initial encounter ([address] and Hip pain were also pertinent to this visit. has a past medical history of Anxiety, Asthma, Dementia ([address] Difficulty walking, Essential hypertension, Fracture lumbar vertebra-closed ([address] Hyperlipidemia, Hypoxemia, Insomnia, Macular degeneration, Major depressive disorder, Mononeuropathy, Pneumonia, Polyosteoarthritis, Repeated falls, and Rhabdomyolysis. has a past surgical history that includes pacemaker placement; Hip fusion (10/2017); hip surgery (); colectomy; Cholecystectomy; back surgery; Hysterectomy; and Femur fracture surgery (Right, 2021).     Restrictions  Restrictions/Precautions  Restrictions/Precautions: Weight Bearing  Required Braces or Orthoses?: Yes  Lower Extremity Weight Bearing Restrictions  Right Lower Extremity Weight Bearing: Non Weight Bearing  Upper Extremity Weight Bearing Restrictions  Right Upper Extremity Weight Bearing: Non Weight Bearing  Required Braces or Orthoses  Right Lower Extremity Brace: Knee Immobilizer  Subjective   General Chart Reviewed: Yes  Patient assessed for rehabilitation services?: Yes  Response to previous treatment: Patient with no complaints from previous session  Family / Caregiver Present: No  Diagnosis: R femur fx., R humerus fx. General Comment  Comments: Pt. very [place], RUE in sling  Pain Assessment  Pain Assessment: Faces  Minaya-Baker Pain Rating: Hurts whole lot  Pain Type: Acute pain  Pain Location: Leg  Pain Orientation: Right  Pain Descriptors: Aching; Sharp  Pain Onset: On-going  Clinical Progression: Not changed  Functional Pain Assessment: Prevents or interferes with all active and some passive activities  Non-Pharmaceutical Pain Intervention(s): Elevation;Rest;Repositioned; Therapeutic presence  Response to Pain Intervention: Patient Satisfied  Vital Signs  Patient Currently in Pain: Yes   Orientation     Objective             Balance  Sitting Balance: Dependent/Total  Bed mobility  Supine to Sit: Dependent/Total;2 Person assistance  Sit to Supine: 2 Person assistance;Dependent/Total  Scooting: Dependent/Total;2 Person assistance                                                                    Plan   Plan  Times per week: 3-5x/week  Times per day: Daily  G-Code     OutComes Score                                                  AM-PAC Score             Goals  Short term goals  Time Frame for Short term goals: 1 week  Short term goal 1: Scooting 2 feet while sitting EOB in order to prepare for sliding board tfers  Short term goal 2: Tolerate elbow AROM RUE with therapist  Long term goals  Long term goal 1: Return to PLOF       Therapy Time   Individual Concurrent Group Co-treatment   Time In           Time Out           Minutes                   DEEPTHI Purcell

## 2021-01-26 NOTE — PROGRESS NOTES
Orthopedic Surgery Progress Note    Celio AMADO Mix  1/26/2021      Subjective:     Systemic or Specific Complaints:  Resting comfortably in bed. Objective:     Patient Vitals for the past 24 hrs:   BP Temp Temp src Pulse Resp SpO2 Weight   01/26/21 0633 (!) 155/67 98 °F (36.7 °C) Temporal 59 16 90 %    01/26/21 0510       195 lb 12.8 oz (88.8 kg)   01/26/21 0255 (!) 155/72 98.4 °F (36.9 °C) Temporal 62 16 91 %    01/25/21 2303 (!) 158/67 98.7 °F (37.1 °C) Temporal 68 16 91 %    01/25/21 1853 (!) 143/65 98.7 °F (37.1 °C) Temporal 75 16 91 %    01/25/21 1453 (!) 148/68 98.5 °F (36.9 °C) Temporal 84 15 91 %    01/25/21 1054 (!) 174/73 98.4 °F (36.9 °C) Temporal 74 16 95 %        right upper; right lower  General: alert, appears stated age and cooperative   Wound: Warm, dry and intact. Dressing: Clean, dry and intact. Extremity: Distal NVI           DVT Exam: No evidence of DVT seen on physical exam.                   Data Review:  Recent Labs     01/25/21  1752 01/26/21  0303   HGB 10.6* 9.2*     Recent Labs     01/26/21  0303      K 4.9   CREATININE 0.7       Assessment:     POD# 2     1) Open reduction internal fixation, right supracondylar distal femur fracture without intercondylar extension. 2) Closed treatment without manipulation of right 2 part proximal humerus fracture      Plan:      1:  DVT prophylaxis, ICE, elevate  2:  Pain control  3:  Physical therapy/Occupational therapy  4:  Placement. ..likely [facility]  5: Non-weight bearing, RLE/ RUE. 6:  f/u in office in 2 weeks.       Electronically signed by Braydon Enamorado PA-C on 1/26/2021 at 7:31 AM

## 2021-01-26 NOTE — PROGRESS NOTES
Physical Therapy  Name: Elisha Rosas  MRN:  [id number]  Date of service:  1/26/2021 01/26/21 1134   Restrictions/Precautions   Restrictions/Precautions Weight Bearing   Required Braces or Orthoses? Yes   Lower Extremity Weight Bearing Restrictions   Right Lower Extremity Weight Bearing Non Weight Bearing   Upper Extremity Weight Bearing Restrictions   Right Upper Extremity Weight Bearing Non Weight Bearing   Required Braces or Orthoses   Right Lower Extremity Brace Knee Immobilizer   General   Chart Reviewed Yes   Subjective   Subjective Pt very lethargic this morning, will hardly keep her eyes open or verbalize. Pain Screening   Patient Currently in Pain Yes   Pain Assessment   Pain Assessment Faces   Minaya-Baker Pain Rating 8  (with movement)   Pain Type Acute pain   Pain Location Leg   Pain Orientation Right   Non-Pharmaceutical Pain Intervention(s) Repositioned   Bed Mobility   Supine to Sit Dependent/Total  (x2)   Sit to Supine Dependent/Total  (x2)   Scooting Dependent/Total;2 Person assistance   Comment Pt sits EOB x8 min, unable to hold balance and requires constant cues to keep her eyes open   Transfers   Sit to Stand Unable to assess   Stand to sit Unable to assess   Short term goals   Time Frame for Short term goals 2 WKS   Short term goal 1 ROLLING WITH MIN A 1   Short term goal 2 SUP<>SIT, MIN A 2   Short term goal 3 BSS X 10 MIN UNSUPPORTED. Conditions Requiring Skilled Therapeutic Intervention   Body structures, Functions, Activity limitations Decreased functional mobility ; Decreased endurance; Increased pain;Decreased strength   Assessment Pt very lethargic this tx, does not initiate any voluntary movement and is dependent x2 to sit EOB. Physician came in to assess pt, notified of her current state. Pt returned to supine, positioned for comfort with all needs in reach.     Activity Tolerance   Activity Tolerance Patient limited by fatigue;Patient limited by endurance   Safety Devices Type of devices Bed alarm in place;Call light within reach; Left in bed         Electronically signed by Kim Blackmon PTA on 1/26/2021 at 11:43 AM

## 2021-01-26 NOTE — PROGRESS NOTES
Daily Progress Note    Date:2021  Patient: Arnaldo Alba  : 1925  X:[id number]  CODE:Full Code No additional code details  Zeina Mclean MD    Admit Date: 2021  4:28 AM   LOS: 2 days       Subjective:   80-year-old female sustained a fall at home overnight with pain in right shoulder and right knee. X-ray showed fractures of right proximal humerus and right distal femur. She does have history of right hip hemiarthroplasty.     Patient taken to the OR  for open reduction/internal fixation of right supracondylar distal femur fracture and closed treatment of right proximal humerus fracture by orthopedic surgery.     Difficulty answering some questions due to hearing loss. Working with therapy today and appears to have significant generalized weakness.         Review of Systems      Unable to complete comprehensive ROS due to hearing impairment      Objective:      Vital signs in last 24 hours:  Patient Vitals for the past 24 hrs:   BP Temp Temp src Pulse Resp SpO2 Weight   21 0749    62      21 0633 (!) 155/67 98 °F (36.7 °C) Temporal 59 16 90 %    21 0510       195 lb 12.8 oz (88.8 kg)   21 0255 (!) 155/72 98.4 °F (36.9 °C) Temporal 62 16 91 %    21 2303 (!) 158/67 98.7 °F (37.1 °C) Temporal 68 16 91 %    21 1853 (!) 143/65 98.7 °F (37.1 °C) Temporal 75 16 91 %    21 1453 (!) 148/68 98.5 °F (36.9 °C) Temporal 84 15 91 %    21 1054 (!) 174/73 98.4 °F (36.9 °C) Temporal 74 16 95 %      Physical exam    General: No acute distress  HEENT: Normocephalic/atraumatic, no scleral icterus  Neck: Supple, nontender  Cardiovascular: Normal rate with regular rhythm, peripheral pulses symmetric  Respiratory: Lungs clear to auscultation bilaterally  Abdomen: Soft, nontender nondistended, bowel sounds present  Musculoskeletal: RLE dressing in place/immobilizer in place  Neurologic: Generalized weakness, but no apparent focal deficits Psychiatric: Normal mood    Lab Review   Recent Results (from the past 24 hour(s))   CBC    Collection Time: 01/25/21  5:52 PM   Result Value Ref Range    WBC 27.0 (H) 4.8 - 10.8 K/uL    RBC 3.55 (L) 4.20 - 5.40 M/uL    Hemoglobin 10.6 (L) 12.0 - 16.0 g/dL    Hematocrit 33.0 (L) 37.0 - 47.0 %    MCV 93.0 81.0 - 99.0 fL    MCH 29.9 27.0 - 31.0 pg    MCHC 32.1 (L) 33.0 - 37.0 g/dL    RDW 15.3 (H) 11.5 - 14.5 %    Platelets 444 538 - 021 K/uL    MPV 11.9 9.4 - 12.3 fL   Basic Metabolic Panel w/ Reflex to MG    Collection Time: 01/26/21  3:03 AM   Result Value Ref Range    Sodium 140 136 - 145 mmol/L    Potassium reflex Magnesium 4.9 3.5 - 5.0 mmol/L    Chloride 102 98 - 111 mmol/L    CO2 31 (H) 22 - 29 mmol/L    Anion Gap 7 7 - 19 mmol/L    Glucose 143 (H) 74 - 109 mg/dL    BUN 20 8 - 23 mg/dL    CREATININE 0.7 0.5 - 0.9 mg/dL    GFR Non-African American >60 >60    GFR African American >59 >59    Calcium 8.8 8.2 - 9.6 mg/dL   CBC auto differential    Collection Time: 01/26/21  3:03 AM   Result Value Ref Range    WBC 23.1 (H) 4.8 - 10.8 K/uL    RBC 3.04 (L) 4.20 - 5.40 M/uL    Hemoglobin 9.2 (L) 12.0 - 16.0 g/dL    Hematocrit 28.5 (L) 37.0 - 47.0 %    MCV 93.8 81.0 - 99.0 fL    MCH 30.3 27.0 - 31.0 pg    MCHC 32.3 (L) 33.0 - 37.0 g/dL    RDW 15.5 (H) 11.5 - 14.5 %    Platelets 097 (L) 815 - 400 K/uL    MPV 12.1 9.4 - 12.3 fL    PLATELET SLIDE REVIEW Decreased     Neutrophils % 77.0 (H) 50.0 - 65.0 %    Lymphocytes % 15.0 (L) 20.0 - 40.0 %    Monocytes % 8.0 0.0 - 10.0 %    Eosinophils % 0.0 0.0 - 5.0 %    Basophils % 0.0 0.0 - 1.0 %    Neutrophils Absolute 17.8 (H) 1.5 - 7.5 K/uL    Immature Granulocytes # 0.3 K/uL    Lymphocytes Absolute 3.5 1.1 - 4.5 K/uL    Monocytes Absolute 1.80 (H) 0.00 - 0.90 K/uL    Eosinophils Absolute 0.00 0.00 - 0.60 K/uL    Basophils Absolute 0.00 0.00 - 0.20 K/uL       I/O last 3 completed shifts: In: 4087 [P.O.:1090]  Out: 2950 [Urine:2950]  No intake/output data recorded. Current Facility-Administered Medications:     naloxone (NARCAN) injection 0.4 mg, 0.4 mg, Intravenous, PRN, Vandana Gerard MD    albuterol (PROVENTIL) nebulizer solution 2.5 mg, 2.5 mg, Nebulization, Q1H PRN, Vandana Gerard MD    morphine injection 1 mg, 1 mg, Intravenous, Q4H PRN, Luis Felipe Ho MD, 1 mg at 01/24/21 1030    morphine injection 4 mg, 4 mg, Intravenous, Q4H PRN, Vandana Gerard MD    morphine injection 2 mg, 2 mg, Intravenous, Q4H PRN, Vandana Gerard MD    sodium chloride flush 0.9 % injection 10 mL, 10 mL, Intravenous, 2 times per day, Vandana Gerard MD, 10 mL at 01/26/21 0753    sodium chloride flush 0.9 % injection 10 mL, 10 mL, Intravenous, PRN, Vandana Gerard MD    acetaminophen (TYLENOL) tablet 650 mg, 650 mg, Oral, Q4H PRN, Vandana Gerard MD    potassium chloride 10 mEq/100 mL IVPB (Peripheral Line), 10 mEq, Intravenous, PRN, Vandana Gerard MD    magnesium sulfate 2000 mg in 50 mL IVPB premix, 2,000 mg, Intravenous, PRN, Vandana Gerard MD    bacitracin ointment, , Topical, TID, Vandana Gerard MD, Given at 01/26/21 0751    ondansetron (ZOFRAN-ODT) disintegrating tablet 4 mg, 4 mg, Oral, Q8H PRN **OR** ondansetron (ZOFRAN) injection 4 mg, 4 mg, Intravenous, Q6H PRN, Vandana Gerard MD    polyethylene glycol (GLYCOLAX) packet 17 g, 17 g, Oral, Daily PRN, Vandana Gerard MD    enoxaparin (LOVENOX) injection 40 mg, 40 mg, Subcutaneous, Daily, Luis Felipe Ho MD, 40 mg at 01/26/21 2253    zolpidem (AMBIEN) tablet 5 mg, 5 mg, Oral, Nightly PRN, Vandana Gerard MD, 5 mg at 01/24/21 2206    vitamin E capsule 400 Units, 400 Units, Oral, Daily, Vandana Gerard MD, 400 Units at 01/26/21 0548    vitamin D (CHOLECALCIFEROL) tablet 1,000 Units, 1,000 Units, Oral, Daily, Vandana Gerard MD, 1,000 Units at 01/26/21 7390   sodium chloride (OCEAN, BABY AYR) 0.65 % nasal spray 1 spray, 1 spray, Nasal, Daily, Merritt Ho MD, 1 spray at 01/26/21 7507    atorvastatin (LIPITOR) tablet 10 mg, 10 mg, Oral, Nightly, Merritt Ho MD, 10 mg at 01/25/21 2157    PARoxetine (PAXIL) tablet 20 mg, 20 mg, Oral, QAM, Merritt Ho MD, 20 mg at 01/26/21 0749    therapeutic multivitamin-minerals 1 tablet, 1 tablet, Oral, Daily, Samantha Hinojosa MD, 1 tablet at 01/26/21 0750    metoprolol tartrate (LOPRESSOR) tablet 50 mg, 50 mg, Oral, BID, Merritt Ho MD, 50 mg at 01/26/21 0750    methylcellulose (CITRUCEL) oral powder 2 g, 2 g, Oral, Daily, Samantha Hinojosa MD, 2 g at 01/25/21 0901    lisinopril (PRINIVIL;ZESTRIL) tablet 40 mg, 40 mg, Oral, Daily, Merritt Ho MD, 40 mg at 01/26/21 0751    hydrALAZINE (APRESOLINE) tablet 25 mg, 25 mg, Oral, BID, Merritt Ho MD, 25 mg at 01/26/21 0749    guaiFENesin (MUCINEX) extended release tablet 1,200 mg, 1,200 mg, Oral, BID, Merritt Ho MD, 1,200 mg at 01/26/21 0749    gabapentin (NEURONTIN) capsule 300 mg, 300 mg, Oral, 2 times per day, Samantha Hinojosa MD, 300 mg at 01/26/21 0749    docusate sodium (COLACE) capsule 100 mg, 100 mg, Oral, BID, Samantha Hinojosa MD, 100 mg at 01/26/21 0750    cloNIDine (CATAPRES) tablet 0.2 mg, 0.2 mg, Oral, 2 times per day, Samantha Hinojosa MD, 0.2 mg at 01/26/21 0750    aspirin chewable tablet 162 mg, 162 mg, Oral, Daily, Merritt Ho MD, 162 mg at 01/26/21 0749    ALPRAZolam (XANAX) tablet 0.125 mg, 0.125 mg, Oral, Daily, Merritt Ho MD, 0.125 mg at 01/26/21 0750    sodium chloride flush 0.9 % injection 10 mL, 10 mL, Intravenous, 2 times per day, Samantha Hinojosa MD, 10 mL at 01/26/21 0752    sodium chloride flush 0.9 % injection 10 mL, 10 mL, Intravenous, PRN, Samantha Hinojosa MD   morphine injection 2 mg, 2 mg, Intravenous, Q2H PRN **OR** morphine injection 4 mg, 4 mg, Intravenous, Q2H PRN, Kay Munoz MD    sennosides-docusate sodium (SENOKOT-S) 8.6-50 MG tablet 1 tablet, 1 tablet, Oral, BID, Kay Munoz MD, 1 tablet at 01/26/21 0751    magnesium hydroxide (MILK OF MAGNESIA) 400 MG/5ML suspension 30 mL, 30 mL, Oral, Daily PRN, Kay Munoz MD    oxyCODONE (ROXICODONE) immediate release tablet 5 mg, 5 mg, Oral, Q4H PRN, 5 mg at 01/24/21 1849 **OR** oxyCODONE (ROXICODONE) immediate release tablet 10 mg, 10 mg, Oral, Q4H PRN, Kay Muonz MD, 10 mg at 01/25/21 2203    famotidine (PEPCID) tablet 20 mg, 20 mg, Oral, BID PRN, Kay Munoz MD        Assessment/plan  Principal Problem:    Closed fracture of distal end of right femur, initial encounter ([facility])  Active Problems:    Difficulty walking    Sight impaired    Bilateral hearing loss    Right humeral fracture  Resolved Problems:    * No resolved hospital problems.  *    Right supracondylar distal femur fracture  Proximal humerus fracture  Status post open reduction internal fixation of distal femur fracture 1/24/2021  Status post closed treatment of proximal humerus fracture  Postoperative care  Pain control  Dressing changes    Anemia, dilutional from IVF and post op acute blood loss  Monitor CBC  Monitor hemodynamics  Transfuse for hemoglobin less than 7    Hypertension   home antihypertensive regimen   As needed antihypertensives    Generalized weakness  PT/OTanticipate will need SNF placement    Continue appropriate home meds for chronic issues    Disposition: Anticipate likely need for SNF placement, hopeful discharge in next 1-2 days    Magali Moe MD 1/26/2021 9:17 AM

## 2021-01-27 VITALS
HEIGHT: 68 IN | DIASTOLIC BLOOD PRESSURE: 58 MMHG | BODY MASS INDEX: 29.67 KG/M2 | SYSTOLIC BLOOD PRESSURE: 129 MMHG | TEMPERATURE: 98 F | HEART RATE: 60 BPM | WEIGHT: 195.8 LBS | RESPIRATION RATE: 15 BRPM | OXYGEN SATURATION: 94 %

## 2021-01-27 LAB
ANION GAP SERPL CALCULATED.3IONS-SCNC: 7 MMOL/L (ref 7–19)
ANISOCYTOSIS: ABNORMAL
BASOPHILS ABSOLUTE: 0 K/UL (ref 0–0.2)
BASOPHILS RELATIVE PERCENT: 0 % (ref 0–1)
BUN BLDV-MCNC: 17 MG/DL (ref 8–23)
CALCIUM SERPL-MCNC: 9 MG/DL (ref 8.2–9.6)
CHLORIDE BLD-SCNC: 101 MMOL/L (ref 98–111)
CO2: 30 MMOL/L (ref 22–29)
CREAT SERPL-MCNC: 0.5 MG/DL (ref 0.5–0.9)
EOSINOPHILS ABSOLUTE: 0 K/UL (ref 0–0.6)
EOSINOPHILS RELATIVE PERCENT: 0 % (ref 0–5)
GFR AFRICAN AMERICAN: >59
GFR NON-AFRICAN AMERICAN: >60
GLUCOSE BLD-MCNC: 126 MG/DL (ref 74–109)
HCT VFR BLD CALC: 28.3 % (ref 37–47)
HEMOGLOBIN: 9 G/DL (ref 12–16)
HYPOCHROMIA: ABNORMAL
IMMATURE GRANULOCYTES #: 0.3 K/UL
LYMPHOCYTES ABSOLUTE: 4 K/UL (ref 1.1–4.5)
LYMPHOCYTES RELATIVE PERCENT: 20 % (ref 20–40)
MACROCYTES: ABNORMAL
MCH RBC QN AUTO: 30.5 PG (ref 27–31)
MCHC RBC AUTO-ENTMCNC: 31.8 G/DL (ref 33–37)
MCV RBC AUTO: 95.9 FL (ref 81–99)
MONOCYTES ABSOLUTE: 1 K/UL (ref 0–0.9)
MONOCYTES RELATIVE PERCENT: 5 % (ref 0–10)
NEUTROPHILS ABSOLUTE: 15.1 K/UL (ref 1.5–7.5)
NEUTROPHILS RELATIVE PERCENT: 75 % (ref 50–65)
OVALOCYTES: ABNORMAL
PDW BLD-RTO: 15.4 % (ref 11.5–14.5)
PLATELET # BLD: 160 K/UL (ref 130–400)
PLATELET SLIDE REVIEW: ADEQUATE
PMV BLD AUTO: 11.4 FL (ref 9.4–12.3)
POLYCHROMASIA: ABNORMAL
POTASSIUM REFLEX MAGNESIUM: 4.6 MMOL/L (ref 3.5–5)
RBC # BLD: 2.95 M/UL (ref 4.2–5.4)
SODIUM BLD-SCNC: 138 MMOL/L (ref 136–145)
STOMATOCYTES: ABNORMAL
WBC # BLD: 20.1 K/UL (ref 4.8–10.8)

## 2021-01-27 PROCEDURE — 2700000000 HC OXYGEN THERAPY PER DAY

## 2021-01-27 PROCEDURE — 85025 COMPLETE CBC W/AUTO DIFF WBC: CPT

## 2021-01-27 PROCEDURE — 6370000000 HC RX 637 (ALT 250 FOR IP): Performed by: ORTHOPAEDIC SURGERY

## 2021-01-27 PROCEDURE — 6360000002 HC RX W HCPCS: Performed by: ORTHOPAEDIC SURGERY

## 2021-01-27 PROCEDURE — 36415 COLL VENOUS BLD VENIPUNCTURE: CPT

## 2021-01-27 PROCEDURE — 2580000003 HC RX 258: Performed by: ORTHOPAEDIC SURGERY

## 2021-01-27 PROCEDURE — 80048 BASIC METABOLIC PNL TOTAL CA: CPT

## 2021-01-27 RX ORDER — ASPIRIN 81 MG/1
81 TABLET, CHEWABLE ORAL DAILY
Qty: 30 TABLET | Refills: 0 | Status: SHIPPED | OUTPATIENT
Start: 2021-01-27

## 2021-01-27 RX ORDER — OXYCODONE HYDROCHLORIDE 5 MG/1
5 TABLET ORAL EVERY 6 HOURS PRN
Qty: 12 TABLET | Refills: 0 | Status: SHIPPED | OUTPATIENT
Start: 2021-01-27 | End: 2021-01-30

## 2021-01-27 RX ORDER — GABAPENTIN 300 MG/1
300 CAPSULE ORAL EVERY 12 HOURS SCHEDULED
Qty: 6 CAPSULE | Refills: 0 | Status: SHIPPED | OUTPATIENT
Start: 2021-01-27 | End: 2021-02-01 | Stop reason: SDUPTHER

## 2021-01-27 RX ORDER — ALPRAZOLAM 0.25 MG/1
0.12 TABLET ORAL DAILY
Qty: 2 TABLET | Refills: 0 | Status: SHIPPED | OUTPATIENT
Start: 2021-01-27 | End: 2021-01-30

## 2021-01-27 RX ADMIN — BACITRACIN: 500 OINTMENT TOPICAL at 09:55

## 2021-01-27 RX ADMIN — SODIUM CHLORIDE, PRESERVATIVE FREE 10 ML: 5 INJECTION INTRAVENOUS at 00:56

## 2021-01-27 RX ADMIN — MULTIPLE VITAMINS W/ MINERALS TAB 1 TABLET: TAB at 08:37

## 2021-01-27 RX ADMIN — PAROXETINE HYDROCHLORIDE 20 MG: 20 TABLET, FILM COATED ORAL at 08:38

## 2021-01-27 RX ADMIN — HYDRALAZINE HYDROCHLORIDE 25 MG: 25 TABLET, FILM COATED ORAL at 08:38

## 2021-01-27 RX ADMIN — METHYLCELLULOSE 2 G: 2 POWDER, FOR SOLUTION ORAL at 09:55

## 2021-01-27 RX ADMIN — LISINOPRIL 40 MG: 20 TABLET ORAL at 08:38

## 2021-01-27 RX ADMIN — ALPRAZOLAM 0.12 MG: 0.25 TABLET ORAL at 08:39

## 2021-01-27 RX ADMIN — GUAIFENESIN 1200 MG: 600 TABLET, EXTENDED RELEASE ORAL at 08:39

## 2021-01-27 RX ADMIN — ASPIRIN 162 MG: 81 TABLET, CHEWABLE ORAL at 08:37

## 2021-01-27 RX ADMIN — GABAPENTIN 300 MG: 300 CAPSULE ORAL at 08:37

## 2021-01-27 RX ADMIN — DOCUSATE SODIUM 100 MG: 100 CAPSULE ORAL at 08:38

## 2021-01-27 RX ADMIN — SODIUM CHLORIDE, PRESERVATIVE FREE 10 ML: 5 INJECTION INTRAVENOUS at 09:50

## 2021-01-27 RX ADMIN — SALINE NASAL SPRAY 1 SPRAY: 1.5 SOLUTION NASAL at 08:40

## 2021-01-27 RX ADMIN — Medication 400 UNITS: at 08:38

## 2021-01-27 RX ADMIN — METOPROLOL TARTRATE 50 MG: 50 TABLET, FILM COATED ORAL at 08:38

## 2021-01-27 RX ADMIN — DOCUSATE SODIUM 50 MG AND SENNOSIDES 8.6 MG 1 TABLET: 8.6; 5 TABLET, FILM COATED ORAL at 08:37

## 2021-01-27 RX ADMIN — Medication 1000 UNITS: at 08:37

## 2021-01-27 RX ADMIN — ENOXAPARIN SODIUM 40 MG: 40 INJECTION SUBCUTANEOUS at 08:39

## 2021-01-27 RX ADMIN — CLONIDINE HYDROCHLORIDE 0.2 MG: 0.2 TABLET ORAL at 08:37

## 2021-01-27 NOTE — DISCHARGE SUMMARY
Discharge Summary    NAME: Cassandria Kehr  :  1925  MRN:  [id number]    Admit date:  2021  Discharge date:  2021    Admitting Physician: Miki Turcios MD    Advance Directive: Full Code    Consults:    Orthopedic surgery    Primary Care Physician:  Alan Perry MD    Discharge Diagnoses:      Closed fracture of distal end of right femur, [facility]  Anemia from postoperative acute blood loss and hemodilution from IV fluid    Difficulty walking    Bilateral hearing loss    Right humeral fracture      Significant Diagnostic Studies:   Xr Shoulder Right (min 2 Views)    Result Date: 2021  EXAMINATION:  XR SHOULDER RIGHT (MIN 2 VIEWS), XR HUMERUS RIGHT (MIN 2 VIEWS)  2021 7:36 AM HISTORY: The patient fell. Right arm pain. COMPARISON: No comparison study. TECHNIQUE: AP and transthoracic views of the right shoulder. AP and transthoracic views of the right humerus. FINDINGS: There is a fracture of the proximal metaphysis of the right humerus. The fracture is mildly comminuted. There is medial displacement of the distal fragment on the AP images. There is no significant displacement on the transthoracic views. The remainder of the right humerus appears to be intact. The elbow joint is not assessed very well. Proximal right humerus fracture, as described.  Signed by Dr Solo Sit on 2021 7:38 AM    Xr Humerus Right (min 2 Views)    Result Date: 2021 EXAMINATION:  XR SHOULDER RIGHT (MIN 2 VIEWS), XR HUMERUS RIGHT (MIN 2 VIEWS)  1/24/2021 7:36 AM HISTORY: The patient fell. Right arm pain. COMPARISON: No comparison study. TECHNIQUE: AP and transthoracic views of the right shoulder. AP and transthoracic views of the right humerus. FINDINGS: There is a fracture of the proximal metaphysis of the right humerus. The fracture is mildly comminuted. There is medial displacement of the distal fragment on the AP images. There is no significant displacement on the transthoracic views. The remainder of the right humerus appears to be intact. The elbow joint is not assessed very well. Proximal right humerus fracture, as described. Signed by Dr Franco Guzman on 1/24/2021 7:38 AM    Xr Knee Right (1-2 Views)    Result Date: 1/24/2021  EXAMINATION:  XR KNEE RIGHT (1-2 VIEWS)  1/24/2021 11:55 AM HISTORY: Right femur fracture internal repair. COMPARISON: No comparison study. TECHNIQUE: 4 fluoroscopic spot images were obtained. FLUOROSCOPY DOSE: 0.1200 mGym2. FINDINGS: Images demonstrate lateral plate fixation of the distal right femur fracture. The major fracture fragments appear well aligned. There are multiple comminuted smaller fragments along the distal medial metaphysis. Bone detail is limited. Operative images, as above.  Signed by Dr Franco Guzman on 1/24/2021 11:57 AM    Xr Knee Right (1-2 Views)    Result Date: 1/24/2021 EXAMINATION:  XR HIP 2-3 VW W PELVIS RIGHT, XR KNEE RIGHT (1-2 VIEWS) 1/24/2021 7:32 AM HISTORY: The patient fell. Left leg pain. Right hip pain. COMPARISON: No comparison study. TECHNIQUE: AP pelvis; AP and lateral right knee; AP and crosstable lateral right hip. RIGHT HIP AND PELVIS: There has been prior right hip arthroplasty. The appliance is in good position. There has been prior internal repair of a left proximal femur fracture that appears to be healed. The pelvis is intact. LEFT KNEE: There is an oblique impacted fracture of the distal right femoral shaft and metaphysis. There is not appear to be significant displacement or angulation. There is narrowing of all 3 compartments of the right knee. There is chondrocalcinosis of the menisci. There is fluid in the right knee joint space. 1. Status post right hip arthroplasty. The appliance is in proper position. No evidence of acute pelvic or right hip abnormality. 2. Acute fracture of the distal right femoral shaft and metaphysis with impaction. No significant displacement. 3. Prior left proximal femur repair. 4. Narrowing of the right knee joint spaces. Chondrocalcinosis. Signed by Dr Carlos Wharton on 1/24/2021 7:36 AM    Xr Chest Portable    Result Date: 1/24/2021  EXAMINATION:  XR CHEST PORTABLE  1/24/2021 7:30 AM HISTORY: Preoperative chest x-ray. Hypertension. COMPARISON: 3/6/2018. FINDINGS:  There is hypoventilation. There is vascular crowding. There is stable coarse markings and bronchial wall thickening. There is mild atelectasis in the lung bases. There is mild cardiomegaly. There is a pacemaker on the left. There has been prior kyphoplasty at 2 levels. There is an acute fracture of the proximal right humerus. 1. Hypoventilation with vascular crowding. Atelectasis in both lung bases. 2. Coarse markings and bronchial wall thickening, stable. 3. Cardiomegaly. No CHF.  Signed by Dr Carlos Wharton on 1/24/2021 7:31 AM    Xr Hip 2-3 Vw W Pelvis Right Result Date: 1/24/2021  EXAMINATION:  XR HIP 2-3 VW W PELVIS RIGHT, XR KNEE RIGHT (1-2 VIEWS) 1/24/2021 7:32 AM HISTORY: The patient fell. Left leg pain. Right hip pain. COMPARISON: No comparison study. TECHNIQUE: AP pelvis; AP and lateral right knee; AP and crosstable lateral right hip. RIGHT HIP AND PELVIS: There has been prior right hip arthroplasty. The appliance is in good position. There has been prior internal repair of a left proximal femur fracture that appears to be healed. The pelvis is intact. LEFT KNEE: There is an oblique impacted fracture of the distal right femoral shaft and metaphysis. There is not appear to be significant displacement or angulation. There is narrowing of all 3 compartments of the right knee. There is chondrocalcinosis of the menisci. There is fluid in the right knee joint space. 1. Status post right hip arthroplasty. The appliance is in proper position. No evidence of acute pelvic or right hip abnormality. 2. Acute fracture of the distal right femoral shaft and metaphysis with impaction. No significant displacement. 3. Prior left proximal femur repair. 4. Narrowing of the right knee joint spaces. Chondrocalcinosis. Signed by Dr Rupali Colón on 1/24/2021 7:36 AM      Pertinent Labs:     CBC:   Recent Labs     01/25/21  1752 01/26/21  0303 01/27/21  0406   WBC 27.0* 23.1* 20.1*   HGB 10.6* 9.2* 9.0*    110* 160     BMP:    Recent Labs     01/25/21  0135 01/26/21  0303 01/27/21  0406    140 138   K 4.4 4.9 4.6    102 101   CO2 25 31* 30*   BUN 13 20 17   CREATININE 0.8 0.7 0.5   GLUCOSE 163* 143* 126*           Procedures: Open reduction internal fixation of right supracondylar distal femur fracture and closed treatment of proximal humerus fracture      DATE of SURGERY: 1/24/2021     SURGEON: Alex Ho MD     ASSISTANT: NONE      PREOPERATIVE DIAGNOSES: 1) Acute traumatic displaced supracondylar fracture of the right femur without intercondylar extension, initial encounter for closed fracture. 2) Acute traumatic displaced 2 part surgical neck fracture of the right humerus, initial encounter for closed fracture     POSTOPERATIVE DIAGNOSES:   1) Acute traumatic displaced supracondylar fracture of the right femur without intercondylar extension, initial encounter for closed fracture. 2) Acute traumatic displaced 2 part surgical neck fracture of the right humerus, initial encounter for closed fracture     PROCEDURE PERFORMED:   1) Open reduction internal fixation, right supracondylar distal femur fracture without intercondylar extension. 2) Closed treatment without manipulation of right 2 part proximal humerus fracture     IMPLANTS: Synthes distal femoral locking plate.      ANESTHESIA: General endotracheal anesthesia.      INDICATIONS: This patient is a 80 y.o. female who status post fall at home earlier this morning sustaining injuries to the right shoulder and right distal femur. After reviewing radiographs, I recommended surgical treatment for her femur and nonop treatment for the shoulder. Risks included, but are not limited to that of anesthesia, bleeding, infection, pain, damage to local structures, need for further surgery, malunion, nonunion, m alrotation, and leg length inequality.  We also discussed that given her smoking status that she was at risk for developing nonunion, and wound healing problems.      ESTIMATED BLOOD LOSS: 200 mL.      SPECIMENS: None.      DRAINS: None.      COMPLICATIONS: None.     DESCRIPTION OF PROCEDURE: The patient was seen in the preoperative holding room. Once again, the informed consent form was reviewed with the patient and signed. The site of surgery was marked with her in agreement. She was transported to the operating room where a timeout was performed identifying the correct patient, as well as the operative site. Then 1 g of IV Kefzol was given as perioperative antibiotics. The right lower extremity was prepped and draped in sterile fashion. Tourniquet was placed about her right thigh, inflated to 250 mmHg and total tourniquet time was approximately 2 hours.      A Shantz pin was then placed through the proximal tibia and a traction bow was applied to this pin. A sterile rope was utilized and 15 pounds of weight was then attached to the rope and dropped off the edge of the bed to apply longitudinal traction to the fracture. With this maneuver only her fracture appeared to regain length and alignment. There was slight posterior site apex posterior angulation of the fracture, which was then improve with a bump of towels underneath this depressed segment.  Again, the fracture was noted to be heavily comminuted.    The incision was made on the lateral aspect of the knee it extending approximately. The IT band was split in line with the incision. The vastus lateralis was identified was retracted and preserved. The perforating vessels were identified and cauterized. The fracture was cleared of hematoma and soft tissue interposition and an adequate reduction was achieved. While maintaining this reduction with a point-to-point clamp, a Synthes 8-hole lateral distal femoral locking plate was applied to bone. A series of locking screws were placed distally and proximally after the plate was secured to bone with a cortical screw. Taking care to place the plate directly bone proximally in both the AP and lateral planes, again a series of locking screws were inserted proximally maintaining length, alignment and rotation. Her bone quality was extremely poor and the fracture was heavily comminuted. The incision was irrigated followed by closure in layers. Skin was closed with staples. Distally the traction pin was removed and these small and stab incisions were closed with staples as well.  A sterile dressing was placed.      Closed treatment of the right proximal humerus fracture will include sling, non-weightbearing, serial x-rays until healing, and therapy if needed.     She was at awakened from anesthesia and transported to recovery room stable condition.      PLAN:  1) Non-weightbearing operative extremity x 10-12 weeks  2) DVT prophylaxis  3) PT/OT      Electronically signed by Sanchez Srivastava MD on 1/24/2021 at 12:11 PM Hospital Course:    42-year-old female who presented after sustaining a fall from standing after tripping over her walker. Imaging demonstrated fracture of right humerus and distal right femur. On 1/24/2021 she underwent open reduction internal fixation of distal right femur fracture and had closed treatment of humerus fracture. She did have some anemia likely secondary to postoperative acute blood loss and hemodilution from IV fluid, however H&H stabilized and never required transfusion. Patient otherwise with generalized weakness, worked with PT/OT and would benefit from ongoing rehab. Patient qualified for SNF placement and stable for discharge to SNF on 1/27/2021. She has outpatient follow-up appointment with orthopedic surgery scheduled for postoperative checkup. Physical Exam:  Vital Signs: BP (!) 147/64   Pulse 61   Temp 99 °F (37.2 °C) (Temporal)   Resp 16   Ht 5' 8\" (1.727 m)   Wt 195 lb 12.8 oz (88.8 kg)   LMP  (LMP Unknown)   SpO2 91%   BMI 29.77 kg/m²   General: No acute distress  HEENT: Normocephalic/atraumatic, no scleral icterus  Neck: Supple, nontender  Cardiovascular: Normal rate with regular rhythm, peripheral pulses symmetric  Respiratory: Lungs clear to auscultation bilaterally  Abdomen: Soft, nontender nondistended, bowel sounds present  Musculoskeletal: RLE dressing in place/immobilizer in place  Neurologic: Generalized weakness, but no apparent focal deficits  Psychiatric: Normal mood    Discharge Medications:       Viky Collins   Home Medication Instructions [id number]    Printed on:01/27/21 0949   Medication Information                      acetaminophen (TYLENOL) 325 MG tablet  Take 650 mg by mouth every 6 hours as needed for Pain             ALPRAZolam (XANAX) 0.25 MG tablet  Take 0.5 tablets by mouth daily for 3 days.              amLODIPine (NORVASC) 10 MG tablet  Take 10 mg by mouth daily             aspirin 81 MG chewable tablet Take 1 tablet by mouth daily             cloNIDine (CATAPRES) 0.1 MG tablet  Take 0.1 mg by mouth every 4 hours as needed for High Blood Pressure             cloNIDine (CATAPRES) 0.3 MG tablet  Take 0.2 mg by mouth every 12 hours              docusate sodium (COLACE) 100 MG capsule  Take 100 mg by mouth 2 times daily             fluticasone (FLONASE) 50 MCG/ACT nasal spray  1 spray by Nasal route as needed Indications: each nostril              furosemide (LASIX) 40 MG tablet  TAKE 1 TABLET EVERY MORNING             gabapentin (NEURONTIN) 300 MG capsule  Take 1 capsule by mouth every 12 hours for 3 days. guaiFENesin (MUCINEX) 600 MG extended release tablet  Take 1,200 mg by mouth 2 times daily             hydrALAZINE (APRESOLINE) 25 MG tablet  Take 25 mg by mouth 2 times daily             hydrocortisone 1 % cream  Apply topically as needed Apply topically 2 times daily.              ipratropium-albuterol (DUONEB) 0.5-2.5 (3) MG/3ML SOLN nebulizer solution  Inhale 1 vial into the lungs every 6 hours as needed for Shortness of Breath             lisinopril (PRINIVIL;ZESTRIL) 40 MG tablet  Take 40 mg by mouth daily             Methylcellulose POWD  2 g by Does not apply route daily             metoprolol tartrate (LOPRESSOR) 50 MG tablet  Take 50 mg by mouth 2 times daily             MULTIPLE VITAMIN PO  Take by mouth             Omega-3 1000 MG CAPS  Take by mouth 2 times daily             OXYGEN  Inhale into the lungs             PARoxetine (PAXIL) 20 MG tablet  Take 20 mg by mouth every morning             polyethyl glycol-propyl glycol 0.4-0.3 % (SYSTANE) 0.4-0.3 % ophthalmic solution  Place 1 drop into both eyes as needed for Dry Eyes or Other             simvastatin (ZOCOR) 20 MG tablet  Take 20 mg by mouth nightly             sodium chloride (OCEAN, BABY AYR) 0.65 % nasal spray  1 spray by Nasal route daily             vitamin D (CHOLECALCIFEROL) 25 MCG (1000 UT) TABS tablet Take 1,000 Units by mouth daily             vitamin E 400 UNIT capsule  Take 400 Units by mouth daily             zolpidem (AMBIEN) 5 MG tablet  Take 5 mg by mouth nightly as needed for Sleep. Discharge Instructions: Follow up with Yancy Laughlin MD.  Call for appointment. Follow up with Orthopedic Surgery for post-op check up as scheduled. Take medications as directed. Resume activity as tolerated. Diet: DIET GENERAL;  Dietary Nutrition Supplements: Standard High Calorie Oral Supplement     Disposition: Patient is medically stable and will be discharged to SNF. Time spent on discharge over 30 minutes.       Signed:  Yaakov Mulligan MD  1/27/2021 9:49 AM

## 2021-01-27 NOTE — PROGRESS NOTES
Surgical incision assessed with Neymar HUERTA. Incision had slight amount of drainage. Dressing removed and new dressing applied.  Electronically signed by Radha Fairbanks RN on 1/27/2021 at 7:42 AM

## 2021-01-27 NOTE — PROGRESS NOTES
Orthopedic Surgery Progress Note    [facility]  1/27/2021      Subjective:     Systemic or Specific Complaints:  No complaints. No overnight events. Objective:     Patient Vitals for the past 24 hrs:   BP Temp Temp src Pulse Resp SpO2   01/27/21 0621 (!) 147/64 99 °F (37.2 °C) Temporal 61 16 91 %   01/27/21 0216 (!) 149/65 97.6 °F (36.4 °C) Temporal 60 18 91 %   01/26/21 2210 (!) 163/68 98.2 °F (36.8 °C) Temporal 61 18 90 %   01/26/21 1848 (!) 179/73 96.8 °F (36 °C) Temporal 64 18 90 %   01/26/21 1443 138/67 98.6 °F (37 °C) Temporal 60 18 90 %   01/26/21 1049 (!) 121/53 97.1 °F (36.2 °C) Temporal 60 18 90 %   01/26/21 0749    58         right upper; right lower  General: alert, appears stated age and cooperative   Wound: Warm, dry and intact. Dressing: Small amount of bloody drainage. Extremity: Distal NVI           DVT Exam: No evidence of DVT seen on physical exam.                   Data Review:  Recent Labs     01/26/21  0303 01/27/21  0406   HGB 9.2* 9.0*     Recent Labs     01/27/21  0406      K 4.6   CREATININE 0.5       Assessment:     POD# 3     1) Open reduction internal fixation, right supracondylar distal femur fracture without intercondylar extension. 2) Closed treatment without manipulation of right 2 part proximal humerus fracture      Plan:      1:  DVT prophylaxis, ICE, elevate  2:  Pain control  3:  Physical therapy/Occupational therapy  4:  Placement. ..likely [facility] today. 5: Non-weight bearing, RLE/ RUE. 6:  f/u in office in 2 weeks.       Electronically signed by Prudencio Sever, PA-C on 1/27/2021 at 7:23 AM

## 2021-01-27 NOTE — DISCHARGE INSTR - DIET
? Good nutrition is important when healing from an illness, injury, or surgery. Follow any nutrition recommendations given to you during your hospital stay. ? If you were given an oral nutrition supplement while in the hospital, continue to take this supplement at home. You can take it with meals, in-between meals, and/or before bedtime. These supplements can be purchased at most local grocery stores, pharmacies, and chain [employer]-stores. ? If you have any questions about your diet or nutrition, call the hospital and ask for the dietitian. ?    General diet with Dietary Nutrition Supplements: Standard High Calorie Oral Supplement

## 2021-02-01 DIAGNOSIS — M79.2 NEUROPATHIC PAIN: ICD-10-CM

## 2021-02-01 RX ORDER — GABAPENTIN 300 MG/1
300 CAPSULE ORAL EVERY 12 HOURS SCHEDULED
Qty: 60 CAPSULE | Refills: 1 | Status: SHIPPED | OUTPATIENT
Start: 2021-02-01 | End: 2021-03-08

## 2021-02-02 ENCOUNTER — LAB REQUISITION (OUTPATIENT)
Dept: LAB | Facility: HOSPITAL | Age: 86
End: 2021-02-02

## 2021-02-02 DIAGNOSIS — Z00.00 ENCOUNTER FOR GENERAL ADULT MEDICAL EXAMINATION WITHOUT ABNORMAL FINDINGS: ICD-10-CM

## 2021-02-02 LAB
ANION GAP SERPL CALCULATED.3IONS-SCNC: 10 MMOL/L (ref 5–15)
BASOPHILS # BLD AUTO: 0.16 10*3/MM3 (ref 0–0.2)
BASOPHILS NFR BLD AUTO: 0.6 % (ref 0–1.5)
BUN SERPL-MCNC: 15 MG/DL (ref 8–23)
BUN/CREAT SERPL: 29.4 (ref 7–25)
CALCIUM SPEC-SCNC: 9.1 MG/DL (ref 8.2–9.6)
CHLORIDE SERPL-SCNC: 99 MMOL/L (ref 98–107)
CO2 SERPL-SCNC: 29 MMOL/L (ref 22–29)
CREAT SERPL-MCNC: 0.51 MG/DL (ref 0.57–1)
DEPRECATED RDW RBC AUTO: 50.5 FL (ref 37–54)
EOSINOPHIL # BLD AUTO: 0.14 10*3/MM3 (ref 0–0.4)
EOSINOPHIL NFR BLD AUTO: 0.6 % (ref 0.3–6.2)
ERYTHROCYTE [DISTWIDTH] IN BLOOD BY AUTOMATED COUNT: 15.3 % (ref 12.3–15.4)
GFR SERPL CREATININE-BSD FRML MDRD: 112 ML/MIN/1.73
GLUCOSE SERPL-MCNC: 138 MG/DL (ref 65–99)
HCT VFR BLD AUTO: 29.4 % (ref 34–46.6)
HGB BLD-MCNC: 9.6 G/DL (ref 12–15.9)
IMM GRANULOCYTES # BLD AUTO: 0.76 10*3/MM3 (ref 0–0.05)
IMM GRANULOCYTES NFR BLD AUTO: 3 % (ref 0–0.5)
LYMPHOCYTES # BLD AUTO: 2.74 10*3/MM3 (ref 0.7–3.1)
LYMPHOCYTES NFR BLD AUTO: 11 % (ref 19.6–45.3)
MCH RBC QN AUTO: 29.9 PG (ref 26.6–33)
MCHC RBC AUTO-ENTMCNC: 32.7 G/DL (ref 31.5–35.7)
MCV RBC AUTO: 91.6 FL (ref 79–97)
MONOCYTES # BLD AUTO: 2.02 10*3/MM3 (ref 0.1–0.9)
MONOCYTES NFR BLD AUTO: 8.1 % (ref 5–12)
NEUTROPHILS NFR BLD AUTO: 19.11 10*3/MM3 (ref 1.7–7)
NEUTROPHILS NFR BLD AUTO: 76.7 % (ref 42.7–76)
NRBC BLD AUTO-RTO: 0 /100 WBC (ref 0–0.2)
PLATELET # BLD AUTO: 412 10*3/MM3 (ref 140–450)
PMV BLD AUTO: 10 FL (ref 6–12)
POTASSIUM SERPL-SCNC: 4.2 MMOL/L (ref 3.5–5.2)
RBC # BLD AUTO: 3.21 10*6/MM3 (ref 3.77–5.28)
SODIUM SERPL-SCNC: 138 MMOL/L (ref 136–145)
WBC # BLD AUTO: 24.93 10*3/MM3 (ref 3.4–10.8)

## 2021-02-02 PROCEDURE — 36415 COLL VENOUS BLD VENIPUNCTURE: CPT | Performed by: NURSE PRACTITIONER

## 2021-02-02 PROCEDURE — 85025 COMPLETE CBC W/AUTO DIFF WBC: CPT | Performed by: NURSE PRACTITIONER

## 2021-02-02 PROCEDURE — 80048 BASIC METABOLIC PNL TOTAL CA: CPT | Performed by: NURSE PRACTITIONER

## 2021-02-04 ENCOUNTER — HOSPITAL ENCOUNTER (EMERGENCY)
Age: 86
Discharge: NURSING, REHAB OR LONG-TERM CARE | End: 2021-02-04
Attending: PEDIATRICS
Payer: MEDICARE

## 2021-02-04 VITALS
OXYGEN SATURATION: 94 % | HEART RATE: 69 BPM | DIASTOLIC BLOOD PRESSURE: 67 MMHG | TEMPERATURE: 98.7 F | SYSTOLIC BLOOD PRESSURE: 165 MMHG | RESPIRATION RATE: 20 BRPM

## 2021-02-04 DIAGNOSIS — Z78.9 LACK OF INTRAVENOUS ACCESS: Primary | ICD-10-CM

## 2021-02-04 PROCEDURE — 99285 EMERGENCY DEPT VISIT HI MDM: CPT

## 2021-02-04 ASSESSMENT — ENCOUNTER SYMPTOMS
SHORTNESS OF BREATH: 0
RHINORRHEA: 0
COUGH: 0
VOMITING: 0
ABDOMINAL PAIN: 0

## 2021-02-04 NOTE — ED PROVIDER NOTES
[facility] [place] EMERGENCY DEPT  eMERGENCY dEPARTMENT eNCOUnter      Pt Name: Feroz Dhillno  MRN: [id number]  [place] 4/4/1925  Date of evaluation: 2/3/2021  Provider: Torres Hernandez MD    [address] [place] [address]       Chief Complaint   Patient presents with    Other     Needs IV for vancomycin          HISTORY OF PRESENT ILLNESS   (Location/Symptom, Timing/Onset,Context/Setting, Quality, Duration, Modifying Factors, Severity)  Note limiting factors. Merle Collins is a 80 y.o. female who presents to the emergency department because she needs IV placed for vancomycin dosing. Patient is sent to the emergency department by her nursing care facility as they are unable to obtain IV access. Patient is currently on vancomycin secondary to urinary tract infection. Both nursing facility and patient deny other symptoms of concern. HPI    NursingNotes were reviewed. REVIEW OF SYSTEMS    (2-9 systems for level 4, 10 or more for level 5)     Review of Systems   Constitutional: Negative for chills and fever. Patient states \"no\" to all questions except hearing loss. HENT: Positive for hearing loss (Chronic). Negative for congestion and rhinorrhea. Respiratory: Negative for cough and shortness of breath. Cardiovascular: Negative for chest pain. Gastrointestinal: Negative for abdominal pain and vomiting. Genitourinary: Negative for difficulty urinating and dysuria. Psychiatric/Behavioral: Negative for confusion.             PAST MEDICALHISTORY     Past Medical History:   Diagnosis Date    Anxiety     Asthma     Dementia ([address]     Difficulty walking     Essential hypertension     Fracture lumbar vertebra-closed (HCC)     Hyperlipidemia     Hypoxemia     Insomnia     Macular degeneration     Major depressive disorder     Mononeuropathy     Pneumonia     Polyosteoarthritis     Repeated falls     Rhabdomyolysis          SURGICAL HISTORY       Past Surgical History:   Procedure Laterality Date  BACK SURGERY      CHOLECYSTECTOMY      COLECTOMY      6 inches removed    FEMUR FRACTURE SURGERY Right 1/24/2021    FEMUR OPEN REDUCTION INTERNAL FIXATION performed by Andree Leonard MD at [address]  10/2017    pins     HIP SURGERY  2013    HYSTERECTOMY      PACEMAKER PLACEMENT           CURRENT MEDICATIONS     Discharge Medication List as of 2/4/2021  1:06 AM      CONTINUE these medications which have NOT CHANGED    Details   gabapentin (NEURONTIN) 300 MG capsule Take 1 capsule by mouth every 12 hours for 30 days. , Disp-60 capsule, R-1Normal      aspirin 81 MG chewable tablet Take 1 tablet by mouth daily, Disp-30 tablet, R-0Normal      vitamin D (CHOLECALCIFEROL) 25 MCG (1000 UT) TABS tablet Take 1,000 Units by mouth dailyHistorical Med      !! cloNIDine (CATAPRES) 0.3 MG tablet Take 0.2 mg by mouth every 12 hours Historical Med      furosemide (LASIX) 40 MG tablet TAKE 1 TABLET EVERY MORNINGHistorical Med      polyethyl glycol-propyl glycol 0.4-0.3 % (SYSTANE) 0.4-0.3 % ophthalmic solution Place 1 drop into both eyes as needed for Dry Eyes or OtherHistorical Med      acetaminophen (TYLENOL) 325 MG tablet Take 650 mg by mouth every 6 hours as needed for PainHistorical Med      amLODIPine (NORVASC) 10 MG tablet Take 10 mg by mouth dailyHistorical Med      !! cloNIDine (CATAPRES) 0.1 MG tablet Take 0.1 mg by mouth every 4 hours as needed for High Blood PressureHistorical Med      docusate sodium (COLACE) 100 MG capsule Take 100 mg by mouth 2 times dailyHistorical Med      ipratropium-albuterol (DUONEB) 0.5-2.5 (3) MG/3ML SOLN nebulizer solution Inhale 1 vial into the lungs every 6 hours as needed for Shortness of BreathHistorical Med      fluticasone (FLONASE) 50 MCG/ACT nasal spray 1 spray by Nasal route as needed Indications: each nostril Historical Med      guaiFENesin (MUCINEX) 600 MG extended release tablet Take 1,200 mg by mouth 2 times dailyHistorical Med  Physical activity     Days per week: None     Minutes per session: None    Stress: None   Relationships    Social connections     Talks on phone: None     Gets together: None     Attends Sikhism service: None     Active member of club or organization: None     Attends meetings of clubs or organizations: None     Relationship status: None    Intimate partner violence     Fear of current or ex partner: None     Emotionally abused: None     Physically abused: None     Forced sexual activity: None   Other Topics Concern    None   Social History Narrative    None       SCREENINGS    Tami Coma Scale  Eye Opening: Spontaneous  Best Verbal Response: Oriented  Best Motor Response: Obeys commands  [place] Coma Scale Score: 15        PHYSICAL EXAM    (up to 7 for level 4, 8 or more for level 5)     ED Triage Vitals   BP Temp Temp Source Pulse Resp SpO2 Height Weight   02/04/21 0047 02/04/21 0034 02/04/21 0034 02/04/21 0034 02/04/21 0034 02/04/21 0034 -- --   (!) 165/67 98.7 °F (37.1 °C) Oral 69 20 94 %         Physical Exam  Vitals signs and nursing note reviewed. Constitutional:       General: She is not in acute distress. Appearance: Normal appearance. HENT:      Head: Normocephalic and atraumatic. Right Ear: External ear normal.      Left Ear: External ear normal.      Nose: Nose normal.      Mouth/Throat:      Mouth: Mucous membranes are moist.      Pharynx: Oropharynx is clear. No oropharyngeal exudate. Eyes:      General: No scleral icterus. Conjunctiva/sclera: Conjunctivae normal.      Pupils: Pupils are equal, round, and reactive to light. Neck:      Musculoskeletal: Neck supple. No neck rigidity. Cardiovascular:      Rate and Rhythm: Normal rate and regular rhythm. Pulses: Normal pulses. Heart sounds: Normal heart sounds. Pulmonary:      Effort: Pulmonary effort is normal.      Breath sounds: Normal breath sounds.    Abdominal:      General: Bowel sounds are normal. Palpations: Abdomen is soft. Tenderness: There is no abdominal tenderness. There is no guarding. Musculoskeletal:         General: Tenderness and signs of injury (Right upper extremity in sling due to humeral fracture.) present. No deformity. Skin:     General: Skin is warm and dry. Capillary Refill: Capillary refill takes less than 2 seconds. Coloration: Skin is pale. Skin is not jaundiced. Findings: Bruising (Fading bruising of multiple sites) present. Neurological:      General: No focal deficit present. Mental Status: She is alert and oriented to person, place, and time. Mental status is at baseline. Cranial Nerves: Cranial nerve deficit (Chronic hearing loss bilaterally) present. Motor: Weakness (Generalized) present. Coordination: Coordination normal.   Psychiatric:         Mood and Affect: Mood normal.         Behavior: Behavior normal.         DIAGNOSTIC RESULTS     RADIOLOGY:  Non-plain film images such as CT, Ultrasound and MRI are read by the radiologist. Plain radiographic images are visualized and preliminarily interpreted bythe emergency physician with the below findings:      No orders to display           LABS:  Labs Reviewed - No data to display    All other labs were within normal range or not returned as of this dictation. EMERGENCY DEPARTMENT COURSE and DIFFERENTIAL DIAGNOSIS/MDM:   Vitals:    Vitals:    02/04/21 0034 02/04/21 0047   BP:  (!) 165/67   Pulse: 69    Resp: 20    Temp: 98.7 °F (37.1 °C)    TempSrc: Oral    SpO2: 94%        MDM  42-year-old female presents with request to obtain IV access. IV access obtained and right upper extremity. EMS is waiting to transport patient back to SNF. Patient will follow up with Dr. Alex White as needed. Patient will return with further symptoms of concern. CONSULTS:  None    PROCEDURES:  Unless otherwise noted below, none     Procedures    FINAL IMPRESSION      1.  Lack of intravenous access DISPOSITION/PLAN   DISPOSITION Decision To Discharge 02/04/2021 12:53:14 AM      PATIENT REFERRED TO:  Franko Ayers MD  [address] [place] [address] [address] [id number]  [phone]    Schedule an appointment as soon as possible for a visit         DISCHARGE MEDICATIONS:  Discharge Medication List as of 2/4/2021  1:06 AM             (Please note that portions of this note were completed with a voice recognition program.  Efforts were made to edit thedictations but occasionally words are mis-transcribed.)    Mirta Ospina MD (electronically signed)  Attending Emergency Physician          Mirta Ospina MD  02/04/21 [id number]

## 2021-02-05 ENCOUNTER — HOSPITAL ENCOUNTER (OUTPATIENT)
Dept: WOUND CARE | Age: 86
Discharge: HOME | End: 2021-02-05
Payer: MEDICARE

## 2021-02-05 VITALS
WEIGHT: 195 LBS | HEART RATE: 63 BPM | HEIGHT: 68 IN | RESPIRATION RATE: 20 BRPM | SYSTOLIC BLOOD PRESSURE: 168 MMHG | DIASTOLIC BLOOD PRESSURE: 62 MMHG | BODY MASS INDEX: 29.55 KG/M2 | TEMPERATURE: 97.4 F

## 2021-02-05 DIAGNOSIS — S72.401A CLOSED FRACTURE OF DISTAL END OF RIGHT FEMUR, INITIAL ENCOUNTER (HCC): Primary | ICD-10-CM

## 2021-02-05 PROCEDURE — 99214 OFFICE O/P EST MOD 30 MIN: CPT

## 2021-02-05 PROCEDURE — 99024 POSTOP FOLLOW-UP VISIT: CPT | Performed by: NURSE PRACTITIONER

## 2021-02-05 RX ORDER — SIMVASTATIN 20 MG
20 TABLET ORAL NIGHTLY
COMMUNITY

## 2021-02-05 RX ORDER — OXYCODONE HYDROCHLORIDE 5 MG/1
5 CAPSULE ORAL EVERY 6 HOURS PRN
COMMUNITY

## 2021-02-05 RX ORDER — ALPRAZOLAM 0.5 MG/1
0.5 TABLET ORAL NIGHTLY PRN
COMMUNITY
End: 2021-03-04 | Stop reason: SDUPTHER

## 2021-02-05 ASSESSMENT — PAIN SCALES - GENERAL: PAINLEVEL_OUTOF10: 3

## 2021-02-05 ASSESSMENT — PAIN DESCRIPTION - ORIENTATION: ORIENTATION: RIGHT

## 2021-02-05 ASSESSMENT — VISUAL ACUITY: OU: 1

## 2021-02-05 ASSESSMENT — PAIN DESCRIPTION - LOCATION: LOCATION: SHOULDER

## 2021-02-05 ASSESSMENT — PAIN DESCRIPTION - PAIN TYPE: TYPE: ACUTE PAIN

## 2021-02-05 NOTE — PROGRESS NOTES
Patient Care Team:  Joi Yee MD as PCP - General (Internal Medicine)    TODAY'S DATE:  2/5/2021 1/24/21- Dr. Margie King of [facility]   Lindle Ganser Mix is a 80 y.o. female who presents today for wound evaluation. Ms. Collins suffered a fall resulting in fractures to her right femur and right shoulder. She had surgical repair with Dr. Jud Roldan to her right femur. She is here today for evaluation of this incision. Edges are approximated, clean, dry, intact with staples, and without redness noted. Patient Active Problem List   Diagnosis Code    [facility] distal end of right femur[facility] S72.401A    Difficulty walking R26.2    Sight impaired H54.7    Bilateral hearing loss H91.93    Right humeral fracture S42.301A     Viky Collins is a 80 y.o. female with the following history reviewed and recorded in [facility]:    Current Outpatient Medications   Medication Sig Dispense Refill    simvastatin (ZOCOR) 20 MG tablet Take 20 mg by mouth nightly      oxyCODONE 5 MG capsule Take 5 mg by mouth every 6 hours as needed for Pain.  ALPRAZolam (XANAX) 0.5 MG tablet Take 0.5 mg by mouth nightly as needed for Sleep.  gabapentin (NEURONTIN) 300 MG capsule Take 1 capsule by mouth every 12 hours for 30 days.  60 capsule 1    aspirin 81 MG chewable tablet Take 1 tablet by mouth daily 30 tablet 0    vitamin D (CHOLECALCIFEROL) 25 MCG (1000 UT) TABS tablet Take 1,000 Units by mouth daily      cloNIDine (CATAPRES) 0.2 MG tablet Take 0.2 mg by mouth every 12 hours       furosemide (LASIX) 40 MG tablet TAKE 1 TABLET EVERY MORNING      polyethyl glycol-propyl glycol 0.4-0.3 % (SYSTANE) 0.4-0.3 % ophthalmic solution Place 1 drop into both eyes as needed for Dry Eyes or Other      acetaminophen (TYLENOL) 325 MG tablet Take 650 mg by mouth every 6 hours as needed for Pain  amLODIPine (NORVASC) 10 MG tablet Take 10 mg by mouth daily      docusate sodium (COLACE) 100 MG capsule Take 100 mg by mouth 2 times daily      ipratropium-albuterol (DUONEB) 0.5-2.5 (3) MG/3ML SOLN nebulizer solution Inhale 1 vial into the lungs every 6 hours as needed for Shortness of Breath      fluticasone (FLONASE) 50 MCG/ACT nasal spray 1 spray by Nasal route as needed Indications: each nostril       guaiFENesin (MUCINEX) 600 MG extended release tablet Take 1,200 mg by mouth 2 times daily      hydrALAZINE (APRESOLINE) 25 MG tablet Take 25 mg by mouth 2 times daily      lisinopril (PRINIVIL;ZESTRIL) 40 MG tablet Take 40 mg by mouth daily      Methylcellulose POWD 2 g by Does not apply route daily      metoprolol tartrate (LOPRESSOR) 50 MG tablet Take 50 mg by mouth 2 times daily      MULTIPLE VITAMIN PO Take by mouth      sodium chloride (OCEAN, BABY AYR) 0.65 % nasal spray 1 spray by Nasal route daily      Omega-3 1000 MG CAPS Take by mouth 2 times daily      OXYGEN Inhale into the lungs      PARoxetine (PAXIL) 20 MG tablet Take 20 mg by mouth every morning      vitamin E 400 UNIT capsule Take 400 Units by mouth daily      zolpidem (AMBIEN) 5 MG tablet Take 5 mg by mouth nightly as needed for Sleep. No current facility-administered medications for this encounter.       Allergies: Ceclor [cefaclor], Eggs or egg-derived products, Penicillins, and Sulfa antibiotics  Past Medical History:   Diagnosis Date    Anxiety     Asthma     D[facility]     Difficulty walking     Essential hypertension     Fracture lumbar vertebra-closed ([facility]     Hyperlipidemia     Hypoxemia     Insomnia     Macular degeneration     Major depressive disorder     Mononeuropathy     Pneumonia     Polyosteoarthritis     Repeated falls     Rhabdomyolysis        Past Surgical History:   Procedure Laterality Date    BACK SURGERY      CHOLECYSTECTOMY      COLECTOMY      6 inches removed  FEMUR FRACTURE SURGERY Right 1/24/2021    FEMUR OPEN REDUCTION INTERNAL FIXATION performed by Ai Aguilar MD at [address]  10/2017    pins     HIP SURGERY  2013    HYSTERECTOMY      PACEMAKER PLACEMENT       History reviewed. No pertinent family history. Social History     Tobacco Use    Smoking status: Former Smoker    Smokeless tobacco: Never Used    Tobacco comment: quit 30 years ago   Substance Use Topics    Alcohol use: No         Review of Systems    Review of Systems   All other systems reviewed and are negative. All other review of systems are negative. Physical Exam    BP (!) 168/62   Pulse 63   Temp 97.4 °F (36.3 °C) (Temporal)   Resp 20   Ht 5' 8\" (1.727 m)   Wt 195 lb (88.5 kg)   LMP  (LMP Unknown)   BMI 29.65 kg/m²     Physical Exam  Vitals signs reviewed. Exam conducted with a chaperone present. Constitutional:       Appearance: Normal appearance. She is normal weight. HENT:      Head: Normocephalic and atraumatic. Right Ear: External ear normal.      Left Ear: External ear normal.   Eyes:      General: Lids are normal. Lids are everted, no foreign bodies appreciated. Vision grossly intact. Gaze aligned appropriately. Cardiovascular:      Rate and Rhythm: Normal rate and regular rhythm. Pulses: Normal pulses. Heart sounds: Normal heart sounds. Pulmonary:      Breath sounds: Normal breath sounds. Abdominal:      General: Bowel sounds are normal.   Musculoskeletal:      Comments: Unable to freely move due to weakness and pain related to surgical intervention and fractures   Skin:     General: Skin is warm and dry. Capillary Refill: Capillary refill takes 2 to 3 seconds. Findings: Ecchymosis present. Neurological:      Mental Status: She is alert and oriented to person, place, and time.       Comments: Unable to hear to answer questions herself however I am able to communicate with her daughter   Psychiatric: Mood and Affect: Mood normal.         Behavior: Behavior normal.         Thought Content: Thought content normal.         Judgment: Judgment normal.             Post Debridement Measurements and Assessment:    The patientspain isPain Level: 3 Pain Type: Acute pain. Please refer to nursing measurements and assessment regarding wound pre and postdebridement. Incision 01/24/21 Thigh Right;Lateral;Distal (Active)   Wound Image    02/05/21 0819   Dressing Status Clean;Dry; Intact 02/05/21 0819   Dressing Change Due 01/28/21 01/27/21 1259   Dressing/Treatment ABD pad 02/05/21 0819   Incision Length (cm) 21 02/05/21 0819   Incision Width (cm) 0.1 cm 02/05/21 0819   Incision Depth (cm) 0.1 cm 02/05/21 0819   Closure Staples 02/05/21 0819   Margins Approximated 01/25/21 1008   Drainage Amount None 02/05/21 0819   Drainage Description Other (Comment) 02/05/21 0819   Odor None 02/05/21 0819   Aparna-incision Assessment Intact; Ecchymosis 02/05/21 0819   Number of days: 11       Assessment    1. Closed fracture of distal end of right femur[facility]          Plan    1. Follow up with Dr. Paul Hinton    Discussed importance of nutrition and offloading bony prominences with the patients randellGaston Palomares. I spent a total of  60 minutes face to face with the patient. Over 50% of that time was spent on counseling and care coordination. Patient was told that if symptoms worsen or new symptoms develop they are to go to the emergency department immediately. Patient was educated on diagnosis and treatment plan. All of patient's questions were answered, and the patient understands the discharge plan. Discussed appropriate home care of this wound. Wound redressed. Patient instructions were given.   Recommend no smoking  Offloading instructions given

## 2021-02-09 ENCOUNTER — HOSPITAL ENCOUNTER (EMERGENCY)
Age: 86
Discharge: HOME | End: 2021-02-09
Attending: EMERGENCY MEDICINE
Payer: MEDICARE

## 2021-02-09 VITALS
DIASTOLIC BLOOD PRESSURE: 63 MMHG | OXYGEN SATURATION: 96 % | TEMPERATURE: 97.3 F | SYSTOLIC BLOOD PRESSURE: 140 MMHG | RESPIRATION RATE: 17 BRPM | HEART RATE: 73 BPM

## 2021-02-09 DIAGNOSIS — Z78.9 LACK OF INTRAVENOUS ACCESS: Primary | ICD-10-CM

## 2021-02-09 PROCEDURE — 99282 EMERGENCY DEPT VISIT SF MDM: CPT

## 2021-02-09 NOTE — ED PROVIDER NOTES
[facility] [place] [facility] EMERGENCY DEPT  eMERGENCY dEPARTMENT eNCOUnter      Pt Name: Maura Ram  MRN: [id number]  [place] 4/4/1925  Date of evaluation: 2/9/2021  Provider: Erica Flowers MD    CHIEF COMPLAINT       Chief Complaint   Patient presents with    Other     Pt sent from [address] [place] [address] for IV access, EMS able to get IV access. Brought to ER to assess IV. HISTORY OF PRESENT ILLNESS   (Location/Symptom, Timing/Onset,Context/Setting, Quality, Duration, Modifying Factors, Severity)  Note limiting factors. Ely Collins is a 80 y.o. female who presents to the emergency department after loss of IV access. I am told numerous nurses tried but were unsuccessful at the nursing home. The medic was successful at the NH but was still required to transport the patient here per the EMS captain I am told. Patient is on vancomycin for UTI. She was seen here on 2/4 for the same issue. HPI    NursingNotes were reviewed.     REVIEW OF SYSTEMS    (2-9 systems for level 4, 10 or more for level 5)     Review of Systems   Unable to perform ROS: Other      Patient very hard of hearing, lots of feedback from hearing aids but she tells me no if anything is bothering her      PAST MEDICALHISTORY     Past Medical History:   Diagnosis Date    Anxiety     Asthma     Dementia ([address]     Difficulty walking     Essential hypertension     Fracture lumbar vertebra-closed ([address]     Hyperlipidemia     Hypoxemia     Insomnia     Macular degeneration     Major depressive disorder     Mononeuropathy     Pneumonia     Polyosteoarthritis     Repeated falls     Rhabdomyolysis          SURGICAL HISTORY       Past Surgical History:   Procedure Laterality Date    BACK SURGERY      CHOLECYSTECTOMY      COLECTOMY      6 inches removed    FEMUR FRACTURE SURGERY Right 1/24/2021    FEMUR OPEN REDUCTION INTERNAL FIXATION performed by Fede Montez MD at [address]  10/2017    pins     HIP SURGERY  2013    HYSTERECTOMY  PACEMAKER PLACEMENT           CURRENT MEDICATIONS     Previous Medications    ACETAMINOPHEN (TYLENOL) 325 MG TABLET    Take 650 mg by mouth every 6 hours as needed for Pain    ALPRAZOLAM (XANAX) 0.5 MG TABLET    Take 0.5 mg by mouth nightly as needed for Sleep. AMLODIPINE (NORVASC) 10 MG TABLET    Take 10 mg by mouth daily    ASPIRIN 81 MG CHEWABLE TABLET    Take 1 tablet by mouth daily    CLONIDINE (CATAPRES) 0.2 MG TABLET    Take 0.2 mg by mouth every 12 hours     DOCUSATE SODIUM (COLACE) 100 MG CAPSULE    Take 100 mg by mouth 2 times daily    FLUTICASONE (FLONASE) 50 MCG/ACT NASAL SPRAY    1 spray by Nasal route as needed Indications: each nostril     FUROSEMIDE (LASIX) 40 MG TABLET    TAKE 1 TABLET EVERY MORNING    GABAPENTIN (NEURONTIN) 300 MG CAPSULE    Take 1 capsule by mouth every 12 hours for 30 days. GUAIFENESIN (MUCINEX) 600 MG EXTENDED RELEASE TABLET    Take 1,200 mg by mouth 2 times daily    HYDRALAZINE (APRESOLINE) 25 MG TABLET    Take 25 mg by mouth 2 times daily    IPRATROPIUM-ALBUTEROL (DUONEB) 0.5-2.5 (3) MG/3ML SOLN NEBULIZER SOLUTION    Inhale 1 vial into the lungs every 6 hours as needed for Shortness of Breath    LISINOPRIL (PRINIVIL;ZESTRIL) 40 MG TABLET    Take 40 mg by mouth daily    METHYLCELLULOSE POWD    2 g by Does not apply route daily    METOPROLOL TARTRATE (LOPRESSOR) 50 MG TABLET    Take 50 mg by mouth 2 times daily    MULTIPLE VITAMIN PO    Take by mouth    OMEGA-3 1000 MG CAPS    Take by mouth 2 times daily    OXYCODONE 5 MG CAPSULE    Take 5 mg by mouth every 6 hours as needed for Pain.     OXYGEN    Inhale into the lungs    PAROXETINE (PAXIL) 20 MG TABLET    Take 20 mg by mouth every morning    POLYETHYL GLYCOL-PROPYL GLYCOL 0.4-0.3 % (SYSTANE) 0.4-0.3 % OPHTHALMIC SOLUTION    Place 1 drop into both eyes as needed for Dry Eyes or Other    SIMVASTATIN (ZOCOR) 20 MG TABLET    Take 20 mg by mouth nightly SODIUM CHLORIDE (OCEAN, BABY AYR) 0.65 % NASAL SPRAY    1 spray by Nasal route daily    VITAMIN D (CHOLECALCIFEROL) 25 MCG (1000 UT) TABS TABLET    Take 1,000 Units by mouth daily    VITAMIN E 400 UNIT CAPSULE    Take 400 Units by mouth daily    ZOLPIDEM (AMBIEN) 5 MG TABLET    Take 5 mg by mouth nightly as needed for Sleep. ALLERGIES     Ceclor [cefaclor], Eggs or egg-derived products, Penicillins, and Sulfa antibiotics    FAMILY HISTORY     History reviewed. No pertinent family history. SOCIAL HISTORY       Social History     Socioeconomic History    Marital status:       Spouse name: None    Number of children: None    Years of education: None    Highest education level: None   Occupational History    None   Social Needs    Financial resource strain: None    Food insecurity     Worry: None     Inability: None    Transportation needs     Medical: None     Non-medical: None   Tobacco Use    Smoking status: Former Smoker    Smokeless tobacco: Never Used    Tobacco comment: quit 30 years ago   Substance and Sexual Activity    Alcohol use: No    Drug use: No    Sexual activity: None   Lifestyle    Physical activity     Days per week: None     Minutes per session: None    Stress: None   Relationships    Social connections     Talks on phone: None     Gets together: None     Attends Restorationism service: None     Active member of club or organization: None     Attends meetings of clubs or organizations: None     Relationship status: None    Intimate partner violence     Fear of current or ex partner: None     Emotionally abused: None     Physically abused: None     Forced sexual activity: None   Other Topics Concern    None   Social History Narrative    None       SCREENINGS             PHYSICAL EXAM    (up to 7 for level 4, 8 or more for level 5)     ED Triage Vitals [02/09/21 0345]   BP Temp Temp Source Pulse Resp SpO2 Height Weight (!) 140/63 97.3 °F (36.3 °C) Temporal 73 17 96 % -- --       Physical Exam  Vitals signs reviewed. Constitutional:       General: She is not in acute distress. Appearance: She is well-developed. HENT:      Head: Normocephalic and atraumatic. Right Ear: External ear normal.      Left Ear: External ear normal.   Eyes:      Conjunctiva/sclera: Conjunctivae normal.   Neck:      Musculoskeletal: Normal range of motion. Trachea: No tracheal deviation. Cardiovascular:      Rate and Rhythm: Normal rate and regular rhythm. Heart sounds: Normal heart sounds. No murmur. Pulmonary:      Effort: No respiratory distress. Breath sounds: Normal breath sounds. No wheezing or rales. Musculoskeletal:      Comments: RUE in sling-known fx    Right forearm IV 20ga   Skin:     General: Skin is warm and dry. Neurological:      Mental Status: She is alert. GCS: GCS eye subscore is 4. GCS verbal subscore is 5. GCS motor subscore is 6. DIAGNOSTIC RESULTS           No orders to display           LABS:  Labs Reviewed - No data to display    All other labs were within normal range or not returned as of this dictation. EMERGENCY DEPARTMENT COURSE and DIFFERENTIAL DIAGNOSIS/MDM:   Vitals:    Vitals:    02/09/21 0345   BP: (!) 140/63   Pulse: 73   Resp: 17   Temp: 97.3 °F (36.3 °C)   TempSrc: Temporal   SpO2: 96%       MDM    VSS, no other concerns, pt now has functioning IV for her antibx, stable for DC back to facility    CONSULTS:  None    PROCEDURES:  Unless otherwise noted below, none     Procedures    FINAL IMPRESSION      1.  Lack of intravenous access          DISPOSITION/PLAN   DISPOSITION Decision To Discharge 02/09/2021 03:48:35 AM      PATIENT REFERRED TO:  Liss Norman MD  [address] [place] [id number]  [phone]      As needed    [facility]  [facility]  [phone]    As needed      DISCHARGE MEDICATIONS:  New Prescriptions No medications on file          (Please note that portions of this note were completed with a voice recognition program.  Efforts were made to edit thedictations but occasionally words are mis-transcribed.)    Rad Jean MD (electronically signed)  Attending Emergency Physician        Chevy Leiva MD  02/09/21 [id number]

## 2021-02-09 NOTE — ED NOTES
Bed: 05  Expected date:   Expected time:   Means of arrival:   Comments:  Elver James [facility]  02/09/21 [id number]

## 2021-03-04 DIAGNOSIS — F41.9 ANXIETY: Primary | ICD-10-CM

## 2021-03-04 RX ORDER — ALPRAZOLAM 0.5 MG/1
0.5 TABLET ORAL NIGHTLY PRN
Qty: 30 TABLET | Refills: 0 | Status: SHIPPED | OUTPATIENT
Start: 2021-03-04 | End: 2021-04-28 | Stop reason: SDUPTHER

## 2021-03-06 DIAGNOSIS — M79.2 NEUROPATHIC PAIN: ICD-10-CM

## 2021-03-08 RX ORDER — GABAPENTIN 300 MG/1
300 CAPSULE ORAL EVERY 12 HOURS SCHEDULED
Qty: 60 CAPSULE | Refills: 0 | Status: SHIPPED | OUTPATIENT
Start: 2021-03-08 | End: 2021-05-26 | Stop reason: SDUPTHER

## 2021-04-08 RX ORDER — CLONIDINE HYDROCHLORIDE 0.2 MG/1
TABLET ORAL
Qty: 180 TABLET | Refills: 3 | OUTPATIENT
Start: 2021-04-08

## 2021-04-08 NOTE — TELEPHONE ENCOUNTER
Called dgt to verify her dose and she says she is in a NH now and she thought she had cancelled her automatic refills at [facility].  Will deny and attach msg to [facility].

## 2021-04-28 DIAGNOSIS — F41.9 ANXIETY: ICD-10-CM

## 2021-04-28 RX ORDER — ALPRAZOLAM 0.5 MG/1
0.5 TABLET ORAL NIGHTLY PRN
Qty: 30 TABLET | Refills: 0 | Status: SHIPPED | OUTPATIENT
Start: 2021-04-28 | End: 2021-05-26 | Stop reason: SDUPTHER

## 2021-05-26 DIAGNOSIS — M79.2 NEUROPATHIC PAIN: ICD-10-CM

## 2021-05-26 DIAGNOSIS — F41.9 ANXIETY: ICD-10-CM

## 2021-05-26 RX ORDER — GABAPENTIN 300 MG/1
300 CAPSULE ORAL EVERY 12 HOURS SCHEDULED
Qty: 60 CAPSULE | Refills: 0 | Status: SHIPPED | OUTPATIENT
Start: 2021-05-26 | End: 2021-09-07

## 2021-05-26 RX ORDER — ALPRAZOLAM 0.5 MG/1
0.5 TABLET ORAL NIGHTLY PRN
Qty: 30 TABLET | Refills: 0 | Status: SHIPPED | OUTPATIENT
Start: 2021-05-26 | End: 2021-06-25

## 2021-06-23 DIAGNOSIS — M54.50 LOW BACK PAIN WITHOUT SCIATICA, UNSPECIFIED BACK PAIN LATERALITY, UNSPECIFIED CHRONICITY: Primary | ICD-10-CM

## 2021-06-23 RX ORDER — OXYCODONE HYDROCHLORIDE 5 MG/1
TABLET ORAL
Qty: 30 TABLET | Refills: 0 | Status: SHIPPED | OUTPATIENT
Start: 2021-06-23 | End: 2021-07-23

## 2021-06-25 DIAGNOSIS — F41.9 ANXIETY: Primary | ICD-10-CM

## 2021-06-28 RX ORDER — ALPRAZOLAM 0.25 MG/1
TABLET ORAL
Qty: 30 TABLET | Refills: 0 | Status: SHIPPED | OUTPATIENT
Start: 2021-06-28 | End: 2021-07-26

## 2021-07-23 DIAGNOSIS — F41.9 ANXIETY: ICD-10-CM

## 2021-07-26 RX ORDER — ALPRAZOLAM 0.25 MG/1
TABLET ORAL
Qty: 30 TABLET | Refills: 0 | Status: SHIPPED | OUTPATIENT
Start: 2021-07-26 | End: 2021-08-24

## 2021-08-23 DIAGNOSIS — F41.9 ANXIETY: ICD-10-CM

## 2021-08-24 RX ORDER — ALPRAZOLAM 0.25 MG/1
TABLET ORAL
Qty: 30 TABLET | Refills: 0 | Status: SHIPPED | OUTPATIENT
Start: 2021-08-24 | End: 2021-09-17

## 2021-08-24 NOTE — TELEPHONE ENCOUNTER
Dgpercy Grayson, has called, needing a physician's statement stating that patient is incapicitated due to being hard of hearing and poor vision.  Pt has been in a NH for the 6 months and she has a long term care policy but dgt says they are not wanting to accept her POA paperwork without a doctor's statement that she is incapacitated.  Dgt says that she can still make decisions for herself, but due to her hearing impairment and poor vision, she cannot sign papers, etc.

## 2021-08-24 NOTE — TELEPHONE ENCOUNTER
Caller: Brittany Grayson    Relationship to patient: Emergency Contact    Best call back number: [phone]    Patients daughter is needing assistance with obtaining power of  for her mother. She states that this becomes effective upon disability and incapacity. She is requesting a call back to discuss.

## 2021-09-01 DIAGNOSIS — M79.2 NEUROPATHIC PAIN: ICD-10-CM

## 2021-09-06 DIAGNOSIS — M79.2 NEUROPATHIC PAIN: ICD-10-CM

## 2021-09-07 RX ORDER — GABAPENTIN 300 MG/1
CAPSULE ORAL
Qty: 60 CAPSULE | Refills: 0 | Status: SHIPPED | OUTPATIENT
Start: 2021-09-07 | End: 2021-10-07

## 2021-09-07 RX ORDER — GABAPENTIN 300 MG/1
300 CAPSULE ORAL EVERY 12 HOURS SCHEDULED
Qty: 60 CAPSULE | Refills: 0 | Status: SHIPPED | OUTPATIENT
Start: 2021-09-07 | End: 2021-10-07

## 2021-09-16 DIAGNOSIS — F41.9 ANXIETY: ICD-10-CM

## 2021-09-17 RX ORDER — ALPRAZOLAM 0.25 MG/1
TABLET ORAL
Qty: 30 TABLET | Refills: 0 | Status: SHIPPED | OUTPATIENT
Start: 2021-09-17 | End: 2021-10-11

## 2021-10-11 DIAGNOSIS — F41.9 ANXIETY: ICD-10-CM

## 2021-10-11 RX ORDER — ALPRAZOLAM 0.25 MG/1
TABLET ORAL
Qty: 30 TABLET | Refills: 0 | Status: SHIPPED | OUTPATIENT
Start: 2021-10-11 | End: 2021-11-02

## 2021-11-02 DIAGNOSIS — F41.9 ANXIETY: ICD-10-CM

## 2021-11-02 RX ORDER — ALPRAZOLAM 0.25 MG/1
TABLET ORAL
Qty: 30 TABLET | Refills: 0 | Status: SHIPPED | OUTPATIENT
Start: 2021-11-02 | End: 2021-12-02

## 2021-12-06 DIAGNOSIS — M54.50 LOW BACK PAIN WITHOUT SCIATICA, UNSPECIFIED BACK PAIN LATERALITY, UNSPECIFIED CHRONICITY: Primary | ICD-10-CM

## 2021-12-06 RX ORDER — OXYCODONE HYDROCHLORIDE 5 MG/1
TABLET ORAL
Qty: 30 TABLET | Refills: 0 | Status: SHIPPED | OUTPATIENT
Start: 2021-12-06 | End: 2022-01-05

## 2021-12-07 DIAGNOSIS — M79.2 NEUROPATHIC PAIN: Primary | ICD-10-CM

## 2021-12-07 RX ORDER — GABAPENTIN 300 MG/1
CAPSULE ORAL
Qty: 60 CAPSULE | Refills: 0 | Status: SHIPPED | OUTPATIENT
Start: 2021-12-07 | End: 2022-01-06

## 2022-01-01 ENCOUNTER — HOSPITAL ENCOUNTER (INPATIENT)
Facility: HOSPITAL | Age: 87
LOS: 6 days | End: 2022-06-27
Attending: FAMILY MEDICINE | Admitting: INTERNAL MEDICINE

## 2022-01-01 ENCOUNTER — APPOINTMENT (OUTPATIENT)
Dept: GENERAL RADIOLOGY | Facility: HOSPITAL | Age: 87
End: 2022-01-01

## 2022-01-01 ENCOUNTER — HOSPITAL ENCOUNTER (INPATIENT)
Facility: HOSPITAL | Age: 87
LOS: 6 days | Discharge: NURSING, REHAB OR LONG-TERM CARE | End: 2022-02-21
Attending: EMERGENCY MEDICINE | Admitting: INTERNAL MEDICINE

## 2022-01-01 ENCOUNTER — APPOINTMENT (OUTPATIENT)
Dept: CARDIOLOGY | Facility: HOSPITAL | Age: 87
End: 2022-01-01

## 2022-01-01 ENCOUNTER — APPOINTMENT (OUTPATIENT)
Dept: CT IMAGING | Facility: HOSPITAL | Age: 87
End: 2022-01-01

## 2022-01-01 VITALS
BODY MASS INDEX: 34.35 KG/M2 | DIASTOLIC BLOOD PRESSURE: 80 MMHG | WEIGHT: 201.2 LBS | HEART RATE: 88 BPM | HEIGHT: 64 IN | TEMPERATURE: 98.3 F | RESPIRATION RATE: 16 BRPM | SYSTOLIC BLOOD PRESSURE: 172 MMHG | OXYGEN SATURATION: 96 %

## 2022-01-01 VITALS
HEART RATE: 146 BPM | SYSTOLIC BLOOD PRESSURE: 119 MMHG | RESPIRATION RATE: 38 BRPM | TEMPERATURE: 99.4 F | DIASTOLIC BLOOD PRESSURE: 78 MMHG | HEIGHT: 72 IN | BODY MASS INDEX: 25.6 KG/M2 | WEIGHT: 189 LBS | OXYGEN SATURATION: 94 %

## 2022-01-01 DIAGNOSIS — R09.02 HYPOXIA: Primary | ICD-10-CM

## 2022-01-01 DIAGNOSIS — R13.10 DYSPHAGIA, UNSPECIFIED TYPE: ICD-10-CM

## 2022-01-01 DIAGNOSIS — S82.402A CLOSED FRACTURE OF SHAFT OF LEFT FIBULA, UNSPECIFIED FRACTURE MORPHOLOGY, INITIAL ENCOUNTER: ICD-10-CM

## 2022-01-01 DIAGNOSIS — G62.9 NEUROPATHY: ICD-10-CM

## 2022-01-01 DIAGNOSIS — R06.89 HYPERCAPNIA: ICD-10-CM

## 2022-01-01 DIAGNOSIS — M85.80 OSTEOPENIA, UNSPECIFIED LOCATION: ICD-10-CM

## 2022-01-01 DIAGNOSIS — S82.302A CLOSED FRACTURE OF DISTAL END OF LEFT TIBIA, UNSPECIFIED FRACTURE MORPHOLOGY, INITIAL ENCOUNTER: ICD-10-CM

## 2022-01-01 DIAGNOSIS — Z51.5 COMFORT MEASURES ONLY STATUS: ICD-10-CM

## 2022-01-01 DIAGNOSIS — R41.82 ALTERED MENTAL STATUS, UNSPECIFIED ALTERED MENTAL STATUS TYPE: Primary | ICD-10-CM

## 2022-01-01 DIAGNOSIS — N39.0 URINARY TRACT INFECTION WITHOUT HEMATURIA, SITE UNSPECIFIED: ICD-10-CM

## 2022-01-01 LAB
ALBUMIN SERPL-MCNC: 2.9 G/DL (ref 3.5–5.2)
ALBUMIN SERPL-MCNC: 3.2 G/DL (ref 3.5–5.2)
ALBUMIN SERPL-MCNC: 3.4 G/DL (ref 3.5–5.2)
ALBUMIN SERPL-MCNC: 3.5 G/DL (ref 3.5–5.2)
ALBUMIN SERPL-MCNC: 3.7 G/DL (ref 3.5–5.2)
ALBUMIN SERPL-MCNC: 3.7 G/DL (ref 3.5–5.2)
ALBUMIN/GLOB SERPL: 0.8 G/DL
ALBUMIN/GLOB SERPL: 0.8 G/DL
ALBUMIN/GLOB SERPL: 1.2 G/DL
ALBUMIN/GLOB SERPL: 1.5 G/DL
ALP SERPL-CCNC: 102 U/L (ref 39–117)
ALP SERPL-CCNC: 55 U/L (ref 39–117)
ALP SERPL-CCNC: 57 U/L (ref 39–117)
ALP SERPL-CCNC: 59 U/L (ref 39–117)
ALP SERPL-CCNC: 62 U/L (ref 39–117)
ALP SERPL-CCNC: 81 U/L (ref 39–117)
ALT SERPL W P-5'-P-CCNC: 10 U/L (ref 1–33)
ALT SERPL W P-5'-P-CCNC: 10 U/L (ref 1–33)
ALT SERPL W P-5'-P-CCNC: 12 U/L (ref 1–33)
ALT SERPL W P-5'-P-CCNC: 17 U/L (ref 1–33)
ALT SERPL W P-5'-P-CCNC: 22 U/L (ref 1–33)
ALT SERPL W P-5'-P-CCNC: 9 U/L (ref 1–33)
AMMONIA BLD-SCNC: 33 UMOL/L (ref 11–51)
ANION GAP SERPL CALCULATED.3IONS-SCNC: 2 MMOL/L (ref 5–15)
ANION GAP SERPL CALCULATED.3IONS-SCNC: 6 MMOL/L (ref 5–15)
ANION GAP SERPL CALCULATED.3IONS-SCNC: 7 MMOL/L (ref 5–15)
ANION GAP SERPL CALCULATED.3IONS-SCNC: 7 MMOL/L (ref 5–15)
ANION GAP SERPL CALCULATED.3IONS-SCNC: 8 MMOL/L (ref 5–15)
ANION GAP SERPL CALCULATED.3IONS-SCNC: 9 MMOL/L (ref 5–15)
ANISOCYTOSIS BLD QL: ABNORMAL
ANISOCYTOSIS BLD QL: ABNORMAL
ARTERIAL PATENCY WRIST A: ABNORMAL
ARTERIAL PATENCY WRIST A: ABNORMAL
ARTERIAL PATENCY WRIST A: POSITIVE
AST SERPL-CCNC: 11 U/L (ref 1–32)
AST SERPL-CCNC: 11 U/L (ref 1–32)
AST SERPL-CCNC: 12 U/L (ref 1–32)
AST SERPL-CCNC: 13 U/L (ref 1–32)
AST SERPL-CCNC: 20 U/L (ref 1–32)
AST SERPL-CCNC: 31 U/L (ref 1–32)
ATMOSPHERIC PRESS: 751 MMHG
ATMOSPHERIC PRESS: 751 MMHG
ATMOSPHERIC PRESS: 753 MMHG
ATMOSPHERIC PRESS: 755 MMHG
ATMOSPHERIC PRESS: 757 MMHG
ATMOSPHERIC PRESS: 758 MMHG
ATMOSPHERIC PRESS: 759 MMHG
B PARAPERT DNA SPEC QL NAA+PROBE: NOT DETECTED
B PERT DNA SPEC QL NAA+PROBE: NOT DETECTED
BACTERIA BLD CULT: ABNORMAL
BACTERIA SPEC AEROBE CULT: ABNORMAL
BACTERIA SPEC AEROBE CULT: NORMAL
BACTERIA UR QL AUTO: ABNORMAL /HPF
BACTERIA UR QL AUTO: ABNORMAL /HPF
BASE EXCESS BLDA CALC-SCNC: 5.8 MMOL/L (ref 0–2)
BASE EXCESS BLDA CALC-SCNC: 6.7 MMOL/L (ref 0–2)
BASE EXCESS BLDA CALC-SCNC: 7.7 MMOL/L (ref 0–2)
BASE EXCESS BLDA CALC-SCNC: 8 MMOL/L (ref 0–2)
BASE EXCESS BLDA CALC-SCNC: 8.2 MMOL/L (ref 0–2)
BASE EXCESS BLDA CALC-SCNC: 9 MMOL/L (ref 0–2)
BASE EXCESS BLDA CALC-SCNC: 9.5 MMOL/L (ref 0–2)
BASO STIPL COARSE BLD QL SMEAR: ABNORMAL
BASOPHILS # BLD AUTO: 0.05 10*3/MM3 (ref 0–0.2)
BASOPHILS # BLD AUTO: 0.07 10*3/MM3 (ref 0–0.2)
BASOPHILS # BLD AUTO: 0.1 10*3/MM3 (ref 0–0.2)
BASOPHILS # BLD AUTO: 0.17 10*3/MM3 (ref 0–0.2)
BASOPHILS NFR BLD AUTO: 0.2 % (ref 0–1.5)
BASOPHILS NFR BLD AUTO: 0.5 % (ref 0–1.5)
BASOPHILS NFR BLD AUTO: 0.6 % (ref 0–1.5)
BASOPHILS NFR BLD AUTO: 0.8 % (ref 0–1.5)
BDY SITE: ABNORMAL
BH CV ECHO MEAS - AO MAX PG (FULL): 7.7 MMHG
BH CV ECHO MEAS - AO MAX PG: 19.5 MMHG
BH CV ECHO MEAS - AO MEAN PG (FULL): 3 MMHG
BH CV ECHO MEAS - AO MEAN PG: 10 MMHG
BH CV ECHO MEAS - AO ROOT AREA (BSA CORRECTED): 1.7
BH CV ECHO MEAS - AO ROOT AREA: 8.6 CM^2
BH CV ECHO MEAS - AO ROOT DIAM: 3.3 CM
BH CV ECHO MEAS - AO V2 MAX: 221 CM/SEC
BH CV ECHO MEAS - AO V2 MEAN: 150 CM/SEC
BH CV ECHO MEAS - AO V2 VTI: 36.9 CM
BH CV ECHO MEAS - AVA(I,A): 2.9 CM^2
BH CV ECHO MEAS - AVA(I,D): 2.9 CM^2
BH CV ECHO MEAS - AVA(V,A): 2.7 CM^2
BH CV ECHO MEAS - AVA(V,D): 2.7 CM^2
BH CV ECHO MEAS - BSA(HAYCOCK): 2.1 M^2
BH CV ECHO MEAS - BSA: 2 M^2
BH CV ECHO MEAS - BZI_BMI: 34.5 KILOGRAMS/M^2
BH CV ECHO MEAS - BZI_METRIC_HEIGHT: 162.6 CM
BH CV ECHO MEAS - BZI_METRIC_WEIGHT: 91.2 KG
BH CV ECHO MEAS - EDV(CUBED): 37.9 ML
BH CV ECHO MEAS - EDV(MOD-SP2): 47.6 ML
BH CV ECHO MEAS - EDV(MOD-SP4): 91.3 ML
BH CV ECHO MEAS - EDV(TEICH): 46.1 ML
BH CV ECHO MEAS - EF(CUBED): 76.6 %
BH CV ECHO MEAS - EF(MOD-SP2): 66.6 %
BH CV ECHO MEAS - EF(MOD-SP4): 61.4 %
BH CV ECHO MEAS - EF(TEICH): 69.9 %
BH CV ECHO MEAS - ESV(CUBED): 8.9 ML
BH CV ECHO MEAS - ESV(MOD-SP2): 15.9 ML
BH CV ECHO MEAS - ESV(MOD-SP4): 35.2 ML
BH CV ECHO MEAS - ESV(TEICH): 13.9 ML
BH CV ECHO MEAS - FS: 38.4 %
BH CV ECHO MEAS - IVS/LVPW: 1.3
BH CV ECHO MEAS - IVSD: 1.5 CM
BH CV ECHO MEAS - LA DIMENSION: 4.6 CM
BH CV ECHO MEAS - LA/AO: 1.4
BH CV ECHO MEAS - LAT PEAK E' VEL: 6 CM/SEC
BH CV ECHO MEAS - LV DIASTOLIC VOL/BSA (35-75): 46.6 ML/M^2
BH CV ECHO MEAS - LV MASS(C)D: 145.2 GRAMS
BH CV ECHO MEAS - LV MASS(C)DI: 74.1 GRAMS/M^2
BH CV ECHO MEAS - LV MAX PG: 11.8 MMHG
BH CV ECHO MEAS - LV MEAN PG: 7 MMHG
BH CV ECHO MEAS - LV SYSTOLIC VOL/BSA (12-30): 18 ML/M^2
BH CV ECHO MEAS - LV V1 MAX: 172 CM/SEC
BH CV ECHO MEAS - LV V1 MEAN: 127 CM/SEC
BH CV ECHO MEAS - LV V1 VTI: 30.7 CM
BH CV ECHO MEAS - LVIDD: 3.4 CM
BH CV ECHO MEAS - LVIDS: 2.1 CM
BH CV ECHO MEAS - LVLD AP2: 8.1 CM
BH CV ECHO MEAS - LVLD AP4: 8.7 CM
BH CV ECHO MEAS - LVLS AP2: 6 CM
BH CV ECHO MEAS - LVLS AP4: 7.7 CM
BH CV ECHO MEAS - LVOT AREA (M): 3.5 CM^2
BH CV ECHO MEAS - LVOT AREA: 3.5 CM^2
BH CV ECHO MEAS - LVOT DIAM: 2.1 CM
BH CV ECHO MEAS - LVPWD: 1.1 CM
BH CV ECHO MEAS - MED PEAK E' VEL: 7.07 CM/SEC
BH CV ECHO MEAS - MV A MAX VEL: 122 CM/SEC
BH CV ECHO MEAS - MV DEC SLOPE: 228 CM/SEC^2
BH CV ECHO MEAS - MV DEC TIME: 0.35 SEC
BH CV ECHO MEAS - MV E MAX VEL: 79.6 CM/SEC
BH CV ECHO MEAS - MV E/A: 0.65
BH CV ECHO MEAS - RAP SYSTOLE: 5 MMHG
BH CV ECHO MEAS - RVSP: 46 MMHG
BH CV ECHO MEAS - SI(AO): 161 ML/M^2
BH CV ECHO MEAS - SI(CUBED): 14.8 ML/M^2
BH CV ECHO MEAS - SI(LVOT): 54.2 ML/M^2
BH CV ECHO MEAS - SI(MOD-SP2): 16.2 ML/M^2
BH CV ECHO MEAS - SI(MOD-SP4): 28.6 ML/M^2
BH CV ECHO MEAS - SI(TEICH): 16.4 ML/M^2
BH CV ECHO MEAS - SV(AO): 315.6 ML
BH CV ECHO MEAS - SV(CUBED): 29.1 ML
BH CV ECHO MEAS - SV(LVOT): 106.3 ML
BH CV ECHO MEAS - SV(MOD-SP2): 31.7 ML
BH CV ECHO MEAS - SV(MOD-SP4): 56.1 ML
BH CV ECHO MEAS - SV(TEICH): 32.2 ML
BH CV ECHO MEAS - TR MAX VEL: 320 CM/SEC
BH CV ECHO MEASUREMENTS AVERAGE E/E' RATIO: 12.18
BILIRUB SERPL-MCNC: 0.4 MG/DL (ref 0–1.2)
BILIRUB SERPL-MCNC: 0.4 MG/DL (ref 0–1.2)
BILIRUB SERPL-MCNC: 0.5 MG/DL (ref 0–1.2)
BILIRUB SERPL-MCNC: 0.8 MG/DL (ref 0–1.2)
BILIRUB UR QL STRIP: NEGATIVE
BILIRUB UR QL STRIP: NEGATIVE
BODY TEMPERATURE: 37 C
BOTTLE TYPE: ABNORMAL
BUN SERPL-MCNC: 12 MG/DL (ref 8–23)
BUN SERPL-MCNC: 26 MG/DL (ref 8–23)
BUN SERPL-MCNC: 30 MG/DL (ref 8–23)
BUN SERPL-MCNC: 35 MG/DL (ref 8–23)
BUN SERPL-MCNC: 41 MG/DL (ref 8–23)
BUN SERPL-MCNC: 45 MG/DL (ref 8–23)
BUN SERPL-MCNC: 5 MG/DL (ref 8–23)
BUN SERPL-MCNC: 62 MG/DL (ref 8–23)
BUN SERPL-MCNC: 7 MG/DL (ref 8–23)
BUN SERPL-MCNC: 9 MG/DL (ref 8–23)
BUN/CREAT SERPL: 19.2 (ref 7–25)
BUN/CREAT SERPL: 21.2 (ref 7–25)
BUN/CREAT SERPL: 25.7 (ref 7–25)
BUN/CREAT SERPL: 37.5 (ref 7–25)
BUN/CREAT SERPL: 39.5 (ref 7–25)
BUN/CREAT SERPL: 59.1 (ref 7–25)
BUN/CREAT SERPL: 69.5 (ref 7–25)
BUN/CREAT SERPL: 70 (ref 7–25)
BUN/CREAT SERPL: 74.7 (ref 7–25)
BUN/CREAT SERPL: 88.2 (ref 7–25)
C PNEUM DNA NPH QL NAA+NON-PROBE: NOT DETECTED
CALCIUM SPEC-SCNC: 10.1 MG/DL (ref 8.2–9.6)
CALCIUM SPEC-SCNC: 10.3 MG/DL (ref 8.2–9.6)
CALCIUM SPEC-SCNC: 10.5 MG/DL (ref 8.2–9.6)
CALCIUM SPEC-SCNC: 9.2 MG/DL (ref 8.2–9.6)
CALCIUM SPEC-SCNC: 9.2 MG/DL (ref 8.2–9.6)
CALCIUM SPEC-SCNC: 9.4 MG/DL (ref 8.2–9.6)
CALCIUM SPEC-SCNC: 9.9 MG/DL (ref 8.2–9.6)
CHLORIDE SERPL-SCNC: 100 MMOL/L (ref 98–107)
CHLORIDE SERPL-SCNC: 100 MMOL/L (ref 98–107)
CHLORIDE SERPL-SCNC: 104 MMOL/L (ref 98–107)
CHLORIDE SERPL-SCNC: 111 MMOL/L (ref 98–107)
CHLORIDE SERPL-SCNC: 111 MMOL/L (ref 98–107)
CHLORIDE SERPL-SCNC: 97 MMOL/L (ref 98–107)
CHLORIDE SERPL-SCNC: 97 MMOL/L (ref 98–107)
CHLORIDE SERPL-SCNC: 98 MMOL/L (ref 98–107)
CHLORIDE SERPL-SCNC: 99 MMOL/L (ref 98–107)
CHLORIDE SERPL-SCNC: 99 MMOL/L (ref 98–107)
CHOLEST SERPL-MCNC: 131 MG/DL (ref 0–200)
CLARITY UR: ABNORMAL
CLARITY UR: CLEAR
CO2 SERPL-SCNC: 29 MMOL/L (ref 22–29)
CO2 SERPL-SCNC: 31 MMOL/L (ref 22–29)
CO2 SERPL-SCNC: 31 MMOL/L (ref 22–29)
CO2 SERPL-SCNC: 32 MMOL/L (ref 22–29)
CO2 SERPL-SCNC: 32 MMOL/L (ref 22–29)
CO2 SERPL-SCNC: 33 MMOL/L (ref 22–29)
CO2 SERPL-SCNC: 33 MMOL/L (ref 22–29)
CO2 SERPL-SCNC: 34 MMOL/L (ref 22–29)
CO2 SERPL-SCNC: 36 MMOL/L (ref 22–29)
CO2 SERPL-SCNC: 38 MMOL/L (ref 22–29)
COLOR UR: ABNORMAL
COLOR UR: ABNORMAL
CREAT SERPL-MCNC: 0.26 MG/DL (ref 0.57–1)
CREAT SERPL-MCNC: 0.32 MG/DL (ref 0.57–1)
CREAT SERPL-MCNC: 0.33 MG/DL (ref 0.57–1)
CREAT SERPL-MCNC: 0.35 MG/DL (ref 0.57–1)
CREAT SERPL-MCNC: 0.44 MG/DL (ref 0.57–1)
CREAT SERPL-MCNC: 0.5 MG/DL (ref 0.57–1)
CREAT SERPL-MCNC: 0.51 MG/DL (ref 0.57–1)
CREAT SERPL-MCNC: 0.59 MG/DL (ref 0.57–1)
CREAT SERPL-MCNC: 0.76 MG/DL (ref 0.57–1)
CREAT SERPL-MCNC: 0.83 MG/DL (ref 0.57–1)
CRP SERPL-MCNC: 2.15 MG/DL (ref 0–0.5)
CRP SERPL-MCNC: 2.61 MG/DL (ref 0–0.5)
CRP SERPL-MCNC: 23.04 MG/DL (ref 0–0.5)
CRP SERPL-MCNC: 4.67 MG/DL (ref 0–0.5)
CRP SERPL-MCNC: 7.81 MG/DL (ref 0–0.5)
CRP SERPL-MCNC: 9.42 MG/DL (ref 0–0.5)
D DIMER PPP FEU-MCNC: 1.01 MG/L (FEU) (ref 0–0.5)
D-LACTATE SERPL-SCNC: 0.9 MMOL/L (ref 0.5–2)
D-LACTATE SERPL-SCNC: 1 MMOL/L (ref 0.5–2)
D-LACTATE SERPL-SCNC: 1.8 MMOL/L (ref 0.5–2)
DEPRECATED RDW RBC AUTO: 48.7 FL (ref 37–54)
DEPRECATED RDW RBC AUTO: 50.3 FL (ref 37–54)
DEPRECATED RDW RBC AUTO: 50.8 FL (ref 37–54)
DEPRECATED RDW RBC AUTO: 50.9 FL (ref 37–54)
DEPRECATED RDW RBC AUTO: 51.4 FL (ref 37–54)
DEPRECATED RDW RBC AUTO: 51.9 FL (ref 37–54)
DEPRECATED RDW RBC AUTO: 52.3 FL (ref 37–54)
DEPRECATED RDW RBC AUTO: 53.2 FL (ref 37–54)
DEPRECATED RDW RBC AUTO: 54.7 FL (ref 37–54)
DEPRECATED RDW RBC AUTO: 55 FL (ref 37–54)
EGFRCR SERPLBLD CKD-EPI 2021: 64.2 ML/MIN/1.73
EGFRCR SERPLBLD CKD-EPI 2021: 82.1 ML/MIN/1.73
EGFRCR SERPLBLD CKD-EPI 2021: 85 ML/MIN/1.73
EGFRCR SERPLBLD CKD-EPI 2021: 85.4 ML/MIN/1.73
EOSINOPHIL # BLD AUTO: 0.11 10*3/MM3 (ref 0–0.4)
EOSINOPHIL # BLD AUTO: 0.12 10*3/MM3 (ref 0–0.4)
EOSINOPHIL # BLD AUTO: 0.41 10*3/MM3 (ref 0–0.4)
EOSINOPHIL # BLD AUTO: 0.46 10*3/MM3 (ref 0–0.4)
EOSINOPHIL # BLD AUTO: 0.48 10*3/MM3 (ref 0–0.4)
EOSINOPHIL # BLD AUTO: 0.55 10*3/MM3 (ref 0–0.4)
EOSINOPHIL # BLD MANUAL: 0.24 10*3/MM3 (ref 0–0.4)
EOSINOPHIL # BLD MANUAL: 0.54 10*3/MM3 (ref 0–0.4)
EOSINOPHIL NFR BLD AUTO: 0.5 % (ref 0.3–6.2)
EOSINOPHIL NFR BLD AUTO: 0.6 % (ref 0.3–6.2)
EOSINOPHIL NFR BLD AUTO: 3 % (ref 0.3–6.2)
EOSINOPHIL NFR BLD AUTO: 3 % (ref 0.3–6.2)
EOSINOPHIL NFR BLD AUTO: 3.2 % (ref 0.3–6.2)
EOSINOPHIL NFR BLD AUTO: 4 % (ref 0.3–6.2)
EOSINOPHIL NFR BLD MANUAL: 1 % (ref 0.3–6.2)
EOSINOPHIL NFR BLD MANUAL: 2 % (ref 0.3–6.2)
EPAP: 5
EPAP: 6
ERYTHROCYTE [DISTWIDTH] IN BLOOD BY AUTOMATED COUNT: 14.9 % (ref 12.3–15.4)
ERYTHROCYTE [DISTWIDTH] IN BLOOD BY AUTOMATED COUNT: 15.1 % (ref 12.3–15.4)
ERYTHROCYTE [DISTWIDTH] IN BLOOD BY AUTOMATED COUNT: 15.2 % (ref 12.3–15.4)
ERYTHROCYTE [DISTWIDTH] IN BLOOD BY AUTOMATED COUNT: 15.2 % (ref 12.3–15.4)
ERYTHROCYTE [DISTWIDTH] IN BLOOD BY AUTOMATED COUNT: 15.3 % (ref 12.3–15.4)
ERYTHROCYTE [DISTWIDTH] IN BLOOD BY AUTOMATED COUNT: 15.4 % (ref 12.3–15.4)
ERYTHROCYTE [DISTWIDTH] IN BLOOD BY AUTOMATED COUNT: 15.6 % (ref 12.3–15.4)
ERYTHROCYTE [DISTWIDTH] IN BLOOD BY AUTOMATED COUNT: 15.6 % (ref 12.3–15.4)
ERYTHROCYTE [DISTWIDTH] IN BLOOD BY AUTOMATED COUNT: 15.7 % (ref 12.3–15.4)
ERYTHROCYTE [DISTWIDTH] IN BLOOD BY AUTOMATED COUNT: 16.1 % (ref 12.3–15.4)
FLUAV SUBTYP SPEC NAA+PROBE: NOT DETECTED
FLUBV RNA ISLT QL NAA+PROBE: NOT DETECTED
GAS FLOW AIRWAY: 15 LPM
GAS FLOW AIRWAY: 2 LPM
GAS FLOW AIRWAY: 4 LPM
GAS FLOW AIRWAY: 5 LPM
GAS FLOW AIRWAY: 6 LPM
GFR SERPL CREATININE-BSD FRML MDRD: 133 ML/MIN/1.73
GFR SERPL CREATININE-BSD FRML MDRD: 71 ML/MIN/1.73
GFR SERPL CREATININE-BSD FRML MDRD: >150 ML/MIN/1.73
GLOBULIN UR ELPH-MCNC: 2.3 GM/DL
GLOBULIN UR ELPH-MCNC: 2.4 GM/DL
GLOBULIN UR ELPH-MCNC: 2.4 GM/DL
GLOBULIN UR ELPH-MCNC: 2.9 GM/DL
GLOBULIN UR ELPH-MCNC: 3.8 GM/DL
GLOBULIN UR ELPH-MCNC: 4 GM/DL
GLUCOSE BLDC GLUCOMTR-MCNC: 104 MG/DL (ref 70–130)
GLUCOSE BLDC GLUCOMTR-MCNC: 105 MG/DL (ref 70–130)
GLUCOSE BLDC GLUCOMTR-MCNC: 109 MG/DL (ref 70–130)
GLUCOSE BLDC GLUCOMTR-MCNC: 115 MG/DL (ref 70–130)
GLUCOSE BLDC GLUCOMTR-MCNC: 117 MG/DL (ref 70–130)
GLUCOSE BLDC GLUCOMTR-MCNC: 118 MG/DL (ref 70–130)
GLUCOSE BLDC GLUCOMTR-MCNC: 119 MG/DL (ref 70–130)
GLUCOSE BLDC GLUCOMTR-MCNC: 121 MG/DL (ref 70–130)
GLUCOSE BLDC GLUCOMTR-MCNC: 122 MG/DL (ref 70–130)
GLUCOSE BLDC GLUCOMTR-MCNC: 122 MG/DL (ref 70–130)
GLUCOSE BLDC GLUCOMTR-MCNC: 123 MG/DL (ref 70–130)
GLUCOSE BLDC GLUCOMTR-MCNC: 130 MG/DL (ref 70–130)
GLUCOSE BLDC GLUCOMTR-MCNC: 131 MG/DL (ref 70–130)
GLUCOSE BLDC GLUCOMTR-MCNC: 134 MG/DL (ref 70–130)
GLUCOSE BLDC GLUCOMTR-MCNC: 140 MG/DL (ref 70–130)
GLUCOSE BLDC GLUCOMTR-MCNC: 144 MG/DL (ref 70–130)
GLUCOSE BLDC GLUCOMTR-MCNC: 145 MG/DL (ref 70–130)
GLUCOSE BLDC GLUCOMTR-MCNC: 152 MG/DL (ref 70–130)
GLUCOSE BLDC GLUCOMTR-MCNC: 181 MG/DL (ref 70–130)
GLUCOSE SERPL-MCNC: 111 MG/DL (ref 65–99)
GLUCOSE SERPL-MCNC: 118 MG/DL (ref 65–99)
GLUCOSE SERPL-MCNC: 118 MG/DL (ref 65–99)
GLUCOSE SERPL-MCNC: 119 MG/DL (ref 65–99)
GLUCOSE SERPL-MCNC: 125 MG/DL (ref 65–99)
GLUCOSE SERPL-MCNC: 130 MG/DL (ref 65–99)
GLUCOSE SERPL-MCNC: 134 MG/DL (ref 65–99)
GLUCOSE SERPL-MCNC: 134 MG/DL (ref 65–99)
GLUCOSE SERPL-MCNC: 145 MG/DL (ref 65–99)
GLUCOSE SERPL-MCNC: 161 MG/DL (ref 65–99)
GLUCOSE UR STRIP-MCNC: NEGATIVE MG/DL
GLUCOSE UR STRIP-MCNC: NEGATIVE MG/DL
GRAM STN SPEC: ABNORMAL
GRAM STN SPEC: ABNORMAL
GRAM STN SPEC: NORMAL
GRAM STN SPEC: NORMAL
HADV DNA SPEC NAA+PROBE: NOT DETECTED
HBA1C MFR BLD: 6.1 % (ref 4.8–5.6)
HCO3 BLDA-SCNC: 31.5 MMOL/L (ref 20–26)
HCO3 BLDA-SCNC: 33 MMOL/L (ref 20–26)
HCO3 BLDA-SCNC: 33.9 MMOL/L (ref 20–26)
HCO3 BLDA-SCNC: 34.9 MMOL/L (ref 20–26)
HCO3 BLDA-SCNC: 36.4 MMOL/L (ref 20–26)
HCO3 BLDA-SCNC: 37.5 MMOL/L (ref 20–26)
HCO3 BLDA-SCNC: 39.1 MMOL/L (ref 20–26)
HCOV 229E RNA SPEC QL NAA+PROBE: NOT DETECTED
HCOV HKU1 RNA SPEC QL NAA+PROBE: NOT DETECTED
HCOV NL63 RNA SPEC QL NAA+PROBE: NOT DETECTED
HCOV OC43 RNA SPEC QL NAA+PROBE: NOT DETECTED
HCT VFR BLD AUTO: 36 % (ref 34–46.6)
HCT VFR BLD AUTO: 37.2 % (ref 34–46.6)
HCT VFR BLD AUTO: 40.5 % (ref 34–46.6)
HCT VFR BLD AUTO: 42.2 % (ref 34–46.6)
HCT VFR BLD AUTO: 42.5 % (ref 34–46.6)
HCT VFR BLD AUTO: 42.7 % (ref 34–46.6)
HCT VFR BLD AUTO: 43.1 % (ref 34–46.6)
HCT VFR BLD AUTO: 44.7 % (ref 34–46.6)
HDLC SERPL-MCNC: 37 MG/DL (ref 40–60)
HGB BLD-MCNC: 11 G/DL (ref 12–15.9)
HGB BLD-MCNC: 11.2 G/DL (ref 12–15.9)
HGB BLD-MCNC: 12.9 G/DL (ref 12–15.9)
HGB BLD-MCNC: 13.1 G/DL (ref 12–15.9)
HGB BLD-MCNC: 13.3 G/DL (ref 12–15.9)
HGB BLD-MCNC: 13.4 G/DL (ref 12–15.9)
HGB BLD-MCNC: 13.5 G/DL (ref 12–15.9)
HGB BLD-MCNC: 13.5 G/DL (ref 12–15.9)
HGB BLD-MCNC: 13.6 G/DL (ref 12–15.9)
HGB BLD-MCNC: 13.8 G/DL (ref 12–15.9)
HGB UR QL STRIP.AUTO: ABNORMAL
HGB UR QL STRIP.AUTO: NEGATIVE
HMPV RNA NPH QL NAA+NON-PROBE: NOT DETECTED
HOLD SPECIMEN: NORMAL
HOLD SPECIMEN: NORMAL
HPIV1 RNA ISLT QL NAA+PROBE: NOT DETECTED
HPIV2 RNA SPEC QL NAA+PROBE: NOT DETECTED
HPIV3 RNA NPH QL NAA+PROBE: NOT DETECTED
HPIV4 P GENE NPH QL NAA+PROBE: NOT DETECTED
HYALINE CASTS UR QL AUTO: ABNORMAL /LPF
IMM GRANULOCYTES # BLD AUTO: 0.11 10*3/MM3 (ref 0–0.05)
IMM GRANULOCYTES # BLD AUTO: 0.12 10*3/MM3 (ref 0–0.05)
IMM GRANULOCYTES # BLD AUTO: 0.13 10*3/MM3 (ref 0–0.05)
IMM GRANULOCYTES # BLD AUTO: 0.23 10*3/MM3 (ref 0–0.05)
IMM GRANULOCYTES # BLD AUTO: 0.26 10*3/MM3 (ref 0–0.05)
IMM GRANULOCYTES # BLD AUTO: 0.87 10*3/MM3 (ref 0–0.05)
IMM GRANULOCYTES NFR BLD AUTO: 0.8 % (ref 0–0.5)
IMM GRANULOCYTES NFR BLD AUTO: 0.9 % (ref 0–0.5)
IMM GRANULOCYTES NFR BLD AUTO: 0.9 % (ref 0–0.5)
IMM GRANULOCYTES NFR BLD AUTO: 1.2 % (ref 0–0.5)
IMM GRANULOCYTES NFR BLD AUTO: 1.3 % (ref 0–0.5)
IMM GRANULOCYTES NFR BLD AUTO: 4.2 % (ref 0–0.5)
INHALED O2 CONCENTRATION: 100 %
INHALED O2 CONCENTRATION: 35 %
IPAP: 16
IPAP: 20
ISOLATED FROM: ABNORMAL
ISOLATED FROM: ABNORMAL
KETONES UR QL STRIP: ABNORMAL
KETONES UR QL STRIP: NEGATIVE
LDLC SERPL CALC-MCNC: 61 MG/DL (ref 0–100)
LDLC/HDLC SERPL: 1.48 {RATIO}
LEFT ATRIUM VOLUME INDEX: 37.7 ML/M2
LEFT ATRIUM VOLUME: 73.8 CM3
LEUKOCYTE ESTERASE UR QL STRIP.AUTO: ABNORMAL
LEUKOCYTE ESTERASE UR QL STRIP.AUTO: ABNORMAL
LV EF 2D ECHO EST: 75 %
LYMPHOCYTES # BLD AUTO: 1.15 10*3/MM3 (ref 0.7–3.1)
LYMPHOCYTES # BLD AUTO: 1.4 10*3/MM3 (ref 0.7–3.1)
LYMPHOCYTES # BLD AUTO: 1.42 10*3/MM3 (ref 0.7–3.1)
LYMPHOCYTES # BLD AUTO: 1.71 10*3/MM3 (ref 0.7–3.1)
LYMPHOCYTES # BLD AUTO: 1.99 10*3/MM3 (ref 0.7–3.1)
LYMPHOCYTES # BLD AUTO: 2.55 10*3/MM3 (ref 0.7–3.1)
LYMPHOCYTES # BLD MANUAL: 3.38 10*3/MM3 (ref 0.7–3.1)
LYMPHOCYTES # BLD MANUAL: 3.49 10*3/MM3 (ref 0.7–3.1)
LYMPHOCYTES NFR BLD AUTO: 13.3 % (ref 19.6–45.3)
LYMPHOCYTES NFR BLD AUTO: 14.1 % (ref 19.6–45.3)
LYMPHOCYTES NFR BLD AUTO: 6.8 % (ref 19.6–45.3)
LYMPHOCYTES NFR BLD AUTO: 9.2 % (ref 19.6–45.3)
LYMPHOCYTES NFR BLD AUTO: 9.5 % (ref 19.6–45.3)
LYMPHOCYTES NFR BLD AUTO: 9.6 % (ref 19.6–45.3)
LYMPHOCYTES NFR BLD MANUAL: 5.1 % (ref 5–12)
LYMPHOCYTES NFR BLD MANUAL: 8 % (ref 5–12)
Lab: ABNORMAL
M PNEUMO IGG SER IA-ACNC: NOT DETECTED
MAGNESIUM SERPL-MCNC: 2.2 MG/DL (ref 1.7–2.3)
MAGNESIUM SERPL-MCNC: 2.2 MG/DL (ref 1.7–2.3)
MAXIMAL PREDICTED HEART RATE: 124 BPM
MCH RBC QN AUTO: 28.4 PG (ref 26.6–33)
MCH RBC QN AUTO: 28.5 PG (ref 26.6–33)
MCH RBC QN AUTO: 28.6 PG (ref 26.6–33)
MCH RBC QN AUTO: 28.7 PG (ref 26.6–33)
MCH RBC QN AUTO: 28.8 PG (ref 26.6–33)
MCH RBC QN AUTO: 28.9 PG (ref 26.6–33)
MCH RBC QN AUTO: 29 PG (ref 26.6–33)
MCH RBC QN AUTO: 29 PG (ref 26.6–33)
MCHC RBC AUTO-ENTMCNC: 29.6 G/DL (ref 31.5–35.7)
MCHC RBC AUTO-ENTMCNC: 30.8 G/DL (ref 31.5–35.7)
MCHC RBC AUTO-ENTMCNC: 30.9 G/DL (ref 31.5–35.7)
MCHC RBC AUTO-ENTMCNC: 31.1 G/DL (ref 31.5–35.7)
MCHC RBC AUTO-ENTMCNC: 31.1 G/DL (ref 31.5–35.7)
MCHC RBC AUTO-ENTMCNC: 31.3 G/DL (ref 31.5–35.7)
MCHC RBC AUTO-ENTMCNC: 31.5 G/DL (ref 31.5–35.7)
MCHC RBC AUTO-ENTMCNC: 31.8 G/DL (ref 31.5–35.7)
MCHC RBC AUTO-ENTMCNC: 31.9 G/DL (ref 31.5–35.7)
MCHC RBC AUTO-ENTMCNC: 32.2 G/DL (ref 31.5–35.7)
MCV RBC AUTO: 88.1 FL (ref 79–97)
MCV RBC AUTO: 90 FL (ref 79–97)
MCV RBC AUTO: 90.2 FL (ref 79–97)
MCV RBC AUTO: 90.6 FL (ref 79–97)
MCV RBC AUTO: 91.5 FL (ref 79–97)
MCV RBC AUTO: 92.8 FL (ref 79–97)
MCV RBC AUTO: 92.9 FL (ref 79–97)
MCV RBC AUTO: 93 FL (ref 79–97)
MCV RBC AUTO: 93.3 FL (ref 79–97)
MCV RBC AUTO: 97.9 FL (ref 79–97)
METAMYELOCYTES NFR BLD MANUAL: 1 % (ref 0–0)
MICROCYTES BLD QL: ABNORMAL
MODALITY: ABNORMAL
MONOCYTES # BLD AUTO: 0.94 10*3/MM3 (ref 0.1–0.9)
MONOCYTES # BLD AUTO: 0.95 10*3/MM3 (ref 0.1–0.9)
MONOCYTES # BLD AUTO: 0.98 10*3/MM3 (ref 0.1–0.9)
MONOCYTES # BLD AUTO: 1.41 10*3/MM3 (ref 0.1–0.9)
MONOCYTES # BLD AUTO: 1.57 10*3/MM3 (ref 0.1–0.9)
MONOCYTES # BLD AUTO: 1.96 10*3/MM3 (ref 0.1–0.9)
MONOCYTES # BLD: 1.21 10*3/MM3 (ref 0.1–0.9)
MONOCYTES # BLD: 2.15 10*3/MM3 (ref 0.1–0.9)
MONOCYTES NFR BLD AUTO: 6.3 % (ref 5–12)
MONOCYTES NFR BLD AUTO: 6.8 % (ref 5–12)
MONOCYTES NFR BLD AUTO: 7.3 % (ref 5–12)
MONOCYTES NFR BLD AUTO: 7.9 % (ref 5–12)
MONOCYTES NFR BLD AUTO: 8.7 % (ref 5–12)
MONOCYTES NFR BLD AUTO: 9.4 % (ref 5–12)
MUCOUS THREADS URNS QL MICRO: ABNORMAL /HPF
MYELOCYTES NFR BLD MANUAL: 2 % (ref 0–0)
MYELOCYTES NFR BLD MANUAL: 3.1 % (ref 0–0)
NEUTROPHILS # BLD AUTO: 17.87 10*3/MM3 (ref 1.7–7)
NEUTROPHILS # BLD AUTO: 19.88 10*3/MM3 (ref 1.7–7)
NEUTROPHILS NFR BLD AUTO: 12.41 10*3/MM3 (ref 1.7–7)
NEUTROPHILS NFR BLD AUTO: 13.07 10*3/MM3 (ref 1.7–7)
NEUTROPHILS NFR BLD AUTO: 16.58 10*3/MM3 (ref 1.7–7)
NEUTROPHILS NFR BLD AUTO: 16.65 10*3/MM3 (ref 1.7–7)
NEUTROPHILS NFR BLD AUTO: 72.3 % (ref 42.7–76)
NEUTROPHILS NFR BLD AUTO: 74.5 % (ref 42.7–76)
NEUTROPHILS NFR BLD AUTO: 76.8 % (ref 42.7–76)
NEUTROPHILS NFR BLD AUTO: 79.1 % (ref 42.7–76)
NEUTROPHILS NFR BLD AUTO: 80.2 % (ref 42.7–76)
NEUTROPHILS NFR BLD AUTO: 80.9 % (ref 42.7–76)
NEUTROPHILS NFR BLD AUTO: 9.21 10*3/MM3 (ref 1.7–7)
NEUTROPHILS NFR BLD AUTO: 9.62 10*3/MM3 (ref 1.7–7)
NEUTROPHILS NFR BLD MANUAL: 72 % (ref 42.7–76)
NEUTROPHILS NFR BLD MANUAL: 73.5 % (ref 42.7–76)
NEUTS BAND NFR BLD MANUAL: 2 % (ref 0–5)
NEUTS BAND NFR BLD MANUAL: 2 % (ref 0–5)
NITRITE UR QL STRIP: NEGATIVE
NITRITE UR QL STRIP: NEGATIVE
NOTIFIED BY: ABNORMAL
NOTIFIED BY: ABNORMAL
NOTIFIED WHO: ABNORMAL
NOTIFIED WHO: ABNORMAL
NRBC BLD AUTO-RTO: 0 /100 WBC (ref 0–0.2)
NT-PROBNP SERPL-MCNC: 317.7 PG/ML (ref 0–1800)
NT-PROBNP SERPL-MCNC: 711.4 PG/ML (ref 0–1800)
PCO2 BLDA: 49.2 MM HG (ref 35–45)
PCO2 BLDA: 52.9 MM HG (ref 35–45)
PCO2 BLDA: 53.2 MM HG (ref 35–45)
PCO2 BLDA: 57.4 MM HG (ref 35–45)
PCO2 BLDA: 61.4 MM HG (ref 35–45)
PCO2 BLDA: 70.6 MM HG (ref 35–45)
PCO2 BLDA: 99.5 MM HG (ref 35–45)
PCO2 TEMP ADJ BLD: 49.2 MM HG (ref 35–45)
PCO2 TEMP ADJ BLD: 52.9 MM HG (ref 35–45)
PCO2 TEMP ADJ BLD: 53.2 MM HG (ref 35–45)
PCO2 TEMP ADJ BLD: 57.4 MM HG (ref 35–45)
PCO2 TEMP ADJ BLD: 61.4 MM HG (ref 35–45)
PCO2 TEMP ADJ BLD: 70.6 MM HG (ref 35–45)
PCO2 TEMP ADJ BLD: 99.5 MM HG (ref 35–45)
PEEP RESPIRATORY: 9 CM[H2O]
PH BLDA: 7.2 PH UNITS (ref 7.35–7.45)
PH BLDA: 7.33 PH UNITS (ref 7.35–7.45)
PH BLDA: 7.38 PH UNITS (ref 7.35–7.45)
PH BLDA: 7.39 PH UNITS (ref 7.35–7.45)
PH BLDA: 7.4 PH UNITS (ref 7.35–7.45)
PH BLDA: 7.42 PH UNITS (ref 7.35–7.45)
PH BLDA: 7.42 PH UNITS (ref 7.35–7.45)
PH UR STRIP.AUTO: 5.5 [PH] (ref 5–8)
PH UR STRIP.AUTO: 6.5 [PH] (ref 5–8)
PH, TEMP CORRECTED: 7.2 PH UNITS (ref 7.35–7.45)
PH, TEMP CORRECTED: 7.33 PH UNITS (ref 7.35–7.45)
PH, TEMP CORRECTED: 7.38 PH UNITS (ref 7.35–7.45)
PH, TEMP CORRECTED: 7.39 PH UNITS (ref 7.35–7.45)
PH, TEMP CORRECTED: 7.4 PH UNITS (ref 7.35–7.45)
PH, TEMP CORRECTED: 7.42 PH UNITS (ref 7.35–7.45)
PH, TEMP CORRECTED: 7.42 PH UNITS (ref 7.35–7.45)
PLAT MORPH BLD: NORMAL
PLAT MORPH BLD: NORMAL
PLATELET # BLD AUTO: 109 10*3/MM3 (ref 140–450)
PLATELET # BLD AUTO: 173 10*3/MM3 (ref 140–450)
PLATELET # BLD AUTO: 181 10*3/MM3 (ref 140–450)
PLATELET # BLD AUTO: 190 10*3/MM3 (ref 140–450)
PLATELET # BLD AUTO: 196 10*3/MM3 (ref 140–450)
PLATELET # BLD AUTO: 199 10*3/MM3 (ref 140–450)
PLATELET # BLD AUTO: 202 10*3/MM3 (ref 140–450)
PLATELET # BLD AUTO: 203 10*3/MM3 (ref 140–450)
PLATELET # BLD AUTO: 248 10*3/MM3 (ref 140–450)
PLATELET # BLD AUTO: 255 10*3/MM3 (ref 140–450)
PMV BLD AUTO: 10.1 FL (ref 6–12)
PMV BLD AUTO: 10.5 FL (ref 6–12)
PMV BLD AUTO: 10.5 FL (ref 6–12)
PMV BLD AUTO: 10.6 FL (ref 6–12)
PMV BLD AUTO: 10.8 FL (ref 6–12)
PMV BLD AUTO: 11 FL (ref 6–12)
PMV BLD AUTO: 11.4 FL (ref 6–12)
PMV BLD AUTO: 9.7 FL (ref 6–12)
PMV BLD AUTO: 9.9 FL (ref 6–12)
PMV BLD AUTO: 9.9 FL (ref 6–12)
PO2 BLDA: 110 MM HG (ref 83–108)
PO2 BLDA: 236 MM HG (ref 83–108)
PO2 BLDA: 57.2 MM HG (ref 83–108)
PO2 BLDA: 59.6 MM HG (ref 83–108)
PO2 BLDA: 59.6 MM HG (ref 83–108)
PO2 BLDA: 61.9 MM HG (ref 83–108)
PO2 BLDA: 68.4 MM HG (ref 83–108)
PO2 TEMP ADJ BLD: 110 MM HG (ref 83–108)
PO2 TEMP ADJ BLD: 236 MM HG (ref 83–108)
PO2 TEMP ADJ BLD: 57.2 MM HG (ref 83–108)
PO2 TEMP ADJ BLD: 59.6 MM HG (ref 83–108)
PO2 TEMP ADJ BLD: 59.6 MM HG (ref 83–108)
PO2 TEMP ADJ BLD: 61.9 MM HG (ref 83–108)
PO2 TEMP ADJ BLD: 68.4 MM HG (ref 83–108)
POIKILOCYTOSIS BLD QL SMEAR: ABNORMAL
POLYCHROMASIA BLD QL SMEAR: ABNORMAL
POTASSIUM SERPL-SCNC: 3.6 MMOL/L (ref 3.5–5.2)
POTASSIUM SERPL-SCNC: 3.7 MMOL/L (ref 3.5–5.2)
POTASSIUM SERPL-SCNC: 3.7 MMOL/L (ref 3.5–5.2)
POTASSIUM SERPL-SCNC: 4.2 MMOL/L (ref 3.5–5.2)
POTASSIUM SERPL-SCNC: 4.3 MMOL/L (ref 3.5–5.2)
POTASSIUM SERPL-SCNC: 4.3 MMOL/L (ref 3.5–5.2)
POTASSIUM SERPL-SCNC: 4.4 MMOL/L (ref 3.5–5.2)
POTASSIUM SERPL-SCNC: 4.6 MMOL/L (ref 3.5–5.2)
POTASSIUM SERPL-SCNC: 4.9 MMOL/L (ref 3.5–5.2)
POTASSIUM SERPL-SCNC: 5.2 MMOL/L (ref 3.5–5.2)
PROCALCITONIN SERPL-MCNC: 0.07 NG/ML (ref 0–0.25)
PROCALCITONIN SERPL-MCNC: 0.08 NG/ML (ref 0–0.25)
PROCALCITONIN SERPL-MCNC: 0.09 NG/ML (ref 0–0.25)
PROCALCITONIN SERPL-MCNC: 0.11 NG/ML (ref 0–0.25)
PROCALCITONIN SERPL-MCNC: 0.38 NG/ML (ref 0–0.25)
PROMYELOCYTES NFR BLD MANUAL: 1 % (ref 0–0)
PROT SERPL-MCNC: 5.7 G/DL (ref 6–8.5)
PROT SERPL-MCNC: 6.1 G/DL (ref 6–8.5)
PROT SERPL-MCNC: 6.1 G/DL (ref 6–8.5)
PROT SERPL-MCNC: 6.4 G/DL (ref 6–8.5)
PROT SERPL-MCNC: 6.7 G/DL (ref 6–8.5)
PROT SERPL-MCNC: 7.2 G/DL (ref 6–8.5)
PROT UR QL STRIP: ABNORMAL
PROT UR QL STRIP: ABNORMAL
PSV: 10 CMH2O
QT INTERVAL: 392 MS
QT INTERVAL: 420 MS
QTC INTERVAL: 416 MS
QTC INTERVAL: 420 MS
RBC # BLD AUTO: 3.8 10*6/MM3 (ref 3.77–5.28)
RBC # BLD AUTO: 3.86 10*6/MM3 (ref 3.77–5.28)
RBC # BLD AUTO: 4.5 10*6/MM3 (ref 3.77–5.28)
RBC # BLD AUTO: 4.58 10*6/MM3 (ref 3.77–5.28)
RBC # BLD AUTO: 4.59 10*6/MM3 (ref 3.77–5.28)
RBC # BLD AUTO: 4.69 10*6/MM3 (ref 3.77–5.28)
RBC # BLD AUTO: 4.71 10*6/MM3 (ref 3.77–5.28)
RBC # BLD AUTO: 4.71 10*6/MM3 (ref 3.77–5.28)
RBC # BLD AUTO: 4.79 10*6/MM3 (ref 3.77–5.28)
RBC # BLD AUTO: 4.81 10*6/MM3 (ref 3.77–5.28)
RBC # UR STRIP: ABNORMAL /HPF
RBC # UR STRIP: ABNORMAL /HPF
REF LAB TEST METHOD: ABNORMAL
REF LAB TEST METHOD: ABNORMAL
RHINOVIRUS RNA SPEC NAA+PROBE: NOT DETECTED
ROULEAUX BLD QL SMEAR: ABNORMAL
RSV RNA NPH QL NAA+NON-PROBE: NOT DETECTED
SAO2 % BLDCOA: 90.8 % (ref 94–99)
SAO2 % BLDCOA: 91.7 % (ref 94–99)
SAO2 % BLDCOA: 91.7 % (ref 94–99)
SAO2 % BLDCOA: 92.3 % (ref 94–99)
SAO2 % BLDCOA: 94.2 % (ref 94–99)
SAO2 % BLDCOA: 97.9 % (ref 94–99)
SAO2 % BLDCOA: 99.8 % (ref 94–99)
SARS-COV-2 RNA NPH QL NAA+NON-PROBE: NOT DETECTED
SARS-COV-2 RNA PNL SPEC NAA+PROBE: NOT DETECTED
SET MECH RESP RATE: 12
SET MECH RESP RATE: 20
SODIUM SERPL-SCNC: 137 MMOL/L (ref 136–145)
SODIUM SERPL-SCNC: 137 MMOL/L (ref 136–145)
SODIUM SERPL-SCNC: 138 MMOL/L (ref 136–145)
SODIUM SERPL-SCNC: 138 MMOL/L (ref 136–145)
SODIUM SERPL-SCNC: 139 MMOL/L (ref 136–145)
SODIUM SERPL-SCNC: 140 MMOL/L (ref 136–145)
SODIUM SERPL-SCNC: 140 MMOL/L (ref 136–145)
SODIUM SERPL-SCNC: 145 MMOL/L (ref 136–145)
SODIUM SERPL-SCNC: 151 MMOL/L (ref 136–145)
SODIUM SERPL-SCNC: 151 MMOL/L (ref 136–145)
SP GR UR STRIP: 1.02 (ref 1–1.03)
SP GR UR STRIP: 1.02 (ref 1–1.03)
SPHEROCYTES BLD QL SMEAR: ABNORMAL
SQUAMOUS #/AREA URNS HPF: ABNORMAL /HPF
SQUAMOUS #/AREA URNS HPF: ABNORMAL /HPF
STOMATOCYTES BLD QL SMEAR: ABNORMAL
STRESS TARGET HR: 105 BPM
TRIGL SERPL-MCNC: 197 MG/DL (ref 0–150)
TROPONIN T SERPL-MCNC: 0.02 NG/ML (ref 0–0.03)
TROPONIN T SERPL-MCNC: 0.03 NG/ML (ref 0–0.03)
TROPONIN T SERPL-MCNC: 0.04 NG/ML (ref 0–0.03)
TSH SERPL DL<=0.05 MIU/L-ACNC: 2 UIU/ML (ref 0.27–4.2)
UROBILINOGEN UR QL STRIP: ABNORMAL
UROBILINOGEN UR QL STRIP: ABNORMAL
VARIANT LYMPHS NFR BLD MANUAL: 13 % (ref 19.6–45.3)
VARIANT LYMPHS NFR BLD MANUAL: 5.1 % (ref 0–5)
VARIANT LYMPHS NFR BLD MANUAL: 9.2 % (ref 19.6–45.3)
VENTILATOR MODE: ABNORMAL
VLDLC SERPL-MCNC: 33 MG/DL (ref 5–40)
VT ON VENT VENT: 408 ML
WBC # UR STRIP: ABNORMAL /HPF
WBC # UR STRIP: ABNORMAL /HPF
WBC MORPH BLD: NORMAL
WBC MORPH BLD: NORMAL
WBC NRBC COR # BLD: 12 10*3/MM3 (ref 3.4–10.8)
WBC NRBC COR # BLD: 12.9 10*3/MM3 (ref 3.4–10.8)
WBC NRBC COR # BLD: 14.15 10*3/MM3 (ref 3.4–10.8)
WBC NRBC COR # BLD: 15.47 10*3/MM3 (ref 3.4–10.8)
WBC NRBC COR # BLD: 18.07 10*3/MM3 (ref 3.4–10.8)
WBC NRBC COR # BLD: 20.61 10*3/MM3 (ref 3.4–10.8)
WBC NRBC COR # BLD: 20.96 10*3/MM3 (ref 3.4–10.8)
WBC NRBC COR # BLD: 23.67 10*3/MM3 (ref 3.4–10.8)
WBC NRBC COR # BLD: 26.86 10*3/MM3 (ref 3.4–10.8)
WBC NRBC COR # BLD: 9.59 10*3/MM3 (ref 3.4–10.8)
WHOLE BLOOD HOLD COAG: NORMAL

## 2022-01-01 PROCEDURE — 85025 COMPLETE CBC W/AUTO DIFF WBC: CPT

## 2022-01-01 PROCEDURE — 83880 ASSAY OF NATRIURETIC PEPTIDE: CPT | Performed by: EMERGENCY MEDICINE

## 2022-01-01 PROCEDURE — 82803 BLOOD GASES ANY COMBINATION: CPT

## 2022-01-01 PROCEDURE — 94799 UNLISTED PULMONARY SVC/PX: CPT

## 2022-01-01 PROCEDURE — 25010000002 CEFTRIAXONE PER 250 MG: Performed by: INTERNAL MEDICINE

## 2022-01-01 PROCEDURE — 36415 COLL VENOUS BLD VENIPUNCTURE: CPT | Performed by: PHYSICIAN ASSISTANT

## 2022-01-01 PROCEDURE — 93005 ELECTROCARDIOGRAM TRACING: CPT | Performed by: EMERGENCY MEDICINE

## 2022-01-01 PROCEDURE — 80048 BASIC METABOLIC PNL TOTAL CA: CPT | Performed by: NURSE PRACTITIONER

## 2022-01-01 PROCEDURE — 82962 GLUCOSE BLOOD TEST: CPT

## 2022-01-01 PROCEDURE — 87040 BLOOD CULTURE FOR BACTERIA: CPT | Performed by: NURSE PRACTITIONER

## 2022-01-01 PROCEDURE — 81001 URINALYSIS AUTO W/SCOPE: CPT | Performed by: NURSE PRACTITIONER

## 2022-01-01 PROCEDURE — 84145 PROCALCITONIN (PCT): CPT | Performed by: NURSE PRACTITIONER

## 2022-01-01 PROCEDURE — 85027 COMPLETE CBC AUTOMATED: CPT | Performed by: NURSE PRACTITIONER

## 2022-01-01 PROCEDURE — 25010000002 LORAZEPAM PER 2 MG: Performed by: INTERNAL MEDICINE

## 2022-01-01 PROCEDURE — 83735 ASSAY OF MAGNESIUM: CPT | Performed by: EMERGENCY MEDICINE

## 2022-01-01 PROCEDURE — 87040 BLOOD CULTURE FOR BACTERIA: CPT

## 2022-01-01 PROCEDURE — 25010000002 ENOXAPARIN PER 10 MG: Performed by: NURSE PRACTITIONER

## 2022-01-01 PROCEDURE — 87205 SMEAR GRAM STAIN: CPT | Performed by: INTERNAL MEDICINE

## 2022-01-01 PROCEDURE — 92526 ORAL FUNCTION THERAPY: CPT | Performed by: SPEECH-LANGUAGE PATHOLOGIST

## 2022-01-01 PROCEDURE — 85025 COMPLETE CBC W/AUTO DIFF WBC: CPT | Performed by: NURSE PRACTITIONER

## 2022-01-01 PROCEDURE — 83735 ASSAY OF MAGNESIUM: CPT | Performed by: FAMILY MEDICINE

## 2022-01-01 PROCEDURE — 87147 CULTURE TYPE IMMUNOLOGIC: CPT | Performed by: EMERGENCY MEDICINE

## 2022-01-01 PROCEDURE — 87635 SARS-COV-2 COVID-19 AMP PRB: CPT | Performed by: INTERNAL MEDICINE

## 2022-01-01 PROCEDURE — 36600 WITHDRAWAL OF ARTERIAL BLOOD: CPT

## 2022-01-01 PROCEDURE — 25010000002 MEROPENEM PER 100 MG

## 2022-01-01 PROCEDURE — G0378 HOSPITAL OBSERVATION PER HR: HCPCS

## 2022-01-01 PROCEDURE — 86140 C-REACTIVE PROTEIN: CPT | Performed by: NURSE PRACTITIONER

## 2022-01-01 PROCEDURE — 94669 MECHANICAL CHEST WALL OSCILL: CPT

## 2022-01-01 PROCEDURE — 25010000002 HYDRALAZINE PER 20 MG: Performed by: NURSE PRACTITIONER

## 2022-01-01 PROCEDURE — 25010000002 CEFTRIAXONE PER 250 MG: Performed by: NURSE PRACTITIONER

## 2022-01-01 PROCEDURE — 84484 ASSAY OF TROPONIN QUANT: CPT | Performed by: PHYSICIAN ASSISTANT

## 2022-01-01 PROCEDURE — 94660 CPAP INITIATION&MGMT: CPT

## 2022-01-01 PROCEDURE — 71045 X-RAY EXAM CHEST 1 VIEW: CPT

## 2022-01-01 PROCEDURE — 82140 ASSAY OF AMMONIA: CPT | Performed by: FAMILY MEDICINE

## 2022-01-01 PROCEDURE — 92610 EVALUATE SWALLOWING FUNCTION: CPT | Performed by: SPEECH-LANGUAGE PATHOLOGIST

## 2022-01-01 PROCEDURE — 80053 COMPREHEN METABOLIC PANEL: CPT

## 2022-01-01 PROCEDURE — 70450 CT HEAD/BRAIN W/O DYE: CPT

## 2022-01-01 PROCEDURE — 85007 BL SMEAR W/DIFF WBC COUNT: CPT | Performed by: NURSE PRACTITIONER

## 2022-01-01 PROCEDURE — 92526 ORAL FUNCTION THERAPY: CPT

## 2022-01-01 PROCEDURE — 99497 ADVNCD CARE PLAN 30 MIN: CPT

## 2022-01-01 PROCEDURE — 86140 C-REACTIVE PROTEIN: CPT

## 2022-01-01 PROCEDURE — 99221 1ST HOSP IP/OBS SF/LOW 40: CPT | Performed by: NURSE PRACTITIONER

## 2022-01-01 PROCEDURE — 93010 ELECTROCARDIOGRAM REPORT: CPT | Performed by: INTERNAL MEDICINE

## 2022-01-01 PROCEDURE — 97166 OT EVAL MOD COMPLEX 45 MIN: CPT

## 2022-01-01 PROCEDURE — 87186 SC STD MICRODIL/AGAR DIL: CPT | Performed by: FAMILY MEDICINE

## 2022-01-01 PROCEDURE — 85025 COMPLETE CBC W/AUTO DIFF WBC: CPT | Performed by: INTERNAL MEDICINE

## 2022-01-01 PROCEDURE — 87635 SARS-COV-2 COVID-19 AMP PRB: CPT | Performed by: EMERGENCY MEDICINE

## 2022-01-01 PROCEDURE — 97162 PT EVAL MOD COMPLEX 30 MIN: CPT

## 2022-01-01 PROCEDURE — 84145 PROCALCITONIN (PCT): CPT

## 2022-01-01 PROCEDURE — 93306 TTE W/DOPPLER COMPLETE: CPT | Performed by: INTERNAL MEDICINE

## 2022-01-01 PROCEDURE — 84484 ASSAY OF TROPONIN QUANT: CPT

## 2022-01-01 PROCEDURE — 0 IOPAMIDOL PER 1 ML: Performed by: EMERGENCY MEDICINE

## 2022-01-01 PROCEDURE — 73610 X-RAY EXAM OF ANKLE: CPT

## 2022-01-01 PROCEDURE — 85379 FIBRIN DEGRADATION QUANT: CPT | Performed by: EMERGENCY MEDICINE

## 2022-01-01 PROCEDURE — 83605 ASSAY OF LACTIC ACID: CPT | Performed by: FAMILY MEDICINE

## 2022-01-01 PROCEDURE — 2W3RX1Z IMMOBILIZATION OF LEFT LOWER LEG USING SPLINT: ICD-10-PCS | Performed by: ORTHOPAEDIC SURGERY

## 2022-01-01 PROCEDURE — 25010000002 FUROSEMIDE PER 20 MG

## 2022-01-01 PROCEDURE — 25010000002 PERFLUTREN 6.52 MG/ML SUSPENSION

## 2022-01-01 PROCEDURE — 87086 URINE CULTURE/COLONY COUNT: CPT | Performed by: FAMILY MEDICINE

## 2022-01-01 PROCEDURE — 87040 BLOOD CULTURE FOR BACTERIA: CPT | Performed by: EMERGENCY MEDICINE

## 2022-01-01 PROCEDURE — 71275 CT ANGIOGRAPHY CHEST: CPT

## 2022-01-01 PROCEDURE — 93306 TTE W/DOPPLER COMPLETE: CPT

## 2022-01-01 PROCEDURE — 0202U NFCT DS 22 TRGT SARS-COV-2: CPT | Performed by: FAMILY MEDICINE

## 2022-01-01 PROCEDURE — 83880 ASSAY OF NATRIURETIC PEPTIDE: CPT | Performed by: FAMILY MEDICINE

## 2022-01-01 PROCEDURE — 80053 COMPREHEN METABOLIC PANEL: CPT | Performed by: INTERNAL MEDICINE

## 2022-01-01 PROCEDURE — 25010000002 VANCOMYCIN 10 G RECONSTITUTED SOLUTION: Performed by: FAMILY MEDICINE

## 2022-01-01 PROCEDURE — 25010000002 DIPHENHYDRAMINE PER 50 MG: Performed by: FAMILY MEDICINE

## 2022-01-01 PROCEDURE — 80061 LIPID PANEL: CPT | Performed by: NURSE PRACTITIONER

## 2022-01-01 PROCEDURE — 84484 ASSAY OF TROPONIN QUANT: CPT | Performed by: EMERGENCY MEDICINE

## 2022-01-01 PROCEDURE — 36415 COLL VENOUS BLD VENIPUNCTURE: CPT

## 2022-01-01 PROCEDURE — 85025 COMPLETE CBC W/AUTO DIFF WBC: CPT | Performed by: EMERGENCY MEDICINE

## 2022-01-01 PROCEDURE — 80053 COMPREHEN METABOLIC PANEL: CPT | Performed by: EMERGENCY MEDICINE

## 2022-01-01 PROCEDURE — 80048 BASIC METABOLIC PNL TOTAL CA: CPT | Performed by: INTERNAL MEDICINE

## 2022-01-01 PROCEDURE — 87070 CULTURE OTHR SPECIMN AEROBIC: CPT | Performed by: INTERNAL MEDICINE

## 2022-01-01 PROCEDURE — 83036 HEMOGLOBIN GLYCOSYLATED A1C: CPT | Performed by: NURSE PRACTITIONER

## 2022-01-01 PROCEDURE — 63710000001 INSULIN LISPRO (HUMAN) PER 5 UNITS: Performed by: NURSE PRACTITIONER

## 2022-01-01 PROCEDURE — 99285 EMERGENCY DEPT VISIT HI MDM: CPT

## 2022-01-01 PROCEDURE — 25010000002 MORPHINE PER 10 MG: Performed by: FAMILY MEDICINE

## 2022-01-01 PROCEDURE — 99232 SBSQ HOSP IP/OBS MODERATE 35: CPT

## 2022-01-01 PROCEDURE — 25010000002 AZITHROMYCIN PER 500 MG: Performed by: NURSE PRACTITIONER

## 2022-01-01 PROCEDURE — 73630 X-RAY EXAM OF FOOT: CPT

## 2022-01-01 PROCEDURE — 83605 ASSAY OF LACTIC ACID: CPT | Performed by: EMERGENCY MEDICINE

## 2022-01-01 PROCEDURE — 86140 C-REACTIVE PROTEIN: CPT | Performed by: FAMILY MEDICINE

## 2022-01-01 PROCEDURE — 84443 ASSAY THYROID STIM HORMONE: CPT | Performed by: NURSE PRACTITIONER

## 2022-01-01 PROCEDURE — 85027 COMPLETE CBC AUTOMATED: CPT | Performed by: INTERNAL MEDICINE

## 2022-01-01 PROCEDURE — 80053 COMPREHEN METABOLIC PANEL: CPT | Performed by: FAMILY MEDICINE

## 2022-01-01 PROCEDURE — 73110 X-RAY EXAM OF WRIST: CPT

## 2022-01-01 PROCEDURE — 81001 URINALYSIS AUTO W/SCOPE: CPT | Performed by: FAMILY MEDICINE

## 2022-01-01 PROCEDURE — 99233 SBSQ HOSP IP/OBS HIGH 50: CPT

## 2022-01-01 PROCEDURE — 87040 BLOOD CULTURE FOR BACTERIA: CPT | Performed by: FAMILY MEDICINE

## 2022-01-01 PROCEDURE — 94640 AIRWAY INHALATION TREATMENT: CPT

## 2022-01-01 PROCEDURE — 85025 COMPLETE CBC W/AUTO DIFF WBC: CPT | Performed by: FAMILY MEDICINE

## 2022-01-01 PROCEDURE — 99222 1ST HOSP IP/OBS MODERATE 55: CPT

## 2022-01-01 PROCEDURE — 11042 DBRDMT SUBQ TIS 1ST 20SQCM/<: CPT | Performed by: NURSE PRACTITIONER

## 2022-01-01 PROCEDURE — 87150 DNA/RNA AMPLIFIED PROBE: CPT | Performed by: EMERGENCY MEDICINE

## 2022-01-01 PROCEDURE — 87077 CULTURE AEROBIC IDENTIFY: CPT | Performed by: FAMILY MEDICINE

## 2022-01-01 PROCEDURE — 83605 ASSAY OF LACTIC ACID: CPT

## 2022-01-01 PROCEDURE — P9612 CATHETERIZE FOR URINE SPEC: HCPCS

## 2022-01-01 RX ORDER — AMLODIPINE BESYLATE 10 MG/1
10 TABLET ORAL DAILY
Status: DISCONTINUED | OUTPATIENT
Start: 2022-01-01 | End: 2022-01-01

## 2022-01-01 RX ORDER — GABAPENTIN 100 MG/1
100 CAPSULE ORAL 3 TIMES DAILY
COMMUNITY
End: 2022-01-01 | Stop reason: HOSPADM

## 2022-01-01 RX ORDER — LORAZEPAM 2 MG/ML
2 CONCENTRATE ORAL
Status: DISCONTINUED | OUTPATIENT
Start: 2022-01-01 | End: 2022-01-01 | Stop reason: HOSPADM

## 2022-01-01 RX ORDER — LORAZEPAM 1 MG/1
1 TABLET ORAL
Status: DISCONTINUED | OUTPATIENT
Start: 2022-01-01 | End: 2022-01-01

## 2022-01-01 RX ORDER — AMOXICILLIN 250 MG
1 CAPSULE ORAL DAILY
Start: 2022-01-01 | End: 2022-01-01 | Stop reason: HOSPADM

## 2022-01-01 RX ORDER — SODIUM CHLORIDE 9 MG/ML
125 INJECTION, SOLUTION INTRAVENOUS CONTINUOUS
Status: DISCONTINUED | OUTPATIENT
Start: 2022-01-01 | End: 2022-01-01

## 2022-01-01 RX ORDER — ACETAMINOPHEN 650 MG/1
650 SUPPOSITORY RECTAL EVERY 4 HOURS PRN
Status: DISCONTINUED | OUTPATIENT
Start: 2022-01-01 | End: 2022-01-01 | Stop reason: HOSPADM

## 2022-01-01 RX ORDER — LORAZEPAM 0.5 MG/1
0.5 TABLET ORAL
Status: DISCONTINUED | OUTPATIENT
Start: 2022-01-01 | End: 2022-01-01

## 2022-01-01 RX ORDER — MORPHINE SULFATE 20 MG/ML
10 SOLUTION ORAL
Qty: 30 ML | Refills: 0 | Status: SHIPPED | OUTPATIENT
Start: 2022-01-01

## 2022-01-01 RX ORDER — DOCUSATE SODIUM 100 MG/1
100 CAPSULE, LIQUID FILLED ORAL 2 TIMES DAILY
COMMUNITY
End: 2022-01-01 | Stop reason: HOSPADM

## 2022-01-01 RX ORDER — IPRATROPIUM BROMIDE AND ALBUTEROL SULFATE 2.5; .5 MG/3ML; MG/3ML
3 SOLUTION RESPIRATORY (INHALATION)
Status: DISCONTINUED | OUTPATIENT
Start: 2022-01-01 | End: 2022-01-01 | Stop reason: HOSPADM

## 2022-01-01 RX ORDER — LORAZEPAM 2 MG/ML
2 CONCENTRATE ORAL
Status: DISCONTINUED | OUTPATIENT
Start: 2022-01-01 | End: 2022-01-01

## 2022-01-01 RX ORDER — LORAZEPAM 2 MG/ML
2 INJECTION INTRAMUSCULAR
Status: DISCONTINUED | OUTPATIENT
Start: 2022-01-01 | End: 2022-01-01

## 2022-01-01 RX ORDER — ACETAMINOPHEN 325 MG/1
650 TABLET ORAL EVERY 4 HOURS PRN
Status: DISCONTINUED | OUTPATIENT
Start: 2022-01-01 | End: 2022-01-01 | Stop reason: SDUPTHER

## 2022-01-01 RX ORDER — LORAZEPAM 2 MG/ML
0.5 INJECTION INTRAMUSCULAR
Status: DISCONTINUED | OUTPATIENT
Start: 2022-01-01 | End: 2022-01-01 | Stop reason: RX

## 2022-01-01 RX ORDER — CEFDINIR 300 MG/1
300 CAPSULE ORAL EVERY 12 HOURS SCHEDULED
Status: DISCONTINUED | OUTPATIENT
Start: 2022-01-01 | End: 2022-01-01 | Stop reason: HOSPADM

## 2022-01-01 RX ORDER — CLONIDINE HYDROCHLORIDE 0.1 MG/1
0.1 TABLET ORAL EVERY 6 HOURS PRN
Status: DISCONTINUED | OUTPATIENT
Start: 2022-01-01 | End: 2022-01-01 | Stop reason: HOSPADM

## 2022-01-01 RX ORDER — LORAZEPAM 2 MG/ML
1 CONCENTRATE ORAL
Status: DISCONTINUED | OUTPATIENT
Start: 2022-01-01 | End: 2022-01-01

## 2022-01-01 RX ORDER — LORAZEPAM 2 MG/ML
1 INJECTION INTRAMUSCULAR
Status: DISCONTINUED | OUTPATIENT
Start: 2022-01-01 | End: 2022-01-01 | Stop reason: RX

## 2022-01-01 RX ORDER — ONDANSETRON 2 MG/ML
4 INJECTION INTRAMUSCULAR; INTRAVENOUS EVERY 6 HOURS PRN
Status: DISCONTINUED | OUTPATIENT
Start: 2022-01-01 | End: 2022-01-01 | Stop reason: HOSPADM

## 2022-01-01 RX ORDER — LORAZEPAM 2 MG/ML
0.5 INJECTION INTRAMUSCULAR
Status: DISCONTINUED | OUTPATIENT
Start: 2022-01-01 | End: 2022-01-01

## 2022-01-01 RX ORDER — CARVEDILOL 6.25 MG/1
12.5 TABLET ORAL 2 TIMES DAILY WITH MEALS
Status: DISCONTINUED | OUTPATIENT
Start: 2022-01-01 | End: 2022-01-01

## 2022-01-01 RX ORDER — ATORVASTATIN CALCIUM 10 MG/1
10 TABLET, FILM COATED ORAL NIGHTLY
COMMUNITY
End: 2022-01-01 | Stop reason: HOSPADM

## 2022-01-01 RX ORDER — ECHINACEA PURPUREA EXTRACT 125 MG
1 TABLET ORAL DAILY
COMMUNITY
End: 2022-01-01 | Stop reason: HOSPADM

## 2022-01-01 RX ORDER — ACETAMINOPHEN 650 MG/1
650 SUPPOSITORY RECTAL EVERY 4 HOURS PRN
Status: DISCONTINUED | OUTPATIENT
Start: 2022-01-01 | End: 2022-01-01 | Stop reason: SDUPTHER

## 2022-01-01 RX ORDER — HYDROCODONE BITARTRATE AND ACETAMINOPHEN 5; 325 MG/1; MG/1
1 TABLET ORAL EVERY 6 HOURS PRN
COMMUNITY
End: 2022-01-01 | Stop reason: HOSPADM

## 2022-01-01 RX ORDER — ASPIRIN 81 MG/1
81 TABLET ORAL DAILY
Status: DISCONTINUED | OUTPATIENT
Start: 2022-01-01 | End: 2022-01-01 | Stop reason: HOSPADM

## 2022-01-01 RX ORDER — SODIUM CHLORIDE 0.9 % (FLUSH) 0.9 %
10 SYRINGE (ML) INJECTION AS NEEDED
Status: DISCONTINUED | OUTPATIENT
Start: 2022-01-01 | End: 2022-01-01 | Stop reason: HOSPADM

## 2022-01-01 RX ORDER — LIDOCAINE 40 MG/G
1 CREAM TOPICAL ONCE
Status: DISCONTINUED | OUTPATIENT
Start: 2022-01-01 | End: 2022-01-01 | Stop reason: HOSPADM

## 2022-01-01 RX ORDER — DOXYCYCLINE 100 MG/1
100 TABLET ORAL EVERY 12 HOURS SCHEDULED
Status: DISCONTINUED | OUTPATIENT
Start: 2022-01-01 | End: 2022-01-01 | Stop reason: HOSPADM

## 2022-01-01 RX ORDER — MORPHINE SULFATE 2 MG/ML
2 INJECTION, SOLUTION INTRAMUSCULAR; INTRAVENOUS
Status: DISCONTINUED | OUTPATIENT
Start: 2022-01-01 | End: 2022-01-01 | Stop reason: HOSPADM

## 2022-01-01 RX ORDER — LISINOPRIL 20 MG/1
20 TABLET ORAL
Status: DISCONTINUED | OUTPATIENT
Start: 2022-01-01 | End: 2022-01-01

## 2022-01-01 RX ORDER — ACETAMINOPHEN 325 MG/1
650 TABLET ORAL EVERY 4 HOURS PRN
Status: DISCONTINUED | OUTPATIENT
Start: 2022-01-01 | End: 2022-01-01 | Stop reason: HOSPADM

## 2022-01-01 RX ORDER — LORAZEPAM 2 MG/ML
0.5 CONCENTRATE ORAL
Status: DISCONTINUED | OUTPATIENT
Start: 2022-01-01 | End: 2022-01-01

## 2022-01-01 RX ORDER — LORAZEPAM 2 MG/ML
1 CONCENTRATE ORAL
Qty: 30 ML | Refills: 0 | Status: SHIPPED | OUTPATIENT
Start: 2022-01-01

## 2022-01-01 RX ORDER — METOPROLOL SUCCINATE 25 MG/1
25 TABLET, EXTENDED RELEASE ORAL
Status: DISCONTINUED | OUTPATIENT
Start: 2022-01-01 | End: 2022-01-01

## 2022-01-01 RX ORDER — OXYCODONE HYDROCHLORIDE 5 MG/1
5 TABLET ORAL EVERY 6 HOURS PRN
COMMUNITY
End: 2022-01-01 | Stop reason: HOSPADM

## 2022-01-01 RX ORDER — MORPHINE SULFATE 20 MG/ML
10 SOLUTION ORAL
Status: DISCONTINUED | OUTPATIENT
Start: 2022-01-01 | End: 2022-01-01 | Stop reason: HOSPADM

## 2022-01-01 RX ORDER — IPRATROPIUM BROMIDE AND ALBUTEROL SULFATE 2.5; .5 MG/3ML; MG/3ML
3 SOLUTION RESPIRATORY (INHALATION)
Status: DISCONTINUED | OUTPATIENT
Start: 2022-01-01 | End: 2022-01-01

## 2022-01-01 RX ORDER — ACETAMINOPHEN 325 MG/1
650 TABLET ORAL EVERY 4 HOURS PRN
Status: DISCONTINUED | OUTPATIENT
Start: 2022-01-01 | End: 2022-01-01

## 2022-01-01 RX ORDER — HYDRALAZINE HYDROCHLORIDE 50 MG/1
50 TABLET, FILM COATED ORAL ONCE
Status: COMPLETED | OUTPATIENT
Start: 2022-01-01 | End: 2022-01-01

## 2022-01-01 RX ORDER — DIPHENOXYLATE HYDROCHLORIDE AND ATROPINE SULFATE 2.5; .025 MG/1; MG/1
1 TABLET ORAL
Status: DISCONTINUED | OUTPATIENT
Start: 2022-01-01 | End: 2022-01-01 | Stop reason: HOSPADM

## 2022-01-01 RX ORDER — SODIUM CHLORIDE, SODIUM LACTATE, POTASSIUM CHLORIDE, CALCIUM CHLORIDE 600; 310; 30; 20 MG/100ML; MG/100ML; MG/100ML; MG/100ML
50 INJECTION, SOLUTION INTRAVENOUS CONTINUOUS
Status: DISCONTINUED | OUTPATIENT
Start: 2022-01-01 | End: 2022-01-01

## 2022-01-01 RX ORDER — DIPHENHYDRAMINE HYDROCHLORIDE 50 MG/ML
25 INJECTION INTRAMUSCULAR; INTRAVENOUS ONCE
Status: COMPLETED | OUTPATIENT
Start: 2022-01-01 | End: 2022-01-01

## 2022-01-01 RX ORDER — LORAZEPAM 2 MG/ML
1 CONCENTRATE ORAL
Status: DISCONTINUED | OUTPATIENT
Start: 2022-01-01 | End: 2022-01-01 | Stop reason: HOSPADM

## 2022-01-01 RX ORDER — PAROXETINE 10 MG/1
10 TABLET, FILM COATED ORAL DAILY
Status: DISCONTINUED | OUTPATIENT
Start: 2022-01-01 | End: 2022-01-01 | Stop reason: HOSPADM

## 2022-01-01 RX ORDER — POLYETHYLENE GLYCOL 400 AND PROPYLENE GLYCOL 4; 3 MG/ML; MG/ML
1 GEL OPHTHALMIC DAILY
COMMUNITY
End: 2022-01-01 | Stop reason: HOSPADM

## 2022-01-01 RX ORDER — HYDROXYZINE HYDROCHLORIDE 25 MG/1
25 TABLET, FILM COATED ORAL EVERY 6 HOURS PRN
COMMUNITY
End: 2022-01-01 | Stop reason: HOSPADM

## 2022-01-01 RX ORDER — ERYTHROMYCIN 5 MG/G
OINTMENT OPHTHALMIC EVERY 12 HOURS
Start: 2022-01-01 | End: 2022-01-01

## 2022-01-01 RX ORDER — LORAZEPAM 2 MG/ML
1 INJECTION INTRAMUSCULAR
Status: DISCONTINUED | OUTPATIENT
Start: 2022-01-01 | End: 2022-01-01

## 2022-01-01 RX ORDER — IPRATROPIUM BROMIDE AND ALBUTEROL SULFATE 2.5; .5 MG/3ML; MG/3ML
3 SOLUTION RESPIRATORY (INHALATION)
Qty: 360 ML
Start: 2022-01-01 | End: 2022-01-01 | Stop reason: HOSPADM

## 2022-01-01 RX ORDER — ACETAMINOPHEN 160 MG/5ML
650 SOLUTION ORAL EVERY 4 HOURS PRN
Status: DISCONTINUED | OUTPATIENT
Start: 2022-01-01 | End: 2022-01-01 | Stop reason: SDUPTHER

## 2022-01-01 RX ORDER — SODIUM CHLORIDE 0.9 % (FLUSH) 0.9 %
10 SYRINGE (ML) INJECTION EVERY 12 HOURS SCHEDULED
Status: DISCONTINUED | OUTPATIENT
Start: 2022-01-01 | End: 2022-01-01 | Stop reason: HOSPADM

## 2022-01-01 RX ORDER — TRIAMCINOLONE ACETONIDE 1 MG/G
1 CREAM TOPICAL 2 TIMES DAILY
COMMUNITY
End: 2022-01-01 | Stop reason: HOSPADM

## 2022-01-01 RX ORDER — ONDANSETRON 2 MG/ML
4 INJECTION INTRAMUSCULAR; INTRAVENOUS EVERY 6 HOURS PRN
Status: DISCONTINUED | OUTPATIENT
Start: 2022-01-01 | End: 2022-01-01 | Stop reason: SDUPTHER

## 2022-01-01 RX ORDER — SODIUM CHLORIDE 9 MG/ML
50 INJECTION, SOLUTION INTRAVENOUS CONTINUOUS
Status: DISCONTINUED | OUTPATIENT
Start: 2022-01-01 | End: 2022-01-01

## 2022-01-01 RX ORDER — DEXTROSE MONOHYDRATE 50 MG/ML
50 INJECTION, SOLUTION INTRAVENOUS CONTINUOUS
Status: DISCONTINUED | OUTPATIENT
Start: 2022-01-01 | End: 2022-01-01

## 2022-01-01 RX ORDER — ONDANSETRON 4 MG/1
4 TABLET, FILM COATED ORAL EVERY 6 HOURS PRN
Status: DISCONTINUED | OUTPATIENT
Start: 2022-01-01 | End: 2022-01-01 | Stop reason: HOSPADM

## 2022-01-01 RX ORDER — CARVEDILOL 6.25 MG/1
6.25 TABLET ORAL 2 TIMES DAILY WITH MEALS
Status: DISCONTINUED | OUTPATIENT
Start: 2022-01-01 | End: 2022-01-01 | Stop reason: HOSPADM

## 2022-01-01 RX ORDER — HYDRALAZINE HYDROCHLORIDE 20 MG/ML
10 INJECTION INTRAMUSCULAR; INTRAVENOUS EVERY 6 HOURS PRN
Status: DISCONTINUED | OUTPATIENT
Start: 2022-01-01 | End: 2022-01-01 | Stop reason: HOSPADM

## 2022-01-01 RX ORDER — CARVEDILOL 6.25 MG/1
6.25 TABLET ORAL 2 TIMES DAILY WITH MEALS
Start: 2022-01-01 | End: 2022-01-01 | Stop reason: HOSPADM

## 2022-01-01 RX ORDER — LISINOPRIL 20 MG/1
20 TABLET ORAL ONCE
Status: COMPLETED | OUTPATIENT
Start: 2022-01-01 | End: 2022-01-01

## 2022-01-01 RX ORDER — VITAMIN E 268 MG
400 CAPSULE ORAL DAILY
COMMUNITY
End: 2022-01-01 | Stop reason: HOSPADM

## 2022-01-01 RX ORDER — OXYCODONE HYDROCHLORIDE AND ACETAMINOPHEN 5; 325 MG/1; MG/1
1 TABLET ORAL EVERY 6 HOURS PRN
Status: ON HOLD | COMMUNITY
End: 2022-01-01

## 2022-01-01 RX ORDER — PAROXETINE HYDROCHLORIDE 20 MG/1
10 TABLET, FILM COATED ORAL DAILY
Start: 2022-01-01 | End: 2022-01-01 | Stop reason: HOSPADM

## 2022-01-01 RX ORDER — LISINOPRIL 40 MG/1
40 TABLET ORAL DAILY
COMMUNITY
End: 2022-01-01 | Stop reason: HOSPADM

## 2022-01-01 RX ORDER — ONDANSETRON 4 MG/1
4 TABLET, ORALLY DISINTEGRATING ORAL EVERY 8 HOURS PRN
Start: 2022-01-01

## 2022-01-01 RX ORDER — LORAZEPAM 2 MG/ML
2 INJECTION INTRAMUSCULAR
Status: DISCONTINUED | OUTPATIENT
Start: 2022-01-01 | End: 2022-01-01 | Stop reason: RX

## 2022-01-01 RX ORDER — BISACODYL 10 MG
10 SUPPOSITORY, RECTAL RECTAL DAILY PRN
COMMUNITY
End: 2022-01-01 | Stop reason: HOSPADM

## 2022-01-01 RX ORDER — CEFDINIR 300 MG/1
300 CAPSULE ORAL EVERY 12 HOURS SCHEDULED
Status: DISCONTINUED | OUTPATIENT
Start: 2022-01-01 | End: 2022-01-01

## 2022-01-01 RX ORDER — NICOTINE POLACRILEX 4 MG
15 LOZENGE BUCCAL
Status: DISCONTINUED | OUTPATIENT
Start: 2022-01-01 | End: 2022-01-01 | Stop reason: HOSPADM

## 2022-01-01 RX ORDER — TRIAMCINOLONE ACETONIDE 1 MG/G
1 CREAM TOPICAL EVERY 12 HOURS SCHEDULED
Status: DISCONTINUED | OUTPATIENT
Start: 2022-01-01 | End: 2022-01-01

## 2022-01-01 RX ORDER — ATORVASTATIN CALCIUM 10 MG/1
10 TABLET, FILM COATED ORAL NIGHTLY
Status: DISCONTINUED | OUTPATIENT
Start: 2022-01-01 | End: 2022-01-01 | Stop reason: HOSPADM

## 2022-01-01 RX ORDER — ACETAMINOPHEN 160 MG/5ML
650 SOLUTION ORAL EVERY 4 HOURS PRN
Status: DISCONTINUED | OUTPATIENT
Start: 2022-01-01 | End: 2022-01-01 | Stop reason: HOSPADM

## 2022-01-01 RX ORDER — LORAZEPAM 1 MG/1
2 TABLET ORAL
Status: DISCONTINUED | OUTPATIENT
Start: 2022-01-01 | End: 2022-01-01

## 2022-01-01 RX ORDER — GUAIFENESIN 600 MG/1
600 TABLET, EXTENDED RELEASE ORAL 2 TIMES DAILY
COMMUNITY
End: 2022-01-01 | Stop reason: HOSPADM

## 2022-01-01 RX ORDER — CARVEDILOL 6.25 MG/1
6.25 TABLET ORAL 2 TIMES DAILY WITH MEALS
Status: DISCONTINUED | OUTPATIENT
Start: 2022-01-01 | End: 2022-01-01

## 2022-01-01 RX ORDER — LISINOPRIL 20 MG/1
40 TABLET ORAL
Status: DISCONTINUED | OUTPATIENT
Start: 2022-01-01 | End: 2022-01-01 | Stop reason: HOSPADM

## 2022-01-01 RX ORDER — DOXYCYCLINE 100 MG/1
100 TABLET ORAL EVERY 12 HOURS SCHEDULED
Status: DISCONTINUED | OUTPATIENT
Start: 2022-01-01 | End: 2022-01-01

## 2022-01-01 RX ORDER — DIPHENHYDRAMINE HYDROCHLORIDE, ZINC ACETATE 2; .1 G/100G; G/100G
1 CREAM TOPICAL 3 TIMES DAILY PRN
Status: DISCONTINUED | OUTPATIENT
Start: 2022-01-01 | End: 2022-01-01 | Stop reason: HOSPADM

## 2022-01-01 RX ORDER — LABETALOL HYDROCHLORIDE 5 MG/ML
20 INJECTION, SOLUTION INTRAVENOUS EVERY 6 HOURS PRN
Status: DISCONTINUED | OUTPATIENT
Start: 2022-01-01 | End: 2022-01-01

## 2022-01-01 RX ORDER — DOXYCYCLINE 100 MG/1
100 TABLET ORAL EVERY 12 HOURS SCHEDULED
Qty: 1 TABLET | Refills: 0
Start: 2022-01-01 | End: 2022-01-01

## 2022-01-01 RX ORDER — LISINOPRIL 20 MG/1
40 TABLET ORAL DAILY
Status: DISCONTINUED | OUTPATIENT
Start: 2022-01-01 | End: 2022-01-01

## 2022-01-01 RX ORDER — AMLODIPINE BESYLATE 10 MG/1
10 TABLET ORAL
Status: DISCONTINUED | OUTPATIENT
Start: 2022-01-01 | End: 2022-01-01 | Stop reason: HOSPADM

## 2022-01-01 RX ORDER — CEFDINIR 300 MG/1
300 CAPSULE ORAL EVERY 12 HOURS SCHEDULED
Qty: 1 CAPSULE | Refills: 0
Start: 2022-01-01 | End: 2022-01-01

## 2022-01-01 RX ORDER — FUROSEMIDE 10 MG/ML
60 INJECTION INTRAMUSCULAR; INTRAVENOUS ONCE
Status: COMPLETED | OUTPATIENT
Start: 2022-01-01 | End: 2022-01-01

## 2022-01-01 RX ORDER — GABAPENTIN 100 MG/1
100 CAPSULE ORAL 3 TIMES DAILY
Qty: 9 CAPSULE | Refills: 0 | Status: ON HOLD | OUTPATIENT
Start: 2022-01-01 | End: 2022-01-01

## 2022-01-01 RX ORDER — LORAZEPAM 2 MG/ML
0.5 CONCENTRATE ORAL
Status: DISCONTINUED | OUTPATIENT
Start: 2022-01-01 | End: 2022-01-01 | Stop reason: HOSPADM

## 2022-01-01 RX ORDER — DEXTROSE MONOHYDRATE 25 G/50ML
25 INJECTION, SOLUTION INTRAVENOUS
Status: DISCONTINUED | OUTPATIENT
Start: 2022-01-01 | End: 2022-01-01 | Stop reason: HOSPADM

## 2022-01-01 RX ORDER — CEFDINIR 300 MG/1
300 CAPSULE ORAL EVERY 12 HOURS SCHEDULED
Status: DISCONTINUED | OUTPATIENT
Start: 2022-01-01 | End: 2022-01-01 | Stop reason: SDUPTHER

## 2022-01-01 RX ORDER — ERYTHROMYCIN 5 MG/G
OINTMENT OPHTHALMIC EVERY 12 HOURS
Status: DISCONTINUED | OUTPATIENT
Start: 2022-01-01 | End: 2022-01-01 | Stop reason: HOSPADM

## 2022-01-01 RX ADMIN — IPRATROPIUM BROMIDE AND ALBUTEROL SULFATE 3 ML: 2.5; .5 SOLUTION RESPIRATORY (INHALATION) at 18:23

## 2022-01-01 RX ADMIN — IPRATROPIUM BROMIDE AND ALBUTEROL SULFATE 3 ML: 2.5; .5 SOLUTION RESPIRATORY (INHALATION) at 18:07

## 2022-01-01 RX ADMIN — INSULIN LISPRO 2 UNITS: 100 INJECTION, SOLUTION INTRAVENOUS; SUBCUTANEOUS at 18:18

## 2022-01-01 RX ADMIN — ERYTHROMYCIN 1 APPLICATION: 5 OINTMENT OPHTHALMIC at 21:37

## 2022-01-01 RX ADMIN — SODIUM CHLORIDE, PRESERVATIVE FREE 10 ML: 5 INJECTION INTRAVENOUS at 20:23

## 2022-01-01 RX ADMIN — IPRATROPIUM BROMIDE AND ALBUTEROL SULFATE 3 ML: 2.5; .5 SOLUTION RESPIRATORY (INHALATION) at 11:00

## 2022-01-01 RX ADMIN — TRIAMCINOLONE ACETONIDE 1 APPLICATION: 1 CREAM TOPICAL at 21:30

## 2022-01-01 RX ADMIN — METOPROLOL SUCCINATE 25 MG: 25 TABLET, EXTENDED RELEASE ORAL at 08:35

## 2022-01-01 RX ADMIN — CARVEDILOL 12.5 MG: 6.25 TABLET, FILM COATED ORAL at 17:43

## 2022-01-01 RX ADMIN — SODIUM CHLORIDE, POTASSIUM CHLORIDE, SODIUM LACTATE AND CALCIUM CHLORIDE 50 ML/HR: 600; 310; 30; 20 INJECTION, SOLUTION INTRAVENOUS at 00:53

## 2022-01-01 RX ADMIN — LORAZEPAM 1 MG: 2 SOLUTION, CONCENTRATE ORAL at 22:12

## 2022-01-01 RX ADMIN — IOPAMIDOL 125 ML: 755 INJECTION, SOLUTION INTRAVENOUS at 18:04

## 2022-01-01 RX ADMIN — Medication 10 ML: at 20:42

## 2022-01-01 RX ADMIN — LORAZEPAM 2 MG: 2 SOLUTION, CONCENTRATE ORAL at 13:31

## 2022-01-01 RX ADMIN — ENOXAPARIN SODIUM 40 MG: 40 INJECTION SUBCUTANEOUS at 00:17

## 2022-01-01 RX ADMIN — ENOXAPARIN SODIUM 40 MG: 40 INJECTION SUBCUTANEOUS at 21:36

## 2022-01-01 RX ADMIN — SODIUM CHLORIDE, POTASSIUM CHLORIDE, SODIUM LACTATE AND CALCIUM CHLORIDE 50 ML/HR: 600; 310; 30; 20 INJECTION, SOLUTION INTRAVENOUS at 20:22

## 2022-01-01 RX ADMIN — POLYETHYLENE GLYCOL AND PROPYLENE GLYCOL 1 DROP: 4; 3 SOLUTION/ DROPS OPHTHALMIC at 18:43

## 2022-01-01 RX ADMIN — ASPIRIN 81 MG: 81 TABLET, COATED ORAL at 08:26

## 2022-01-01 RX ADMIN — SODIUM CHLORIDE 1 G: 9 INJECTION, SOLUTION INTRAVENOUS at 16:18

## 2022-01-01 RX ADMIN — IPRATROPIUM BROMIDE AND ALBUTEROL SULFATE 3 ML: 2.5; .5 SOLUTION RESPIRATORY (INHALATION) at 19:40

## 2022-01-01 RX ADMIN — SODIUM CHLORIDE 1 G: 9 INJECTION, SOLUTION INTRAVENOUS at 20:42

## 2022-01-01 RX ADMIN — HYDRALAZINE HYDROCHLORIDE 10 MG: 20 INJECTION INTRAMUSCULAR; INTRAVENOUS at 00:11

## 2022-01-01 RX ADMIN — LABETALOL HYDROCHLORIDE 20 MG: 5 INJECTION, SOLUTION INTRAVENOUS at 21:41

## 2022-01-01 RX ADMIN — ERYTHROMYCIN 1 APPLICATION: 5 OINTMENT OPHTHALMIC at 10:49

## 2022-01-01 RX ADMIN — ERYTHROMYCIN 1 APPLICATION: 5 OINTMENT OPHTHALMIC at 22:10

## 2022-01-01 RX ADMIN — CARVEDILOL 6.25 MG: 6.25 TABLET, FILM COATED ORAL at 08:18

## 2022-01-01 RX ADMIN — LABETALOL HYDROCHLORIDE 20 MG: 5 INJECTION, SOLUTION INTRAVENOUS at 05:32

## 2022-01-01 RX ADMIN — ATORVASTATIN CALCIUM 10 MG: 10 TABLET, FILM COATED ORAL at 22:25

## 2022-01-01 RX ADMIN — POLYETHYLENE GLYCOL AND PROPYLENE GLYCOL 1 DROP: 4; 3 SOLUTION/ DROPS OPHTHALMIC at 11:06

## 2022-01-01 RX ADMIN — ASPIRIN 81 MG: 81 TABLET, COATED ORAL at 08:17

## 2022-01-01 RX ADMIN — LISINOPRIL 20 MG: 20 TABLET ORAL at 08:24

## 2022-01-01 RX ADMIN — METOPROLOL SUCCINATE 25 MG: 25 TABLET, EXTENDED RELEASE ORAL at 18:18

## 2022-01-01 RX ADMIN — CARVEDILOL 6.25 MG: 6.25 TABLET, FILM COATED ORAL at 17:35

## 2022-01-01 RX ADMIN — INSULIN LISPRO 2 UNITS: 100 INJECTION, SOLUTION INTRAVENOUS; SUBCUTANEOUS at 18:47

## 2022-01-01 RX ADMIN — POLYETHYLENE GLYCOL AND PROPYLENE GLYCOL 1 DROP: 4; 3 SOLUTION/ DROPS OPHTHALMIC at 12:58

## 2022-01-01 RX ADMIN — MEROPENEM 1 G: 1 INJECTION, POWDER, FOR SOLUTION INTRAVENOUS at 03:49

## 2022-01-01 RX ADMIN — MEROPENEM 1 G: 1 INJECTION, POWDER, FOR SOLUTION INTRAVENOUS at 03:44

## 2022-01-01 RX ADMIN — MORPHINE SULFATE 10 MG: 20 SOLUTION ORAL at 11:56

## 2022-01-01 RX ADMIN — ERYTHROMYCIN 1 APPLICATION: 5 OINTMENT OPHTHALMIC at 21:28

## 2022-01-01 RX ADMIN — LORAZEPAM 0.5 MG: 2 INJECTION INTRAMUSCULAR; INTRAVENOUS at 15:49

## 2022-01-01 RX ADMIN — ERYTHROMYCIN 1 APPLICATION: 5 OINTMENT OPHTHALMIC at 10:42

## 2022-01-01 RX ADMIN — AMLODIPINE BESYLATE 10 MG: 10 TABLET ORAL at 08:24

## 2022-01-01 RX ADMIN — FUROSEMIDE 60 MG: 10 INJECTION INTRAMUSCULAR; INTRAVENOUS at 22:10

## 2022-01-01 RX ADMIN — TRIAMCINOLONE ACETONIDE 1 APPLICATION: 1 CREAM TOPICAL at 09:27

## 2022-01-01 RX ADMIN — ACETAMINOPHEN 650 MG: 325 TABLET, FILM COATED ORAL at 14:39

## 2022-01-01 RX ADMIN — SODIUM CHLORIDE, POTASSIUM CHLORIDE, SODIUM LACTATE AND CALCIUM CHLORIDE 50 ML/HR: 600; 310; 30; 20 INJECTION, SOLUTION INTRAVENOUS at 19:05

## 2022-01-01 RX ADMIN — ERYTHROMYCIN 1 APPLICATION: 5 OINTMENT OPHTHALMIC at 12:05

## 2022-01-01 RX ADMIN — ERYTHROMYCIN 1 APPLICATION: 5 OINTMENT OPHTHALMIC at 22:26

## 2022-01-01 RX ADMIN — SODIUM CHLORIDE, PRESERVATIVE FREE 10 ML: 5 INJECTION INTRAVENOUS at 22:26

## 2022-01-01 RX ADMIN — POLYETHYLENE GLYCOL AND PROPYLENE GLYCOL 1 DROP: 4; 3 SOLUTION/ DROPS OPHTHALMIC at 21:28

## 2022-01-01 RX ADMIN — AMLODIPINE BESYLATE 10 MG: 10 TABLET ORAL at 08:18

## 2022-01-01 RX ADMIN — ACETAMINOPHEN 650 MG: 325 TABLET, FILM COATED ORAL at 07:32

## 2022-01-01 RX ADMIN — LORAZEPAM 1 MG: 2 SOLUTION, CONCENTRATE ORAL at 19:08

## 2022-01-01 RX ADMIN — MEROPENEM 1 G: 1 INJECTION, POWDER, FOR SOLUTION INTRAVENOUS at 22:09

## 2022-01-01 RX ADMIN — CARVEDILOL 6.25 MG: 6.25 TABLET, FILM COATED ORAL at 08:14

## 2022-01-01 RX ADMIN — SODIUM CHLORIDE 75 ML/HR: 9 INJECTION, SOLUTION INTRAVENOUS at 17:50

## 2022-01-01 RX ADMIN — PAROXETINE 10 MG: 10 TABLET, FILM COATED ORAL at 08:36

## 2022-01-01 RX ADMIN — IPRATROPIUM BROMIDE AND ALBUTEROL SULFATE 3 ML: 2.5; .5 SOLUTION RESPIRATORY (INHALATION) at 18:24

## 2022-01-01 RX ADMIN — LORAZEPAM 0.5 MG: 2 SOLUTION, CONCENTRATE ORAL at 00:14

## 2022-01-01 RX ADMIN — CARVEDILOL 6.25 MG: 6.25 TABLET, FILM COATED ORAL at 17:33

## 2022-01-01 RX ADMIN — ERYTHROMYCIN 1 APPLICATION: 5 OINTMENT OPHTHALMIC at 12:58

## 2022-01-01 RX ADMIN — LISINOPRIL 20 MG: 20 TABLET ORAL at 08:26

## 2022-01-01 RX ADMIN — TRIAMCINOLONE ACETONIDE 1 APPLICATION: 1 CREAM TOPICAL at 08:27

## 2022-01-01 RX ADMIN — LORAZEPAM 0.5 MG: 2 INJECTION INTRAMUSCULAR; INTRAVENOUS at 09:04

## 2022-01-01 RX ADMIN — CEFDINIR 300 MG: 300 CAPSULE ORAL at 21:38

## 2022-01-01 RX ADMIN — AMLODIPINE BESYLATE 10 MG: 10 TABLET ORAL at 08:09

## 2022-01-01 RX ADMIN — POLYETHYLENE GLYCOL AND PROPYLENE GLYCOL 1 DROP: 4; 3 SOLUTION/ DROPS OPHTHALMIC at 07:33

## 2022-01-01 RX ADMIN — SODIUM CHLORIDE 1 G: 9 INJECTION, SOLUTION INTRAVENOUS at 21:57

## 2022-01-01 RX ADMIN — MEROPENEM 1 G: 1 INJECTION, POWDER, FOR SOLUTION INTRAVENOUS at 05:00

## 2022-01-01 RX ADMIN — IPRATROPIUM BROMIDE AND ALBUTEROL SULFATE 3 ML: 2.5; .5 SOLUTION RESPIRATORY (INHALATION) at 02:47

## 2022-01-01 RX ADMIN — IPRATROPIUM BROMIDE AND ALBUTEROL SULFATE 3 ML: 2.5; .5 SOLUTION RESPIRATORY (INHALATION) at 23:17

## 2022-01-01 RX ADMIN — LORAZEPAM 2 MG: 2 SOLUTION, CONCENTRATE ORAL at 07:54

## 2022-01-01 RX ADMIN — IPRATROPIUM BROMIDE AND ALBUTEROL SULFATE 3 ML: 2.5; .5 SOLUTION RESPIRATORY (INHALATION) at 07:25

## 2022-01-01 RX ADMIN — LORAZEPAM 1 MG: 2 INJECTION INTRAMUSCULAR; INTRAVENOUS at 11:20

## 2022-01-01 RX ADMIN — POLYETHYLENE GLYCOL AND PROPYLENE GLYCOL 1 DROP: 4; 3 SOLUTION/ DROPS OPHTHALMIC at 22:55

## 2022-01-01 RX ADMIN — POLYETHYLENE GLYCOL AND PROPYLENE GLYCOL 1 DROP: 4; 3 SOLUTION/ DROPS OPHTHALMIC at 17:50

## 2022-01-01 RX ADMIN — POLYETHYLENE GLYCOL AND PROPYLENE GLYCOL 1 DROP: 4; 3 SOLUTION/ DROPS OPHTHALMIC at 22:26

## 2022-01-01 RX ADMIN — LORAZEPAM 1 MG: 2 SOLUTION, CONCENTRATE ORAL at 16:51

## 2022-01-01 RX ADMIN — Medication 10 ML: at 10:52

## 2022-01-01 RX ADMIN — ACETAMINOPHEN 650 MG: 325 TABLET, FILM COATED ORAL at 18:38

## 2022-01-01 RX ADMIN — PAROXETINE 10 MG: 10 TABLET, FILM COATED ORAL at 08:26

## 2022-01-01 RX ADMIN — IPRATROPIUM BROMIDE AND ALBUTEROL SULFATE 3 ML: 2.5; .5 SOLUTION RESPIRATORY (INHALATION) at 06:23

## 2022-01-01 RX ADMIN — HYDRALAZINE HYDROCHLORIDE 10 MG: 20 INJECTION INTRAMUSCULAR; INTRAVENOUS at 09:27

## 2022-01-01 RX ADMIN — LISINOPRIL 20 MG: 20 TABLET ORAL at 17:05

## 2022-01-01 RX ADMIN — DIPHENHYDRAMINE HYDROCHLORIDE 25 MG: 50 INJECTION, SOLUTION INTRAMUSCULAR; INTRAVENOUS at 03:48

## 2022-01-01 RX ADMIN — SODIUM CHLORIDE, PRESERVATIVE FREE 10 ML: 5 INJECTION INTRAVENOUS at 00:00

## 2022-01-01 RX ADMIN — NYSTATIN AND TRIAMCINOLONE ACETONIDE 1 APPLICATION: 100000; 1 CREAM TOPICAL at 21:39

## 2022-01-01 RX ADMIN — POLYETHYLENE GLYCOL AND PROPYLENE GLYCOL 1 DROP: 4; 3 SOLUTION/ DROPS OPHTHALMIC at 12:09

## 2022-01-01 RX ADMIN — LORAZEPAM 2 MG: 2 SOLUTION, CONCENTRATE ORAL at 04:17

## 2022-01-01 RX ADMIN — NYSTATIN AND TRIAMCINOLONE ACETONIDE 1 APPLICATION: 100000; 1 CREAM TOPICAL at 08:19

## 2022-01-01 RX ADMIN — ACETAMINOPHEN 650 MG: 650 SUPPOSITORY RECTAL at 11:51

## 2022-01-01 RX ADMIN — METOPROLOL SUCCINATE 25 MG: 25 TABLET, EXTENDED RELEASE ORAL at 08:24

## 2022-01-01 RX ADMIN — POLYETHYLENE GLYCOL AND PROPYLENE GLYCOL 1 DROP: 4; 3 SOLUTION/ DROPS OPHTHALMIC at 08:25

## 2022-01-01 RX ADMIN — LORAZEPAM 0.5 MG: 2 SOLUTION, CONCENTRATE ORAL at 06:16

## 2022-01-01 RX ADMIN — MEROPENEM 1 G: 1 INJECTION, POWDER, FOR SOLUTION INTRAVENOUS at 16:48

## 2022-01-01 RX ADMIN — POLYETHYLENE GLYCOL AND PROPYLENE GLYCOL 1 DROP: 4; 3 SOLUTION/ DROPS OPHTHALMIC at 17:35

## 2022-01-01 RX ADMIN — TRIAMCINOLONE ACETONIDE 1 APPLICATION: 1 CREAM TOPICAL at 22:10

## 2022-01-01 RX ADMIN — IPRATROPIUM BROMIDE AND ALBUTEROL SULFATE 3 ML: 2.5; .5 SOLUTION RESPIRATORY (INHALATION) at 23:22

## 2022-01-01 RX ADMIN — SODIUM CHLORIDE, POTASSIUM CHLORIDE, SODIUM LACTATE AND CALCIUM CHLORIDE 50 ML/HR: 600; 310; 30; 20 INJECTION, SOLUTION INTRAVENOUS at 14:40

## 2022-01-01 RX ADMIN — POLYETHYLENE GLYCOL AND PROPYLENE GLYCOL 1 DROP: 4; 3 SOLUTION/ DROPS OPHTHALMIC at 08:18

## 2022-01-01 RX ADMIN — SODIUM CHLORIDE, PRESERVATIVE FREE 10 ML: 5 INJECTION INTRAVENOUS at 08:09

## 2022-01-01 RX ADMIN — PAROXETINE 10 MG: 10 TABLET, FILM COATED ORAL at 08:24

## 2022-01-01 RX ADMIN — SODIUM CHLORIDE 75 ML/HR: 9 INJECTION, SOLUTION INTRAVENOUS at 02:54

## 2022-01-01 RX ADMIN — VANCOMYCIN HYDROCHLORIDE 1750 MG: 10 INJECTION, POWDER, LYOPHILIZED, FOR SOLUTION INTRAVENOUS at 14:53

## 2022-01-01 RX ADMIN — SODIUM CHLORIDE 1 G: 900 INJECTION INTRAVENOUS at 00:08

## 2022-01-01 RX ADMIN — ASPIRIN 81 MG: 81 TABLET, COATED ORAL at 08:24

## 2022-01-01 RX ADMIN — LISINOPRIL 40 MG: 20 TABLET ORAL at 08:14

## 2022-01-01 RX ADMIN — ERYTHROMYCIN 1 APPLICATION: 5 OINTMENT OPHTHALMIC at 11:27

## 2022-01-01 RX ADMIN — SODIUM CHLORIDE 1 G: 9 INJECTION, SOLUTION INTRAVENOUS at 21:51

## 2022-01-01 RX ADMIN — HYDRALAZINE HYDROCHLORIDE 10 MG: 20 INJECTION INTRAMUSCULAR; INTRAVENOUS at 13:25

## 2022-01-01 RX ADMIN — AMLODIPINE BESYLATE 10 MG: 10 TABLET ORAL at 14:16

## 2022-01-01 RX ADMIN — POLYETHYLENE GLYCOL AND PROPYLENE GLYCOL 1 DROP: 4; 3 SOLUTION/ DROPS OPHTHALMIC at 17:44

## 2022-01-01 RX ADMIN — DOXYCYCLINE 100 MG: 100 TABLET, FILM COATED ORAL at 08:17

## 2022-01-01 RX ADMIN — CEFDINIR 300 MG: 300 CAPSULE ORAL at 08:17

## 2022-01-01 RX ADMIN — POLYETHYLENE GLYCOL AND PROPYLENE GLYCOL 1 DROP: 4; 3 SOLUTION/ DROPS OPHTHALMIC at 17:09

## 2022-01-01 RX ADMIN — POLYETHYLENE GLYCOL AND PROPYLENE GLYCOL 1 DROP: 4; 3 SOLUTION/ DROPS OPHTHALMIC at 18:00

## 2022-01-01 RX ADMIN — IPRATROPIUM BROMIDE AND ALBUTEROL SULFATE 3 ML: 2.5; .5 SOLUTION RESPIRATORY (INHALATION) at 20:19

## 2022-01-01 RX ADMIN — ASPIRIN 81 MG: 81 TABLET, COATED ORAL at 08:09

## 2022-01-01 RX ADMIN — MORPHINE SULFATE 4 MG: 4 INJECTION, SOLUTION INTRAMUSCULAR; INTRAVENOUS at 00:04

## 2022-01-01 RX ADMIN — SODIUM CHLORIDE 125 ML/HR: 9 INJECTION, SOLUTION INTRAVENOUS at 14:52

## 2022-01-01 RX ADMIN — PAROXETINE 10 MG: 10 TABLET, FILM COATED ORAL at 18:18

## 2022-01-01 RX ADMIN — TRIAMCINOLONE ACETONIDE 1 APPLICATION: 1 CREAM TOPICAL at 08:25

## 2022-01-01 RX ADMIN — ACETAMINOPHEN 650 MG: 325 TABLET, FILM COATED ORAL at 00:26

## 2022-01-01 RX ADMIN — POLYETHYLENE GLYCOL AND PROPYLENE GLYCOL 1 DROP: 4; 3 SOLUTION/ DROPS OPHTHALMIC at 21:37

## 2022-01-01 RX ADMIN — TRIAMCINOLONE ACETONIDE 1 APPLICATION: 1 CREAM TOPICAL at 22:26

## 2022-01-01 RX ADMIN — LORAZEPAM 2 MG: 2 SOLUTION, CONCENTRATE ORAL at 10:57

## 2022-01-01 RX ADMIN — ENOXAPARIN SODIUM 40 MG: 40 INJECTION SUBCUTANEOUS at 22:25

## 2022-01-01 RX ADMIN — SODIUM CHLORIDE, PRESERVATIVE FREE 10 ML: 5 INJECTION INTRAVENOUS at 08:36

## 2022-01-01 RX ADMIN — MORPHINE SULFATE 10 MG: 20 SOLUTION ORAL at 09:36

## 2022-01-01 RX ADMIN — LORAZEPAM 1 MG: 2 INJECTION INTRAMUSCULAR; INTRAVENOUS at 05:51

## 2022-01-01 RX ADMIN — LISINOPRIL 40 MG: 20 TABLET ORAL at 08:09

## 2022-01-01 RX ADMIN — TRIAMCINOLONE ACETONIDE 1 APPLICATION: 1 CREAM TOPICAL at 22:28

## 2022-01-01 RX ADMIN — AZITHROMYCIN DIHYDRATE 500 MG: 500 INJECTION, POWDER, LYOPHILIZED, FOR SOLUTION INTRAVENOUS at 22:55

## 2022-01-01 RX ADMIN — POLYETHYLENE GLYCOL AND PROPYLENE GLYCOL 1 DROP: 4; 3 SOLUTION/ DROPS OPHTHALMIC at 12:12

## 2022-01-01 RX ADMIN — IPRATROPIUM BROMIDE AND ALBUTEROL SULFATE 3 ML: 2.5; .5 SOLUTION RESPIRATORY (INHALATION) at 22:59

## 2022-01-01 RX ADMIN — IPRATROPIUM BROMIDE AND ALBUTEROL SULFATE 3 ML: 2.5; .5 SOLUTION RESPIRATORY (INHALATION) at 19:41

## 2022-01-01 RX ADMIN — IPRATROPIUM BROMIDE AND ALBUTEROL SULFATE 3 ML: 2.5; .5 SOLUTION RESPIRATORY (INHALATION) at 14:06

## 2022-01-01 RX ADMIN — IPRATROPIUM BROMIDE AND ALBUTEROL SULFATE 3 ML: 2.5; .5 SOLUTION RESPIRATORY (INHALATION) at 10:07

## 2022-01-01 RX ADMIN — IPRATROPIUM BROMIDE AND ALBUTEROL SULFATE 3 ML: 2.5; .5 SOLUTION RESPIRATORY (INHALATION) at 03:13

## 2022-01-01 RX ADMIN — IPRATROPIUM BROMIDE AND ALBUTEROL SULFATE 3 ML: 2.5; .5 SOLUTION RESPIRATORY (INHALATION) at 14:05

## 2022-01-01 RX ADMIN — LISINOPRIL 20 MG: 20 TABLET ORAL at 08:35

## 2022-01-01 RX ADMIN — HYDRALAZINE HYDROCHLORIDE 10 MG: 20 INJECTION INTRAMUSCULAR; INTRAVENOUS at 13:47

## 2022-01-01 RX ADMIN — DEXTROSE MONOHYDRATE 75 ML/HR: 50 INJECTION, SOLUTION INTRAVENOUS at 11:45

## 2022-01-01 RX ADMIN — HYDRALAZINE HYDROCHLORIDE 10 MG: 20 INJECTION INTRAMUSCULAR; INTRAVENOUS at 23:06

## 2022-01-01 RX ADMIN — ACETAMINOPHEN 650 MG: 325 TABLET, FILM COATED ORAL at 16:21

## 2022-01-01 RX ADMIN — ERYTHROMYCIN 1 APPLICATION: 5 OINTMENT OPHTHALMIC at 09:29

## 2022-01-01 RX ADMIN — SODIUM CHLORIDE, PRESERVATIVE FREE 10 ML: 5 INJECTION INTRAVENOUS at 08:25

## 2022-01-01 RX ADMIN — ERYTHROMYCIN 1 APPLICATION: 5 OINTMENT OPHTHALMIC at 22:55

## 2022-01-01 RX ADMIN — SODIUM CHLORIDE, PRESERVATIVE FREE 10 ML: 5 INJECTION INTRAVENOUS at 21:37

## 2022-01-01 RX ADMIN — POLYETHYLENE GLYCOL AND PROPYLENE GLYCOL 1 DROP: 4; 3 SOLUTION/ DROPS OPHTHALMIC at 08:26

## 2022-01-01 RX ADMIN — PAROXETINE 10 MG: 10 TABLET, FILM COATED ORAL at 08:09

## 2022-01-01 RX ADMIN — NYSTATIN AND TRIAMCINOLONE ACETONIDE 1 APPLICATION: 100000; 1 CREAM TOPICAL at 11:04

## 2022-01-01 RX ADMIN — CARVEDILOL 6.25 MG: 6.25 TABLET, FILM COATED ORAL at 18:29

## 2022-01-01 RX ADMIN — MEROPENEM 1 G: 1 INJECTION, POWDER, FOR SOLUTION INTRAVENOUS at 15:39

## 2022-01-01 RX ADMIN — ASPIRIN 81 MG: 81 TABLET, COATED ORAL at 08:35

## 2022-01-01 RX ADMIN — LORAZEPAM 1 MG: 2 SOLUTION, CONCENTRATE ORAL at 14:06

## 2022-01-01 RX ADMIN — POLYETHYLENE GLYCOL AND PROPYLENE GLYCOL 1 DROP: 4; 3 SOLUTION/ DROPS OPHTHALMIC at 22:09

## 2022-01-01 RX ADMIN — ENOXAPARIN SODIUM 40 MG: 40 INJECTION SUBCUTANEOUS at 20:23

## 2022-01-01 RX ADMIN — IPRATROPIUM BROMIDE AND ALBUTEROL SULFATE 3 ML: 2.5; .5 SOLUTION RESPIRATORY (INHALATION) at 10:05

## 2022-01-01 RX ADMIN — HYDRALAZINE HYDROCHLORIDE 10 MG: 20 INJECTION INTRAMUSCULAR; INTRAVENOUS at 00:02

## 2022-01-01 RX ADMIN — SODIUM CHLORIDE, POTASSIUM CHLORIDE, SODIUM LACTATE AND CALCIUM CHLORIDE 50 ML/HR: 600; 310; 30; 20 INJECTION, SOLUTION INTRAVENOUS at 22:55

## 2022-01-01 RX ADMIN — IPRATROPIUM BROMIDE AND ALBUTEROL SULFATE 3 ML: 2.5; .5 SOLUTION RESPIRATORY (INHALATION) at 06:15

## 2022-01-01 RX ADMIN — DIPHENHYDRAMINE HYDROCHLORIDE, ZINC ACETATE 1 APPLICATION: 2; .1 CREAM TOPICAL at 08:18

## 2022-01-01 RX ADMIN — LISINOPRIL 20 MG: 20 TABLET ORAL at 18:18

## 2022-01-01 RX ADMIN — POLYETHYLENE GLYCOL AND PROPYLENE GLYCOL 1 DROP: 4; 3 SOLUTION/ DROPS OPHTHALMIC at 12:51

## 2022-01-01 RX ADMIN — HYDRALAZINE HYDROCHLORIDE 10 MG: 20 INJECTION INTRAMUSCULAR; INTRAVENOUS at 02:49

## 2022-01-01 RX ADMIN — IPRATROPIUM BROMIDE AND ALBUTEROL SULFATE 3 ML: 2.5; .5 SOLUTION RESPIRATORY (INHALATION) at 06:19

## 2022-01-01 RX ADMIN — IPRATROPIUM BROMIDE AND ALBUTEROL SULFATE 3 ML: 2.5; .5 SOLUTION RESPIRATORY (INHALATION) at 03:47

## 2022-01-01 RX ADMIN — IPRATROPIUM BROMIDE AND ALBUTEROL SULFATE 3 ML: 2.5; .5 SOLUTION RESPIRATORY (INHALATION) at 05:35

## 2022-01-01 RX ADMIN — ATORVASTATIN CALCIUM 10 MG: 10 TABLET, FILM COATED ORAL at 21:37

## 2022-01-01 RX ADMIN — ENOXAPARIN SODIUM 40 MG: 40 INJECTION SUBCUTANEOUS at 20:17

## 2022-01-01 RX ADMIN — HYDRALAZINE HYDROCHLORIDE 10 MG: 20 INJECTION INTRAMUSCULAR; INTRAVENOUS at 01:37

## 2022-01-01 RX ADMIN — LISINOPRIL 40 MG: 20 TABLET ORAL at 15:25

## 2022-01-01 RX ADMIN — LORAZEPAM 1 MG: 2 SOLUTION, CONCENTRATE ORAL at 11:29

## 2022-01-01 RX ADMIN — CARVEDILOL 6.25 MG: 6.25 TABLET, FILM COATED ORAL at 18:21

## 2022-01-01 RX ADMIN — ENOXAPARIN SODIUM 40 MG: 40 INJECTION SUBCUTANEOUS at 22:09

## 2022-01-01 RX ADMIN — LISINOPRIL 40 MG: 20 TABLET ORAL at 08:18

## 2022-01-01 RX ADMIN — DOXYCYCLINE 100 MG: 100 TABLET, FILM COATED ORAL at 11:25

## 2022-01-01 RX ADMIN — SODIUM CHLORIDE, PRESERVATIVE FREE 10 ML: 5 INJECTION INTRAVENOUS at 08:22

## 2022-01-01 RX ADMIN — ATORVASTATIN CALCIUM 10 MG: 10 TABLET, FILM COATED ORAL at 21:30

## 2022-01-01 RX ADMIN — Medication 10 ML: at 21:52

## 2022-01-01 RX ADMIN — AZITHROMYCIN DIHYDRATE 500 MG: 500 INJECTION, POWDER, LYOPHILIZED, FOR SOLUTION INTRAVENOUS at 00:08

## 2022-01-01 RX ADMIN — ASPIRIN 81 MG: 81 TABLET, COATED ORAL at 18:18

## 2022-01-01 RX ADMIN — AMLODIPINE BESYLATE 10 MG: 10 TABLET ORAL at 15:25

## 2022-01-01 RX ADMIN — POLYETHYLENE GLYCOL AND PROPYLENE GLYCOL 1 DROP: 4; 3 SOLUTION/ DROPS OPHTHALMIC at 10:49

## 2022-01-01 RX ADMIN — METOPROLOL SUCCINATE 25 MG: 25 TABLET, EXTENDED RELEASE ORAL at 08:26

## 2022-01-01 RX ADMIN — IPRATROPIUM BROMIDE AND ALBUTEROL SULFATE 3 ML: 2.5; .5 SOLUTION RESPIRATORY (INHALATION) at 23:19

## 2022-01-01 RX ADMIN — SODIUM CHLORIDE, PRESERVATIVE FREE 10 ML: 5 INJECTION INTRAVENOUS at 10:49

## 2022-01-01 RX ADMIN — IPRATROPIUM BROMIDE AND ALBUTEROL SULFATE 3 ML: 2.5; .5 SOLUTION RESPIRATORY (INHALATION) at 14:36

## 2022-01-01 RX ADMIN — PAROXETINE 10 MG: 10 TABLET, FILM COATED ORAL at 08:17

## 2022-01-01 RX ADMIN — POLYETHYLENE GLYCOL AND PROPYLENE GLYCOL 1 DROP: 4; 3 SOLUTION/ DROPS OPHTHALMIC at 00:52

## 2022-01-01 RX ADMIN — CLONIDINE HYDROCHLORIDE 0.1 MG: 0.1 TABLET ORAL at 05:58

## 2022-01-01 RX ADMIN — DOXYCYCLINE 100 MG: 100 TABLET, FILM COATED ORAL at 21:39

## 2022-01-01 RX ADMIN — SODIUM CHLORIDE, PRESERVATIVE FREE 10 ML: 5 INJECTION INTRAVENOUS at 11:34

## 2022-01-01 RX ADMIN — SODIUM CHLORIDE 1000 ML: 9 INJECTION, SOLUTION INTRAVENOUS at 13:24

## 2022-01-01 RX ADMIN — CEFDINIR 300 MG: 300 CAPSULE ORAL at 11:25

## 2022-01-01 RX ADMIN — POLYETHYLENE GLYCOL AND PROPYLENE GLYCOL 1 DROP: 4; 3 SOLUTION/ DROPS OPHTHALMIC at 20:23

## 2022-01-01 RX ADMIN — DEXTROSE MONOHYDRATE 50 ML/HR: 50 INJECTION, SOLUTION INTRAVENOUS at 05:55

## 2022-01-01 RX ADMIN — ACETAMINOPHEN 650 MG: 325 TABLET, FILM COATED ORAL at 14:21

## 2022-01-01 RX ADMIN — IPRATROPIUM BROMIDE AND ALBUTEROL SULFATE 3 ML: 2.5; .5 SOLUTION RESPIRATORY (INHALATION) at 11:37

## 2022-01-01 RX ADMIN — AMLODIPINE BESYLATE 10 MG: 10 TABLET ORAL at 08:14

## 2022-01-01 RX ADMIN — IPRATROPIUM BROMIDE AND ALBUTEROL SULFATE 3 ML: 2.5; .5 SOLUTION RESPIRATORY (INHALATION) at 03:27

## 2022-01-01 RX ADMIN — IPRATROPIUM BROMIDE AND ALBUTEROL SULFATE 3 ML: 2.5; .5 SOLUTION RESPIRATORY (INHALATION) at 07:09

## 2022-01-01 RX ADMIN — IPRATROPIUM BROMIDE AND ALBUTEROL SULFATE 3 ML: 2.5; .5 SOLUTION RESPIRATORY (INHALATION) at 11:21

## 2022-01-01 RX ADMIN — ERYTHROMYCIN 1 APPLICATION: 5 OINTMENT OPHTHALMIC at 08:00

## 2022-01-01 RX ADMIN — ENOXAPARIN SODIUM 40 MG: 40 INJECTION SUBCUTANEOUS at 21:30

## 2022-01-01 RX ADMIN — MEROPENEM 1 G: 1 INJECTION, POWDER, FOR SOLUTION INTRAVENOUS at 16:10

## 2022-01-01 RX ADMIN — CLONIDINE HYDROCHLORIDE 0.1 MG: 0.1 TABLET ORAL at 23:03

## 2022-01-01 RX ADMIN — SODIUM CHLORIDE 1 G: 900 INJECTION INTRAVENOUS at 22:54

## 2022-01-01 RX ADMIN — MEROPENEM 1 G: 1 INJECTION, POWDER, FOR SOLUTION INTRAVENOUS at 04:21

## 2022-01-01 RX ADMIN — CARVEDILOL 6.25 MG: 6.25 TABLET, FILM COATED ORAL at 08:09

## 2022-01-01 RX ADMIN — Medication 10 ML: at 09:01

## 2022-01-01 RX ADMIN — SODIUM CHLORIDE, PRESERVATIVE FREE 10 ML: 5 INJECTION INTRAVENOUS at 08:26

## 2022-01-01 RX ADMIN — IPRATROPIUM BROMIDE AND ALBUTEROL SULFATE 3 ML: 2.5; .5 SOLUTION RESPIRATORY (INHALATION) at 14:58

## 2022-01-01 RX ADMIN — POLYETHYLENE GLYCOL AND PROPYLENE GLYCOL 1 DROP: 4; 3 SOLUTION/ DROPS OPHTHALMIC at 08:36

## 2022-01-01 RX ADMIN — HYDRALAZINE HYDROCHLORIDE 50 MG: 50 TABLET, FILM COATED ORAL at 06:46

## 2022-01-01 RX ADMIN — SODIUM CHLORIDE, PRESERVATIVE FREE 10 ML: 5 INJECTION INTRAVENOUS at 21:30

## 2022-01-01 RX ADMIN — ERYTHROMYCIN 1 APPLICATION: 5 OINTMENT OPHTHALMIC at 22:27

## 2022-01-01 RX ADMIN — POLYETHYLENE GLYCOL AND PROPYLENE GLYCOL 1 DROP: 4; 3 SOLUTION/ DROPS OPHTHALMIC at 12:10

## 2022-01-01 RX ADMIN — POLYETHYLENE GLYCOL AND PROPYLENE GLYCOL 1 DROP: 4; 3 SOLUTION/ DROPS OPHTHALMIC at 11:36

## 2022-02-14 PROBLEM — R09.02 HYPOXIA: Status: RESOLVED | Noted: 2020-07-18 | Resolved: 2022-01-01

## 2022-02-14 PROBLEM — J96.22 ACUTE ON CHRONIC RESPIRATORY FAILURE WITH HYPOXIA AND HYPERCAPNIA (HCC): Status: ACTIVE | Noted: 2022-01-01

## 2022-02-14 PROBLEM — J96.21 ACUTE ON CHRONIC RESPIRATORY FAILURE WITH HYPOXIA AND HYPERCAPNIA (HCC): Status: ACTIVE | Noted: 2022-01-01

## 2022-02-14 PROBLEM — S82.309A CLOSED FRACTURE OF DISTAL TIBIA: Status: ACTIVE | Noted: 2022-01-01

## 2022-02-14 PROBLEM — J18.9 PNEUMONIA DUE TO INFECTIOUS ORGANISM: Status: ACTIVE | Noted: 2022-01-01

## 2022-02-14 PROBLEM — R40.0 SOMNOLENCE: Status: ACTIVE | Noted: 2022-01-01

## 2022-02-14 PROBLEM — J98.11 BILATERAL ATELECTASIS: Status: ACTIVE | Noted: 2022-01-01

## 2022-02-14 PROBLEM — E11.9 DIET-CONTROLLED DIABETES MELLITUS (HCC): Status: ACTIVE | Noted: 2022-01-01

## 2022-02-15 PROBLEM — R09.02 HYPOXIA: Status: ACTIVE | Noted: 2022-01-01

## 2022-02-18 PROBLEM — Z74.01 BEDBOUND: Status: ACTIVE | Noted: 2022-01-01

## 2022-06-21 PROBLEM — R41.82 ALTERED MENTAL STATUS: Status: ACTIVE | Noted: 2022-01-01

## 2022-06-22 PROBLEM — G93.41 METABOLIC ENCEPHALOPATHY: Status: ACTIVE | Noted: 2022-01-01

## 2022-06-22 PROBLEM — N30.00 ACUTE CYSTITIS WITHOUT HEMATURIA: Status: ACTIVE | Noted: 2022-01-01

## 2022-06-29 LAB — BACTERIA SPEC AEROBE CULT: NORMAL

## (undated) DEVICE — SURGICAL PROCEDURE PACK LOWER EXTREMITY LOURDES HOSP

## (undated) DEVICE — BIT DRL 3FLUT QC CALIB 4.2X330X100MM

## (undated) DEVICE — PACK,SHOULDER SPLIT: Brand: MEDLINE

## (undated) DEVICE — BANDAGE COMPR W6INXL10YD ST M E WHITE/BEIGE

## (undated) DEVICE — GW 3.2X475MM

## (undated) DEVICE — Device

## (undated) DEVICE — SOLUTION IV 1000ML LAC RINGERS PH 6.5 INJ USP VIAFLX PLAS

## (undated) DEVICE — LARYNGOSCOPE VID MILLER 2 MTL BLADE M HNDL CURAPLEX

## (undated) DEVICE — ST. TRACTION CORD: Brand: DEROYAL

## (undated) DEVICE — ANTIBACTERIAL UNDYED BRAIDED (POLYGLACTIN 910), SYNTHETIC ABSORBABLE SUTURE: Brand: COATED VICRYL

## (undated) DEVICE — DISPOSABLE SURGICAL CABLE

## (undated) DEVICE — SOL IRR NACL 0.9PCT BT 1000ML

## (undated) DEVICE — GLV SURG TRIUMPH MICRO PF LTX 8 STRL

## (undated) DEVICE — GUIDEWIRE ORTH DIA2.5MM LOK SMOOTH DRL TIP FLX THRD FOR

## (undated) DEVICE — PAD,ARMBOARD,CONV,FOAM,2X8X20",12PR/CS: Brand: MEDLINE

## (undated) DEVICE — BIT DRL L145MM DIA3.2MM QUIK CPL W/O STP REUSE

## (undated) DEVICE — Z CONVERTED USE 2271386 BANDAGE COMPR W6INXL11YD WVN COTTON/ELASTIC CLP CLSR DBL

## (undated) DEVICE — PIN FIX L225MM DIA6MM S STL FOR L EXT FIX SET

## (undated) DEVICE — ELECTRD PAD DEFIB A/

## (undated) DEVICE — PK TURNOVER RM ADV

## (undated) DEVICE — SUT GUT CHRM 2/0 CT1 36IN 923H

## (undated) DEVICE — STERILE LATEX POWDER FREE SURGICAL GLOVES WITH HYDROGEL COATING: Brand: PROTEXIS

## (undated) DEVICE — SUTURE VCRL SZ 0 L27IN ABSRB UD L36MM CT-1 1/2 CIR J260H

## (undated) DEVICE — SKIN AFFIX SURG ADHESIVE 72/CS 0.55ML: Brand: MEDLINE

## (undated) DEVICE — SPNG GZ WOVN 4X4IN 12PLY 10/BX STRL

## (undated) DEVICE — C-ARMOR C-ARM EQUIPMENT COVERS CLEAR STERILE UNIVERSAL FIT 12 PER CASE: Brand: C-ARMOR

## (undated) DEVICE — BNDG ELAS CO-FLEX SLF ADHR 6IN 5YD LF STRL

## (undated) DEVICE — TUBE ET 7MM NSL ORAL BASIC CUF INTMED MURPHY EYE RADPQ MRK

## (undated) DEVICE — GOLDVAC PUSH BUTTON ELECTROSURGICAL SMOKE EVACUATION HANDPIECE: Brand: GOLDVAC

## (undated) DEVICE — DRSNG WND GZ CURAD OIL EMULSION 3X8IN STRL PK/3

## (undated) DEVICE — GW FOR TROCH NAIL 3.2X400MM

## (undated) DEVICE — BIT DRL L180MM DIA4.3MM QUIK CPL W/O STP REUSE

## (undated) DEVICE — PK HIP ORIF FX TABL 30

## (undated) DEVICE — DRESSING PETRO W3XL3IN OIL EMUL N ADH GZ KNIT IMPREG CELOS

## (undated) DEVICE — CHLORAPREP 26ML ORANGE

## (undated) DEVICE — STERILE POLYISOPRENE POWDER-FREE SURGICAL GLOVES: Brand: PROTEXIS

## (undated) DEVICE — SOL NS 500ML

## (undated) DEVICE — SOLUTION IV IRRIG POUR BRL 0.9% SODIUM CHL 2F7124

## (undated) DEVICE — PROXIMATE RH ROTATING HEAD SKIN STAPLERS (35 REGULAR) CONTAINS 35 STAINLESS STEEL STAPLES: Brand: PROXIMATE

## (undated) DEVICE — C-ARM: Brand: UNBRANDED

## (undated) DEVICE — GLV SURG TRIUMPH MICRO PF LTX 7.5 STRL

## (undated) DEVICE — TRY PREP SCRB VAG PVP

## (undated) DEVICE — PENCIL CAUT PUSH BTTN W HOLSTER AND CRD 15FT

## (undated) DEVICE — PK PM 30

## (undated) DEVICE — SUTURE VCRL SZ 2-0 L36IN ABSRB UD L36MM CT-1 1/2 CIR J945H